# Patient Record
Sex: FEMALE | Race: WHITE | Employment: PART TIME | URBAN - METROPOLITAN AREA
[De-identification: names, ages, dates, MRNs, and addresses within clinical notes are randomized per-mention and may not be internally consistent; named-entity substitution may affect disease eponyms.]

---

## 2017-01-20 ENCOUNTER — HOSPITAL ENCOUNTER (OUTPATIENT)
Dept: GENERAL RADIOLOGY | Age: 77
Discharge: HOME | End: 2017-01-20
Payer: MEDICARE

## 2017-01-20 DIAGNOSIS — M19.90 ARTHRITIS: ICD-10-CM

## 2017-01-20 PROCEDURE — 73130 X-RAY EXAM OF HAND: CPT

## 2017-01-20 PROCEDURE — 71020 XR CHEST PA LAT: CPT

## 2017-02-16 RX ORDER — AZITHROMYCIN 250 MG/1
TABLET, FILM COATED ORAL
Qty: 6 TAB | Refills: 0 | Status: SHIPPED | OUTPATIENT
Start: 2017-02-16 | End: 2017-02-20 | Stop reason: SDUPTHER

## 2017-02-20 RX ORDER — PROMETHAZINE HYDROCHLORIDE AND DEXTROMETHORPHAN HYDROBROMIDE 6.25; 15 MG/5ML; MG/5ML
SYRUP ORAL
Qty: 180 ML | Refills: 0 | Status: SHIPPED | OUTPATIENT
Start: 2017-02-20 | End: 2017-06-02 | Stop reason: ALTCHOICE

## 2017-02-20 RX ORDER — AZITHROMYCIN 250 MG/1
TABLET, FILM COATED ORAL
Qty: 6 TAB | Refills: 0 | Status: SHIPPED | OUTPATIENT
Start: 2017-02-20 | End: 2017-06-02 | Stop reason: ALTCHOICE

## 2017-03-09 ENCOUNTER — OFFICE VISIT (OUTPATIENT)
Dept: FAMILY MEDICINE CLINIC | Age: 77
End: 2017-03-09

## 2017-03-09 VITALS
HEIGHT: 64 IN | DIASTOLIC BLOOD PRESSURE: 84 MMHG | BODY MASS INDEX: 27.14 KG/M2 | SYSTOLIC BLOOD PRESSURE: 138 MMHG | RESPIRATION RATE: 24 BRPM | OXYGEN SATURATION: 93 % | HEART RATE: 58 BPM | WEIGHT: 159 LBS | TEMPERATURE: 97.8 F

## 2017-03-09 DIAGNOSIS — J45.20 ASTHMATIC BRONCHITIS, MILD INTERMITTENT, UNCOMPLICATED: ICD-10-CM

## 2017-03-09 DIAGNOSIS — J30.9 ALLERGIC RHINITIS, CAUSE UNSPECIFIED: Primary | ICD-10-CM

## 2017-03-09 DIAGNOSIS — J32.0 MAXILLARY SINUSITIS, UNSPECIFIED CHRONICITY: ICD-10-CM

## 2017-03-09 RX ORDER — PREDNISONE 10 MG/1
10 TABLET ORAL 2 TIMES DAILY
Qty: 10 TAB | Refills: 0 | Status: SHIPPED | OUTPATIENT
Start: 2017-03-09 | End: 2017-06-02 | Stop reason: ALTCHOICE

## 2017-03-09 RX ORDER — DOXYCYCLINE 100 MG/1
100 CAPSULE ORAL 2 TIMES DAILY
Qty: 20 CAP | Refills: 0 | Status: SHIPPED | OUTPATIENT
Start: 2017-03-09 | End: 2017-03-19

## 2017-03-09 RX ORDER — PROMETHAZINE HYDROCHLORIDE AND CODEINE PHOSPHATE 6.25; 1 MG/5ML; MG/5ML
1 SOLUTION ORAL
Qty: 180 ML | Refills: 1 | Status: SHIPPED | OUTPATIENT
Start: 2017-03-09 | End: 2017-06-02 | Stop reason: ALTCHOICE

## 2017-03-09 NOTE — PROGRESS NOTES
HISTORY OF PRESENT ILLNESS  Amada Nageotte is a 67 y.o. female. sinusitis sx with wheeezy cough x 3 weeks. PMH COPD   Cold Symptoms   The history is provided by the patient. This is a chronic problem. The problem has not changed since onset. The cough is productive of purulent sputum. Associated symptoms include headaches, rhinorrhea, sore throat, shortness of breath and wheezing. Pertinent negatives include no chest pain. Cough   This is a chronic problem. The problem occurs hourly. The problem has not changed since onset. Associated symptoms include headaches and shortness of breath. Pertinent negatives include no chest pain. Review of Systems   Constitutional: Positive for malaise/fatigue. HENT: Positive for rhinorrhea and sore throat. Respiratory: Positive for cough, shortness of breath and wheezing. Cardiovascular: Negative for chest pain, palpitations and orthopnea. Gastrointestinal: Negative for heartburn. Neurological: Positive for headaches. Physical Exam   Constitutional: She appears well-developed and well-nourished. HENT:   Head: Normocephalic and atraumatic. Right Ear: Tympanic membrane and ear canal normal.   Left Ear: Tympanic membrane and ear canal normal.   Nose: Mucosal edema and rhinorrhea present. Mouth/Throat: Posterior oropharyngeal erythema present. Neck: Normal range of motion. Neck supple. Abdominal: Soft. Bowel sounds are normal.   Skin: Skin is warm and dry. ASSESSMENT and PLAN  Joe Humphrey was seen today for cold symptoms. Diagnoses and all orders for this visit:    Allergic rhinitis, cause unspecified  -     predniSONE (DELTASONE) 10 mg tablet; Take 1 Tab by mouth two (2) times a day. Asthmatic bronchitis, mild intermittent, uncomplicated    Maxillary sinusitis, unspecified chronicity  -     XR CHEST PA LAT; Future  -     doxycycline (VIBRAMYCIN) 100 mg capsule; Take 1 Cap by mouth two (2) times a day for 10 days.   -     predniSONE (Dana Dheeraj) 10 mg tablet; Take 1 Tab by mouth two (2) times a day. -     promethazine-codeine (PHENERGAN WITH CODEINE) 6.25-10 mg/5 mL syrup; Take 5 mL by mouth four (4) times daily as needed for Cough. Max Daily Amount: 20 mL. Follow-up Disposition:  Return if symptoms worsen or fail to improve.

## 2017-03-09 NOTE — PROGRESS NOTES
Chief Complaint   Patient presents with    Cold Symptoms     Pt has cough, runny nose, SOB, fever, congestion, ear fullness, bodyaches and HA.

## 2017-03-09 NOTE — MR AVS SNAPSHOT
Visit Information Date & Time Provider Department Dept. Phone Encounter #  
 3/9/2017 10:45 AM Anastasiya Martins [phone] [id number] Follow-up Instructions Return if symptoms worsen or fail to improve. Upcoming Health Maintenance Date Due DTaP/Tdap/Td series (1 - Tdap) 5/28/1960 ZOSTER VACCINE AGE 60> 5/28/1999 GLAUCOMA SCREENING Q2Y 5/28/2004 OSTEOPOROSIS SCREENING (DEXA) 5/28/2004 Pneumococcal 65+ Low/Medium Risk (1 of 2 - PCV13) 5/28/2004 MEDICARE YEARLY EXAM 9/26/2012 INFLUENZA AGE 9 TO ADULT 8/1/2016 Allergies as of 3/9/2017  Review Complete On: 3/9/2017 By: Jamar Gomez Severity Noted Reaction Type Reactions Penicillins  05/26/2010    Unable to Obtain  
 Sulfa (Sulfonamide Antibiotics)  05/26/2010    Unknown (comments) Current Immunizations  Reviewed on 9/27/2011 Name Date Influenza Vaccine 10/22/2013 Influenza Vaccine Split 9/27/2011 Not reviewed this visit You Were Diagnosed With   
  
 Codes Comments Allergic rhinitis, cause unspecified    -  Primary ICD-10-CM: J30.9 ICD-9-CM: 477.9 Asthmatic bronchitis, mild intermittent, uncomplicated     GOS-59-VQ: J45.20 ICD-9-CM: 493.90 Maxillary sinusitis, unspecified chronicity     ICD-10-CM: J32.0 ICD-9-CM: 473.0 Vitals BP Pulse Temp Resp Height(growth percentile) Weight(growth percentile) 138/84 (BP 1 Location: Left arm, BP Patient Position: Sitting) (!) 58 97.8 °F (36.6 °C) (Oral) 24 5' 4\" (1.626 m) 159 lb (72.1 kg) SpO2 BMI OB Status Smoking Status 93% 27.29 kg/m2 Hysterectomy Never Smoker Vitals History BMI and BSA Data Body Mass Index Body Surface Area  
 27.29 kg/m 2 1.8 m 2 Preferred Pharmacy Pharmacy Name Phone [facility] [address] , [address] [place] [address] [phone] Your Updated Medication List  
  
   
 This list is accurate as of: 3/9/17 11:16 AM.  Always use your most recent med list.  
  
  
  
  
 albuterol 90 mcg/actuation inhaler Commonly known as:  PROVENTIL HFA, VENTOLIN HFA, PROAIR HFA Take 1 Puff by inhalation every six (6) hours as needed for Wheezing. amLODIPine 5 mg tablet Commonly known as:  Pipe Tahira Take 5 mg by mouth daily. azithromycin 250 mg tablet Commonly known as:  Alireza Lauth TAKE TWO TABLETS BY MOUTH AS ONE DOSE ON THE FIRST DAY THEN TAKE ONE TABLET DAILY THEREAFTER  
  
 doxycycline 100 mg capsule Commonly known as:  VIBRAMYCIN Take 1 Cap by mouth two (2) times a day for 10 days. fluticasone 50 mcg/actuation nasal spray Commonly known as:  Kina Paez PLACE TWO SPRAYS IN EACH NOSTRIL ONCE DAILY  
  
 metaxalone 400 mg tablet Commonly known as:  SKELAXIN Take 1 Tab by mouth three (3) times daily as needed. * predniSONE 10 mg tablet Commonly known as:  Dana Dheeraj Take 1 Tab by mouth two (2) times a day. * predniSONE 10 mg tablet Commonly known as:  Dana Dheeraj Take 1 Tab by mouth two (2) times a day. promethazine-codeine 6.25-10 mg/5 mL syrup Commonly known as:  PHENERGAN with CODEINE Take 5 mL by mouth four (4) times daily as needed for Cough. Max Daily Amount: 20 mL. promethazine-dextromethorphan 6.25-15 mg/5 mL syrup Commonly known as:  PROMETHAZINE-DM  
TAKE ONE TEASPOONFUL (5ML)  BY MOUTH FOUR TIMES A DAY AS NEEDED  
  
 synthroid 50 mcg tablet Generic drug:  levothyroxine Take  by mouth daily (before breakfast). * Notice: This list has 2 medication(s) that are the same as other medications prescribed for you. Read the directions carefully, and ask your doctor or other care provider to review them with you. Prescriptions Printed Refills  
 promethazine-codeine (PHENERGAN WITH CODEINE) 6.25-10 mg/5 mL syrup 1 Sig: Take 5 mL by mouth four (4) times daily as needed for Cough.  Max Daily Amount: 20 mL. Class: Print Route: Oral  
  
Prescriptions Sent to Pharmacy Refills  
 doxycycline (VIBRAMYCIN) 100 mg capsule 0 Sig: Take 1 Cap by mouth two (2) times a day for 10 days. Class: Normal  
 Pharmacy: [address], [address] [place] [address] Ph #: [phone] Route: Oral  
 predniSONE (DELTASONE) 10 mg tablet 0 Sig: Take 1 Tab by mouth two (2) times a day. Class: Normal  
 Pharmacy: [address] Ph #: [phone] Route: Oral  
  
Follow-up Instructions Return if symptoms worsen or fail to improve. To-Do List   
 03/09/2017 Imaging:  XR CHEST PA LAT Introducing [facility]! Raegan Marquez introduces [email or web address] patient portal. Now you can access parts of your medical record, email your doctor's office, and request medication refills online. 1. In your internet browser, go to [email or web address] 2. Click on the First Time User? Click Here link in the Sign In box. You will see the New Member Sign Up page. 3. Enter your [email or web address] Access Code exactly as it appears below. You will not need to use this code after youve completed the sign-up process. If you do not sign up before the expiration date, you must request a new code. · [email or web address] Access Code: [email or web address] Expires: 4/20/2017 11:16 AM 
 
4. Enter the last four digits of your Social Security Number (xxxx) and Date of Birth (mm/dd/yyyy) as indicated and click Submit. You will be taken to the next sign-up page. 5. Create a Lyfepointst ID. This will be your [email or web address] login ID and cannot be changed, so think of one that is secure and easy to remember. 6. Create a [email or web address][email or web address] password. You can change your password at any time. 7. Enter your Password Reset Question and Answer. This can be used at a later time if you forget your password. 8. Enter your e-mail address.  You will receive e-mail notification when new information is available in HowAboutWe. 9. Click Sign Up. You can now view and download portions of your medical record. 10. Click the Download Summary menu link to download a portable copy of your medical information. If you have questions, please visit the Frequently Asked Questions section of the HowAboutWe website. Remember, HowAboutWe is NOT to be used for urgent needs. For medical emergencies, dial 911. Now available from your iPhone and Android! Please provide this summary of care documentation to your next provider. Your primary care clinician is listed as Maggi Mcgee. If you have any questions after today's visit, please call [phone].

## 2017-03-21 DIAGNOSIS — J44.1 COPD EXACERBATION (HCC): Primary | ICD-10-CM

## 2017-03-21 RX ORDER — PREDNISONE 5 MG/1
TABLET ORAL
Qty: 21 TAB | Refills: 0 | Status: SHIPPED | OUTPATIENT
Start: 2017-03-21 | End: 2017-06-02 | Stop reason: ALTCHOICE

## 2017-03-21 RX ORDER — LEVOFLOXACIN 500 MG/1
500 TABLET, FILM COATED ORAL DAILY
Qty: 10 TAB | Refills: 0 | Status: SHIPPED | OUTPATIENT
Start: 2017-03-21 | End: 2017-03-31

## 2017-03-21 RX ORDER — BUDESONIDE AND FORMOTEROL FUMARATE DIHYDRATE 160; 4.5 UG/1; UG/1
2 AEROSOL RESPIRATORY (INHALATION) 2 TIMES DAILY
Qty: 1 INHALER | Refills: 5 | Status: SHIPPED | OUTPATIENT
Start: 2017-03-21 | End: 2017-06-02 | Stop reason: ALTCHOICE

## 2017-06-02 ENCOUNTER — HOSPITAL ENCOUNTER (OUTPATIENT)
Dept: LAB | Age: 77
Discharge: HOME | End: 2017-06-02
Payer: MEDICARE

## 2017-06-02 ENCOUNTER — OFFICE VISIT (OUTPATIENT)
Dept: FAMILY MEDICINE CLINIC | Age: 77
End: 2017-06-02

## 2017-06-02 VITALS
HEIGHT: 64 IN | RESPIRATION RATE: 24 BRPM | SYSTOLIC BLOOD PRESSURE: 128 MMHG | TEMPERATURE: 98 F | BODY MASS INDEX: 27.66 KG/M2 | HEART RATE: 55 BPM | DIASTOLIC BLOOD PRESSURE: 80 MMHG | WEIGHT: 162 LBS | OXYGEN SATURATION: 97 %

## 2017-06-02 DIAGNOSIS — M54.50 LEFT-SIDED LOW BACK PAIN WITHOUT SCIATICA, UNSPECIFIED CHRONICITY: ICD-10-CM

## 2017-06-02 DIAGNOSIS — E78.2 MIXED HYPERLIPIDEMIA: ICD-10-CM

## 2017-06-02 DIAGNOSIS — E03.9 UNSPECIFIED HYPOTHYROIDISM: ICD-10-CM

## 2017-06-02 DIAGNOSIS — I10 ESSENTIAL HYPERTENSION, BENIGN: ICD-10-CM

## 2017-06-02 DIAGNOSIS — J45.20 MILD INTERMITTENT ASTHMA WITHOUT COMPLICATION: ICD-10-CM

## 2017-06-02 DIAGNOSIS — Z00.00 MEDICARE ANNUAL WELLNESS VISIT, SUBSEQUENT: Primary | ICD-10-CM

## 2017-06-02 DIAGNOSIS — M06.09 RHEUMATOID ARTHRITIS OF MULTIPLE SITES WITH NEGATIVE RHEUMATOID FACTOR (HCC): ICD-10-CM

## 2017-06-02 LAB
BILIRUB UR QL STRIP: NEGATIVE
GLUCOSE UR-MCNC: NEGATIVE MG/DL
KETONES P FAST UR STRIP-MCNC: NEGATIVE MG/DL
PH UR STRIP: 5.5 [PH] (ref 4.6–8)
PROT UR QL STRIP: NEGATIVE MG/DL
SP GR UR STRIP: 1.02 (ref 1–1.03)
UA UROBILINOGEN AMB POC: NORMAL (ref 0.2–1)
URINALYSIS CLARITY POC: NORMAL
URINALYSIS COLOR POC: YELLOW
URINE BLOOD POC: NEGATIVE
URINE LEUKOCYTES POC: NORMAL
URINE NITRITES POC: NEGATIVE

## 2017-06-02 PROCEDURE — 85025 COMPLETE CBC W/AUTO DIFF WBC: CPT

## 2017-06-02 PROCEDURE — 80053 COMPREHEN METABOLIC PANEL: CPT

## 2017-06-02 PROCEDURE — 36415 COLL VENOUS BLD VENIPUNCTURE: CPT

## 2017-06-02 PROCEDURE — 82465 ASSAY BLD/SERUM CHOLESTEROL: CPT

## 2017-06-02 RX ORDER — METAXALONE 400 MG/1
400 TABLET ORAL
Qty: 25 TAB | Refills: 1 | Status: SHIPPED | OUTPATIENT
Start: 2017-06-02 | End: 2017-08-25

## 2017-06-02 RX ORDER — PREDNISONE 10 MG/1
10 TABLET ORAL
Qty: 30 TAB | Refills: 1 | Status: SHIPPED | OUTPATIENT
Start: 2017-06-02 | End: 2017-08-25

## 2017-06-02 NOTE — MR AVS SNAPSHOT
Visit Information Date & Time Provider Department Dept. Phone Encounter #  
 6/2/2017  2:15 PM Zully Shaikh, [facility] [phone] [id number] Follow-up Instructions Return in about 4 weeks (around 6/30/2017). Upcoming Health Maintenance Date Due ZOSTER VACCINE AGE 60> 5/28/1999 GLAUCOMA SCREENING Q2Y 5/28/2004 OSTEOPOROSIS SCREENING (DEXA) 5/28/2004 Pneumococcal 65+ Low/Medium Risk (1 of 2 - PCV13) 5/28/2004 MEDICARE YEARLY EXAM 9/26/2012 INFLUENZA AGE 9 TO ADULT 8/1/2017 DTaP/Tdap/Td series (2 - Td) 6/2/2027 Allergies as of 6/2/2017  Review Complete On: 6/2/2017 By: Paty Hawley Severity Noted Reaction Type Reactions Penicillins  05/26/2010    Unable to Obtain  
 Sulfa (Sulfonamide Antibiotics)  05/26/2010    Unknown (comments) Current Immunizations  Reviewed on 9/27/2011 Name Date Influenza Vaccine 10/22/2013 Influenza Vaccine Split 9/27/2011 Not reviewed this visit You Were Diagnosed With   
  
 Codes Comments Medicare annual wellness visit, subsequent    -  Primary ICD-10-CM: Z00.00 ICD-9-CM: V70.0 Essential hypertension, benign     ICD-10-CM: I10 
ICD-9-CM: 401.1 Mixed hyperlipidemia     ICD-10-CM: E78.2 ICD-9-CM: 272.2 Rheumatoid arthritis of multiple sites with negative rheumatoid factor (HCC)     ICD-10-CM: M06.09 
ICD-9-CM: 714.0 Mild intermittent asthma without complication     [facility]-03-BR: J45.20 ICD-9-CM: 493.90 Unspecified hypothyroidism     ICD-10-CM: E03.9 ICD-9-CM: 244.9 Left-sided low back pain without sciatica, unspecified chronicity     ICD-10-CM: M54.5 ICD-9-CM: 724.2 Vitals BP Pulse Temp Resp Height(growth percentile) Weight(growth percentile) 128/80 (BP 1 Location: Left arm, BP Patient Position: Sitting) (!) 55 98 °F (36.7 °C) (Oral) 24 5' 4\" (1.626 m) 162 lb (73.5 kg) SpO2 BMI OB Status Smoking Status 97% 27.81 kg/m2 Hysterectomy Never Smoker Vitals History BMI and BSA Data Body Mass Index Body Surface Area  
 27.81 kg/m 2 1.82 m 2 Preferred Pharmacy Pharmacy Name Phone [facility] [address] [phone] Your Updated Medication List  
  
   
This list is accurate as of: 6/2/17  2:42 PM.  Always use your most recent med list.  
  
  
  
  
 albuterol 90 mcg/actuation inhaler Commonly known as:  PROVENTIL HFA, VENTOLIN HFA, PROAIR HFA Take 1 Puff by inhalation every six (6) hours as needed for Wheezing. amLODIPine 5 mg tablet Commonly known as:  Tali Medici Take 5 mg by mouth daily. DULERA 100-5 mcg/actuation HFA inhaler Generic drug:  mometasone-formoterol Take 2 Puffs by inhalation two (2) times a day. fluticasone 50 mcg/actuation nasal spray Commonly known as:  Vilma Earmele PLACE TWO SPRAYS IN EACH NOSTRIL ONCE DAILY  
  
 guaiFENesin-dextromethorphan -30 mg per tablet Commonly known as:  Edvin & Edvin DM Take 1 Tab by mouth two (2) times a day. metaxalone 400 mg tablet Commonly known as:  SKELAXIN Take 1 Tab by mouth three (3) times daily as needed. predniSONE 10 mg tablet Commonly known as:  Euel Salaam Take 1 Tab by mouth daily (with breakfast). synthroid 50 mcg tablet Generic drug:  levothyroxine Take  by mouth daily (before breakfast). Prescriptions Sent to Pharmacy Refills  
 metaxalone (SKELAXIN) 400 mg tablet 1 Sig: Take 1 Tab by mouth three (3) times daily as needed. Class: Normal  
 Pharmacy: [address] Ph #: [phone] Route: Oral  
 predniSONE (DELTASONE) 10 mg tablet 1 Sig: Take 1 Tab by mouth daily (with breakfast). Class: Normal  
 Pharmacy: [address] Ph #: [phone] Route: Oral  
  
We Performed the Following AMB POC URINALYSIS DIP STICK AUTO W/O MICRO [61076 CPT(R)] CBC WITH AUTOMATED DIFF [71598 CPT(R)] CHOLESTEROL, TOTAL [75808 CPT(R)] METABOLIC PANEL, COMPREHENSIVE [90208 CPT(R)] REFERRAL TO RHEUMATOLOGY [GCB99 Custom] Comments:  
 Please evaluate patient for RA Follow-up Instructions Return in about 4 weeks (around 6/30/2017). Referral Information Referral ID Referred By Referred To  
  
 [id number] Cinthia Lawrence MD   
   [facility], [address] [place] [address] Phone: [phone] Fax: [phone] Visits Status Start Date End Date 1 New Request 6/2/17 6/2/18 If your referral has a status of pending review or denied, additional information will be sent to support the outcome of this decision. Introducing [facility]! Lulu Sharma introduces [employer] patient portal. Now you can access parts of your medical record, email your doctor's office, and request medication refills online. 1. In your internet browser, go to [email or web address] [employer] 2. Click on the First Time User? Click Here link in the Sign In box. You will see the New Member Sign Up page. 3. Enter your [employer] Access Code exactly as it appears below. You will not need to use this code after youve completed the sign-up process. If you do not sign up before the expiration date, you must request a new code. · [employer] Access Code: [email or web address] Expires: 8/31/2017  2:42 PM 
 
4. Enter the last four digits of your Social Security Number (xxxx) and Date of Birth (mm/dd/yyyy) as indicated and click Submit. You will be taken to the next sign-up page. 5. Create a Milkt ID. This will be your [employer] login ID and cannot be changed, so think of one that is secure and easy to remember. 6. Create a Milkt password. You can change your password at any time. 7. Enter your Password Reset Question and Answer.  This can be used at a later time if you forget your password. 8. Enter your e-mail address. You will receive e-mail notification when new information is available in [address] 9. Click Sign Up. You can now view and download portions of your medical record. 10. Click the Download Summary menu link to download a portable copy of your medical information. If you have questions, please visit the Frequently Asked Questions section of the Peeky website. Remember, [employer] is NOT to be used for urgent needs. For medical emergencies, dial 911. Now available from your iPhone and Android! Please provide this summary of care documentation to your next provider. Your primary care clinician is listed as Bennie Brooks. If you have any questions after today's visit, please call [phone].

## 2017-06-02 NOTE — PROGRESS NOTES
This is a Subsequent Medicare Annual Wellness Visit providing Personalized Prevention Plan Services (PPPS) (Performed 12 months after initial AWV and PPPS )    I have reviewed the patient's medical history in detail and updated the computerized patient record. History     Past Medical History:   Diagnosis Date    Allergic rhinitis, cause unspecified 5/26/2010    Asthma 5/26/2010    Depression 5/26/2010    Encounter for long-term (current) use of other medications 7/11/2011    Essential hypertension, benign 5/26/2010    Hypertension     OA (osteoarthritis) 5/26/2010    Unspecified hypothyroidism 5/26/2010      Past Surgical History:   Procedure Laterality Date    HX HYSTERECTOMY       Current Outpatient Prescriptions   Medication Sig Dispense Refill    mometasone-formoterol (DULERA) 100-5 mcg/actuation HFA inhaler Take 2 Puffs by inhalation two (2) times a day.  fluticasone (FLONASE) 50 mcg/actuation nasal spray PLACE TWO SPRAYS IN EACH NOSTRIL ONCE DAILY 3 Bottle 3    albuterol (PROVENTIL HFA, VENTOLIN HFA) 90 mcg/actuation inhaler Take 1 Puff by inhalation every six (6) hours as needed for Wheezing. 1 Inhaler 5    levothyroxine (SYNTHROID) 50 mcg tablet Take  by mouth daily (before breakfast).  amlodipine (NORVASC) 5 mg tablet Take 5 mg by mouth daily.  guaiFENesin-dextromethorphan SR (MUCINEX DM) 600-30 mg per tablet Take 1 Tab by mouth two (2) times a day.  20 Tab 3     Allergies   Allergen Reactions    Penicillins Unable to Obtain    Sulfa (Sulfonamide Antibiotics) Unknown (comments)     Family History   Problem Relation Age of Onset    Heart Disease Mother     Heart Disease Father     Cancer Sister      Social History   Substance Use Topics    Smoking status: Never Smoker    Smokeless tobacco: Never Used    Alcohol use No     Patient Active Problem List   Diagnosis Code    Allergic rhinitis, cause unspecified J30.9    Asthma J45.909    Essential hypertension, benign I10  OA (osteoarthritis) M19.90    Depression F32.9    Unspecified hypothyroidism E03.9    Mixed hyperlipidemia E78.2    Encounter for long-term (current) use of other medications Z79.899    Rheumatoid arthritis of multiple sites with negative rheumatoid factor (HCC) M06.09       Depression Risk Factor Screening:   No flowsheet data found. Alcohol Risk Factor Screening: On any occasion during the past 3 months, have you had more than 3 drinks containing alcohol? No    Do you average more than 7 drinks per week? No      Functional Ability and Level of Safety:     Hearing Loss   mild    Activities of Daily Living   Self-care. Requires assistance with: no ADLs    Fall Risk     Fall Risk Assessment, last 12 mths 3/9/2017   Able to walk? Yes   Fall in past 12 months? No     Abuse Screen   Patient is not abused    Review of Systems   Constitutional: negative  Eyes: negative  Ears, nose, mouth, throat, and face: negative  Respiratory: negative  Cardiovascular: negative  Gastrointestinal: negative  Genitourinary:negative  Integument/breast: negative  Hematologic/lymphatic: negative  Musculoskeletal:positive for arthralgias and stiff joints    Physical Examination     Evaluation of Cognitive Function:  Mood/affect:  neutral  Appearance: age appropriate  Family member/caregiver input: 0      Visit Vitals    /80 (BP 1 Location: Left arm, BP Patient Position: Sitting)    Pulse (!) 55    Temp 98 °F (36.7 °C) (Oral)    Resp 24    Ht 5' 4\" (1.626 m)    Wt 162 lb (73.5 kg)    SpO2 97%    BMI 27.81 kg/m2     General:  Alert, cooperative, no distress, appears stated age. Head:  Normocephalic, without obvious abnormality, atraumatic. Eyes:  Conjunctivae/corneas clear. PERRL, EOMs intact. Fundi benign. Ears:  Normal TMs and external ear canals both ears. Nose: Nares normal. Septum midline. Mucosa normal. No drainage or sinus tenderness.    Throat: Lips, mucosa, and tongue normal. Teeth and gums normal. Neck: Supple, symmetrical, trachea midline, no adenopathy, thyroid: no enlargement/tenderness/nodules, no carotid bruit and no JVD. Lungs:   Clear to auscultation bilaterally. Heart:  Regular rate and rhythm, S1, S2 normal, no murmur, click, rub or gallop. Abdomen:   Soft, non-tender. Bowel sounds normal. No masses,  No organomegaly. Rectal:  Normal tone,  no masses or tenderness  Guaiac negative stool. Extremities: Extremities normal, atraumatic, no cyanosis or edema. ,synovitis bilateral wrists and mp joints             Lymph nodes: Cervical, supraclavicular, and axillary nodes normal.   Neurologic: CNII-XII intact. Normal strength, sensation and reflexes throughout. Patient Care Team:  Rashida Roberson MD as PCP - General    Advice/Referrals/Counseling   Education and counseling provided:  Are appropriate based on today's review and evaluation      Assessment/Plan   Bernadine Garner was seen today for well woman. Diagnoses and all orders for this visit:    Medicare annual wellness visit, subsequent    Essential hypertension, benign  -     METABOLIC PANEL, COMPREHENSIVE  -     CBC WITH AUTOMATED DIFF  -     AMB POC URINALYSIS DIP STICK AUTO W/O MICRO    Mixed hyperlipidemia  -     CHOLESTEROL, TOTAL    Rheumatoid arthritis of multiple sites with negative rheumatoid factor (HCC)  -     predniSONE (DELTASONE) 10 mg tablet; Take 1 Tab by mouth daily (with breakfast). -     REFERRAL TO RHEUMATOLOGY    Mild intermittent asthma without complication    Unspecified hypothyroidism    Left-sided low back pain without sciatica, unspecified chronicity  -     metaxalone (SKELAXIN) 400 mg tablet; Take 1 Tab by mouth three (3) times daily as needed. Follow-up Disposition:  Return in about 4 weeks (around 6/30/2017). Santino Moore

## 2017-06-03 LAB
ALBUMIN SERPL-MCNC: 4.3 G/DL (ref 3.5–4.8)
ALBUMIN/GLOB SERPL: 1.9 {RATIO} (ref 1.2–2.2)
ALP SERPL-CCNC: 117 IU/L (ref 39–117)
ALT SERPL-CCNC: 17 IU/L (ref 0–32)
AST SERPL-CCNC: 22 IU/L (ref 0–40)
BASOPHILS # BLD AUTO: 0 X10E3/UL (ref 0–0.2)
BASOPHILS NFR BLD AUTO: 0 %
BILIRUB SERPL-MCNC: 0.3 MG/DL (ref 0–1.2)
BUN SERPL-MCNC: 21 MG/DL (ref 8–27)
BUN/CREAT SERPL: 27 (ref 12–28)
CALCIUM SERPL-MCNC: 9.2 MG/DL (ref 8.7–10.3)
CHLORIDE SERPL-SCNC: 98 MMOL/L (ref 96–106)
CHOLEST SERPL-MCNC: 203 MG/DL (ref 100–199)
CO2 SERPL-SCNC: 24 MMOL/L (ref 18–29)
CREAT SERPL-MCNC: 0.77 MG/DL (ref 0.57–1)
EOSINOPHIL # BLD AUTO: 0.2 X10E3/UL (ref 0–0.4)
EOSINOPHIL NFR BLD AUTO: 3 %
ERYTHROCYTE [DISTWIDTH] IN BLOOD BY AUTOMATED COUNT: 13.7 % (ref 12.3–15.4)
GLOBULIN SER CALC-MCNC: 2.3 G/DL (ref 1.5–4.5)
GLUCOSE SERPL-MCNC: 84 MG/DL (ref 65–99)
HCT VFR BLD AUTO: 39.4 % (ref 34–46.6)
HGB BLD-MCNC: 12.9 G/DL (ref 11.1–15.9)
IMM GRANULOCYTES # BLD: 0 X10E3/UL (ref 0–0.1)
IMM GRANULOCYTES NFR BLD: 0 %
LYMPHOCYTES # BLD AUTO: 1.7 X10E3/UL (ref 0.7–3.1)
LYMPHOCYTES NFR BLD AUTO: 24 %
MCH RBC QN AUTO: 29.9 PG (ref 26.6–33)
MCHC RBC AUTO-ENTMCNC: 32.7 G/DL (ref 31.5–35.7)
MCV RBC AUTO: 91 FL (ref 79–97)
MONOCYTES # BLD AUTO: 0.6 X10E3/UL (ref 0.1–0.9)
MONOCYTES NFR BLD AUTO: 8 %
NEUTROPHILS # BLD AUTO: 4.4 X10E3/UL (ref 1.4–7)
NEUTROPHILS NFR BLD AUTO: 65 %
PLATELET # BLD AUTO: 271 X10E3/UL (ref 150–379)
POTASSIUM SERPL-SCNC: 4.5 MMOL/L (ref 3.5–5.2)
PROT SERPL-MCNC: 6.6 G/DL (ref 6–8.5)
RBC # BLD AUTO: 4.31 X10E6/UL (ref 3.77–5.28)
SODIUM SERPL-SCNC: 138 MMOL/L (ref 134–144)
WBC # BLD AUTO: 7 X10E3/UL (ref 3.4–10.8)

## 2017-08-22 ENCOUNTER — TELEPHONE (OUTPATIENT)
Dept: RHEUMATOLOGY | Age: 77
End: 2017-08-22

## 2017-08-24 ENCOUNTER — TELEPHONE (OUTPATIENT)
Dept: RHEUMATOLOGY | Age: 77
End: 2017-08-24

## 2017-08-24 NOTE — TELEPHONE ENCOUNTER
Patient confirmed NP appointment for 8/25/17. She will fill out her NP Paperwork prior to her visit to our office for an arrival time of 8:40am. She is not able to arrive a half hour prior to her visit patient stated.

## 2017-08-25 ENCOUNTER — OFFICE VISIT (OUTPATIENT)
Dept: RHEUMATOLOGY | Age: 77
End: 2017-08-25

## 2017-08-25 VITALS
BODY MASS INDEX: 27.31 KG/M2 | HEIGHT: 64 IN | TEMPERATURE: 97.5 F | RESPIRATION RATE: 18 BRPM | WEIGHT: 160 LBS | SYSTOLIC BLOOD PRESSURE: 166 MMHG | DIASTOLIC BLOOD PRESSURE: 77 MMHG | HEART RATE: 54 BPM

## 2017-08-25 DIAGNOSIS — M89.9 DISORDER OF BONE: ICD-10-CM

## 2017-08-25 DIAGNOSIS — N18.2 CKD (CHRONIC KIDNEY DISEASE) STAGE 2, GFR 60-89 ML/MIN: ICD-10-CM

## 2017-08-25 DIAGNOSIS — Z78.0 POST-MENOPAUSAL: ICD-10-CM

## 2017-08-25 DIAGNOSIS — M43.28 ANKYLOSIS OF SACROILIAC JOINT: ICD-10-CM

## 2017-08-25 DIAGNOSIS — M35.3 PMR (POLYMYALGIA RHEUMATICA) (HCC): ICD-10-CM

## 2017-08-25 DIAGNOSIS — M17.0 PRIMARY OSTEOARTHRITIS OF BOTH KNEES: ICD-10-CM

## 2017-08-25 DIAGNOSIS — M06.9 RHEUMATOID ARTHRITIS WITH UNKNOWN RHEUMATOID FACTOR STATUS (HCC): Primary | ICD-10-CM

## 2017-08-25 RX ORDER — LEFLUNOMIDE 20 MG/1
20 TABLET ORAL DAILY
Qty: 90 TAB | Refills: 0 | Status: SHIPPED | OUTPATIENT
Start: 2017-08-25 | End: 2017-09-11

## 2017-08-25 NOTE — PROGRESS NOTES
REASON FOR VISIT    This is the initial evaluation for Ms. Ginger Oro a 65 y.o.  female for question of an inflammatory arthritis. The patient is referred to the Arthritis and [address] [address] at the request of Dr. Jolene Jean. HISTORY OF PRESENT ILLNESS      I have reviewed and summarized old records from [address] [place] [id number], she was diagnosed with Rheumatoid Arthritis based on bilateral knee swelling which would require aspiration. She had a Baker's cyst. She was treated with Gold Injections but it was stopped after \"passing out\". In 7/15/2015, left hip radiograph showed no fracture, dislocation or other acute abnormality. There appears to be fusion of the left sacroiliac joint and possibly the right sacroiliac joint. Spurring is noted at the symphysis. Lumbar Spine showed the patient is leaning to the right. There is a 2 mm retrolisthesis of L1  relative to L2. Bilateral facet arthropathy is the dominant feature at the  lower 3 levels. Bilateral laminectomies have been performed at L5-S1. Vertebral body heights spaces are well-preserved. There is no fracture. In Fall 2016, she has felt something new developed. She lives alone and has 5 acres of land. She was using the weed eater and she felt all the bones in her back, hips, wrists, and thumbs began to have a sore pain. She also difficutly getting up from a seated position to outer hip pain and stiffness throughout her body. In 1/20/2017, Bilateral Hand radiographs: LEFT: No fracture or dislocation on plain film. There are scattered mild degenerative changes of the interphalangeal joints. There is moderate degeneration of the first [facility] joint and mild degeneration of the first MCP joint. Minimal degenerative changes of the third and fourth MCP joints. There is widening of the scapholunate interval compatible with chronic scapholunate ligament tear. No joint space erosion or periosteal reaction. Alignment is within normal limits. Bone mineralization is decreased. No soft tissue calcification. RIGHT: No fracture or dislocation on plain film. There are scattered mild degenerative changes in the interphalangeal joints and first MCP joint. There is narrowing of the lateral margin radiocarpal joint There is widening of the scapholunate interval compatible with chronic scapholunate ligament tear. No joint space erosion or periosteal reaction. Alignment is within normal limits. Bone mineralization is decreased. No soft tissue calcification. In 2/2017, she was given prednisone for an upper respiratory infection which helped her pain. In 3/09/2017, Chest radiograph showed normal heart size. There is no acute process in the lung fields. The osseous structures are unremarkable. In 6/02/2017, labs showed WBC 7.0, lymphocytes 1.7, Hct 39.4%, platelets 264,799, creatinine 0.77 mg/dL, eGFR 74, albumin 4.3 g/dL,  U/L, ALT 17 U/L, AST 22 U/L. Today, she reports morning stiffness lasting 2 hours and pain in her pelvic girdle and lower back. She feels the soreness is palpable. She has no shoulder girdle stiffness or pain. She has pain in her wrists if she flexes them fully. Her right ankle is a little swollen by the end of the day. She has not taken NSAIDs or Tylenol because she does not want to take medications . Her last DXA was more than 10 years ago.     Therapy History includes:    Current DMARD therapy includes: none  Prior DMARD therapy includes: Gold  The following DMARDs have been ineffective: none  The following DMARDs were stopped because of side effects: Gold (\"pass out\")    Osteoporosis Historical Synopsis    Height loss since age 26 (at least two inches): 0.5  Fracture history includes: yes (ankle)  Family history of hip fracture: no  Fall Risk: no    Daily calcium intake is 1200 mg  Daily vitamin D intake is 800 IU    Smoking history: no  Alcohol consumption: no  Prednisone history: no    Exercise: yes    Previous work-up for osteoporosis includes the following:  DEXA Scan: more than 10 years  Vitamin 25OH D level: None  PTH: None  TSH: None    Therapy History includes:    Current osteoporosis therapy includes: none  Prior osteoporosis therapy includes: none  The following osteoporosis therapy have been ineffective: none  The following osteoporosis therapy were stopped because of side effects: none    REVIEW OF SYSTEMS    A 15 point review of systems was performed and summarized below. The questionnaire was reviewed with the patient and scanned into the patient's medical record.     General: endorses recent 3lb weight gain, fatigue, weakness, denies recent weight loss, fever, night sweats  Musculoskeletal: endorses joint pain, joint swelling, morning stiffness (lasting 4 minutes), denies muscle weakness  Ears: endorses deafness, denies ringing in ears, loss of hearing  Eyes: endorses redness, dryness, denies pain, loss of vision, double vision, blurred vision, foreign body sensation  Mouth: denies sore tongue, oral ulcers, bleeding gums, loss of taste, dryness, increased dental caries  Nose: denies nosebleeds, loss of smell, nasal ulcers  Throat: endorses hoarseness, denies frequent sore throats, difficulty in swallowing, pain in jaw while chewing  Neck: denies swollen glands, tender glands  Cardiopulmonary: endorses sudden changes in heart beat, shortness of breath, denies pain in chest, irregular heart beat, difficulty breathing at night, swollen legs or feet, cough, coughing of blood, wheezing  Gastrointestinal: denies nausea, heartburn, stomach pain relieved by food, vomiting of blood/\"coffee grounds\", jaundice, increasing constipation, persistent diarrhea, blood in stools, black stools  Genitourinary: denies getting up at night to pass urine, difficult urination, pain or burning on urination, blood in urine, cloudy urine, pus in urine, genital discharge, frequent urination, vaginal dryness, rash/ulcers, sexual difficulties Hematologic: denies anemia, bleeding tendency, blood clots  Skin: endorses easy bruising, sun sensitive, denies redness, rash, hives, skin tightness, nodules/bumps, hair loss, color changes of hands or feet in the cold (Raynaud's)  Neurologic: endorses memory loss, denies headaches, dizziness, fainting of loss of consciousness, numbness or tingling in hands/feet, muscle weakness  Psychiatric: denies depression, excessive worries  Sleep: denies poor sleep (8-10 hours), snoring, daytime somnolence, difficulty falling asleep, difficulty staying asleep     PAST MEDICAL HISTORY    She has a past medical history of Allergic rhinitis, cause unspecified (2010); Asthma (2010); Depression (2010); Encounter for long-term (current) use of other medications (2011); Essential hypertension, benign (2010); Hypertension; OA (osteoarthritis) (2010); [facility] and Unspecified hypothyroidism (2010). FAMILY HISTORY    Her family history includes Cancer in her sister; Heart Disease in her father, mother, and sister; Stroke in her sister. SOCIAL HISTORY    She reports that she has never smoked. She has never used smokeless tobacco. She reports that she does not drink alcohol or use illicit drugs. GYNECOLOGIC HISTORY     1, Para 1, Living 1, Miscarriage 0    She denies severe pre-eclampsia, eclampsia or placental insufficiency    HEALTH MAINTENANCE    Immunizations  Immunization History   Administered Date(s) Administered    Influenza Vaccine 10/22/2013    Influenza Vaccine Split 2011       Age Appropriate Cancer Screening    Colonoscopy: 2015  PAP Smear: 2016  Mammogram: 2016    MEDICATIONS    Current Outpatient Prescriptions   Medication Sig Dispense Refill    leflunomide (ARAVA) 20 mg tablet Take 1 Tab by mouth daily for 90 days.  Take half tab daily for 7 days and then one tab if tolerated 90 Tab 0    mometasone-formoterol (DULERA) 100-5 mcg/actuation HFA inhaler Take 2 Puffs by inhalation two (2) times a day.  fluticasone (FLONASE) 50 mcg/actuation nasal spray PLACE TWO SPRAYS IN EACH NOSTRIL ONCE DAILY 3 Bottle 3    albuterol (PROVENTIL HFA, VENTOLIN HFA) 90 mcg/actuation inhaler Take 1 Puff by inhalation every six (6) hours as needed for Wheezing. 1 Inhaler 5    levothyroxine (SYNTHROID) 50 mcg tablet Take  by mouth daily (before breakfast).  amlodipine (NORVASC) 5 mg tablet Take 5 mg by mouth daily. ALLERGIES    Allergies   Allergen Reactions    Penicillins Unable to Obtain    Sulfa (Sulfonamide Antibiotics) Unknown (comments)       PHYSICAL EXAMINATION    Visit Vitals    /77 (BP 1 Location: Right arm, BP Patient Position: Sitting)    Pulse (!) 54    Temp 97.5 °F (36.4 °C)    Resp 18    Ht 5' 4\" (1.626 m)    Wt 160 lb (72.6 kg)    BMI 27.46 kg/m2     Body mass index is 27.46 kg/(m^2). General: Patient is alert, oriented x 3, not in acute distress    HEENT:   Conjunctiva are not injected and appear moist, oral mucous membranes are moist, there are no ulcers present, there is no alopecia, neck is supple, there is no lymphadenopathy. Salivary glands are normal    Cardiovascular:  Heart is regular rate and rhythm, no murmurs. Chest:  Lungs are clear to auscultation bilaterally. Abdomen:  Soft, non-tender    Extremities:  Free of clubbing, cyanosis, edema, extremities well perfused. Neurological exam:  No focal sensory deficits, muscle strength is full in upper and lower extremities. Skin exam:  There are no rashes, no tophi, no psoriasis, no active Raynaud's, no livedo reticularis, no periungual erythema. Musculoskeletal exam:  A comprehensive musculoskeletal exam was performed for all joints of each upper and lower extremity and assessed for swelling, tenderness and range of motion. Pertinent results are documented as below:    Bilateral Sharmila and Heberden nodes.   Bilateral knee crepitus without effusion. Right Ankle synovitis  Bilateral MTP  Sacroiliac tenderness  Normal spina flexion    Z-Deformities:   no  Hatch Neck Deformities:  no  Boutonierre's Deformities:  no  Ulnar Deviation:   no. MCP Subluxation:  no    Joint Count 8/25/2017   Patient pain (0-100) (No Data)   MHAQ 0.125   Left wrist- Tender 1   Left wrist- Swollen 1   Left 1st MCP - Swollen 1   Left 2nd MCP - Swollen 1   Left 3rd MCP - Tender 1   Left 3rd MCP - Swollen 1   Left 4th MCP - Tender 1   Left 2nd PIP - Tender 1   Left 2nd PIP - Swollen 1   Left 3rd PIP - Tender 1   Left 3rd PIP - Swollen 1   Right wrist- Tender 1   Right wrist- Swollen 1   Right 1st MCP - Swollen 1   Right 2nd MCP - Tender 1   Right 2nd MCP - Swollen 1   Right 3rd MCP - Swollen 1   Right 5th MCP - Swollen 1   Right 2nd PIP - Swollen 1   Right 3rd PIP - Swollen 1   Tender Joint Count (Total) 7   Swollen Joint Count (Total) 13   Physician Assessment (0-10) 4   Patient Assessment (0-10) (No Data)       DATA REVIEW    Prior medical records were reviewed and are summarized as below:    Laboratory data: summarized in the HPI    Imaging: summarized in the HPI. ASSESSMENT AND PLAN    1) Rheumatoid Arthritis. She reports a history of Rheumatoid Arthritis that was treated with gold, which she think led her to remission until 2016. She has developed sore pain and Polymyalgia Rheumatica-like symptoms involving her hip girdle. Her CDAI was N/A with 7 tender and 13 swollen joints. She has mild CKD stage 2, so I will avoid methotrexate. I discussed initiation with the DMARD Arava (leflunomide). I informed the patient the potential adverse effects, which may include: nausea, vomiting, dyspepsia, diarrhea, oral ulcers, infection, liver function abnormalities, blood count abnormalities, and rarely melanoma and non-melanoma skin cancer. The patient understood these possible adverse effects.  I informed the patient of the need for routine CBC and CMP as a measure for long term use of immunosuppressants. I also instructed the patient to avoid ill contacts and the needs for annual influenza vaccines and the pneumonia vaccines. In childbearing patients, pregnancy is contra-indicated on leflunomide due to risk for birth defects. I informed the patient that leflunomide may take 4 to 12 weeks to be effective. I prescribed the patient Arava (leflunomide) 20 mg, and instructed to start with half a tablet (10 mg) daily for 7 days and then increase to a full tablet (20 mg) if she has no side effects, such as diarrhea or upset stomach. The patient will follow up in 4 weeks for laboratory monitoring. I will check labs today and radiographs and have her follow up 4 in weeks. 2) Polymyalgia Rheumatica. She is secondary to #1. 3) Bilateral Knee Osteoarthritis. The patient has osteoarthritis, which is also known as \"wear and tear arthritis,\" non-inflammatory arthritis or mechanical arthritis. There are hereditary, vocational and posttraumatic joint injuries predisposing factors. I recommend maintaining a healthy weight to slow the progression of osteoarthritis in addition to following the [employer] of Rheumatology Osteoarthritis Treatment Guidelines: (1) non-pharmacologic modalities such as aerobic, aquatic, and/or resistance exercises as well as weight loss for overweight patients; in addition to (2) pharmacologic modalities such as acetaminophen as first line, oral and topical NSAIDs as second line, tramadol as third line and intra-articular corticosteroid injections as fourth line (Cynthia MC, et al. Arthritis Care Res [facility] ORTHOPEDIC). 2012;64(4):465). Naproxen is a low CARDONA-2 selectivity inhibitor that poses lower cardiovascular risk than other NSAIDs (Haleigh SORENSON, Penny GONZALEZ. Clinical Pharmacology and Cardiovascular Safety of Naproxen. Am J Cardiovasc Drugs. 2016 Nov 8).  NSAIDs should not be used in patients on blood thinners, chronic kidney disease, high risk coronary artery disease, and inflammatory bowel disease (ulcerative colitis or Crohn's disease) Joint replacement surgery if all previous fail, knowing that there is a 10 year lifespan per prosthesis. I recommend weight loss because every 1 lb of extra weight is approximately 5 lbs of extra weight on the knees. I asked the patient to count their daily caloric intake and try not to exceed 1826-4493 calories. I asked the patient to perform at least 30 minutes of physical exercise per day, such as walking, climbing stairs, or swimming. If they have access to a pool, I recommend walking in the pool for at least 30 minutes every day. Performing at least 8 weeks of yoga (two 60-minute classes and 1 home practice per week) was shown to help individuals with arthritis safely increase physical activity, and improve physical and psychological health [facility] et al. [facility] Rheumatol. 2015 Jul;42(7):1194-202). 4) Age-Related Osteoporosis Screening. I ordered a DXA scan. 5) Bilateral Sacroiliac Joint Ankylosis. She has normal spinal flexion, but has tenderness of her SI joints. 6) Chronic Kidney Disease Stage 2. Her creatinine was 0.77 mg/dL and eGFR 74. The patient voiced understanding of the aforementioned assessment and plan. Summary of plan was provided in the After Visit Summary patient instructions. I also provided education about [employer] setup and utility.     TODAY'S ORDERS    Orders Placed This Encounter    QUANTIFERON TB GOLD    DEXA BONE DENSITY STUDY AXIAL    XR FOOT RT MIN 3 V    XR FOOT LT MIN 3 V    XR SI JTS MIN 3 V    CYCLIC CITRUL PEPTIDE AB, IGG    CBC WITH AUTOMATED DIFF    CHRONIC HEPATITIS PANEL    METABOLIC PANEL, COMPREHENSIVE    C REACTIVE PROTEIN, QT    SED RATE (ESR)    HLA-B27    RHEUMATOID FACTOR, QL    PROTEIN ELECTROPHORESIS W/ REFLX JANINA    UA/M W/RFLX CULTURE, ROUTINE    VITAMIN D, 25 HYDROXY    PTH INTACT    TSH REFLEX TO T4    leflunomide (ARAVA) 20 mg tablet       Future Appointments  Date Time Provider Andrea Ga   9/12/2017 4:00 PM [facility] DEXA 1 VALERIA Villegas MD, [address] [facility]    Adult Rheumatology   Musculoskeletal Ultrasound Certified  [address] [place], [place] [address]ash[place]   [id number] [place] [address], [facility], [address]   Phone [phone]  Fax [phone]

## 2017-08-25 NOTE — PATIENT INSTRUCTIONS
Please call the Patient Care Scheduling Team [phone] to schedule your (BONE DENSITY DXA) test.      ARAVA (leflunomide)    I prescribed you Arava (leflunomide) 20 mg tablets. Please start with half a tablet (10 mg) daily for 7 days and if you have no side effects, such as diarrhea or upset stomach, please increase to one full tablet daily (20 mg). Potential Adverse Affects    - Nausea  - Vomiting  - Upset stomach  - Diarrhea  - Oral ulcers  - Hair thinning  - Infection  - Liver function abnormalities  - Blood count abnormalities    Medication Monitoring    You will need routine blood counts and kidney and liver testing as a measure of monitoring for long term use of immunosuppressants. Things You Should Do    You should avoid ill contacts     You should get annual influenza vaccines and the pneumonia vaccines. You should apply SPF 50 sunscreen when out in the sun. You should avoid alcohol as it may hasten hepatotoxicity. You should avoid pregnancy due to risk for birth defects. Leflunomide may take 4 to 12 weeks to be effective. If you have any problems or side effects with this medication, please call me immediately to inform me. KNEE OSTEOARTHRITIS. Osteoarthritis is also known as \"wear and tear arthritis,\" mechanical arthritis, or non-inflammatory arthritis. There are hereditary and vocational components and post-traumatic joint injuries may also predispose to it. It is not Rheumatoid Arthritis, however, patients with Rheumatoid Arthritis may also have osteoarthritis. I recommend maintaining a healthy weight to slow the progression of osteoarthritis in addition to following the [employer] of Rheumatology Osteoarthritis Treatment Guidelines (Cynthia MC, et al. Arthritis Care Res [facility] ORTHOPEDIC).  2012;64(4):465):     (1) non-pharmacologic modalities such as aerobic, aquatic, and/or resistance exercises as well as weight loss for overweight patients      (2) pharmacologic modalities, such as acetaminophen as first line (3000 mg maximum per day) and NSAIDs, such as naproxen (Aleve) two tablets of 220 mg twice daily with food, OR ibuprofen (Advil) two tablets of 200 mg three times daily, with food as second line therapy. Naproxen (Aleve) is associated with a lower cardiovascular risk than other NSAIDs (Haleigh SORENSON, Penny GONZALEZ. Clinical Pharmacology and Cardiovascular Safety of Naproxen. Am J Cardiovasc Drugs. 2016 Nov 8). NSAIDs should not be used in patients on blood thinners, chronic kidney disease, high risk coronary artery disease, and inflammatory bowel disease (ulcerative colitis or Crohn's disease)    (3) tramadol, as third line     (4) intra-articular corticosteroid or viscosupplements injections, as fourth line    (5) knee joint replacement surgery (about 10 years lifespan of prosthesis functionality)    The combination of acetaminophen and NSAIDs are safe together. Please do not combine NSAIDs together, such as Aleve, Advil and Mobic (meloxicam)      MY RECOMMENDATIONS    WEIGHT LOSS    1) I recommend weight loss because every 1 lb of extra weight is approximately 5 lbs of extra weight on the knees. 2) Please count your daily caloric intake and try not to exceed [phone] calories. EXERCISE    1) You should perform at least 30 minutes of physical exercise per day, such as walking, climbing stairs, or swimming. If you have access to a pool, I recommend walking in the pool for at least 30 minutes every day. AND/OR    2) Performing at least 8 weeks of yoga (two 60-minute classes and 1 home practice per week) was shown to help individuals with arthritis safely increase physical activity, and improve physical and psychological health [facility] brayden domingo. [facility] Rheumatol. 2015 Jul;42(7):1197-202).

## 2017-08-25 NOTE — MR AVS SNAPSHOT
Visit Information Date & Time Provider Department Dept. Phone Encounter #  
 8/25/2017  9:00 AM Navjot Schultz MD [address] [facility] [id number] Follow-up Instructions Return in about 4 weeks (around 9/22/2017). Upcoming Health Maintenance Date Due ZOSTER VACCINE AGE 60> 3/28/1999 GLAUCOMA SCREENING Q2Y 5/28/2004 OSTEOPOROSIS SCREENING (DEXA) 5/28/2004 Pneumococcal 65+ Low/Medium Risk (1 of 2 - PCV13) 5/28/2004 INFLUENZA AGE 9 TO ADULT 8/1/2017 MEDICARE YEARLY EXAM 6/3/2018 DTaP/Tdap/Td series (2 - Td) 6/2/2027 Allergies as of 8/25/2017  Review Complete On: 8/25/2017 By: Navjot Schultz MD  
  
 Severity Noted Reaction Type Reactions Penicillins  05/26/2010    Unable to Obtain  
 Sulfa (Sulfonamide Antibiotics)  05/26/2010    Unknown (comments) Current Immunizations  Reviewed on 9/27/2011 Name Date Influenza Vaccine 10/22/2013 Influenza Vaccine Split 9/27/2011 Not reviewed this visit You Were Diagnosed With   
  
 Codes Comments Rheumatoid arthritis with unknown rheumatoid factor status ([facility]    -  Primary ICD-10-CM: M06.9 ICD-9-CM: 714.0 Primary osteoarthritis of both knees     ICD-10-CM: M17.0 ICD-9-CM: 715.16 Post-menopausal     ICD-10-CM: Z78.0 ICD-9-CM: V49.81 Disorder of bone     ICD-10-CM: M89.9 ICD-9-CM: 733.90 Vitals BP Pulse Temp Resp Height(growth percentile) Weight(growth percentile) 166/77 (BP 1 Location: Right arm, BP Patient Position: Sitting) (!) 54 97.5 °F (36.4 °C) 18 5' 4\" (1.626 m) 160 lb (72.6 kg) BMI OB Status Smoking Status 27.46 kg/m2 Hysterectomy Never Smoker BMI and BSA Data Body Mass Index Body Surface Area  
 27.46 kg/m 2 1.81 m 2 Preferred Pharmacy Pharmacy Name Phone [facility] [address] , [address] [place] [address] [phone] Your Updated Medication List  
  
 This list is accurate as of: 8/25/17  9:56 AM.  Always use your most recent med list.  
  
  
  
  
 albuterol 90 mcg/actuation inhaler Commonly known as:  PROVENTIL HFA, VENTOLIN HFA, PROAIR HFA Take 1 Puff by inhalation every six (6) hours as needed for Wheezing. amLODIPine 5 mg tablet Commonly known as:  Marlo Richardsch Take 5 mg by mouth daily. DULERA 100-5 mcg/actuation HFA inhaler Generic drug:  mometasone-formoterol Take 2 Puffs by inhalation two (2) times a day. fluticasone 50 mcg/actuation nasal spray Commonly known as:  Lovetta End PLACE TWO SPRAYS IN EACH NOSTRIL ONCE DAILY  
  
 leflunomide 20 mg tablet Commonly known as:  Mendy Gabriella Take 1 Tab by mouth daily for 90 days. Take half tab daily for 7 days and then one tab if tolerated  
  
 synthroid 50 mcg tablet Generic drug:  levothyroxine Take  by mouth daily (before breakfast). Prescriptions Sent to Pharmacy Refills  
 leflunomide (ARAVA) 20 mg tablet 0 Sig: Take 1 Tab by mouth daily for 90 days. Take half tab daily for 7 days and then one tab if tolerated Class: Normal  
 Pharmacy: [facility] [address] [place] [address] #: [phone] Route: Oral  
  
We Performed the Following C REACTIVE PROTEIN, QT [24186 CPT(R)] CBC WITH AUTOMATED DIFF [22431 CPT(R)] CHRONIC HEPATITIS PANEL [PSQ7186 Custom] [address], IGG [id number] CPT(R)] HLA-B27 [id number] CPT(R)] METABOLIC PANEL, COMPREHENSIVE [87736 CPT(R)] PROTEIN ELECTROPHORESIS W/ REFLX JANINA [[id number] Custom] PTH INTACT [20214 CPT(R)] QUANTIFERON TB GOLD [[id number] Custom] RHEUMATOID FACTOR, QL [id number] CPT(R)] SED RATE (ESR) Q8669400 CPT(R)] TSH REFLEX TO T4 [[id number] Custom] UA/M W/RFLX CULTURE, ROUTINE [[id number] Custom] VITAMIN D, 25 HYDROXY [id number] CPT(R)] Follow-up Instructions Return in about 4 weeks (around 9/22/2017). To-Do List   
 08/25/2017 Imaging:  DEXA BONE DENSITY STUDY AXIAL   
  
 08/25/2017 Imaging:  XR FOOT LT MIN 3 V   
  
 08/25/2017 Imaging:  XR FOOT RT MIN 3 V Patient Instructions Please call the Patient Care Scheduling Team [phone] to schedule your (BONE DENSITY DXA) test. 
 
 
ARAVA (leflunomide) I prescribed you Arava (leflunomide) 20 mg tablets. Please start with half a tablet (10 mg) daily for 7 days and if you have no side effects, such as diarrhea or upset stomach, please increase to one full tablet daily (20 mg). Potential Adverse Affects 
 
- Nausea - Vomiting - Upset stomach 
- Diarrhea 
- Oral ulcers 
- Hair thinning - Infection - Liver function abnormalities - Blood count abnormalities Medication Monitoring You will need routine blood counts and kidney and liver testing as a measure of monitoring for long term use of immunosuppressants. Things You Should Do You should avoid ill contacts You should get annual influenza vaccines and the pneumonia vaccines. You should apply SPF 50 sunscreen when out in the sun. You should avoid alcohol as it may hasten hepatotoxicity. You should avoid pregnancy due to risk for birth defects. Leflunomide may take 4 to 12 weeks to be effective. If you have any problems or side effects with this medication, please call me immediately to inform me. KNEE OSTEOARTHRITIS. Osteoarthritis is also known as \"wear and tear arthritis,\" mechanical arthritis, or non-inflammatory arthritis. There are hereditary and vocational components and post-traumatic joint injuries may also predispose to it. It is not Rheumatoid Arthritis, however, patients with Rheumatoid Arthritis may also have osteoarthritis.   
 
I recommend maintaining a healthy weight to slow the progression of osteoarthritis in addition to following the [employer] of Rheumatology Osteoarthritis Treatment Guidelines (Mirtha Bates MC, et al. Arthritis Care Res [facility]). 2012;64(4):465):  
 
(1) non-pharmacologic modalities such as aerobic, aquatic, and/or resistance exercises as well as weight loss for overweight patients   
 
(2) pharmacologic modalities, such as acetaminophen as first line (3000 mg maximum per day) and NSAIDs, such as naproxen (Aleve) two tablets of 220 mg twice daily with food, OR ibuprofen (Advil) two tablets of 200 mg three times daily, with food as second line therapy. Naproxen (Aleve) is associated with a lower cardiovascular risk than other NSAIDs (Haleigh SORENSON, Penny GONZALEZ. Clinical Pharmacology and Cardiovascular Safety of Naproxen. Am J Cardiovasc Drugs. 2016 Nov 8). NSAIDs should not be used in patients on blood thinners, chronic kidney disease, high risk coronary artery disease, and inflammatory bowel disease (ulcerative colitis or Crohn's disease) 
 
(3) tramadol, as third line  
 
(4) intra-articular corticosteroid or viscosupplements injections, as fourth line 
 
(5) knee joint replacement surgery (about 10 years lifespan of prosthesis functionality) The combination of acetaminophen and NSAIDs are safe together. Please do not combine NSAIDs together, such as Aleve, Advil and Mobic (meloxicam) MY RECOMMENDATIONS 
 
WEIGHT LOSS 
 
1) I recommend weight loss because every 1 lb of extra weight is approximately 5 lbs of extra weight on the knees. 2) Please count your daily caloric intake and try not to exceed 9049-0143 calories. EXERCISE 
 
1) You should perform at least 30 minutes of physical exercise per day, such as walking, climbing stairs, or swimming. If you have access to a pool, I recommend walking in the pool for at least 30 minutes every day. AND/OR 2) Performing at least 8 weeks of yoga (two 60-minute classes and 1 home practice per week) was shown to help individuals with arthritis safely increase physical activity, and improve physical and psychological health [facility] SH et al. Minus Camel Rheumatol. 2015 Jul;42(0):1194-061). Introducing [facility]! [facility] introduces [employer] patient portal. Now you can access parts of your medical record, email your doctor's office, and request medication refills online. 1. In your internet browser, go to [email or web address] 2. Click on the First Time User? Click Here link in the Sign In box. You will see the New Member Sign Up page. 3. Enter your Hoodin Access Code exactly as it appears below. You will not need to use this code after youve completed the sign-up process. If you do not sign up before the expiration date, you must request a new code. · [employer]in Access Code: [email or web address] Expires: 8/31/2017  2:42 PM 
 
4. Enter the last four digits of your Social Security Number (xxxx) and Date of Birth (mm/dd/yyyy) as indicated and click Submit. You will be taken to the next sign-up page. 5. Create a Hoodin ID. This will be your Hoodin login ID and cannot be changed, so think of one that is secure and easy to remember. 6. Create a Hoodin password. You can change your password at any time. 7. Enter your Password Reset Question and Answer. This can be used at a later time if you forget your password. 8. Enter your e-mail address. You will receive e-mail notification when new information is available in [address]. 9. Click Sign Up. You can now view and download portions of your medical record. 10. Click the Download Summary menu link to download a portable copy of your medical information. If you have questions, please visit the Frequently Asked Questions section of the Hoodin website. Remember, [employer]in is NOT to be used for urgent needs. For medical emergencies, dial 911. Now available from your iPhone and Android! Please provide this summary of care documentation to your next provider. Your primary care clinician is listed as Gurdeep Alfaro.  If you have any questions after today's visit, please call [phone].

## 2017-08-27 PROBLEM — N18.2 CKD (CHRONIC KIDNEY DISEASE) STAGE 2, GFR 60-89 ML/MIN: Status: ACTIVE | Noted: 2017-08-27

## 2017-08-27 PROBLEM — M43.28: Status: ACTIVE | Noted: 2017-08-27

## 2017-08-27 PROBLEM — M35.3 PMR (POLYMYALGIA RHEUMATICA) (HCC): Status: ACTIVE | Noted: 2017-08-27

## 2017-08-28 LAB
APPEARANCE UR: CLEAR
BACTERIA #/AREA URNS HPF: NORMAL /[HPF]
BACTERIA UR CULT: NORMAL
BILIRUB UR QL STRIP: NEGATIVE
CASTS URNS QL MICRO: NORMAL /LPF
COLOR UR: YELLOW
EPI CELLS #/AREA URNS HPF: NORMAL /HPF
GLUCOSE UR QL: NEGATIVE
HGB UR QL STRIP: NEGATIVE
KETONES UR QL STRIP: NEGATIVE
LEUKOCYTE ESTERASE UR QL STRIP: ABNORMAL
MICRO URNS: ABNORMAL
MUCOUS THREADS URNS QL MICRO: PRESENT
NITRITE UR QL STRIP: NEGATIVE
PH UR STRIP: 6 [PH] (ref 5–7.5)
PROT UR QL STRIP: NEGATIVE
RBC #/AREA URNS HPF: NORMAL /HPF
SP GR UR: 1.01 (ref 1–1.03)
URINALYSIS REFLEX, 377202: ABNORMAL
UROBILINOGEN UR STRIP-MCNC: 0.2 MG/DL (ref 0.2–1)
WBC #/AREA URNS HPF: NORMAL /HPF

## 2017-08-30 LAB
ANNOTATION COMMENT IMP: NORMAL
GAMMA INTERFERON BACKGROUND BLD IA-ACNC: 0.08 IU/ML
M TB IFN-G BLD-IMP: NEGATIVE
M TB IFN-G CD4+ BCKGRND COR BLD-ACNC: 0.04 IU/ML
M TB IFN-G CD4+ T-CELLS BLD-ACNC: 0.12 IU/ML
MITOGEN IGNF BLD-ACNC: 7.44 IU/ML
QUANTIFERON INCUBATION: NORMAL
SERVICE CMNT-IMP: NORMAL

## 2017-09-01 LAB
25(OH)D3+25(OH)D2 SERPL-MCNC: 50 NG/ML (ref 30–100)
ALBUMIN SERPL ELPH-MCNC: 4.1 G/DL (ref 2.9–4.4)
ALBUMIN SERPL-MCNC: 4.4 G/DL (ref 3.5–4.8)
ALBUMIN/GLOB SERPL: 1.3 {RATIO} (ref 0.7–1.7)
ALBUMIN/GLOB SERPL: 1.6 {RATIO} (ref 1.2–2.2)
ALP SERPL-CCNC: 120 IU/L (ref 39–117)
ALPHA1 GLOB SERPL ELPH-MCNC: 0.2 G/DL (ref 0–0.4)
ALPHA2 GLOB SERPL ELPH-MCNC: 0.7 G/DL (ref 0.4–1)
ALT SERPL-CCNC: 16 IU/L (ref 0–32)
AST SERPL-CCNC: 28 IU/L (ref 0–40)
B-GLOBULIN SERPL ELPH-MCNC: 1.2 G/DL (ref 0.7–1.3)
BASOPHILS # BLD AUTO: 0 X10E3/UL (ref 0–0.2)
BASOPHILS NFR BLD AUTO: 0 %
BILIRUB SERPL-MCNC: 0.4 MG/DL (ref 0–1.2)
BUN SERPL-MCNC: 18 MG/DL (ref 8–27)
BUN/CREAT SERPL: 18 (ref 12–28)
CALCIUM SERPL-MCNC: 9.6 MG/DL (ref 8.7–10.3)
CCP IGA+IGG SERPL IA-ACNC: 6 UNITS (ref 0–19)
CHLORIDE SERPL-SCNC: 102 MMOL/L (ref 96–106)
CO2 SERPL-SCNC: 23 MMOL/L (ref 18–29)
COMMENT, 144067: NORMAL
CREAT SERPL-MCNC: 0.98 MG/DL (ref 0.57–1)
CRP SERPL-MCNC: 9.1 MG/L (ref 0–4.9)
EOSINOPHIL # BLD AUTO: 0.2 X10E3/UL (ref 0–0.4)
EOSINOPHIL NFR BLD AUTO: 3 %
ERYTHROCYTE [DISTWIDTH] IN BLOOD BY AUTOMATED COUNT: 14.2 % (ref 12.3–15.4)
ERYTHROCYTE [SEDIMENTATION RATE] IN BLOOD BY WESTERGREN METHOD: 5 MM/HR (ref 0–40)
GAMMA GLOB SERPL ELPH-MCNC: 0.9 G/DL (ref 0.4–1.8)
GLOBULIN SER CALC-MCNC: 2.8 G/DL (ref 1.5–4.5)
GLOBULIN SER CALC-MCNC: 3.1 G/DL (ref 2.2–3.9)
GLUCOSE SERPL-MCNC: 84 MG/DL (ref 65–99)
HBV CORE AB SERPL QL IA: NEGATIVE
HBV CORE IGM SERPL QL IA: NEGATIVE
HBV E AB SERPL QL IA: NEGATIVE
HBV E AG SERPL QL IA: NEGATIVE
HBV SURFACE AB SER QL: NON REACTIVE
HBV SURFACE AG SERPL QL IA: NEGATIVE
HCT VFR BLD AUTO: 42.1 % (ref 34–46.6)
HCV AB S/CO SERPL IA: <0.1 S/CO RATIO (ref 0–0.9)
HGB BLD-MCNC: 13.9 G/DL (ref 11.1–15.9)
HLA-B27 QL NAA+PROBE: POSITIVE
IMM GRANULOCYTES # BLD: 0 X10E3/UL (ref 0–0.1)
IMM GRANULOCYTES NFR BLD: 0 %
LYMPHOCYTES # BLD AUTO: 1.5 X10E3/UL (ref 0.7–3.1)
LYMPHOCYTES NFR BLD AUTO: 24 %
M PROTEIN SERPL ELPH-MCNC: NORMAL G/DL
MCH RBC QN AUTO: 30.3 PG (ref 26.6–33)
MCHC RBC AUTO-ENTMCNC: 33 G/DL (ref 31.5–35.7)
MCV RBC AUTO: 92 FL (ref 79–97)
MONOCYTES # BLD AUTO: 0.5 X10E3/UL (ref 0.1–0.9)
MONOCYTES NFR BLD AUTO: 9 %
NEUTROPHILS # BLD AUTO: 4 X10E3/UL (ref 1.4–7)
NEUTROPHILS NFR BLD AUTO: 64 %
PLATELET # BLD AUTO: 287 X10E3/UL (ref 150–379)
PLEASE NOTE, 011150: NORMAL
POTASSIUM SERPL-SCNC: 4.7 MMOL/L (ref 3.5–5.2)
PROT PATTERN SERPL ELPH-IMP: NORMAL
PROT SERPL-MCNC: 7.2 G/DL (ref 6–8.5)
PTH-INTACT SERPL-MCNC: 37 PG/ML (ref 15–65)
RBC # BLD AUTO: 4.58 X10E6/UL (ref 3.77–5.28)
RHEUMATOID FACT SERPL-ACNC: <10 IU/ML (ref 0–13.9)
SODIUM SERPL-SCNC: 141 MMOL/L (ref 134–144)
TSH SERPL DL<=0.005 MIU/L-ACNC: 3.33 UIU/ML (ref 0.45–4.5)
WBC # BLD AUTO: 6.2 X10E3/UL (ref 3.4–10.8)

## 2017-09-05 ENCOUNTER — TELEPHONE (OUTPATIENT)
Dept: RHEUMATOLOGY | Age: 77
End: 2017-09-05

## 2017-09-05 NOTE — TELEPHONE ENCOUNTER
Pt called stating that she was getting some swelling in her lips from the Leflunomide. She stated that she started taking it last week ans was only taking half a tab a day and she noticed her lips starting to swell and she stated that she was up going to the bathroom (urinating) about 5 times a night and that was not normal for her. She stated that she stopped the medication on Sunday and has not taken it since then. She stated that her lips are no longer swollen. She wants to know what she needs to do next?

## 2017-09-05 NOTE — TELEPHONE ENCOUNTER
Please call patient regarding having a reaction to new medication puffy lips.  She thinks the medication is arava  [phone] please call after 3pm

## 2017-09-06 ENCOUNTER — TELEPHONE (OUTPATIENT)
Dept: RHEUMATOLOGY | Age: 77
End: 2017-09-06

## 2017-09-06 NOTE — PROGRESS NOTES
The results were reviewed and a letter was sent. Inflammatory marker (CRP and ALK) elevated. Positive HLA-B27 marker. Chronic Kidney Diseae stage 3 - a little worse than before - avoid NSAIDs. All remaining labs are normal/negative.

## 2017-09-06 NOTE — TELEPHONE ENCOUNTER
Spoke with pt and let her know that Dr. Celeste Goetz wants her to stop the Leflunomide and to come in to discuss changing her therapy to a biologic. We looked at the schedule and the month of Sept is fully booked at this time and she has company coming in for the first 2 weeks in October. She stated that she just wanted t keep her appt on Oct 26th and I told her if she starts feeling bad to call us and see if we have any cancellations. She stated an understanding.

## 2017-09-07 ENCOUNTER — TELEPHONE (OUTPATIENT)
Dept: RHEUMATOLOGY | Age: 77
End: 2017-09-07

## 2017-09-07 NOTE — TELEPHONE ENCOUNTER
Spoke with pt and let her know that she can come and  the Humira as we have received some samples in the office today. She stated an understanding.

## 2017-09-11 ENCOUNTER — OFFICE VISIT (OUTPATIENT)
Dept: RHEUMATOLOGY | Age: 77
End: 2017-09-11

## 2017-09-11 VITALS
BODY MASS INDEX: 26.98 KG/M2 | HEART RATE: 54 BPM | TEMPERATURE: 97.8 F | DIASTOLIC BLOOD PRESSURE: 79 MMHG | HEIGHT: 64 IN | RESPIRATION RATE: 18 BRPM | SYSTOLIC BLOOD PRESSURE: 159 MMHG | OXYGEN SATURATION: 96 % | WEIGHT: 158 LBS

## 2017-09-11 DIAGNOSIS — M43.28 ANKYLOSIS, SACROILIAC JOINT: ICD-10-CM

## 2017-09-11 DIAGNOSIS — M45.8 ANKYLOSING SPONDYLITIS OF SACRAL REGION (HCC): Primary | ICD-10-CM

## 2017-09-11 DIAGNOSIS — M17.0 PRIMARY OSTEOARTHRITIS OF BOTH KNEES: ICD-10-CM

## 2017-09-11 DIAGNOSIS — M85.89 OSTEOPENIA OF MULTIPLE SITES: ICD-10-CM

## 2017-09-11 DIAGNOSIS — N18.30 CKD (CHRONIC KIDNEY DISEASE) STAGE 3, GFR 30-59 ML/MIN (HCC): ICD-10-CM

## 2017-09-11 DIAGNOSIS — M35.3 PMR (POLYMYALGIA RHEUMATICA) (HCC): ICD-10-CM

## 2017-09-11 PROBLEM — M06.09 RHEUMATOID ARTHRITIS OF MULTIPLE SITES WITH NEGATIVE RHEUMATOID FACTOR (HCC): Status: RESOLVED | Noted: 2017-06-02 | Resolved: 2017-09-11

## 2017-09-11 NOTE — PATIENT INSTRUCTIONS
I will try you on Humira    Please call the Patient Care Scheduling Team [phone] to schedule your (MRI) test    Adalimumab (By injection)   Adalimumab (l-is-WWF-[id number]-mab)  Treats arthritis, plaque psoriasis, ankylosing spondylitis, Crohn disease, ulcerative colitis, hidradenitis suppurativa, and uveitis. Brand Name(s): Humira   There may be other brand names for this medicine. When This Medicine Should Not Be Used: This medicine is not right for everyone. Do not use it if you had an allergic reaction to adalimumab. How to Use This Medicine:   Injectable  · Your doctor will prescribe your exact dose and tell you how often it should be given. This medicine is given as a shot under your skin. · A nurse or other health provider will give you this medicine. · You may be taught how to give your medicine at home. Make sure you understand all instructions before giving yourself an injection. Do not use more medicine or use it more often than your doctor tells you to. · You will be shown the body areas where this shot can be given. Use a different body area each time you give yourself a shot. Keep track of where you give each shot to make sure you rotate body areas. Do not inject into skin areas that are red, bruised, tender, or hard. If you have psoriasis, do not inject into a raised, thick, red, or scaly skin patch or into skin lesions. · This medicine should come with a Medication Guide. Ask your pharmacist for a copy if you do not have one. · Missed dose: Take a dose as soon as you remember. If it is almost time for your next dose, wait until then and take a regular dose. Do not take extra medicine to make up for a missed dose. · If you store this medicine at home, keep it in the refrigerator. Do not freeze. Protect the medicine from light. Keep your medicine and supplies in the original packages until you are ready to use them.   Drugs and Foods to Avoid:   Ask your doctor or pharmacist before using any other medicine, including over-the-counter medicines, vitamins, and herbal products. · Some foods and medicines can affect how adalimumab works. Tell your doctor if you are using any of the following:   ¨ Abatacept, anakinra, azathioprine, cyclosporine, mercaptopurine, rituximab, theophylline  ¨ A blood thinner (including warfarin)  ¨ Medicine that weakens the immune system (including a steroid or cancer medicine)  · This medicine may interfere with vaccines. Ask your doctor before you get a flu shot or any other vaccines. Warnings While Using This Medicine:   · Tell your doctor if you are pregnant or breastfeeding, or if you have liver disease, a history of cancer, COPD, heart failure, diabetes, psoriasis, multiple sclerosis, optic neuritis, problems with your immune system, or a history of Guillain-Barré syndrome. Tell your doctor if you have any type of infection (such as hepatitis B or tuberculosis) or an infection that keeps coming back. · This medicine may cause the following problems:   ¨ Increased risk for infection  ¨ Increased risk of certain cancers, such as lymphoma or leukemia  ¨ New or worsening heart failure  · Tell your doctor if you have a latex allergy. The needle cover of the syringe contains latex and may cause allergic reactions. · You will need to have a skin test for tuberculosis (TB) before you start this medicine. Tell your doctor if you or anyone in your home has ever had a positive TB skin test or been exposed to TB. · This medicine may make you bleed, bruise, or get infections more easily. Take precautions to prevent illness and injury. Wash your hands often. · Your doctor will do lab tests at regular visits to check on the effects of this medicine. Keep all appointments. · Throw away used needles in a hard, closed container that the needles cannot poke through. Keep this container away from children and pets. · Keep all medicine out of the reach of children.  Never share your medicine with anyone. Possible Side Effects While Using This Medicine:   Call your doctor right away if you notice any of these side effects:  · Allergic reaction: Itching or hives, swelling in your face or hands, swelling or tingling in your mouth or throat, chest tightness, trouble breathing  · Blistering, peeling, red skin rash, or red, scaly patches on the skin  · Change in how much or how often you urinate, painful urination  · Changes in vision  · Chest pain, uneven heartbeat, trouble breathing  · Cough, fever, chills, runny or stuffy nose, sore throat, and body aches  · Dark urine or pale stools, nausea, vomiting, loss of appetite, stomach pain, yellow skin or eyes  · Numbness, tingling, or burning pain in your hands, arms, legs, or feet, or joint pain  · Rapid weight gain, swelling in your hands, ankles, lower legs, or feet  · Sores or white patches on your lips, mouth, or throat  · Swollen glands in your neck, underarms, or groin  · Unusual bleeding, bruising, tiredness, weakness, or weight loss  If you notice these less serious side effects, talk with your doctor:   · Back pain  · Headache  · Redness, itching, bruising, bleeding, pain, or swelling where the shot was given  If you notice other side effects that you think are caused by this medicine, tell your doctor. Call your doctor for medical advice about side effects. You may report side effects to FDA at 0-952-LUN-6913  © 2017 [facility] Information is for End User's use only and may not be sold, redistributed or otherwise used for commercial purposes. The above information is an  only. It is not intended as medical advice for individual conditions or treatments. Talk to your doctor, nurse or pharmacist before following any medical regimen to see if it is safe and effective for you.

## 2017-09-11 NOTE — PROGRESS NOTES
REASON FOR VISIT    This is a follow-up visit for Ms. [facility] for Ankylosing Spondylitis. Spondyloarthritis phenotype includes:  Anti-CCP positive: no  Rheumatoid factor positive: no  HLA-B27: yes  Erosive disease: no  Sacroiliitis: yes  Ankylosis: yes (left SI)  Psoriasis: no  Enthesitis: no  Dactylitis: no  Nail Pitting: no  Onycholysis: no  Extra-articular manifestations include: none  SAPHO: no    Immunosuppression Screening (8/25/2017):   Quantiferon TB: negative  PPD:  Not performed  Hepatitis B: negative  Hepatitis C: negative    Therapy History includes:  Current NSAIDs include: none (contraindicated due to CKD stage 3)  Current DMARD therapy include: None  Prior DMARD therapy includes: Gold, leflunomide  The following DMARDs have been ineffective: none  The following DMARDs were stopped because of side effects: Gold (\"pass out\"), leflunomide  Contra-Indicated DMARDs because of CKD stage 3: methotrexate     Osteoporosis Historical Synopsis     Height loss since age 26 (at least two inches): 0.5  Fracture history includes: yes (ankle)  Family history of hip fracture: no  Fall Risk: no     Daily calcium intake is 1200 mg  Daily vitamin D intake is 800 IU     Smoking history: no  Alcohol consumption: no  Prednisone history: no     Exercise: yes     Previous work-up for osteoporosis includes the following:  DEXA Scan: 9/12/2017  Vitamin 25OH D level: 50.0 (8/25/2017)  PTH: 37 (8/25/2017)  TSH: 3.330 (8/25/2017)     Therapy History includes:     Current osteoporosis therapy includes: none  Prior osteoporosis therapy includes: none  The following osteoporosis therapy have been ineffective: none  The following osteoporosis therapy were stopped because of side effects: none    Active problems include:    Patient Active Problem List   Diagnosis Code    Allergic rhinitis, cause unspecified J30.9    Asthma J45.909    Essential hypertension, benign I10    OA (osteoarthritis) M19.90    Depression F32.9    Unspecified hypothyroidism E03.9    Mixed hyperlipidemia E78.2    Encounter for long-term (current) use of other medications Z79.899    Rheumatoid arthritis of multiple sites with negative rheumatoid factor ([facility]) M06.09    Primary osteoarthritis of both knees M17.0    Ankylosis, 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    PMR (polymyalgia rheumatica) ([facility]) M35.3    CKD (chronic kidney disease) stage 2, GFR 60-89 ml/min N18.2       HISTORY OF PRESENT ILLNESS    Ms. Kristina Gonzalez returns for a follow-up visit. On her last visit, I started her on leflunomide but she had puffy lips, so it was stopped. Radiograghs showed left sacroiliitis with possible early right. Labs showed positive HLA-B27. Today, she complains lower back pain and stiffness lasting hours. She also reports intermittent flu-like symptoms. Last toxicity monitoring by blood work was done on 8/25/2017 and did not reveal any significant adverse effects, except creatinine 0.98 mg/dL, eGFR 55,  U/L. Most recent inflammatory markers from 8/25/2017 revealed a ESR 5 mm/hr (previously N/A mm/hr) and CRP 9.1 mg/L (previously N/A mg/L). The patient has not had any interval hospital admissions, infections, or surgeries. REVIEW OF SYSTEMS    A comprehensive review of systems was performed and pertinent results are documented in the HPI, review of systems is otherwise non-contributory. PAST MEDICAL HISTORY    She has a past medical history of Allergic rhinitis, cause unspecified (5/26/2010); Asthma (5/26/2010); Depression (5/26/2010); Encounter for long-term (current) use of other medications (7/11/2011); Essential hypertension, benign (5/26/2010); Hypertension; OA (osteoarthritis) (5/26/2010); Rheumatoid arthritis ([facility] [address] and Unspecified hypothyroidism (5/26/2010). FAMILY HISTORY    Her family history includes Cancer in her sister; Heart Disease in her father, mother, and sister; Stroke in her sister.     SOCIAL HISTORY    She reports that she has never smoked. She has never used smokeless tobacco. She reports that she does not drink alcohol or use illicit drugs. IMMUNIZATIONS  Immunization History   Administered Date(s) Administered    Influenza Vaccine 10/22/2013    Influenza Vaccine Split 09/27/2011       MEDICATIONS    Current Outpatient Prescriptions   Medication Sig Dispense Refill    FEXOFENADINE HCL (ALLEGRA PO) Take  by mouth.  adalimumab (HUMIRA PEN) 40 mg/0.8 mL injection pen 0.8 mL by SubCUTAneous route every fourteen (14) days for 90 days. 2 Kit 3    mometasone-formoterol (DULERA) 100-5 mcg/actuation HFA inhaler Take 2 Puffs by inhalation two (2) times a day.  fluticasone (FLONASE) 50 mcg/actuation nasal spray PLACE TWO SPRAYS IN EACH NOSTRIL ONCE DAILY 3 Bottle 3    albuterol (PROVENTIL HFA, VENTOLIN HFA) 90 mcg/actuation inhaler Take 1 Puff by inhalation every six (6) hours as needed for Wheezing. 1 Inhaler 5    levothyroxine (SYNTHROID) 50 mcg tablet Take  by mouth daily (before breakfast).  amlodipine (NORVASC) 5 mg tablet Take 5 mg by mouth daily. ALLERGIES    Allergies   Allergen Reactions    Leflunomide Swelling    Penicillins Unable to Obtain    Sulfa (Sulfonamide Antibiotics) Unknown (comments)       PHYSICAL EXAMINATION    Visit Vitals    /79 (BP 1 Location: Right arm, BP Patient Position: Sitting)    Pulse (!) 54    Temp 97.8 °F (36.6 °C) (Oral)    Resp 18    Ht 5' 4\" (1.626 m)    Wt 158 lb (71.7 kg)    SpO2 96%    BMI 27.12 kg/m2     Body mass index is 27.12 kg/(m^2). General: Patient is alert, oriented x 3, not in acute distress    HEENT:   Sclerae are not injected and appear moist.  Oral mucous membranes are moist, there are no ulcers present. There is no alopecia. Cardiovascular:  Heart is regular rate and rhythm, no murmurs. Chest:  Lungs are clear to auscultation bilaterally. No rhonchi, wheezes, or crackles.     Extremities:  Free of clubbing, cyanosis, edema    Neurological exam:  No focal sensory deficits, muscle strength is full in upper and lower extremities     Skin:    Psoriasis:     no  Nail Pitting:     no  Onycholysis:     no  Palmoplantar pustulosis:   no  Acne fulminans:    no  Acne conglobata:    no  Hidradenitis Suppurativa:   no  Dissecting cellulitis of the scalp:  no  Pilonidal sinus:    no  Erythema nodosum:    no    Musculoskeletal:  A comprehensive musculoskeletal exam was performed for all joints of each upper and lower extremity and assessed for swelling, tenderness and range of motion. No change from previous visit    Bilateral Sharmila and Heberden nodes. Bilateral knee crepitus without effusion.   Right Ankle synovitis  Bilateral MTP  Sacroiliac tenderness  Normal spina flexion    Costochondritis:  no   Synovitis:   Yes (see below table)  Dactylitis:   no  Enthesitis:   no    Joint Count 8/25/2017   Patient pain (0-100) (No Data)   MHAQ 0.125   Left wrist- Tender 1   Left wrist- Swollen 1   Left 1st MCP - Swollen 1   Left 2nd MCP - Swollen 1   Left 3rd MCP - Tender 1   Left 3rd MCP - Swollen 1   Left 4th MCP - Tender 1   Left 2nd PIP - Tender 1   Left 2nd PIP - Swollen 1   Left 3rd PIP - Tender 1   Left 3rd PIP - Swollen 1   Right wrist- Tender 1   Right wrist- Swollen 1   Right 1st MCP - Swollen 1   Right 2nd MCP - Tender 1   Right 2nd MCP - Swollen 1   Right 3rd MCP - Swollen 1   Right 5th MCP - Swollen 1   Right 2nd PIP - Swollen 1   Right 3rd PIP - Swollen 1   Tender Joint Count (Total) 7   Swollen Joint Count (Total) 13   Physician Assessment (0-10) 4   Patient Assessment (0-10) (No Data)       DATA REVIEW    Laboratory    The following laboratory results were reviewed and discussed with the patient:    Office Visit on 08/25/2017   Component Date Value    CCP Antibodies IgG/IgA 08/25/2017 6     WBC 08/25/2017 6.2     RBC 08/25/2017 4.58     HGB 08/25/2017 13.9     HCT 08/25/2017 42.1     MCV 08/25/2017 92     [facility] 08/25/2017 30.3     MCHC 08/25/2017 33.0     RDW 08/25/2017 14.2     PLATELET 27/18/4806 541     NEUTROPHILS 08/25/2017 64     Lymphocytes 08/25/2017 24     MONOCYTES 08/25/2017 9     EOSINOPHILS 08/25/2017 3     BASOPHILS 08/25/2017 0     ABS. NEUTROPHILS 08/25/2017 4.0     Abs Lymphocytes 08/25/2017 1.5     ABS. MONOCYTES 08/25/2017 0.5     ABS. EOSINOPHILS 08/25/2017 0.2     ABS. BASOPHILS 08/25/2017 0.0     IMMATURE GRANULOCYTES 08/25/2017 0     ABS. IMM. GRANS. 08/25/2017 0.0     Hep B surface Ag screen 08/25/2017 Negative     Hepatitis Be Antigen 08/25/2017 Negative     Hep B Core Ab, IgM 08/25/2017 Negative     Hep B Core Ab, total 08/25/2017 Negative     Hepatitis Be Antibody 08/25/2017 Negative     HEP B SURFACE AB, QUAL 08/25/2017 Non Reactive     HCV Ab 08/25/2017 <0.1     Glucose 08/25/2017 84     BUN 08/25/2017 18     Creatinine 08/25/2017 0.98     GFR est non-AA 08/25/2017 55*    GFR est AA 08/25/2017 64     BUN/Creatinine ratio 08/25/2017 18     Sodium 08/25/2017 141     Potassium 08/25/2017 4.7     Chloride 08/25/2017 102     CO2 08/25/2017 23     Calcium 08/25/2017 9.6     Protein, total 08/25/2017 7.2     Albumin 08/25/2017 4.4     GLOBULIN, TOTAL 08/25/2017 2.8     A-G Ratio 08/25/2017 1.6     Bilirubin, total 08/25/2017 0.4     Alk. phosphatase 08/25/2017 120*    AST (SGOT) 08/25/2017 28     ALT (SGPT) 08/25/2017 16     C-Reactive Protein, Qt 08/25/2017 9.1*    Sed rate (ESR) 08/25/2017 5     HLA-B27 08/25/2017 Positive     QuantiFERON Incubation 08/25/2017                      Value:Incubated, specimen forwarded to [place], [place] for  completion of the assay.       Rheumatoid factor 08/25/2017 <10.0     Albumin 08/25/2017 4.1     Alpha-1-globulin 08/25/2017 0.2     ALPHA-2 GLOBULIN 08/25/2017 0.7     Beta globulin 08/25/2017 1.2     Gamma globulin 08/25/2017 0.9     M-spike 08/25/2017 Not Observed     Globulin, total 08/25/2017 3.1     A/G ratio 08/25/2017 1.3     Please note 08/25/2017 Comment     Interpretation (see belo* 08/25/2017 Comment     Specific Gravity 08/25/2017 1.011     pH (UA) 08/25/2017 6.0     Color 08/25/2017 Yellow     Appearance 08/25/2017 Clear     Leukocyte Esterase 08/25/2017 1+*    Protein 08/25/2017 Negative     Glucose 08/25/2017 Negative     Ketone 08/25/2017 Negative     Blood 08/25/2017 Negative     Bilirubin 08/25/2017 Negative     Urobilinogen 08/25/2017 0.2     Nitrites 08/25/2017 Negative     Microscopic Examination 08/25/2017 See additional order     URINALYSIS REFLEX 08/25/2017 Comment     VITAMIN D, 25-HYDROXY 08/25/2017 50.0     PTH, Intact 08/25/2017 37     TSH 08/25/2017 3.330     WBC 08/25/2017 0-5     RBC 08/25/2017 0-2     Epithelial cells 08/25/2017 0-10     Casts 08/25/2017 None seen     Mucus 08/25/2017 Present     Bacteria 08/25/2017 None seen     Urine Culture, Routine 08/25/2017                      Value:Lactobacillus species  50,000-100,000 colony forming units per mL      QuantiFERON TB Gold 08/25/2017 Negative     QUANTIFERON CRITERIA 08/25/2017 Comment     QuantiFERON TB Ag Value 08/25/2017 0.12     QuantiFERON Nil Value 08/25/2017 0.08     QuantiFERON Mitogen Value 08/25/2017 7.44     QFT TB Ag minus Nil Value 08/25/2017 0.04     Interpretation: 08/25/2017 Comment     Comment 08/25/2017 Comment    Office Visit on 06/02/2017   Component Date Value    Glucose 06/02/2017 84     BUN 06/02/2017 21     Creatinine 06/02/2017 0.77     GFR est non-AA 06/02/2017 74     GFR est AA 06/02/2017 86     BUN/Creatinine ratio 06/02/2017 27     Sodium 06/02/2017 138     Potassium 06/02/2017 4.5     Chloride 06/02/2017 98     CO2 06/02/2017 24     Calcium 06/02/2017 9.2     Protein, total 06/02/2017 6.6     Albumin 06/02/2017 4.3     GLOBULIN, TOTAL 06/02/2017 2.3     A-G Ratio 06/02/2017 1.9     Bilirubin, total 06/02/2017 0.3     Alk.  phosphatase 06/02/2017 117     AST (SGOT) 06/02/2017 22     ALT (SGPT) 06/02/2017 17     WBC 06/02/2017 7.0     RBC 06/02/2017 4.31     HGB 06/02/2017 12.9     HCT 06/02/2017 39.4     MCV 06/02/2017 91     MCH 06/02/2017 29.9     MCHC 06/02/2017 32.7     RDW 06/02/2017 13.7     PLATELET 93/70/4034 155     NEUTROPHILS 06/02/2017 65     Lymphocytes 06/02/2017 24     MONOCYTES 06/02/2017 8     EOSINOPHILS 06/02/2017 3     BASOPHILS 06/02/2017 0     ABS. NEUTROPHILS 06/02/2017 4.4     Abs Lymphocytes 06/02/2017 1.7     ABS. MONOCYTES 06/02/2017 0.6     ABS. EOSINOPHILS 06/02/2017 0.2     ABS. BASOPHILS 06/02/2017 0.0     IMMATURE GRANULOCYTES 06/02/2017 0     ABS. IMM. GRANS. 06/02/2017 0.0     Cholesterol, total 06/02/2017 203*    Color (UA POC) 06/02/2017 Yellow     Clarity (UA POC) 06/02/2017 Cloudy     Glucose (UA POC) 06/02/2017 Negative     Bilirubin (UA POC) 06/02/2017 Negative     Ketones (UA POC) 06/02/2017 Negative     Specific gravity (UA POC) 06/02/2017 1.020     Blood (UA POC) 06/02/2017 Negative     pH (UA POC) 06/02/2017 5.5     Protein (UA POC) 06/02/2017 Negative     Urobilinogen (UA POC) 06/02/2017 0.2 mg/dL     Nitrites (UA POC) 06/02/2017 Negative     Leukocyte esterase (UA P* 06/02/2017 1+        Imaging    Musculoskeletal Ultrasound    None    Radiographs    Sacroiliac Joint 8/25/2017: There is bony ankylosis of the left SI joint unchanged. There is probable ankylosis of the right SI joint as well. The right SI joint is at least narrowed. Bone mineral density is decreased without fracture or bone destruction    Bilateral Foot 8/25/2017: LEFT: No fracture or dislocation on plain film. There is narrowing of the first MTP joint with minimal spurring. No joint space erosion or periosteal reaction. Alignment is within normal limits. Bone mineralization is decreased. No soft tissue calcification. RIGHT: No fracture or dislocation on plain film.  There is metatarsus primus varus with hallux valgus deformity. There is joint space loss and spurring first MTP joint. No joint space erosion or periosteal reaction. Alignment is within normal limits. Bone mineralization is decreased. No soft tissue calcification. Chest 3/09/2017: normal heart size. There is no acute process in the lung fields. The osseous structures are unremarkable. Bilateral Hand 1/20/2017: LEFT: No fracture or dislocation on plain film. There are scattered mild degenerative changes of the interphalangeal joints. There is moderate degeneration of the first [facility] joint and mild degeneration of the first MCP joint. Minimal degenerative changes of the third and fourth MCP joints. There is widening of the scapholunate interval compatible with chronic scapholunate ligament tear. No joint space erosion or periosteal reaction. Alignment is within normal limits. Bone mineralization is decreased. No soft tissue calcification. RIGHT: No fracture or dislocation on plain film. There are scattered mild degenerative changes in the interphalangeal joints and first MCP joint. There is narrowing of the lateral margin radiocarpal joint There is widening of the scapholunate interval compatible with chronic scapholunate ligament tear. No joint space erosion or periosteal reaction. Alignment is within normal limits. Bone mineralization is decreased. No soft tissue calcification. Left Hip 7/15/2015: no fracture, dislocation or other acute abnormality.  There appears to be fusion of the left sacroiliac joint and possibly the right sacroiliac joint. Spurring is noted at the symphysis. Lumbar Spine 7/15/2015: the patient is leaning to the right. There is a 2 mm retrolisthesis of L1  relative to L2. Bilateral facet arthropathy is the dominant feature at the  lower 3 levels. Bilateral laminectomies have been performed at L5-S1. Vertebral body heights spaces are well-preserved.  There is no fracture.     CT Imaging    None    MR Imaging    None    DXA     DXA 9/12/2017: scheduled    ASSESSMENT AND PLAN    This is a follow-up visit for Ms. Laura Snell. 1) HLA-B27 Positive Ankylosing Spondylitis. (positive HLA-B27, sacroiliitis, inflammatory arthritis). She reports a history of Rheumatoid Arthritis that was treated with gold, which she think led her to remission until 2016. She has developed sore pain and Polymyalgia Rheumatica-like symptoms involving her hip girdle. On her initial evaluation with me, her CDAI was N/A with 7 tender and 13 swollen joints. Her hip radiograph on 7/15/2015 showed ankylosis of the left sacroiliac joint. I repeated the study to evaluate for progression which confirmed left sacroiliac ankylosis and probable right sided involvement. Her labs showed a positive HLA-B27. She has a normal spinal flexion but she continues to have tenderness and complain of pain and stiffness in her lower back lasting hours. She has CKD stage 3, so NSAIDs should be avoided. I discussed obtaining an MRI to confirm active inflammation versus chronic damage and she was amenable to that. I also discussed initiation with an anti-TNF since NSAIDs are contra-indicated and she was amenable. I reviewed potential adverse events and submitted an order for Humira as per her request.     2) Polymyalgia Rheumatica. She is secondary to #1. 3) Bilateral Knee Osteoarthritis. This was not an active issue today. 4) Age-Related Osteoporosis Screening. I ordered a DXA scan which is scheduled on 9/12/2017.    5) Bilateral Sacroiliac Joint Ankylosis. She has normal spinal flexion, but has tenderness of her SI joints. 6) Chronic Kidney Disease Stage 3. Her creatinine was 0.98 mg/dL and eGFR 55 (previously 0.77 mg/dL and eGFR 74). The patient voiced understanding of the aforementioned assessment and plan. Summary of plan was provided in the After Visit Summary patient instructions.      TODAY'S ORDERS    Orders Placed This Encounter    MRI PELV WO CONT    adalimumab (HUMIRA PEN) 40 mg/0.8 mL injection pen     Future Appointments  Date Time Provider Andrea Sury   9/12/2017 4:00 PM [facility] [place] DEXA [address] [facility]   9/20/2017 3:15 PM [facility] MRI [facility]   10/4/2017 3:40 PM Liseth Wang MD [address]   10/18/2017 2:20 PM Liseth Wang MD [address]   12/7/2017 1:40 PM Liseth Wang MD [address]       Tenisha Barreot MD, [address] [facility]    Adult Rheumatology   Musculoskeletal Ultrasound Certified  [address] [facility], [facility], [address]   Phone [phone]  Fax [phone]

## 2017-09-11 NOTE — MR AVS SNAPSHOT
Visit Information Date & Time Provider Department Dept. Phone Encounter #  
 9/11/2017 10:00 AM Mariah Stearns, [address] [phone] Follow-up Instructions Return in about 3 months (around 12/11/2017). Your Appointments 10/4/2017  3:40 PM  
ESTABLISHED PATIENT with Mariah Stearns MD  
[address] ([facility]) Appt Note: f/u; R/S  
 [address] [facility] [address] [facility] [address]  
  
   
 [address] [place] [id number]  
  
    
 10/18/2017  2:20 PM  
ESTABLISHED PATIENT with Mariah Stearns MD  
[address] ([facility]) [address]  
[phone] Upcoming Health Maintenance Date Due ZOSTER VACCINE AGE 60> 3/28/1999 GLAUCOMA SCREENING Q2Y 5/28/2004 OSTEOPOROSIS SCREENING (DEXA) 5/28/2004 Pneumococcal 65+ Low/Medium Risk (1 of 2 - PCV13) 5/28/2004 INFLUENZA AGE 9 TO ADULT 8/1/2017 MEDICARE YEARLY EXAM 6/3/2018 DTaP/Tdap/Td series (2 - Td) 6/2/2027 Allergies as of 9/11/2017  Review Complete On: 9/11/2017 By: Mariah Stearns MD  
  
 Severity Noted Reaction Type Reactions Leflunomide  09/06/2017    Swelling Penicillins  05/26/2010    Unable to Obtain  
 Sulfa (Sulfonamide Antibiotics)  05/26/2010    Unknown (comments) Current Immunizations  Reviewed on 9/27/2011 Name Date Influenza Vaccine 10/22/2013 Influenza Vaccine Split 9/27/2011 Not reviewed this visit You Were Diagnosed With   
  
 Codes Comments Ankylosing spondylitis of sacral region [facility])    -  Primary ICD-10-CM: M45.8 ICD-9-CM: 720.0 Vitals BP Pulse Temp Resp Height(growth percentile) Weight(growth percentile)  159/79 (BP 1 Location: Right arm, BP Patient Position: Sitting) (!) 54 97.8 °F (36.6 °C) (Oral) 18 5' 4\" (1.626 m) 158 lb (71.7 kg) SpO2 BMI OB Status Smoking Status 96% 27.12 kg/m2 Hysterectomy Never Smoker Vitals History BMI and BSA Data Body Mass Index Body Surface Area  
 27.12 kg/m 2 1.8 m 2 Preferred Pharmacy Pharmacy Name Phone Rad[facility] Brasher [address] [place] [address] [phone] Your Updated Medication List  
  
   
This list is accurate as of: 17 10:39 AM.  Always use your most recent med list.  
  
  
  
  
 adalimumab 40 mg/0.8 mL injection pen Commonly known as:  HUMIRA PEN  
0.8 mL by SubCUTAneous route every fourteen (14) days for 90 days. albuterol 90 mcg/actuation inhaler Commonly known as:  PROVENTIL HFA, VENTOLIN HFA, PROAIR HFA Take 1 Puff by inhalation every six (6) hours as needed for Wheezing. ALLEGRA PO Take  by mouth. amLODIPine 5 mg tablet Commonly known as:  Virgilio Cordial Take 5 mg by mouth daily. DULERA 100-5 mcg/actuation HFA inhaler Generic drug:  mometasone-formoterol Take 2 Puffs by inhalation two (2) times a day. fluticasone 50 mcg/actuation nasal spray Commonly known as:  Olga Lamb PLACE TWO SPRAYS IN EACH NOSTRIL ONCE DAILY  
  
 synthroid 50 mcg tablet Generic drug:  levothyroxine Take  by mouth daily (before breakfast). Prescriptions Sent to Pharmacy Refills  
 adalimumab (HUMIRA PEN) 40 mg/0.8 mL injection pen 3 Si.8 mL by SubCUTAneous route every fourteen (14) days for 90 days. Class: Normal  
 Pharmacy: [facility] Ph #: [phone] Route: SubCUTAneous Follow-up Instructions Return in about 3 months (around 2017). To-Do List   
 2017 Imaging:  MRI PELV WO CONT   
  
 2017 4:00 PM  
  Appointment with [facility] [address] at [address] [place] [address] ([phone]) Please, no calcium supplements or antacids that coat the stomach (ex: Tums, Mylanta) 24 hours prior to procedure. Maintain normal diet and medications. Dairy products are allowed. Wear an outfit with an elastic waistband (no zipper or metal snaps). Check in at registration 15min before your appointment time unless you were instructed to do otherwise. Patient Instructions I will try you on Humira Please call the Patient Care Scheduling Team [phone] to schedule your (MRI) test 
 
Adalimumab (By injection) Adalimumab ([id number]-[id number]) Treats arthritis, plaque psoriasis, ankylosing spondylitis, Crohn disease, ulcerative colitis, hidradenitis suppurativa, and uveitis. Brand Name(s): Humira There may be other brand names for this medicine. When This Medicine Should Not Be Used: This medicine is not right for everyone. Do not use it if you had an allergic reaction to adalimumab. How to Use This Medicine:  
Injectable · Your doctor will prescribe your exact dose and tell you how often it should be given. This medicine is given as a shot under your skin. · A nurse or other health provider will give you this medicine. · You may be taught how to give your medicine at home. Make sure you understand all instructions before giving yourself an injection. Do not use more medicine or use it more often than your doctor tells you to. · You will be shown the body areas where this shot can be given. Use a different body area each time you give yourself a shot. Keep track of where you give each shot to make sure you rotate body areas. Do not inject into skin areas that are red, bruised, tender, or hard. If you have psoriasis, do not inject into a raised, thick, red, or scaly skin patch or into skin lesions. · This medicine should come with a Medication Guide. Ask your pharmacist for a copy if you do not have one. · Missed dose: Take a dose as soon as you remember.  If it is almost time for your next dose, wait until then and take a regular dose. Do not take extra medicine to make up for a missed dose. · If you store this medicine at home, keep it in the refrigerator. Do not freeze. Protect the medicine from light. Keep your medicine and supplies in the original packages until you are ready to use them. Drugs and Foods to Avoid: Ask your doctor or pharmacist before using any other medicine, including over-the-counter medicines, vitamins, and herbal products. · Some foods and medicines can affect how adalimumab works. Tell your doctor if you are using any of the following: ¨ Abatacept, anakinra, azathioprine, cyclosporine, mercaptopurine, rituximab, theophylline ¨ A blood thinner (including warfarin) ¨ Medicine that weakens the immune system (including a steroid or cancer medicine) · This medicine may interfere with vaccines. Ask your doctor before you get a flu shot or any other vaccines. Warnings While Using This Medicine: · Tell your doctor if you are pregnant or breastfeeding, or if you have liver disease, a history of cancer, COPD, heart failure, diabetes, psoriasis, multiple sclerosis, optic neuritis, problems with your immune system, or a history of Guillain-Barré syndrome. Tell your doctor if you have any type of infection (such as hepatitis B or tuberculosis) or an infection that keeps coming back. · This medicine may cause the following problems:  
¨ Increased risk for infection ¨ Increased risk of certain cancers, such as lymphoma or leukemia ¨ New or worsening heart failure · Tell your doctor if you have a latex allergy. The needle cover of the syringe contains latex and may cause allergic reactions. · You will need to have a skin test for tuberculosis (TB) before you start this medicine. Tell your doctor if you or anyone in your home has ever had a positive TB skin test or been exposed to TB. · This medicine may make you bleed, bruise, or get infections more easily. Take precautions to prevent illness and injury. Wash your hands often. · Your doctor will do lab tests at regular visits to check on the effects of this medicine. Keep all appointments. · Throw away used needles in a hard, closed container that the needles cannot poke through. Keep this container away from children and pets. · Keep all medicine out of the reach of children. Never share your medicine with anyone. Possible Side Effects While Using This Medicine:  
Call your doctor right away if you notice any of these side effects: · Allergic reaction: Itching or hives, swelling in your face or hands, swelling or tingling in your mouth or throat, chest tightness, trouble breathing · Blistering, peeling, red skin rash, or red, scaly patches on the skin · Change in how much or how often you urinate, painful urination · Changes in vision · Chest pain, uneven heartbeat, trouble breathing · Cough, fever, chills, runny or stuffy nose, sore throat, and body aches · Dark urine or pale stools, nausea, vomiting, loss of appetite, stomach pain, yellow skin or eyes · Numbness, tingling, or burning pain in your hands, arms, legs, or feet, or joint pain · Rapid weight gain, swelling in your hands, ankles, lower legs, or feet · Sores or white patches on your lips, mouth, or throat · Swollen glands in your neck, underarms, or groin · Unusual bleeding, bruising, tiredness, weakness, or weight loss If you notice these less serious side effects, talk with your doctor: · Back pain · Headache · Redness, itching, bruising, bleeding, pain, or swelling where the shot was given If you notice other side effects that you think are caused by this medicine, tell your doctor. Call your doctor for medical advice about side effects. You may report side effects to FDA at 8-708-FDA-9417 © 2017 [address] Information is for End User's use only and may not be sold, redistributed or otherwise used for commercial purposes. The above information is an  only. It is not intended as medical advice for individual conditions or treatments. Talk to your doctor, nurse or pharmacist before following any medical regimen to see if it is safe and effective for you. Introducing [facility]! Jean Rodríguez introduces [employer] patient portal. Now you can access parts of your medical record, email your doctor's office, and request medication refills online. 1. In your internet browser, go to [email or web address] 2. Click on the First Time User? Click Here link in the Sign In box. You will see the New Member Sign Up page. 3. Enter your [employer] Access Code exactly as it appears below. You will not need to use this code after youve completed the sign-up process. If you do not sign up before the expiration date, you must request a new code. · [employer] Access Code: [email or web address] Expires: 12/10/2017 10:30 AM 
 
4. Enter the last four digits of your Social Security Number (xxxx) and Date of Birth (mm/dd/yyyy) as indicated and click Submit. You will be taken to the next sign-up page. 5. Create a [employer] ID. This will be your [employer] login ID and cannot be changed, so think of one that is secure and easy to remember. 6. Create a [employer] password. You can change your password at any time. 7. Enter your Password Reset Question and Answer. This can be used at a later time if you forget your password. 8. Enter your e-mail address. You will receive e-mail notification when new information is available in [address] 9. Click Sign Up. You can now view and download portions of your medical record. 10. Click the Download Summary menu link to download a portable copy of your medical information. If you have questions, please visit the Frequently Asked Questions section of the [employer] website. Remember, [employer] is NOT to be used for urgent needs. For medical emergencies, dial 911. Now available from your iPhone and Android! Please provide this summary of care documentation to your next provider. Your primary care clinician is listed as Tristan Wilde. If you have any questions after today's visit, please call [phone].

## 2017-09-11 NOTE — PROGRESS NOTES
Chief Complaint   Patient presents with    Arthritis    Osteoarthritis    Medication Evaluation     patient states she received letter saying she should follow up; patient states she had been on medication and letter informed her to stop;     Preeti Mac RN

## 2017-09-12 ENCOUNTER — HOSPITAL ENCOUNTER (OUTPATIENT)
Dept: MAMMOGRAPHY | Age: 77
Discharge: HOME | End: 2017-09-12
Attending: INTERNAL MEDICINE
Payer: MEDICARE

## 2017-09-12 DIAGNOSIS — N18.3 CHRONIC KIDNEY DISEASE (CKD), STAGE 3 (MODERATE): Primary | ICD-10-CM

## 2017-09-12 DIAGNOSIS — M06.9 RHEUMATOID ARTHRITIS WITH UNKNOWN RHEUMATOID FACTOR STATUS (HCC): ICD-10-CM

## 2017-09-12 DIAGNOSIS — Z78.0 POST-MENOPAUSAL: ICD-10-CM

## 2017-09-12 PROCEDURE — 77080 DXA BONE DENSITY AXIAL: CPT

## 2017-09-13 ENCOUNTER — TELEPHONE (OUTPATIENT)
Dept: RHEUMATOLOGY | Age: 77
End: 2017-09-13

## 2017-09-13 NOTE — TELEPHONE ENCOUNTER
Ask why Dr. Jean Mora. I do not know who that is. I usually refer to Dr. Rissa Case. But it is up to her.

## 2017-09-13 NOTE — TELEPHONE ENCOUNTER
Patient is calling stating on her last visit Dr. Yancy Gomez stated he would send her to a kidney specialist if needed. Patient would like to be referred to Dr. Laura Oneill. Unless Dr. Yancy Gomez would like her to go to someone else. She would like his opinion. Please advise. It is okay to leave a message with information on voicemail. Dr. Lynsey Jackson office told her she needed to have Dr. Yancy Gomez send a request for her to be seen. Their fax # is - [phone].     Phone # - [phone]

## 2017-09-13 NOTE — TELEPHONE ENCOUNTER
Left message for pt stating that Dr. Frederick Boyle usually sends pt's to Dr. Oanh Lindsey, but that I saw a referral was placed in the system for Dr. Kamlesh De León from her PCP yesterday. I told her to call us back if she needed anything further.

## 2017-09-14 DIAGNOSIS — N18.2 CKD (CHRONIC KIDNEY DISEASE) STAGE 2, GFR 60-89 ML/MIN: Primary | ICD-10-CM

## 2017-09-20 ENCOUNTER — HOSPITAL ENCOUNTER (OUTPATIENT)
Dept: MRI IMAGING | Age: 77
Discharge: HOME | End: 2017-09-20
Attending: INTERNAL MEDICINE
Payer: MEDICARE

## 2017-09-20 DIAGNOSIS — M45.8 ANKYLOSING SPONDYLITIS OF SACRAL REGION (HCC): ICD-10-CM

## 2017-09-20 PROCEDURE — 72195 MRI PELVIS W/O DYE: CPT

## 2017-09-25 NOTE — PROGRESS NOTES
The results were reviewed and a letter was sent. Findings consistent with ankylosing spondylitis but no active inflammation as noted with lack of bone/articular edema.

## 2017-10-05 LAB — CREATININE, EXTERNAL: 0.88

## 2017-10-06 ENCOUNTER — TELEPHONE (OUTPATIENT)
Dept: RHEUMATOLOGY | Age: 77
End: 2017-10-06

## 2017-10-11 ENCOUNTER — HOSPITAL ENCOUNTER (OUTPATIENT)
Dept: ULTRASOUND IMAGING | Age: 77
Discharge: HOME | End: 2017-10-11
Attending: INTERNAL MEDICINE
Payer: MEDICARE

## 2017-10-11 DIAGNOSIS — N18.30 CHRONIC RENAL DISEASE, STAGE III (HCC): ICD-10-CM

## 2017-10-11 PROCEDURE — 76770 US EXAM ABDO BACK WALL COMP: CPT

## 2017-10-17 NOTE — TELEPHONE ENCOUNTER
Received notification from [facility] that med Humira was approved from 08/12/17-10/11/18. Received notification from [address] stating that pt's co-pay for med Humira will be $5.00. [facility] had called pt and left a msg to setup shipment.

## 2017-10-25 ENCOUNTER — OFFICE VISIT (OUTPATIENT)
Dept: RHEUMATOLOGY | Age: 77
End: 2017-10-25

## 2017-10-25 VITALS
BODY MASS INDEX: 26.4 KG/M2 | HEART RATE: 54 BPM | WEIGHT: 153.8 LBS | DIASTOLIC BLOOD PRESSURE: 72 MMHG | SYSTOLIC BLOOD PRESSURE: 150 MMHG | OXYGEN SATURATION: 97 % | TEMPERATURE: 97.7 F | RESPIRATION RATE: 16 BRPM

## 2017-10-25 DIAGNOSIS — M35.3 PMR (POLYMYALGIA RHEUMATICA) (HCC): ICD-10-CM

## 2017-10-25 DIAGNOSIS — M17.0 PRIMARY OSTEOARTHRITIS OF BOTH KNEES: ICD-10-CM

## 2017-10-25 DIAGNOSIS — M45.8 ANKYLOSING SPONDYLITIS OF SACRAL REGION (HCC): Primary | ICD-10-CM

## 2017-10-25 DIAGNOSIS — R10.9 FLANK PAIN: ICD-10-CM

## 2017-10-25 DIAGNOSIS — M43.28 ANKYLOSIS, SACROILIAC JOINT: ICD-10-CM

## 2017-10-25 DIAGNOSIS — N18.30 CKD (CHRONIC KIDNEY DISEASE) STAGE 3, GFR 30-59 ML/MIN (HCC): ICD-10-CM

## 2017-10-25 DIAGNOSIS — M81.0 AGE-RELATED OSTEOPOROSIS WITHOUT CURRENT PATHOLOGICAL FRACTURE: ICD-10-CM

## 2017-10-25 PROBLEM — N18.2 CKD (CHRONIC KIDNEY DISEASE) STAGE 2, GFR 60-89 ML/MIN: Status: RESOLVED | Noted: 2017-08-27 | Resolved: 2017-10-25

## 2017-10-25 RX ORDER — ALENDRONATE SODIUM 70 MG/1
70 TABLET ORAL
Qty: 4 TAB | Refills: 0 | Status: SHIPPED | OUTPATIENT
Start: 2017-10-25 | End: 2017-11-14 | Stop reason: SDUPTHER

## 2017-10-25 NOTE — PROGRESS NOTES
REASON FOR VISIT    This is a follow-up visit for Ms. Ana Marr for Ankylosing Spondylitis. Spondyloarthritis phenotype includes:  Anti-CCP positive: no  Rheumatoid factor positive: no  HLA-B27: yes  Erosive disease: no  Sacroiliitis: yes  Ankylosis: yes (left SI)  Psoriasis: no  Enthesitis: no  Dactylitis: no  Nail Pitting: no  Onycholysis: no  Extra-articular manifestations include: none  SAPHO: no    Immunosuppression Screening (8/25/2017):   Quantiferon TB: negative  PPD:  Not performed  Hepatitis B: negative  Hepatitis C: negative    Therapy History includes:  Current NSAIDs include: none (contraindicated due to CKD stage 3)  Current DMARD therapy include: Humira 40 mg weekl  Prior DMARD therapy includes: Gold, leflunomide  The following DMARDs have been ineffective: none  The following DMARDs were stopped because of side effects: Gold (\"pass out\"), leflunomide  Contra-Indicated DMARDs because of CKD stage 3: methotrexate     Osteoporosis Historical Synopsis     Height loss since age 26 (at least two inches): 0.5  Fracture history includes: yes (ankle)  Family history of hip fracture: no  Fall Risk: no     Daily calcium intake is 1200 mg  Daily vitamin D intake is 800 IU     Smoking history: no  Alcohol consumption: no  Prednisone history: no     Exercise: yes     Previous work-up for osteoporosis includes the following:  DEXA Scan: 9/12/2017  Vitamin 25OH D level: 50.0 (8/25/2017)  PTH: 37 (8/25/2017)  TSH: 3.330 (8/25/2017)     Therapy History includes:     Current osteoporosis therapy includes: none  Prior osteoporosis therapy includes: none  The following osteoporosis therapy have been ineffective: none  The following osteoporosis therapy were stopped because of side effects: none    Active problems include:    Patient Active Problem List   Diagnosis Code    Allergic rhinitis, cause unspecified J30.9    Asthma J45.909    Essential hypertension, benign I10    OA (osteoarthritis) M19.90    Depression F32.9    Unspecified hypothyroidism E03.9    Mixed hyperlipidemia E78.2    Primary osteoarthritis of both knees M17.0    Ankylosis, 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 Cosmo Blackman returns for a follow-up visit. On her last visit, I started her on Humira 40 mg every 2 weeks. I also ordered an MRI of her SI joints due to radiograph showed left sacroiliitis with possible early right. Labs showed positive HLA-B27. MRI showed left SI joint ankylosis. Partial ankylosis of right SI joint. DXA done showed osteoporosis. Humira was approved $5.     She complains left flank pain that has been ongoing for weeks associated with foaming in her urine. Tylenol helps her flank pain. Today, she complains of pain in her groin associated with stiffness. Her hands still hurt. Last toxicity monitoring by blood work was done on 8/25/2017 and did not reveal any significant adverse effects, except creatinine 0.98 mg/dL, eGFR 55,  U/L. Most recent inflammatory markers from 8/25/2017 revealed a ESR 5 mm/hr (previously N/A mm/hr) and CRP 9.1 mg/L (previously N/A mg/L). The patient has not had any interval hospital admissions, infections, or surgeries. REVIEW OF SYSTEMS    A comprehensive review of systems was performed and pertinent results are documented in the HPI, review of systems is otherwise non-contributory. PAST MEDICAL HISTORY    She has a past medical history of Allergic rhinitis, cause unspecified (5/26/2010); Asthma (5/26/2010); Depression (5/26/2010); Encounter for long-term (current) use of other medications (7/11/2011); Essential hypertension, benign (5/26/2010); Hypertension; OA (osteoarthritis) (5/26/2010); [facility] and Unspecified hypothyroidism (5/26/2010).     FAMILY HISTORY    Her family history includes Cancer in her sister; Heart Disease in her father, mother, and sister; Stroke in her sister. SOCIAL HISTORY    She reports that she has never smoked. She has never used smokeless tobacco. She reports that she does not drink alcohol or use illicit drugs. IMMUNIZATIONS  Immunization History   Administered Date(s) Administered    Influenza Vaccine 10/22/2013    Influenza Vaccine Split 09/27/2011       MEDICATIONS    Current Outpatient Prescriptions   Medication Sig Dispense Refill    alendronate (FOSAMAX) 70 mg tablet Take 1 Tab by mouth every seven (7) days for 4 doses. Indications: POST-MENOPAUSAL OSTEOPOROSIS 4 Tab 0    FEXOFENADINE HCL (ALLEGRA PO) Take  by mouth.  mometasone-formoterol (DULERA) 100-5 mcg/actuation HFA inhaler Take 2 Puffs by inhalation two (2) times a day.  fluticasone (FLONASE) 50 mcg/actuation nasal spray PLACE TWO SPRAYS IN EACH NOSTRIL ONCE DAILY 3 Bottle 3    albuterol (PROVENTIL HFA, VENTOLIN HFA) 90 mcg/actuation inhaler Take 1 Puff by inhalation every six (6) hours as needed for Wheezing. 1 Inhaler 5    levothyroxine (SYNTHROID) 50 mcg tablet Take  by mouth daily (before breakfast).  amlodipine (NORVASC) 5 mg tablet Take 5 mg by mouth daily.  adalimumab (HUMIRA PEN) 40 mg/0.8 mL injection pen 0.8 mL by SubCUTAneous route every fourteen (14) days for 90 days. 2 Kit 3        ALLERGIES    Allergies   Allergen Reactions    Leflunomide Swelling    Penicillins Unable to Obtain    Sulfa (Sulfonamide Antibiotics) Unknown (comments)       PHYSICAL EXAMINATION    Visit Vitals    /72 (BP 1 Location: Right arm, BP Patient Position: Sitting)    Pulse (!) 54    Temp 97.7 °F (36.5 °C) (Oral)    Resp 16    Wt 153 lb 12.8 oz (69.8 kg)    SpO2 97%    BMI 26.4 kg/m2     Body mass index is 26.4 kg/(m^2).     General: Patient is alert, oriented x 3, not in acute distress    HEENT:   Sclerae are not injected and appear moist.  Oral mucous membranes are moist, there are no ulcers present. There is no alopecia. Cardiovascular:  Heart is regular rate and rhythm, no murmurs. Chest:  Lungs are clear to auscultation bilaterally. No rhonchi, wheezes, or crackles. Extremities:  Free of clubbing, cyanosis, edema    Neurological exam:  No focal sensory deficits, muscle strength is full in upper and lower extremities     Skin:    Psoriasis:     no  Nail Pitting:     no  Onycholysis:     no  Palmoplantar pustulosis:   no  Acne fulminans:    no  Acne conglobata:    no  Hidradenitis Suppurativa:   no  Dissecting cellulitis of the scalp:  no  Pilonidal sinus:    no  Erythema nodosum:    no    Musculoskeletal:  A comprehensive musculoskeletal exam was performed for all joints of each upper and lower extremity and assessed for swelling, tenderness and range of motion. Left flank tenderness    Bilateral Sharmila and Heberden nodes. Bilateral knee crepitus without effusion.   Right Ankle synovitis  Bilateral MTP  Sacroiliac tenderness  Normal spinal flexion    Costochondritis:  no   Synovitis:   Yes (see below table)  Dactylitis:   no  Enthesitis:   no    Joint Count 8/25/2017   Patient pain (0-100) (No Data)   MHAQ 0.125   Left wrist- Tender 1   Left wrist- Swollen 1   Left 1st MCP - Swollen 1   Left 2nd MCP - Swollen 1   Left 3rd MCP - Tender 1   Left 3rd MCP - Swollen 1   Left 4th MCP - Tender 1   Left 2nd PIP - Tender 1   Left 2nd PIP - Swollen 1   Left 3rd PIP - Tender 1   Left 3rd PIP - Swollen 1   Right wrist- Tender 1   Right wrist- Swollen 1   Right 1st MCP - Swollen 1   Right 2nd MCP - Tender 1   Right 2nd MCP - Swollen 1   Right 3rd MCP - Swollen 1   Right 5th MCP - Swollen 1   Right 2nd PIP - Swollen 1   Right 3rd PIP - Swollen 1   Tender Joint Count (Total) 7   Swollen Joint Count (Total) 13   Physician Assessment (0-10) 4   Patient Assessment (0-10) (No Data)       DATA REVIEW    Laboratory    The following laboratory results were reviewed and discussed with the patient:    Office Visit on 08/25/2017   Component Date Value    CCP Antibodies IgG/IgA 08/25/2017 6     WBC 08/25/2017 6.2     RBC 08/25/2017 4.58     HGB 08/25/2017 13.9     HCT 08/25/2017 42.1     MCV 08/25/2017 92     MCH 08/25/2017 30.3     MCHC 08/25/2017 33.0     RDW 08/25/2017 14.2     PLATELET 77/82/8771 854     NEUTROPHILS 08/25/2017 64     Lymphocytes 08/25/2017 24     MONOCYTES 08/25/2017 9     EOSINOPHILS 08/25/2017 3     BASOPHILS 08/25/2017 0     ABS. NEUTROPHILS 08/25/2017 4.0     Abs Lymphocytes 08/25/2017 1.5     ABS. MONOCYTES 08/25/2017 0.5     ABS. EOSINOPHILS 08/25/2017 0.2     ABS. BASOPHILS 08/25/2017 0.0     IMMATURE GRANULOCYTES 08/25/2017 0     ABS. IMM. GRANS. 08/25/2017 0.0     Hep B surface Ag screen 08/25/2017 Negative     Hepatitis Be Antigen 08/25/2017 Negative     Hep B Core Ab, IgM 08/25/2017 Negative     Hep B Core Ab, total 08/25/2017 Negative     Hepatitis Be Antibody 08/25/2017 Negative     HEP B SURFACE AB, QUAL 08/25/2017 Non Reactive     HCV Ab 08/25/2017 <0.1     Glucose 08/25/2017 84     BUN 08/25/2017 18     Creatinine 08/25/2017 0.98     GFR est non-AA 08/25/2017 55*    GFR est AA 08/25/2017 64     BUN/Creatinine ratio 08/25/2017 18     Sodium 08/25/2017 141     Potassium 08/25/2017 4.7     Chloride 08/25/2017 102     CO2 08/25/2017 23     Calcium 08/25/2017 9.6     Protein, total 08/25/2017 7.2     Albumin 08/25/2017 4.4     GLOBULIN, TOTAL 08/25/2017 2.8     A-G Ratio 08/25/2017 1.6     Bilirubin, total 08/25/2017 0.4     Alk. phosphatase 08/25/2017 120*    AST (SGOT) 08/25/2017 28     ALT (SGPT) 08/25/2017 16     C-Reactive Protein, Qt 08/25/2017 9.1*    Sed rate (ESR) 08/25/2017 5     HLA-B27 08/25/2017 Positive     QuantiFERON Incubation 08/25/2017                      Value:Incubated, specimen forwarded to [place], [place] for  completion of the assay.       Rheumatoid factor 08/25/2017 <10.0     Albumin 08/25/2017 4.1     Alpha-1-globulin 08/25/2017 0.2     ALPHA-2 GLOBULIN 08/25/2017 0.7     Beta globulin 08/25/2017 1.2     Gamma globulin 08/25/2017 0.9     M-spike 08/25/2017 Not Observed     Globulin, total 08/25/2017 3.1     A/G ratio 08/25/2017 1.3     Please note 08/25/2017 Comment     Interpretation (see belo* 08/25/2017 Comment     Specific Gravity 08/25/2017 1.011     pH (UA) 08/25/2017 6.0     Color 08/25/2017 Yellow     Appearance 08/25/2017 Clear     Leukocyte Esterase 08/25/2017 1+*    Protein 08/25/2017 Negative     Glucose 08/25/2017 Negative     Ketone 08/25/2017 Negative     Blood 08/25/2017 Negative     Bilirubin 08/25/2017 Negative     Urobilinogen 08/25/2017 0.2     Nitrites 08/25/2017 Negative     Microscopic Examination 08/25/2017 See additional order     URINALYSIS REFLEX 08/25/2017 Comment     VITAMIN D, 25-HYDROXY 08/25/2017 50.0     PTH, Intact 08/25/2017 37     TSH 08/25/2017 3.330     WBC 08/25/2017 0-5     RBC 08/25/2017 0-2     Epithelial cells 08/25/2017 0-10     Casts 08/25/2017 None seen     Mucus 08/25/2017 Present     Bacteria 08/25/2017 None seen     Urine Culture, Routine 08/25/2017                      Value:Lactobacillus species  50,000-100,000 colony forming units per mL      QuantiFERON TB Gold 08/25/2017 Negative     QUANTIFERON CRITERIA 08/25/2017 Comment     QuantiFERON TB Ag Value 08/25/2017 0.12     QuantiFERON Nil Value 08/25/2017 0.08     QuantiFERON Mitogen Value 08/25/2017 7.44     QFT TB Ag minus Nil Value 08/25/2017 0.04     Interpretation: 08/25/2017 Comment     Comment 08/25/2017 Comment        Imaging    Musculoskeletal Ultrasound    None    Radiographs    Sacroiliac Joint 8/25/2017: There is bony ankylosis of the left SI joint unchanged. There is probable ankylosis of the right SI joint as well. The right SI joint is at least narrowed.  Bone mineral density is decreased without fracture or bone destruction    Bilateral Foot 8/25/2017: LEFT: No fracture or dislocation on plain film. There is narrowing of the first MTP joint with minimal spurring. No joint space erosion or periosteal reaction. Alignment is within normal limits. Bone mineralization is decreased. No soft tissue calcification. RIGHT: No fracture or dislocation on plain film. There is metatarsus primus varus with hallux valgus deformity. There is joint space loss and spurring first MTP joint. No joint space erosion or periosteal reaction. Alignment is within normal limits. Bone mineralization is decreased. No soft tissue calcification. Chest 3/09/2017: normal heart size. There is no acute process in the lung fields. The osseous structures are unremarkable. Bilateral Hand 1/20/2017: LEFT: No fracture or dislocation on plain film. There are scattered mild degenerative changes of the interphalangeal joints. There is moderate degeneration of the first [facility] joint and mild degeneration of the first MCP joint. Minimal degenerative changes of the third and fourth MCP joints. There is widening of the scapholunate interval compatible with chronic scapholunate ligament tear. No joint space erosion or periosteal reaction. Alignment is within normal limits. Bone mineralization is decreased. No soft tissue calcification. RIGHT: No fracture or dislocation on plain film. There are scattered mild degenerative changes in the interphalangeal joints and first MCP joint. There is narrowing of the lateral margin radiocarpal joint There is widening of the scapholunate interval compatible with chronic scapholunate ligament tear. No joint space erosion or periosteal reaction. Alignment is within normal limits. Bone mineralization is decreased. No soft tissue calcification. Left Hip 7/15/2015: no fracture, dislocation or other acute abnormality.  There appears to be fusion of the left sacroiliac joint and possibly the right sacroiliac joint.  Spurring is noted at the symphysis. Lumbar Spine 7/15/2015: the patient is leaning to the right. There is a 2 mm retrolisthesis of L1  relative to L2. Bilateral facet arthropathy is the dominant feature at the  lower 3 levels. Bilateral laminectomies have been performed at L5-S1. Vertebral body heights spaces are well-preserved.  There is no fracture. CT Imaging    None    MR Imaging    MRI Pelvis without contrast 9/20/2017: There is normal bone signal. No para-articular edema-like signal is shown nor is there demonstration of substantial joint effusion. There is ankylosis of the inferior left sacroiliac joint without demonstration of substantial osteophyte formation. The inferior right SI joint is substantially narrowed with perhaps a small area of ankylosis inferiorly.   There is minimal-mild superolateral joint space narrowing of the hip joints bilaterally with tiny marginal osteophytes. Moderate degenerative changes in the lower lumbar spine are shown with disc space narrowing through L4-5 as well as bilateral facet osteoarthrosis without ankylosis at L5-S1. No soft tissue mass is demonstrated. Muscle signal, size and contour appear normal. There is moderate insertional tendinopathy of the gluteus medius bilaterally with partial-thickness tearing in tiny bursal effusions. DXA     DXA 9/12/2017: (excluded Lumbar spine due to degenerative changes) left femoral neck T score: -2.1 (0.749 g/cm2), left total hip T score: -1.9 (0.763 g/cm2), right femoral neck T score: -2.4 (0.709 g/cm2), right total hip T score: -2.1 (0.746 g/cm2), and distal one third left radius T score -3.0 (BMD 0.614 g/cm2). FRAX score 17.8 % probability in 10 years for major osteoporotic fracture and 5.8 % 10 year probability of hip fracture. ASSESSMENT AND PLAN    This is a follow-up visit for Ms. Alea Srivastava. 1) HLA-B27 Positive Ankylosing Spondylitis. (positive HLA-B27, sacroiliitis, inflammatory arthritis).  She reports a history of Rheumatoid Arthritis that was treated with gold, which she think led her to remission until 2016. She has developed sore pain and Polymyalgia Rheumatica-like symptoms involving her hip girdle. On her initial evaluation with me, her CDAI was N/A with 7 tender and 13 swollen joints. Her hip radiograph on 7/15/2015 showed ankylosis of the left sacroiliac joint. I repeated the study to evaluate for progression which confirmed left sacroiliac ankylosis and probable right sided involvement. MRI pelvis showed left SI joint ankylosis. Partial ankylosis of right SI joint. Her labs showed a positive HLA-B27. She has a normal spinal flexion but she continues to have tenderness and complain of pain and stiffness in her lower back lasting hours. She has CKD stage 3, so NSAIDs should be avoided. She was approved for Humira with a co-pay of $5, so I asked her to start it. 2) Polymyalgia Rheumatica. She is secondary to #1. 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 She has osteoporosis. I will started her on Fosamax weekly. 4) Bilateral Knee Osteoarthritis. This was not an active issue today. 5) Chronic Kidney Disease Stage 3. Her creatinine was 0.98 mg/dL and eGFR 55 (previously 0.77 mg/dL and eGFR 74). I referred her to Dr. Viri Sparks. 6) Left Flank Pain. I am concerned from pyelonephritis. I ordered a urinalysis today. The patient voiced understanding of the aforementioned assessment and plan. Summary of plan was provided in the After Visit Summary patient instructions.      TODAY'S ORDERS    Orders Placed This Encounter    UA/M W/RFLX CULTURE, ROUTINE    alendronate (FOSAMAX) 70 mg tablet     Future Appointments  Date Time Provider Andrea Ga   12/7/2017 1:40 PM Caroleen Holter, MD [place], [place], [facility]    Adult Rheumatology   Musculoskeletal Ultrasound Certified  [address] [place] [address], [address] [place] [address]   Phone [phone]  Fax [phone]

## 2017-10-25 NOTE — PATIENT INSTRUCTIONS
I prescribed you Fosamax (Alendronate) which is a ONCE per WEEK medication to be taken on an empty stomach with a tall glass of WATER (not milk) followed by remaining up right for at least 30 minutes. You should not take your other medications before or with Fosamax and you must wait at least 30 minutes after you take Fosamax before taking your other medications. If you develop heart burn or upset stomach with this medication, please let me know.       Please call and order your Humira and bring one dose to us to teach you

## 2017-10-25 NOTE — MR AVS SNAPSHOT
Visit Information Date & Time Provider Department Dept. Phone Encounter #  
 10/25/2017  2:20 PM Ailyn , [address] [facility] [id number] Follow-up Instructions Return in about 3 months (around 1/25/2018). Your Appointments 12/7/2017  1:40 PM  
ESTABLISHED PATIENT with Ailyn Garza MD  
[address] ([facility]) Appt Note: f/up 10.20.17  
 [address] [facility] [id number] [phone]  
  
   
 [facility] [address] Upcoming Health Maintenance Date Due ZOSTER VACCINE AGE 60> 3/28/1999 GLAUCOMA SCREENING Q2Y 5/28/2004 Pneumococcal 65+ Low/Medium Risk (1 of 2 - PCV13) 5/28/2004 INFLUENZA AGE 9 TO ADULT 8/1/2017 MEDICARE YEARLY EXAM 6/3/2018 DTaP/Tdap/Td series (2 - Td) 6/2/2027 Allergies as of 10/25/2017  Review Complete On: 10/25/2017 By: Carmela Contreras LPN Severity Noted Reaction Type Reactions Leflunomide  09/06/2017    Swelling Penicillins  05/26/2010    Unable to Obtain  
 Sulfa (Sulfonamide Antibiotics)  05/26/2010    Unknown (comments) Current Immunizations  Reviewed on 9/27/2011 Name Date Influenza Vaccine 10/22/2013 Influenza Vaccine Split 9/27/2011 Not reviewed this visit You Were Diagnosed With   
  
 Codes Comments Flank pain    -  Primary ICD-10-CM: R10.9 ICD-9-CM: 789.09 Age-related osteoporosis without current pathological fracture     ICD-10-CM: M81.0 ICD-9-CM: 733.01 Vitals BP Pulse Temp Resp Weight(growth percentile) SpO2  
 150/72 (BP 1 Location: Right arm, BP Patient Position: Sitting) (!) 54 97.7 °F (36.5 °C) (Oral) 16 153 lb 12.8 oz (69.8 kg) 97% BMI OB Status Smoking Status 26.4 kg/m2 Hysterectomy Never Smoker BMI and BSA Data  Body Mass Index Body Surface Area  
 26.4 kg/m 2 1.78 m 2  
  
  
 Preferred Pharmacy Pharmacy Name Phone [facility] [address] [phone] Your Updated Medication List  
  
   
This list is accurate as of: 10/25/17  3:20 PM.  Always use your most recent med list.  
  
  
  
  
 adalimumab 40 mg/0.8 mL injection pen Commonly known as:  HUMIRA PEN  
0.8 mL by SubCUTAneous route every fourteen (14) days for 90 days. albuterol 90 mcg/actuation inhaler Commonly known as:  PROVENTIL HFA, VENTOLIN HFA, PROAIR HFA Take 1 Puff by inhalation every six (6) hours as needed for Wheezing. alendronate 70 mg tablet Commonly known as:  FOSAMAX Take 1 Tab by mouth every seven (7) days for 4 doses. Indications: POST-MENOPAUSAL OSTEOPOROSIS ALLEGRA PO Take  by mouth. amLODIPine 5 mg tablet Commonly known as:  [place] Cater Take 5 mg by mouth daily. DULERA 100-5 mcg/actuation HFA inhaler Generic drug:  mometasone-formoterol Take 2 Puffs by inhalation two (2) times a day. fluticasone 50 mcg/actuation nasal spray Commonly known as:  Isa Fields PLACE TWO SPRAYS IN EACH NOSTRIL ONCE DAILY  
  
 synthroid 50 mcg tablet Generic drug:  levothyroxine Take  by mouth daily (before breakfast). Prescriptions Sent to Pharmacy Refills  
 alendronate (FOSAMAX) 70 mg tablet 0 Sig: Take 1 Tab by mouth every seven (7) days for 4 doses. Indications: POST-MENOPAUSAL OSTEOPOROSIS Class: Normal  
 [facility]acy: [facility] [place] [address] Ph #: [phone] Route: Oral  
  
We Performed the Following UA/M W/RFLX CULTURE, ROUTINE [[id number] Custom] Follow-up Instructions Return in about 3 months (around 1/25/2018). Patient Instructions I prescribed you Fosamax (Alendronate) which is a ONCE per WEEK medication to be taken on an empty stomach with a tall glass of WATER (not milk) followed by remaining up right for at least 30 minutes. You should not take your other medications before or with Fosamax and you must wait at least 30 minutes after you take Fosamax before taking your other medications. If you develop heart burn or upset stomach with this medication, please let me know. Please call and order your Humira and bring one dose to us to teach you Introducing [facility]! Jasmin Uziel introduces [employer] patient portal. Now you can access parts of your medical record, email your doctor's office, and request medication refills online. 1. In your internet browser, go to [email or web address] 2. Click on the First Time User? Click Here link in the Sign In box. You will see the New Member Sign Up page. 3. Enter your [employer] Access Code exactly as it appears below. You will not need to use this code after youve completed the sign-up process. If you do not sign up before the expiration date, you must request a new code. · [employer] Access Code: [email or web address] Expires: 12/10/2017 10:30 AM 
 
4. Enter the last four digits of your Social Security Number (xxxx) and Date of Birth (mm/dd/yyyy) as indicated and click Submit. You will be taken to the next sign-up page. 5. Create a [employer] ID. This will be your [employer] login ID and cannot be changed, so think of one that is secure and easy to remember. 6. Create a [employer] password. You can change your password at any time. 7. Enter your Password Reset Question and Answer. This can be used at a later time if you forget your password. 8. Enter your e-mail address. You will receive e-mail notification when new information is available in [address] 9. Click Sign Up. You can now view and download portions of your medical record. 10. Click the Download Summary menu link to download a portable copy of your medical information.  
 
If you have questions, please visit the Frequently Asked Questions section of the [employer]. Remember, [employer] is NOT to be used for urgent needs. For medical emergencies, dial 911. Now available from your iPhone and Android! Please provide this summary of care documentation to your next provider. Your primary care clinician is listed as Troy Cid. If you have any questions after today's visit, please call [phone].

## 2017-10-29 LAB
APPEARANCE UR: ABNORMAL
BACTERIA #/AREA URNS HPF: ABNORMAL /[HPF]
BACTERIA UR CULT: NORMAL
BILIRUB UR QL STRIP: NEGATIVE
CASTS URNS QL MICRO: ABNORMAL /LPF
COLOR UR: YELLOW
EPI CELLS #/AREA URNS HPF: >10 /HPF
GLUCOSE UR QL: NEGATIVE
HGB UR QL STRIP: NEGATIVE
KETONES UR QL STRIP: NEGATIVE
LEUKOCYTE ESTERASE UR QL STRIP: ABNORMAL
MICRO URNS: ABNORMAL
MUCOUS THREADS URNS QL MICRO: PRESENT
NITRITE UR QL STRIP: NEGATIVE
PH UR STRIP: 6 [PH] (ref 5–7.5)
PROT UR QL STRIP: NEGATIVE
RBC #/AREA URNS HPF: ABNORMAL /HPF
SP GR UR: 1.02 (ref 1–1.03)
URINALYSIS REFLEX, 377202: ABNORMAL
UROBILINOGEN UR STRIP-MCNC: 1 MG/DL (ref 0.2–1)
WBC #/AREA URNS HPF: ABNORMAL /HPF

## 2017-10-30 RX ORDER — CEPHALEXIN 500 MG/1
500 CAPSULE ORAL 2 TIMES DAILY
Qty: 14 CAP | Refills: 0 | Status: SHIPPED | OUTPATIENT
Start: 2017-10-30 | End: 2017-11-06

## 2017-10-31 ENCOUNTER — TELEPHONE (OUTPATIENT)
Dept: RHEUMATOLOGY | Age: 77
End: 2017-10-31

## 2017-10-31 NOTE — TELEPHONE ENCOUNTER
Patient called stating she would like to speak with a nurse to discuss a letter that she received from Dr. Bety Morelos. Please advise. [phone]

## 2017-11-14 ENCOUNTER — CLINICAL SUPPORT (OUTPATIENT)
Dept: RHEUMATOLOGY | Age: 77
End: 2017-11-14

## 2017-11-14 VITALS
HEIGHT: 64 IN | TEMPERATURE: 97.9 F | WEIGHT: 153 LBS | SYSTOLIC BLOOD PRESSURE: 182 MMHG | DIASTOLIC BLOOD PRESSURE: 73 MMHG | BODY MASS INDEX: 26.12 KG/M2 | HEART RATE: 58 BPM | RESPIRATION RATE: 18 BRPM

## 2017-11-14 DIAGNOSIS — M45.8 ANKYLOSING SPONDYLITIS SACRAL AND SACROCOCCYGEAL REGION (HCC): Primary | ICD-10-CM

## 2017-11-14 DIAGNOSIS — M81.0 AGE-RELATED OSTEOPOROSIS WITHOUT CURRENT PATHOLOGICAL FRACTURE: ICD-10-CM

## 2017-11-14 RX ORDER — ALENDRONATE SODIUM 70 MG/1
70 TABLET ORAL
Qty: 12 TAB | Refills: 2 | Status: SHIPPED | OUTPATIENT
Start: 2017-11-14 | End: 2018-07-07 | Stop reason: SDUPTHER

## 2017-11-14 NOTE — PROGRESS NOTES
Pt came to office for Humira injection teaching. Pt was instructed on how to store the medication, the sites to inject the medication, the importance of rotating sites and when to do the next injection. Pt gave herself the first injection in her right abdomen. Pt tolerated injection well and stated that she understood how to do the injections. Pt instructed to watch the injection site for injection reactions and to report to us if she experiences any redness, rash, swelling, itching, etc.  Pt stated an understanding.

## 2017-11-29 ENCOUNTER — TELEPHONE (OUTPATIENT)
Dept: RHEUMATOLOGY | Age: 77
End: 2017-11-29

## 2017-11-29 NOTE — TELEPHONE ENCOUNTER
Spoke with pt who stated that she took her Humira injection on Sunday evening (second injection) and she broke out in a rash over her face and neck on Monday. She also started taking the Fosamax 4 days ago. I told her to take Benadryl and to apply some hydrocortisone cream and that I would let Dr. Db Montoya know what is going on and I would call her back. She stated an understanding.

## 2017-11-30 NOTE — TELEPHONE ENCOUNTER
I spoke with the pt and made her an appt on Monday to see Monica per Dr. Shadia Mcghee request.  She stated an understanding.

## 2017-12-04 ENCOUNTER — OFFICE VISIT (OUTPATIENT)
Dept: RHEUMATOLOGY | Age: 77
End: 2017-12-04

## 2017-12-04 VITALS
DIASTOLIC BLOOD PRESSURE: 79 MMHG | OXYGEN SATURATION: 95 % | BODY MASS INDEX: 26.36 KG/M2 | WEIGHT: 154.4 LBS | SYSTOLIC BLOOD PRESSURE: 133 MMHG | RESPIRATION RATE: 16 BRPM | HEART RATE: 56 BPM | TEMPERATURE: 97.4 F | HEIGHT: 64 IN

## 2017-12-04 DIAGNOSIS — M17.0 PRIMARY OSTEOARTHRITIS OF BOTH KNEES: ICD-10-CM

## 2017-12-04 DIAGNOSIS — N18.30 CKD (CHRONIC KIDNEY DISEASE) STAGE 3, GFR 30-59 ML/MIN (HCC): ICD-10-CM

## 2017-12-04 DIAGNOSIS — M43.28 ANKYLOSIS, SACROILIAC JOINT: Primary | ICD-10-CM

## 2017-12-04 DIAGNOSIS — M81.0 AGE-RELATED OSTEOPOROSIS WITHOUT CURRENT PATHOLOGICAL FRACTURE: ICD-10-CM

## 2017-12-04 DIAGNOSIS — R10.9 LEFT FLANK PAIN: ICD-10-CM

## 2017-12-04 DIAGNOSIS — M35.3 PMR (POLYMYALGIA RHEUMATICA) (HCC): ICD-10-CM

## 2017-12-04 RX ORDER — ETANERCEPT 50 MG/ML
50 SOLUTION SUBCUTANEOUS
Qty: 3.92 ML | Refills: 0 | Status: SHIPPED | COMMUNITY
Start: 2017-12-04 | End: 2018-04-25 | Stop reason: SDUPTHER

## 2017-12-04 RX ORDER — MOMETASONE FUROATE AND FORMOTEROL FUMARATE DIHYDRATE 200; 5 UG/1; UG/1
2 AEROSOL RESPIRATORY (INHALATION) 2 TIMES DAILY
COMMUNITY
Start: 2017-11-06 | End: 2022-07-14 | Stop reason: ALTCHOICE

## 2017-12-04 NOTE — PROGRESS NOTES
REASON FOR VISIT    This is a follow-up visit for Ms. Abdi Granados for Ankylosing Spondylitis. Spondyloarthritis phenotype includes:  Anti-CCP positive: no  Rheumatoid factor positive: no  HLA-B27: yes  Erosive disease: no  Sacroiliitis: yes  Ankylosis: yes (left SI)  Psoriasis: no  Enthesitis: no  Dactylitis: no  Nail Pitting: no  Onycholysis: no  Extra-articular manifestations include: none  SAPHO: no    Immunosuppression Screening (8/25/2017):   Quantiferon TB: negative  PPD:  Not performed  Hepatitis B: negative  Hepatitis C: negative    Therapy History includes:  Current NSAIDs include: none (contraindicated due to CKD stage 3)  Current DMARD therapy include: Humira 40 mg weekl  Prior DMARD therapy includes: Gold, leflunomide  The following DMARDs have been ineffective: none  The following DMARDs were stopped because of side effects: Gold (\"pass out\"), leflunomide  Contra-Indicated DMARDs because of CKD stage 3: methotrexate     Osteoporosis Historical Synopsis     Height loss since age 26 (at least two inches): 0.5  Fracture history includes: yes (ankle)  Family history of hip fracture: no  Fall Risk: no     Daily calcium intake is 1200 mg  Daily vitamin D intake is 800 IU     Smoking history: no  Alcohol consumption: no  Prednisone history: no     Exercise: yes     Previous work-up for osteoporosis includes the following:  DEXA Scan: 9/12/2017  Vitamin 25OH D level: 50.0 (8/25/2017)  PTH: 37 (8/25/2017)  TSH: 3.330 (8/25/2017)     Therapy History includes:     Current osteoporosis therapy includes: none  Prior osteoporosis therapy includes: none  The following osteoporosis therapy have been ineffective: none  The following osteoporosis therapy were stopped because of side effects: none    Active problems include:    Patient Active Problem List   Diagnosis Code    Allergic rhinitis, cause unspecified J30.9    Asthma J45.909    Essential hypertension, benign I10    OA (osteoarthritis) M19.90    Depression F32.9    Unspecified hypothyroidism E03.9    Mixed hyperlipidemia E78.2    Primary osteoarthritis of both knees M17.0    Ankylosis, 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 Patten Re returns for an acute visit. On her last visit with Dr. Chelle Gonzalez, he asked her to begin Humira 40 mg every 2 weeks which had been approved. She began Humira on 11/14/2017, and had her second injection on 11/26/2017 (2 days early). Dr. Chelle Gonzalez also started her on alendronate for osteoporosis. She also had complained of flank pain and foamy urine. Initial urinalysis showed white cells, so he called in cephalexin. Urine culture results showed lactobacillus. Today she complains of rash***    ***On her last visit, I started her on Humira 40 mg every 2 weeks. I also ordered an MRI of her SI joints due to radiograph showed left sacroiliitis with possible early right. Labs showed positive HLA-B27. MRI showed left SI joint ankylosis. Partial ankylosis of right SI joint. DXA done showed osteoporosis. Humira was approved $5.     ***She complains left flank pain that has been ongoing for weeks associated with foaming in her urine. Tylenol helps her flank pain. ***Today, she complains of pain in her groin associated with stiffness. Her hands still hurt. Last toxicity monitoring by blood work was done on 8/25/2017 and did not reveal any significant adverse effects, except creatinine 0.98 mg/dL, eGFR 55,  U/L. Most recent inflammatory markers from 8/25/2017 revealed a ESR 5 mm/hr (previously N/A mm/hr) and CRP 9.1 mg/L (previously N/A mg/L). The patient has not had any interval hospital admissions, infections, or surgeries.     REVIEW OF SYSTEMS    A comprehensive review of systems was performed and pertinent results are documented in the HPI, review of systems is otherwise non-contributory. PAST MEDICAL HISTORY    She has a past medical history of Allergic rhinitis, cause unspecified (5/26/2010); Asthma (5/26/2010); Depression (5/26/2010); Encounter for long-term (current) use of other medications (7/11/2011); Essential hypertension, benign (5/26/2010); Hypertension; OA (osteoarthritis) (5/26/2010); [facility] and Unspecified hypothyroidism (5/26/2010). FAMILY HISTORY    Her family history includes Cancer in her sister; Heart Disease in her father, mother, and sister; Stroke in her sister. SOCIAL HISTORY    She reports that she has never smoked. She has never used smokeless tobacco. She reports that she does not drink alcohol or use illicit drugs. IMMUNIZATIONS  Immunization History   Administered Date(s) Administered    Influenza Vaccine 10/22/2013    Influenza Vaccine Split 09/27/2011       MEDICATIONS    Current Outpatient Prescriptions   Medication Sig Dispense Refill    FEXOFENADINE HCL (ALLEGRA PO) Take  by mouth.  adalimumab (HUMIRA PEN) 40 mg/0.8 mL injection pen 0.8 mL by SubCUTAneous route every fourteen (14) days for 90 days. 2 Kit 3    mometasone-formoterol (DULERA) 100-5 mcg/actuation HFA inhaler Take 2 Puffs by inhalation two (2) times a day.  fluticasone (FLONASE) 50 mcg/actuation nasal spray PLACE TWO SPRAYS IN EACH NOSTRIL ONCE DAILY 3 Bottle 3    albuterol (PROVENTIL HFA, VENTOLIN HFA) 90 mcg/actuation inhaler Take 1 Puff by inhalation every six (6) hours as needed for Wheezing. 1 Inhaler 5    levothyroxine (SYNTHROID) 50 mcg tablet Take  by mouth daily (before breakfast).  amlodipine (NORVASC) 5 mg tablet Take 5 mg by mouth daily. ALLERGIES    Allergies   Allergen Reactions    Leflunomide Swelling    Penicillins Unable to Obtain    Sulfa (Sulfonamide Antibiotics) Unknown (comments)       PHYSICAL EXAMINATION    There were no vitals taken for this visit.   There is no height or weight on file to calculate BMI. General: Patient is alert, oriented x 3, not in acute distress    HEENT:   Sclerae are not injected and appear moist.  Oral mucous membranes are moist, there are no ulcers present. There is no alopecia. Cardiovascular:  Heart is regular rate and rhythm, no murmurs. Chest:  Lungs are clear to auscultation bilaterally. No rhonchi, wheezes, or crackles. Extremities:  Free of clubbing, cyanosis, edema    Neurological exam:  No focal sensory deficits, muscle strength is full in upper and lower extremities     Skin:    Psoriasis:     no  Nail Pitting:     no  Onycholysis:     no  Palmoplantar pustulosis:   no  Acne fulminans:    no  Acne conglobata:    no  Hidradenitis Suppurativa:   no  Dissecting cellulitis of the scalp:  no  Pilonidal sinus:    no  Erythema nodosum:    no    Musculoskeletal:  A comprehensive musculoskeletal exam was performed for all joints of each upper and lower extremity and assessed for swelling, tenderness and range of motion. Left flank tenderness    Bilateral Sharmila and Heberden nodes. Bilateral knee crepitus without effusion.   Right Ankle synovitis  Bilateral MTP  Sacroiliac tenderness  Normal spinal flexion    Costochondritis:  no   Synovitis:   Yes (see below table)  Dactylitis:   no  Enthesitis:   no    Joint Count 8/25/2017   Patient pain (0-100) (No Data)   MHAQ 0.125   Left wrist- Tender 1   Left wrist- Swollen 1   Left 1st MCP - Swollen 1   Left 2nd MCP - Swollen 1   Left 3rd MCP - Tender 1   Left 3rd MCP - Swollen 1   Left 4th MCP - Tender 1   Left 2nd PIP - Tender 1   Left 2nd PIP - Swollen 1   Left 3rd PIP - Tender 1   Left 3rd PIP - Swollen 1   Right wrist- Tender 1   Right wrist- Swollen 1   Right 1st MCP - Swollen 1   Right 2nd MCP - Tender 1   Right 2nd MCP - Swollen 1   Right 3rd MCP - Swollen 1   Right 5th MCP - Swollen 1   Right 2nd PIP - Swollen 1   Right 3rd PIP - Swollen 1   Tender Joint Count (Total) 7   Swollen Joint Count (Total) 13   Physician Assessment (0-10) 4   Patient Assessment (0-10) (No Data)       DATA REVIEW    Laboratory    The following laboratory results were reviewed and discussed with the patient:    Abstract on 11/04/2017   Component Date Value    Creatinine, External 10/05/2017 0.88    Office Visit on 10/25/2017   Component Date Value    Specific Gravity 10/25/2017 1.022     pH (UA) 10/25/2017 6.0     Color 10/25/2017 Yellow     Appearance 10/25/2017 Cloudy*    Leukocyte Esterase 10/25/2017 2+*    Protein 10/25/2017 Negative     Glucose 10/25/2017 Negative     Ketone 10/25/2017 Negative     Blood 10/25/2017 Negative     Bilirubin 10/25/2017 Negative     Urobilinogen 10/25/2017 1.0     Nitrites 10/25/2017 Negative     Microscopic Examination 10/25/2017 See additional order     URINALYSIS REFLEX 10/25/2017 Comment     WBC 10/25/2017 6-10*    RBC 10/25/2017 0-2     Epithelial cells 10/25/2017 >10*    Casts 10/25/2017 None seen     Mucus 10/25/2017 Present     Bacteria 10/25/2017 Few     Urine Culture, Routine 10/25/2017                      Value:Lactobacillus species  Greater than 100,000 colony forming units per mL     Office Visit on 08/25/2017   Component Date Value    CCP Antibodies IgG/IgA 08/25/2017 6     WBC 08/25/2017 6.2     RBC 08/25/2017 4.58     HGB 08/25/2017 13.9     HCT 08/25/2017 42.1     MCV 08/25/2017 92     MCH 08/25/2017 30.3     MCHC 08/25/2017 33.0     RDW 08/25/2017 14.2     PLATELET 94/46/2206 030     NEUTROPHILS 08/25/2017 64     Lymphocytes 08/25/2017 24     MONOCYTES 08/25/2017 9     EOSINOPHILS 08/25/2017 3     BASOPHILS 08/25/2017 0     ABS. NEUTROPHILS 08/25/2017 4.0     Abs Lymphocytes 08/25/2017 1.5     ABS. MONOCYTES 08/25/2017 0.5     ABS. EOSINOPHILS 08/25/2017 0.2     ABS. BASOPHILS 08/25/2017 0.0     IMMATURE GRANULOCYTES 08/25/2017 0     ABS. IMM.  GRANS. 08/25/2017 0.0     Hep B surface Ag screen 08/25/2017 Negative     Hepatitis Be Antigen 08/25/2017 Negative     Hep B Core Ab, IgM 08/25/2017 Negative     Hep B Core Ab, total 08/25/2017 Negative     Hepatitis Be Antibody 08/25/2017 Negative     HEP B SURFACE AB, QUAL 08/25/2017 Non Reactive     HCV Ab 08/25/2017 <0.1     Glucose 08/25/2017 84     BUN 08/25/2017 18     Creatinine 08/25/2017 0.98     GFR est non-AA 08/25/2017 55*    GFR est AA 08/25/2017 64     BUN/Creatinine ratio 08/25/2017 18     Sodium 08/25/2017 141     Potassium 08/25/2017 4.7     Chloride 08/25/2017 102     CO2 08/25/2017 23     Calcium 08/25/2017 9.6     Protein, total 08/25/2017 7.2     Albumin 08/25/2017 4.4     GLOBULIN, TOTAL 08/25/2017 2.8     A-G Ratio 08/25/2017 1.6     Bilirubin, total 08/25/2017 0.4     Alk. phosphatase 08/25/2017 120*    AST (SGOT) 08/25/2017 28     ALT (SGPT) 08/25/2017 16     C-Reactive Protein, Qt 08/25/2017 9.1*    Sed rate (ESR) 08/25/2017 5     HLA-B27 08/25/2017 Positive     QuantiFERON Incubation 08/25/2017                      Value:Incubated, specimen forwarded to [place], [place] for  completion of the assay.       Rheumatoid factor 08/25/2017 <10.0     Albumin 08/25/2017 4.1     Alpha-1-globulin 08/25/2017 0.2     ALPHA-2 GLOBULIN 08/25/2017 0.7     Beta globulin 08/25/2017 1.2     Gamma globulin 08/25/2017 0.9     M-spike 08/25/2017 Not Observed     Globulin, total 08/25/2017 3.1     A/G ratio 08/25/2017 1.3     Please note 08/25/2017 Comment     Interpretation (see belo* 08/25/2017 Comment     Specific Gravity 08/25/2017 1.011     pH (UA) 08/25/2017 6.0     Color 08/25/2017 Yellow     Appearance 08/25/2017 Clear     Leukocyte Esterase 08/25/2017 1+*    Protein 08/25/2017 Negative     Glucose 08/25/2017 Negative     Ketone 08/25/2017 Negative     Blood 08/25/2017 Negative     Bilirubin 08/25/2017 Negative     Urobilinogen 08/25/2017 0.2     Nitrites 08/25/2017 Negative     Microscopic Examination 08/25/2017 See additional order     URINALYSIS REFLEX 08/25/2017 Comment     VITAMIN D, 25-HYDROXY 08/25/2017 50.0     PTH, Intact 08/25/2017 37     TSH 08/25/2017 3.330     WBC 08/25/2017 0-5     RBC 08/25/2017 0-2     Epithelial cells 08/25/2017 0-10     Casts 08/25/2017 None seen     Mucus 08/25/2017 Present     Bacteria 08/25/2017 None seen     Urine Culture, Routine 08/25/2017                      Value:Lactobacillus species  50,000-100,000 colony forming units per mL      QuantiFERON TB Gold 08/25/2017 Negative     QUANTIFERON CRITERIA 08/25/2017 Comment     QuantiFERON TB Ag Value 08/25/2017 0.12     QuantiFERON Nil Value 08/25/2017 0.08     QuantiFERON Mitogen Value 08/25/2017 7.44     QFT TB Ag minus Nil Value 08/25/2017 0.04     Interpretation: 08/25/2017 Comment     Comment 08/25/2017 Comment        Imaging    Musculoskeletal Ultrasound    None    Radiographs    Sacroiliac Joint 8/25/2017: There is bony ankylosis of the left SI joint unchanged. There is probable ankylosis of the right SI joint as well. The right SI joint is at least narrowed. Bone mineral density is decreased without fracture or bone destruction    Bilateral Foot 8/25/2017: LEFT: No fracture or dislocation on plain film. There is narrowing of the first MTP joint with minimal spurring. No joint space erosion or periosteal reaction. Alignment is within normal limits. Bone mineralization is decreased. No soft tissue calcification. RIGHT: No fracture or dislocation on plain film. There is metatarsus primus varus with hallux valgus deformity. There is joint space loss and spurring first MTP joint. No joint space erosion or periosteal reaction. Alignment is within normal limits. Bone mineralization is decreased. No soft tissue calcification. Chest 3/09/2017: normal heart size. There is no acute process in the lung fields. The osseous structures are unremarkable. Bilateral Hand 1/20/2017: LEFT: No fracture or dislocation on plain film. There are scattered mild degenerative changes of the interphalangeal joints. There is moderate degeneration of the first [facility] joint and mild degeneration of the first MCP joint. Minimal degenerative changes of the third and fourth MCP joints. There is widening of the scapholunate interval compatible with chronic scapholunate ligament tear. No joint space erosion or periosteal reaction. Alignment is within normal limits. Bone mineralization is decreased. No soft tissue calcification. RIGHT: No fracture or dislocation on plain film. There are scattered mild degenerative changes in the interphalangeal joints and first MCP joint. There is narrowing of the lateral margin radiocarpal joint There is widening of the scapholunate interval compatible with chronic scapholunate ligament tear. No joint space erosion or periosteal reaction. Alignment is within normal limits. Bone mineralization is decreased. No soft tissue calcification. Left Hip 7/15/2015: no fracture, dislocation or other acute abnormality.  There appears to be fusion of the left sacroiliac joint and possibly the right sacroiliac joint. Spurring is noted at the symphysis. Lumbar Spine 7/15/2015: the patient is leaning to the right. There is a 2 mm retrolisthesis of L1  relative to L2. Bilateral facet arthropathy is the dominant feature at the  lower 3 levels. Bilateral laminectomies have been performed at L5-S1. Vertebral body heights spaces are well-preserved.  There is no fracture. CT Imaging    None    MR Imaging    MRI Pelvis without contrast 9/20/2017: There is normal bone signal. No para-articular edema-like signal is shown nor is there demonstration of substantial joint effusion. There is ankylosis of the inferior left sacroiliac joint without demonstration of substantial osteophyte formation.  The inferior right SI joint is substantially narrowed with perhaps a small area of ankylosis inferiorly.   There is minimal-mild superolateral joint space narrowing of the hip joints bilaterally with tiny marginal osteophytes. Moderate degenerative changes in the lower lumbar spine are shown with disc space narrowing through L4-5 as well as bilateral facet osteoarthrosis without ankylosis at L5-S1. No soft tissue mass is demonstrated. Muscle signal, size and contour appear normal. There is moderate insertional tendinopathy of the gluteus medius bilaterally with partial-thickness tearing in tiny bursal effusions. DXA     DXA 9/12/2017: (excluded Lumbar spine due to degenerative changes) left femoral neck T score: -2.1 (0.749 g/cm2), left total hip T score: -1.9 (0.763 g/cm2), right femoral neck T score: -2.4 (0.709 g/cm2), right total hip T score: -2.1 (0.746 g/cm2), and distal one third left radius T score -3.0 (BMD 0.614 g/cm2). FRAX score 17.8 % probability in 10 years for major osteoporotic fracture and 5.8 % 10 year probability of hip fracture. ASSESSMENT AND PLAN    This is a follow-up visit for Ms. Abdi Granados. 1) HLA-B27 Positive Ankylosing Spondylitis. (positive HLA-B27, sacroiliitis, inflammatory arthritis). She reports a history of Rheumatoid Arthritis that was treated with gold, which she think led her to remission until 2016. She has developed sore pain and Polymyalgia Rheumatica-like symptoms involving her hip girdle. On her initial evaluation with me, her CDAI was N/A with 7 tender and 13 swollen joints. Her hip radiograph on 7/15/2015 showed ankylosis of the left sacroiliac joint. I repeated the study to evaluate for progression which confirmed left sacroiliac ankylosis and probable right sided involvement. MRI pelvis showed left SI joint ankylosis. Partial ankylosis of right SI joint. Her labs showed a positive HLA-B27. She has a normal spinal flexion but she continues to have tenderness and complain of pain and stiffness in her lower back lasting hours. She has CKD stage 3, so NSAIDs should be avoided.  She was approved for Humira with a co-pay of $5, so I asked her to start it. 2) Polymyalgia Rheumatica. She is secondary to #1. 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 She has osteoporosis. I will started her on Fosamax weekly. 4) Bilateral Knee Osteoarthritis. This was not an active issue today. 5) Chronic Kidney Disease Stage 3. Her creatinine was 0.98 mg/dL and eGFR 55 (previously 0.77 mg/dL and eGFR 74). I referred her to Dr. Ammy Marc. 6) Left Flank Pain. I am concerned from pyelonephritis. I ordered a urinalysis today. The patient voiced understanding of the aforementioned assessment and plan. Summary of plan was provided in the After Visit Summary patient instructions. TODAY'S ORDERS    No orders of the defined types were placed in this encounter.     Future Appointments  Date Time Provider Andrea Ga   12/4/2017 3:00 PM KIMBERLEY Pederson MD, [address] [facility]    Adult Rheumatology   Musculoskeletal Ultrasound Certified  [employer] [facility] and [address] [place] [address]   Phone [phone]  Fax [phone]

## 2017-12-04 NOTE — PATIENT INSTRUCTIONS
STOP HUMIRA. Return for nurse teaching for Enbrel injection on Wed, Jan 3rd. Bring an Enbrel sample with you.

## 2017-12-04 NOTE — MR AVS SNAPSHOT
Visit Information Date & Time Provider Department Dept. Phone Encounter #  
 12/4/2017  3:00 PM Monica Parker PA-C [address] [facility] [id number] Follow-up Instructions Return in about 4 months (around 4/4/2018) for also nurse visit, Enbrel teaching 3:15 1/4/18. Upcoming Health Maintenance Date Due ZOSTER VACCINE AGE 60> 3/28/1999 GLAUCOMA SCREENING Q2Y 5/28/2004 Pneumococcal 65+ Low/Medium Risk (1 of 2 - PCV13) 5/28/2004 Influenza Age 5 to Adult 8/1/2017 MEDICARE YEARLY EXAM 6/3/2018 DTaP/Tdap/Td series (2 - Td) 6/2/2027 Allergies as of 12/4/2017  Review Complete On: 12/4/2017 By: Ashlee Craft PA-C Severity Noted Reaction Type Reactions Humira [Adalimumab]  12/04/2017    Hives Leflunomide  09/06/2017    Swelling Penicillins  05/26/2010    Unable to Obtain  
 Sulfa (Sulfonamide Antibiotics)  05/26/2010    Unknown (comments) Current Immunizations  Reviewed on 12/4/2017 Name Date Influenza Vaccine 10/22/2013 Influenza Vaccine Split 9/27/2011 Reviewed by Ashlee Craft PA-C on 12/4/2017 at  3:14 PM  
You Were Diagnosed With   
  
 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 
ICD-9-CM: 724.6 Vitals BP Pulse Temp Resp Height(growth percentile) Weight(growth percentile) 133/79 (BP 1 Location: Right arm, BP Patient Position: Sitting) (!) 56 97.4 °F (36.3 °C) (Oral) 16 5' 4\" (1.626 m) 154 lb 6.4 oz (70 kg) SpO2 BMI OB Status Smoking Status 95% 26.5 kg/m2 Hysterectomy Never Smoker Vitals History BMI and BSA Data Body Mass Index Body Surface Area  
 26.5 kg/m 2 1.78 m 2 Preferred Pharmacy Pharmacy Name Phone [facility] [address] [phone] Your Updated Medication List  
  
   
This list is accurate as of: 12/4/17  4:15 PM.  Always use your most recent med list.  
  
  
  
  
 albuterol 90 mcg/actuation inhaler Commonly known as:  PROVENTIL HFA, VENTOLIN HFA, PROAIR HFA Take 1 Puff by inhalation every six (6) hours as needed for Wheezing. ALLEGRA PO Take  by mouth. amLODIPine 5 mg tablet Commonly known as:  Ontiveros Keo Take 5 mg by mouth daily. DULERA 200-5 mcg/actuation HFA inhaler Generic drug:  mometasone-formoterol ENBREL SURECLICK 50 mg/mL (4.10 mL) injection Generic drug:  etanercept  
0.98 mL by SubCUTAneous route every seven (7) days. fluticasone 50 mcg/actuation nasal spray Commonly known as:  Lella Solum PLACE TWO SPRAYS IN EACH NOSTRIL ONCE DAILY  
  
 synthroid 50 mcg tablet Generic drug:  levothyroxine Take  by mouth daily (before breakfast). Follow-up Instructions Return in about 4 months (around 4/4/2018) for also nurse visit, Enbrel teaching 3:15 1/4/18. Patient Instructions STOP HUMIRA. Return for nurse teaching for Enbrel injection on Wed, Jan 3rd. Bring an Enbrel sample with you. Introducing [facility]! Tesfaye Weiner introduces [employer] patient portal. Now you can access parts of your medical record, email your doctor's office, and request medication refills online. 1. In your internet browser, go to [email or web address] 2. Click on the First Time User? Click Here link in the Sign In box. You will see the New Member Sign Up page. 3. Enter your [employer] Access Code exactly as it appears below. You will not need to use this code after youve completed the sign-up process. If you do not sign up before the expiration date, you must request a new code. · [employer] Access Code: [email or web address] Expires: 12/10/2017  9:30 AM 
 
4. Enter the last four digits of your Social Security Number (xxxx) and Date of Birth (mm/dd/yyyy) as indicated and click Submit. You will be taken to the next sign-up page. 5. Create a [employer] ID. This will be your [employer] login ID and cannot be changed, so think of one that is secure and easy to remember. 6. Create a [employer] password. You can change your password at any time. 7. Enter your Password Reset Question and Answer. This can be used at a later time if you forget your password. 8. Enter your e-mail address. You will receive e-mail notification when new information is available in [address] 9. Click Sign Up. You can now view and download portions of your medical record. 10. Click the Download Summary menu link to download a portable copy of your medical information. If you have questions, please visit the Frequently Asked Questions section of the [employer] website. Remember, [employer] is NOT to be used for urgent needs. For medical emergencies, dial 911. Now available from your iPhone and Android! Please provide this summary of care documentation to your next provider. Your primary care clinician is listed as Mya Briscoe. If you have any questions after today's visit, please call [phone].

## 2017-12-04 NOTE — PROGRESS NOTES
REASON FOR VISIT    This is an acute visit for Ms. Fabiana Gutierrez for Ankylosing Spondylitis. Spondyloarthritis phenotype includes:  Anti-CCP positive: no  Rheumatoid factor positive: no  HLA-B27: yes  Erosive disease: no  Sacroiliitis: yes  Ankylosis: yes (left SI)  Psoriasis: no  Enthesitis: no  Dactylitis: no  Nail Pitting: no  Onycholysis: no  Extra-articular manifestations include: none  SAPHO: no    Immunosuppression Screening (8/25/2017):   Quantiferon TB: negative  PPD:  Not performed  Hepatitis B: negative  Hepatitis C: negative    Therapy History includes:  Current NSAIDs include: none (contraindicated due to CKD stage 3)  Current DMARD therapy include: Humira 40 mg every 14 days on Monday (11/14/2017)  Prior DMARD therapy includes: Gold, leflunomide  The following DMARDs have been ineffective: none  The following DMARDs were stopped because of side effects: Gold (\"pass out\"), leflunomide  Contra-Indicated DMARDs because of CKD stage 3: methotrexate     Osteoporosis Historical Synopsis     Height loss since age 26 (at least two inches): 0.5  Fracture history includes: yes (ankle)  Family history of hip fracture: no  Fall Risk: no     Daily calcium intake is 1200 mg  Daily vitamin D intake is 800 IU     Smoking history: no  Alcohol consumption: no  Prednisone history: no     Exercise: yes     Previous work-up for osteoporosis includes the following:  DEXA Scan: 9/12/2017  Vitamin 25OH D level: 50.0 (8/25/2017)  PTH: 37 (8/25/2017)  TSH: 3.330 (8/25/2017)     Therapy History includes:     Current osteoporosis therapy includes: alendronate 70 mg every Sunday  Prior osteoporosis therapy includes: none  The following osteoporosis therapy have been ineffective: none  The following osteoporosis therapy were stopped because of side effects: none    Active problems include:    Patient Active Problem List   Diagnosis Code    Allergic rhinitis, cause unspecified J30.9    Asthma J45.909    Essential hypertension, benign I10    OA (osteoarthritis) M19.90    Depression F32.9    Unspecified hypothyroidism E03.9    Mixed hyperlipidemia E78.2    Primary osteoarthritis of both knees M17.0    Ankylosis, 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 Marcela Hale returns for an acute visit. On her last visit with Dr. Jacquelin Robles, he asked her to begin Humira 40 mg every 2 weeks which had been approved. She has ankylosis of left SI joint seen on MRI, as well as partial ankylosis of right SI joint. Labs showed positive HLA-B27. She also had active synovitis on exam. She began Humira on 11/14/2017, and had her second injection on 11/27/2017. Dr. Jacquelin Robles also started her on alendronate for osteoporosis. She also had complained of flank pain and foamy urine. Initial urinalysis showed white cells, so he called in cephalexin. Urine culture results showed lactobacillus. Today she complains of rash on her face that onset last Tuesday 11/28/2017, the day after having her Humira injection. She reports waking up and having red \"whelps\" on her face and neck and her skin felt \"rubbery\". She also had eyelid swelling, lip swelling and sores in her mouth. The rash had burning and itching. She felt warmth and chills for 3 days (did not check temperature). She took benadryl for the rash, and the rash slowly resolved by Saturday. She reports having had 5 doses of alendronate 70 mg every Sunday and tolerating well. She took alendronate yesterday without adverse reaction. Regarding joint pain, she reports joint pain in hands, fingers, and both hips (points to bilateral SI joints). She reports increased joint pain in hands and hips last week coinciding with when she had the rash. She took Tylenol 500 mg every 3 hours for several days.  She notes that she was feeling better before the rash started and noted improvement in the joint pain in her hands. She reports a stable morning joint stiffness x 1 and 1/2 hours since last visit. She reports the left flank pain resolved after completing the cephalexin. She does note some soreness in flank with certain movements. Last toxicity monitoring by blood work was done on 8/25/2017 and did not reveal any significant adverse effects, except creatinine 0.98 mg/dL, eGFR 55,  U/L. Most recent inflammatory markers from 8/25/2017 revealed a ESR 5 mm/hr (previously N/A mm/hr) and CRP 9.1 mg/L (previously N/A mg/L). The patient has not had any interval hospital admissions, infections, or surgeries. REVIEW OF SYSTEMS    A comprehensive review of systems was performed and pertinent results are documented in the HPI, review of systems is otherwise non-contributory. PAST MEDICAL HISTORY    She has a past medical history of Allergic rhinitis, cause unspecified (5/26/2010); Asthma (5/26/2010); Depression (5/26/2010); Encounter for long-term (current) use of other medications (7/11/2011); Essential hypertension, benign (5/26/2010); Hypertension; OA (osteoarthritis) (5/26/2010); Rheumatoid arthritis ([facility] [address] and Unspecified hypothyroidism (5/26/2010). FAMILY HISTORY    Her family history includes Cancer in her sister; Heart Disease in her father, mother, and sister; Stroke in her sister. SOCIAL HISTORY    She reports that she has never smoked. She has never used smokeless tobacco. She reports that she does not drink alcohol or use illicit drugs.     IMMUNIZATIONS  Immunization History   Administered Date(s) Administered    Influenza Vaccine 10/22/2013    Influenza Vaccine Split 09/27/2011       MEDICATIONS    Current Outpatient Prescriptions   Medication Sig Dispense Refill    DULERA 200-5 mcg/actuation HFA inhaler       albuterol (PROVENTIL HFA, VENTOLIN HFA) 90 mcg/actuation inhaler Take 1 Puff by inhalation every six (6) hours as needed for Wheezing. 1 Inhaler 5    levothyroxine (SYNTHROID) 50 mcg tablet Take  by mouth daily (before breakfast).  amlodipine (NORVASC) 5 mg tablet Take 5 mg by mouth daily.  FEXOFENADINE HCL (ALLEGRA PO) Take  by mouth.  fluticasone (FLONASE) 50 mcg/actuation nasal spray PLACE TWO SPRAYS IN EACH NOSTRIL ONCE DAILY 3 Bottle 3        ALLERGIES    Allergies   Allergen Reactions    Humira [Adalimumab] Hives    Leflunomide Swelling    Penicillins Unable to Obtain    Sulfa (Sulfonamide Antibiotics) Unknown (comments)       PHYSICAL EXAMINATION    Visit Vitals    /79 (BP 1 Location: Right arm, BP Patient Position: Sitting)    Pulse (!) 56    Temp 97.4 °F (36.3 °C) (Oral)    Resp 16    Ht 5' 4\" (1.626 m)    Wt 154 lb 6.4 oz (70 kg)    SpO2 95%    BMI 26.5 kg/m2     Body mass index is 26.5 kg/(m^2). General: Patient is alert, oriented x 3, not in acute distress    HEENT:   Sclerae are not injected and appear moist.  Oral mucous membranes are moist, there are no ulcers present. There is no alopecia. Cardiovascular:  Heart is regular rate and rhythm, no murmurs. Chest:  Lungs are clear to auscultation bilaterally. No rhonchi, wheezes, or crackles. Extremities:  Free of clubbing, cyanosis, edema    Neurological exam:  No focal sensory deficits, muscle strength is full in upper and lower extremities     Skin:    Psoriasis:     no  Nail Pitting:     no  Onycholysis:     no  Palmoplantar pustulosis:   no  Acne fulminans:    no  Acne conglobata:    no  Hidradenitis Suppurativa:   no  Dissecting cellulitis of the scalp:  no  Pilonidal sinus:    no  Erythema nodosum:    no    Musculoskeletal:  A comprehensive musculoskeletal exam was performed for all joints of each upper and lower extremity and assessed for swelling, tenderness and range of motion.       Left flank tenderness is now resolved    The remainder of her joint exam is the same as last visit, but with changes noted in joint count table below. Bilateral Sharmila and [place] nodes. Bilateral knee crepitus without effusion.   Right Ankle synovitis  Bilateral MTP  Sacroiliac tenderness  Normal spinal flexion    Costochondritis:  no   Synovitis:   Yes (see below table)  Dactylitis:   no  Enthesitis:   no    Joint Count 12/4/2017 8/25/2017   Patient pain (0-100) 20 (No Data)   MHAQ (No Data) 0.125   Left shoulder - Tender 1 -   Left wrist- Tender 1 1   Left wrist- Swollen - 1   Left 1st MCP - Tender 1 -   Left 1st MCP - Swollen 1 1   Left 2nd MCP - Tender 1 -   Left 2nd MCP - Swollen 1 1   Left 3rd MCP - Tender - 1   Left 3rd MCP - Swollen 1 1   Left 4th MCP - Tender - 1   Left 2nd PIP - Tender - 1   Left 2nd PIP - Swollen 1 1   Left 3rd PIP - Tender - 1   Left 3rd PIP - Swollen 1 1   Right wrist- Tender 1 1   Right wrist- Swollen - 1   Right 1st MCP - Swollen - 1   Right 2nd MCP - Tender - 1   Right 2nd MCP - Swollen 1 1   Right 3rd MCP - Swollen 1 1   Right 5th MCP - Swollen - 1   Right 2nd PIP - Tender 1 -   Right 2nd PIP - Swollen 1 1   Right 3rd PIP - Tender 1 -   Right 3rd PIP - Swollen 1 1   Tender Joint Count (Total) 7 7   Swollen Joint Count (Total) 9 13   Physician Assessment (0-10) 3 4   Patient Assessment (0-10) 1 (No Data)   CDAI Total (calculated) 20 -       DATA REVIEW    Laboratory    The following laboratory results were reviewed and discussed with the patient:    Abstract on 11/04/2017   Component Date Value    Creatinine, External 10/05/2017 0.88    Office Visit on 10/25/2017   Component Date Value    Specific Gravity 10/25/2017 1.022     pH (UA) 10/25/2017 6.0     Color 10/25/2017 Yellow     Appearance 10/25/2017 Cloudy*    Leukocyte Esterase 10/25/2017 2+*    Protein 10/25/2017 Negative     Glucose 10/25/2017 Negative     Ketone 10/25/2017 Negative     Blood 10/25/2017 Negative     Bilirubin 10/25/2017 Negative     Urobilinogen 10/25/2017 1.0     Nitrites 10/25/2017 Negative     Microscopic Examination 10/25/2017 See additional order     URINALYSIS REFLEX 10/25/2017 Comment     WBC 10/25/2017 6-10*    RBC 10/25/2017 0-2     Epithelial cells 10/25/2017 >10*    Casts 10/25/2017 None seen     Mucus 10/25/2017 Present     Bacteria 10/25/2017 Few     Urine Culture, Routine 10/25/2017                      Value:Lactobacillus species  Greater than 100,000 colony forming units per mL     Office Visit on 08/25/2017   Component Date Value    CCP Antibodies IgG/IgA 08/25/2017 6     WBC 08/25/2017 6.2     RBC 08/25/2017 4.58     HGB 08/25/2017 13.9     HCT 08/25/2017 42.1     MCV 08/25/2017 92     MCH 08/25/2017 30.3     MCHC 08/25/2017 33.0     RDW 08/25/2017 14.2     PLATELET 98/88/3012 448     NEUTROPHILS 08/25/2017 64     Lymphocytes 08/25/2017 24     MONOCYTES 08/25/2017 9     EOSINOPHILS 08/25/2017 3     BASOPHILS 08/25/2017 0     ABS. NEUTROPHILS 08/25/2017 4.0     Abs Lymphocytes 08/25/2017 1.5     ABS. MONOCYTES 08/25/2017 0.5     ABS. EOSINOPHILS 08/25/2017 0.2     ABS. BASOPHILS 08/25/2017 0.0     IMMATURE GRANULOCYTES 08/25/2017 0     ABS. IMM. GRANS. 08/25/2017 0.0     Hep B surface Ag screen 08/25/2017 Negative     Hepatitis Be Antigen 08/25/2017 Negative     Hep B Core Ab, IgM 08/25/2017 Negative     Hep B Core Ab, total 08/25/2017 Negative     Hepatitis Be Antibody 08/25/2017 Negative     HEP B SURFACE AB, QUAL 08/25/2017 Non Reactive     HCV Ab 08/25/2017 <0.1     Glucose 08/25/2017 84     BUN 08/25/2017 18     Creatinine 08/25/2017 0.98     GFR est non-AA 08/25/2017 55*    GFR est AA 08/25/2017 64     BUN/Creatinine ratio 08/25/2017 18     Sodium 08/25/2017 141     Potassium 08/25/2017 4.7     Chloride 08/25/2017 102     CO2 08/25/2017 23     Calcium 08/25/2017 9.6     Protein, total 08/25/2017 7.2     Albumin 08/25/2017 4.4     GLOBULIN, TOTAL 08/25/2017 2.8     A-G Ratio 08/25/2017 1.6     Bilirubin, total 08/25/2017 0.4     Alk. phosphatase 08/25/2017 120*    AST (SGOT) 08/25/2017 28     ALT (SGPT) 08/25/2017 16     C-Reactive Protein, Qt 08/25/2017 9.1*    Sed rate (ESR) 08/25/2017 5     HLA-B27 08/25/2017 Positive     QuantiFERON Incubation 08/25/2017                      Value:Incubated, specimen forwarded to [employer] [facility], [place] for  completion of the assay.       Rheumatoid factor 08/25/2017 <10.0     Albumin 08/25/2017 4.1     Alpha-1-globulin 08/25/2017 0.2     ALPHA-2 GLOBULIN 08/25/2017 0.7     Beta globulin 08/25/2017 1.2     Gamma globulin 08/25/2017 0.9     M-spike 08/25/2017 Not Observed     Globulin, total 08/25/2017 3.1     A/G ratio 08/25/2017 1.3     Please note 08/25/2017 Comment     Interpretation (see belo* 08/25/2017 Comment     Specific Gravity 08/25/2017 1.011     pH (UA) 08/25/2017 6.0     Color 08/25/2017 Yellow     Appearance 08/25/2017 Clear     Leukocyte Esterase 08/25/2017 1+*    Protein 08/25/2017 Negative     Glucose 08/25/2017 Negative     Ketone 08/25/2017 Negative     Blood 08/25/2017 Negative     Bilirubin 08/25/2017 Negative     Urobilinogen 08/25/2017 0.2     Nitrites 08/25/2017 Negative     Microscopic Examination 08/25/2017 See additional order     URINALYSIS REFLEX 08/25/2017 Comment     VITAMIN D, 25-HYDROXY 08/25/2017 50.0     PTH, Intact 08/25/2017 37     TSH 08/25/2017 3.330     WBC 08/25/2017 0-5     RBC 08/25/2017 0-2     Epithelial cells 08/25/2017 0-10     Casts 08/25/2017 None seen     Mucus 08/25/2017 Present     Bacteria 08/25/2017 None seen     Urine Culture, Routine 08/25/2017                      Value:Lactobacillus species  50,000-100,000 colony forming units per mL      QuantiFERON TB Gold 08/25/2017 Negative     QUANTIFERON CRITERIA 08/25/2017 Comment     QuantiFERON TB Ag Value 08/25/2017 0.12     QuantiFERON Nil Value 08/25/2017 0.08     QuantiFERON Mitogen Value 08/25/2017 7.44     QFT TB Ag minus Nil Value 08/25/2017 0.04     Interpretation: 08/25/2017 Comment     Comment 08/25/2017 Comment        Imaging    Musculoskeletal Ultrasound    None    Radiographs    Sacroiliac Joint 8/25/2017: There is bony ankylosis of the left SI joint unchanged. There is probable ankylosis of the right SI joint as well. The right SI joint is at least narrowed. Bone mineral density is decreased without fracture or bone destruction    Bilateral Foot 8/25/2017: LEFT: No fracture or dislocation on plain film. There is narrowing of the first MTP joint with minimal spurring. No joint space erosion or periosteal reaction. Alignment is within normal limits. Bone mineralization is decreased. No soft tissue calcification. RIGHT: No fracture or dislocation on plain film. There is metatarsus primus varus with hallux valgus deformity. There is joint space loss and spurring first MTP joint. No joint space erosion or periosteal reaction. Alignment is within normal limits. Bone mineralization is decreased. No soft tissue calcification. Chest 3/09/2017: normal heart size. There is no acute process in the lung fields. The osseous structures are unremarkable. Bilateral Hand 1/20/2017: LEFT: No fracture or dislocation on plain film. There are scattered mild degenerative changes of the interphalangeal joints. There is moderate degeneration of the first [facility] joint and mild degeneration of the first MCP joint. Minimal degenerative changes of the third and fourth MCP joints. There is widening of the scapholunate interval compatible with chronic scapholunate ligament tear. No joint space erosion or periosteal reaction. Alignment is within normal limits. Bone mineralization is decreased. No soft tissue calcification. RIGHT: No fracture or dislocation on plain film. There are scattered mild degenerative changes in the interphalangeal joints and first MCP joint.  There is narrowing of the lateral margin radiocarpal joint There is widening of the scapholunate interval compatible with chronic scapholunate ligament tear. No joint space erosion or periosteal reaction. Alignment is within normal limits. Bone mineralization is decreased. No soft tissue calcification. Left Hip 7/15/2015: no fracture, dislocation or other acute abnormality.  There appears to be fusion of the left sacroiliac joint and possibly the right sacroiliac joint. Spurring is noted at the symphysis. Lumbar Spine 7/15/2015: the patient is leaning to the right. There is a 2 mm retrolisthesis of L1  relative to L2. Bilateral facet arthropathy is the dominant feature at the  lower 3 levels. Bilateral laminectomies have been performed at L5-S1. Vertebral body heights spaces are well-preserved.  There is no fracture. CT Imaging    None    MR Imaging    MRI Pelvis without contrast 9/20/2017: There is normal bone signal. No para-articular edema-like signal is shown nor is there demonstration of substantial joint effusion. There is ankylosis of the inferior left sacroiliac joint without demonstration of substantial osteophyte formation. The inferior right SI joint is substantially narrowed with perhaps a small area of ankylosis inferiorly.   There is minimal-mild superolateral joint space narrowing of the hip joints bilaterally with tiny marginal osteophytes. Moderate degenerative changes in the lower lumbar spine are shown with disc space narrowing through L4-5 as well as bilateral facet osteoarthrosis without ankylosis at L5-S1. No soft tissue mass is demonstrated. Muscle signal, size and contour appear normal. There is moderate insertional tendinopathy of the gluteus medius bilaterally with partial-thickness tearing in tiny bursal effusions.     DXA     DXA 9/12/2017: (excluded Lumbar spine due to degenerative changes) left femoral neck T score: -2.1 (0.749 g/cm2), left total hip T score: -1.9 (0.763 g/cm2), right femoral neck T score: -2.4 (0.709 g/cm2), right total hip T score: -2.1 (0.746 g/cm2), and distal one third left radius T score -3.0 (BMD 0.614 g/cm2). FRAX score 17.8 % probability in 10 years for major osteoporotic fracture and 5.8 % 10 year probability of hip fracture. ASSESSMENT AND PLAN    This is an acute visit for Ms. Megan Hardy. 1) HLA-B27 Positive Ankylosing Spondylitis. (positive HLA-B27, sacroiliitis, inflammatory arthritis). She reports a history of Rheumatoid Arthritis that was treated with gold, which she think led her to remission until 2016. She has developed sore pain and Polymyalgia Rheumatica-like symptoms involving her hip girdle. Her hip radiograph on 7/15/2015 showed ankylosis of the left sacroiliac joint. A repeat radiograph of SI joints 8/25/2017 confirmed left sacroiliac ankylosis and probable right sided involvement. MRI pelvis showed left SI joint ankylosis. Partial ankylosis of right SI joint. Her labs showed a positive HLA-B27. She has a normal spinal flexion but she continues to have tenderness and complain of pain and stiffness in her lower back lasting hours. She has active peripheral synovitis. She has CKD stage 3, so NSAIDs should be avoided. She started Humira 40 mg every 14 days on 11/14/2017. After her second dose of Humira on 11/27/2017, the next day she developed hives with lip and eyelid swelling. Of note, she had already felt improvement in the joint pain in her hands. Her CDAI today was 20 (previously N/A) with 7 tender and 9 swollen joints (initially 7 tender and 13 swollen joints). She does have improvement in her swollen joints today. Today I have listed Humira as an allergy. She understands to stop Humira and she will call her pharmacy or the drug company on how to properly dispose of the 2 remaining doses that she has. I reviewed the case with Dr. Abdirashid Avila, and I reviewed with her starting Enbrel 50 mg weekly. She would like to wait until after the holidays to start a new medicine. She was given 4 samples of Enbrel Sureclick 50 mg.  She will return for nurse visit in January with an Enbrel sample for teaching and observation. If she does not have a reaction, then we will start authorization for Enbrel Sureclick 50 mg weekly. 2) Polymyalgia Rheumatica. This is secondary to #1. 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 Her DXA 9/12/2017 showed T score distal radius -3.0 and right femoral neck -2.4. She began Fosamax 70 mg every Sunday after last visit and is tolerating well. She had her 5th dose of Fosamax yesterday without reaction. I will continue Fosamax 70 mg every Sunday. 4) Bilateral Knee Osteoarthritis. This was not an active issue today. 5) Chronic Kidney Disease Stage 3. Her creatinine was 0.98 mg/dL and eGFR 55 (previously 0.77 mg/dL and eGFR 74). I referred her to Dr. Sanna Davison. She understands to avoid NSAIDs. 6) Left Flank Pain. She completed cephalexin with relief. Urinalysis showed white cells, but culture showed lactobacillus. This is now resolved. The patient voiced understanding of the aforementioned assessment and plan. Summary of plan was provided in the After Visit Summary patient instructions. TODAY'S ORDERS    Orders Placed This Encounter    ENBREL SURECLICK 50 mg/mL (1.97 mL) injection     No future appointments. Monica Friedman    Adult Rheumatology   [facility] of [address] [place] [address], [address] [place] [address]   Phone [phone]  Fax [phone]

## 2018-01-08 RX ORDER — FLUTICASONE PROPIONATE 50 MCG
SPRAY, SUSPENSION (ML) NASAL
Qty: 3 BOTTLE | Refills: 2 | Status: SHIPPED | OUTPATIENT
Start: 2018-01-08 | End: 2022-06-24 | Stop reason: ALTCHOICE

## 2018-04-25 ENCOUNTER — OFFICE VISIT (OUTPATIENT)
Dept: RHEUMATOLOGY | Age: 78
End: 2018-04-25

## 2018-04-25 VITALS
HEART RATE: 60 BPM | HEIGHT: 64 IN | SYSTOLIC BLOOD PRESSURE: 126 MMHG | OXYGEN SATURATION: 95 % | TEMPERATURE: 97.9 F | BODY MASS INDEX: 27.21 KG/M2 | WEIGHT: 159.4 LBS | DIASTOLIC BLOOD PRESSURE: 64 MMHG | RESPIRATION RATE: 16 BRPM

## 2018-04-25 DIAGNOSIS — M81.0 AGE-RELATED OSTEOPOROSIS WITHOUT CURRENT PATHOLOGICAL FRACTURE: ICD-10-CM

## 2018-04-25 DIAGNOSIS — M35.3 PMR (POLYMYALGIA RHEUMATICA) (HCC): ICD-10-CM

## 2018-04-25 DIAGNOSIS — M70.62 TROCHANTERIC BURSITIS OF BOTH HIPS: ICD-10-CM

## 2018-04-25 DIAGNOSIS — N18.30 CKD (CHRONIC KIDNEY DISEASE) STAGE 3, GFR 30-59 ML/MIN (HCC): ICD-10-CM

## 2018-04-25 DIAGNOSIS — M17.0 PRIMARY OSTEOARTHRITIS OF BOTH KNEES: ICD-10-CM

## 2018-04-25 DIAGNOSIS — M70.61 TROCHANTERIC BURSITIS OF BOTH HIPS: ICD-10-CM

## 2018-04-25 DIAGNOSIS — M45.8 ANKYLOSING SPONDYLITIS OF SACRAL REGION (HCC): Primary | ICD-10-CM

## 2018-04-25 RX ORDER — CHROMIUM PICOLINATE 200 MCG
TABLET ORAL
COMMUNITY

## 2018-04-25 RX ORDER — ALENDRONATE SODIUM 70 MG/1
TABLET ORAL
COMMUNITY
Start: 2018-04-23 | End: 2018-08-01 | Stop reason: SDUPTHER

## 2018-04-25 RX ORDER — ETANERCEPT 50 MG/ML
50 SOLUTION SUBCUTANEOUS
Qty: 3.92 ML | Refills: 2 | Status: SHIPPED | OUTPATIENT
Start: 2018-04-25 | End: 2018-06-15 | Stop reason: SDUPTHER

## 2018-04-25 RX ORDER — ACYCLOVIR 400 MG/1
TABLET ORAL AS NEEDED
COMMUNITY
Start: 2018-03-23 | End: 2022-07-14 | Stop reason: ALTCHOICE

## 2018-04-25 NOTE — PATIENT INSTRUCTIONS
I will submit an order for Enbrel to [facility]. Expect a call from them about your Enbrel. If you are approved but your copay is too high, then tell them you would like to apply for patient assistance. [facility] should contact you to schedule physical therapy. Continue follow up with Dr. Yonathan Laguerre around June for the kidneys.

## 2018-04-25 NOTE — PROGRESS NOTES
REASON FOR VISIT    This is an acute visit for Ms. Charlene White for Ankylosing Spondylitis. Spondyloarthritis phenotype includes:  Anti-CCP positive: no  Rheumatoid factor positive: no  HLA-B27: yes  Erosive disease: no  Sacroiliitis: yes  Ankylosis: yes (left SI)  Psoriasis: no  Enthesitis: no  Dactylitis: no  Nail Pitting: no  Onycholysis: no  Extra-articular manifestations include: none  SAPHO: no    Immunosuppression Screening (8/25/2017):   Quantiferon TB: negative  PPD:  Not performed  Hepatitis B: negative  Hepatitis C: negative    Therapy History includes:  Current NSAIDs include: none (contraindicated due to CKD stage 3)  Current DMARD therapy include: none   Prior DMARD therapy includes: Gold, leflunomide, Humira (11/2017)  The following DMARDs have been ineffective: none  The following DMARDs were stopped because of side effects: Gold (\"pass out\"), leflunomide, Humira (hives, lip and eyelid swelling)  Contra-Indicated DMARDs because of CKD stage 3: methotrexate     Osteoporosis Historical Synopsis     Height loss since age 26 (at least two inches): 0.5  Fracture history includes: yes (ankle)  Family history of hip fracture: no  Fall Risk: no     Daily calcium intake is 1200 mg  Daily vitamin D intake is 800 IU     Smoking history: no  Alcohol consumption: no  Prednisone history: no     Exercise: yes     Previous work-up for osteoporosis includes the following:  DEXA Scan: 9/12/2017  Vitamin 25OH D level: 53.3 (10/04/2017)  PTH: 35 (10/04/2017)  TSH: 3.330 (8/25/2017)     Therapy History includes:     Current osteoporosis therapy includes: alendronate 70 mg every Sunday (11/2017)  Prior osteoporosis therapy includes: none  The following osteoporosis therapy have been ineffective: none  The following osteoporosis therapy were stopped because of side effects: none    Active problems include:    Patient Active Problem List   Diagnosis Code    Allergic rhinitis, cause unspecified J30.9    Asthma J45.909    Essential hypertension, benign I10    OA (osteoarthritis) M19.90    Depression F32.9    Unspecified hypothyroidism E03.9    Mixed hyperlipidemia E78.2    Primary osteoarthritis of both knees M17.0    Ankylosis, 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 Lorena Skelton returns for an acute visit. On her last visit, I stopped Humira due to hives, lip and eyelid swelling. I switched her to Enbrel, giving her samples to return for an observed injection. I continued alendronate 70 mg every Sunday, tolerating well and dosing properly. Today, she reports pain in her left lower back and left hip. But overall stiffness seems to predominate, in her low back, hands, and ankles lasting hours. She also reports myalgias in her thighs, calves, upper back/neck area across shoulders and into upper arms. No joint swelling. She brought an Enbrel sample with her today. She waited so long to come in for Enbrel teaching and observation since she was waiting for after the holidays. She also felt an overall weakness and increased palpitations for over a month after stopping Humira that she wanted to be sure it was cleared from her system before starting something new. No interval falls or fractures. Last toxicity monitoring by blood work was done on 8/25/2017 and did not reveal any significant adverse effects, except creatinine 0.98 mg/dL, eGFR 55,  U/L. Most recent inflammatory markers from 8/25/2017 revealed a ESR 5 mm/hr (previously N/A mm/hr) and CRP 9.1 mg/L (previously N/A mg/L). Labs 10/04/2017 show creatinine 0.88 mg/dL and eGFR 63. The patient has not had any interval hospital admissions, infections, or surgeries. She continues to work as a .      REVIEW OF SYSTEMS    A comprehensive review of systems was performed and pertinent results are documented in the HPI, review of systems is otherwise non-contributory. PAST MEDICAL HISTORY    She has a past medical history of Allergic rhinitis, cause unspecified (5/26/2010); Asthma (5/26/2010); Depression (5/26/2010); Encounter for long-term (current) use of other medications (7/11/2011); Essential hypertension, benign (5/26/2010); Hypertension; OA (osteoarthritis) (5/26/2010); [facility] and Unspecified hypothyroidism (5/26/2010). FAMILY HISTORY    Her family history includes Cancer in her sister; Heart Disease in her father, mother, and sister; Stroke in her sister. SOCIAL HISTORY    She reports that she has never smoked. She has never used smokeless tobacco. She reports that she does not drink alcohol or use illicit drugs. IMMUNIZATIONS  Immunization History   Administered Date(s) Administered    Influenza Vaccine 10/22/2013    Influenza Vaccine Split 09/27/2011       MEDICATIONS    Current Outpatient Prescriptions   Medication Sig Dispense Refill    DULERA 200-5 mcg/actuation HFA inhaler       albuterol (PROVENTIL HFA, VENTOLIN HFA) 90 mcg/actuation inhaler Take 1 Puff by inhalation every six (6) hours as needed for Wheezing. 1 Inhaler 5    levothyroxine (SYNTHROID) 50 mcg tablet Take  by mouth daily (before breakfast).  amlodipine (NORVASC) 5 mg tablet Take 5 mg by mouth daily.  FEXOFENADINE HCL (ALLEGRA PO) Take  by mouth.       fluticasone (FLONASE) 50 mcg/actuation nasal spray PLACE TWO SPRAYS IN EACH NOSTRIL ONCE DAILY 3 Bottle 3        ALLERGIES    Allergies   Allergen Reactions    Humira [Adalimumab] Hives    Leflunomide Swelling    Penicillins Unable to Obtain    Sulfa (Sulfonamide Antibiotics) Unknown (comments)       PHYSICAL EXAMINATION    Visit Vitals    /64 (BP 1 Location: Left arm, BP Patient Position: Sitting)    Pulse 60    Temp 97.9 °F (36.6 °C) (Oral)    Resp 16    Ht 5' 4\" (1.626 m)    Wt 159 lb 6.4 oz (72.3 kg)    SpO2 95%    BMI 27.36 kg/m2     Body mass index is 27.36 kg/(m^2). General: Patient is alert, oriented x 3, not in acute distress    HEENT:   Sclerae are not injected and appear moist.  Oral mucous membranes are moist, there are no ulcers present. There is no alopecia. Cardiovascular:  Heart is regular rate and rhythm, no murmurs. Chest:  Lungs are clear to auscultation bilaterally. No rhonchi, wheezes, or crackles. Extremities:  Free of clubbing, cyanosis, edema    Neurological exam:  No focal sensory deficits, muscle strength is full in upper and lower extremities     Skin:    Psoriasis:     no  Nail Pitting:     no  Onycholysis:     no  Palmoplantar pustulosis:   no  Acne fulminans:    no  Acne conglobata:    no  Hidradenitis Suppurativa:   no  Dissecting cellulitis of the scalp:  no  Pilonidal sinus:    no  Erythema nodosum:    no    Musculoskeletal:  A comprehensive musculoskeletal exam was performed for all joints of each upper and lower extremity and assessed for swelling, tenderness and range of motion. Bilateral upper trapezius tenderness  Bilateral Sharmila and Heberden nodes. Bilateral greater trochanter tenderness  Bilateral knee crepitus without effusion.   Right Ankle synovitis (RESOLVED)  Bilateral MTP tenderness (RIGHT SIDE ONLY TODAY)  Sacroiliac tenderness (LEFT SIDE ONLY TODAY)  Normal spinal flexion (fingertips touch the floor)    Costochondritis:  no   Synovitis:   Yes (see below table)  Dactylitis:   no  Enthesitis:   no    Joint Count 4/25/2018 12/4/2017 8/25/2017   Patient pain (0-100) - 20 (No Data)   MHAQ - (No Data) 0.125   Left shoulder - Tender - 1 -   Left wrist- Tender 1 1 1   Left wrist- Swollen - - 1   Left 1st MCP - Tender - 1 -   Left 1st MCP - Swollen - 1 1   Left 2nd MCP - Tender 1 1 -   Left 2nd MCP - Swollen 1 1 1   Left 3rd MCP - Tender - - 1   Left 3rd MCP - Swollen 1 1 1   Left 4th MCP - Tender - - 1   Left 5th MCP - Tender 1 - -   Left 2nd PIP - Tender 1 - 1   Left 2nd PIP - Swollen 1 1 1   Left 3rd PIP - Tender 1 - 1   Left 3rd PIP - Swollen 1 1 1   Right shoulder - Tender 1 - -   Right elbow - Tender 1 - -   Right wrist- Tender - 1 1   Right wrist- Swollen - - 1   Right 1st MCP - Tender 1 - -   Right 1st MCP - Swollen 1 - 1   Right 2nd MCP - Tender 1 - 1   Right 2nd MCP - Swollen 1 1 1   Right 3rd MCP - Tender 1 - -   Right 3rd MCP - Swollen 1 1 1   Right 5th MCP - Tender 1 - -   Right 5th MCP - Swollen 1 - 1   Right 2nd PIP - Tender 1 1 -   Right 2nd PIP - Swollen 1 1 1   Right 3rd PIP - Tender - 1 -   Right 3rd PIP - Swollen 1 1 1   Tender Joint Count (Total) 12 7 7   Swollen Joint Count (Total) 10 9 13   Physician Assessment (0-10) - 3 4   Patient Assessment (0-10) - 1 (No Data)   CDAI Total (calculated) - 20 -       DATA REVIEW    Laboratory     Recent laboratory results were reviewed, summarized, and discussed with the patient. Imaging    Musculoskeletal Ultrasound    None    Radiographs    Sacroiliac Joint 8/25/2017: There is bony ankylosis of the left SI joint unchanged. There is probable ankylosis of the right SI joint as well. The right SI joint is at least narrowed. Bone mineral density is decreased without fracture or bone destruction    Bilateral Foot 8/25/2017: LEFT: No fracture or dislocation on plain film. There is narrowing of the first MTP joint with minimal spurring. No joint space erosion or periosteal reaction. Alignment is within normal limits. Bone mineralization is decreased. No soft tissue calcification. RIGHT: No fracture or dislocation on plain film. There is metatarsus primus varus with hallux valgus deformity. There is joint space loss and spurring first MTP joint. No joint space erosion or periosteal reaction. Alignment is within normal limits. Bone mineralization is decreased. No soft tissue calcification. Chest 3/09/2017: normal heart size. There is no acute process in the lung fields.  The osseous structures are unremarkable. Bilateral Hand 1/20/2017: LEFT: No fracture or dislocation on plain film. There are scattered mild degenerative changes of the interphalangeal joints. There is moderate degeneration of the first [facility] joint and mild degeneration of the first MCP joint. Minimal degenerative changes of the third and fourth MCP joints. There is widening of the scapholunate interval compatible with chronic scapholunate ligament tear. No joint space erosion or periosteal reaction. Alignment is within normal limits. Bone mineralization is decreased. No soft tissue calcification. RIGHT: No fracture or dislocation on plain film. There are scattered mild degenerative changes in the interphalangeal joints and first MCP joint. There is narrowing of the lateral margin radiocarpal joint There is widening of the scapholunate interval compatible with chronic scapholunate ligament tear. No joint space erosion or periosteal reaction. Alignment is within normal limits. Bone mineralization is decreased. No soft tissue calcification. Left Hip 7/15/2015: no fracture, dislocation or other acute abnormality.  There appears to be fusion of the left sacroiliac joint and possibly the right sacroiliac joint. Spurring is noted at the symphysis. Lumbar Spine 7/15/2015: the patient is leaning to the right. There is a 2 mm retrolisthesis of L1  relative to L2. Bilateral facet arthropathy is the dominant feature at the  lower 3 levels. Bilateral laminectomies have been performed at L5-S1. Vertebral body heights spaces are well-preserved.  There is no fracture. CT Imaging    None    MR Imaging    MRI Pelvis without contrast 9/20/2017: There is normal bone signal. No para-articular edema-like signal is shown nor is there demonstration of substantial joint effusion. There is ankylosis of the inferior left sacroiliac joint without demonstration of substantial osteophyte formation.  The inferior right SI joint is substantially narrowed with perhaps a small area of ankylosis inferiorly.   There is minimal-mild superolateral joint space narrowing of the hip joints bilaterally with tiny marginal osteophytes. Moderate degenerative changes in the lower lumbar spine are shown with disc space narrowing through L4-5 as well as bilateral facet osteoarthrosis without ankylosis at L5-S1. No soft tissue mass is demonstrated. Muscle signal, size and contour appear normal. There is moderate insertional tendinopathy of the gluteus medius bilaterally with partial-thickness tearing in tiny bursal effusions. Ultrasound    Retroperitoneum 10/11/2017: RIGHT KIDNEY: The right kidney has normal echogenicity with no mass, stone or hydronephrosis. The right kidney measures 9.1 cm in length. LEFT KIDNEY: The left kidney has normal echogenicity with  no mass, stone or hydronephrosis. There may be mild caliectasis. The left kidney measures 8.7 cm in length. RETROPERITONEUM: The aorta is atherosclerotic and tapers normally. The aortic bifurcation is normal. The IVC is not visualized due to body habitus and bowel gas. No retroperitoneal mass is identified. BLADDER: The urinary bladder is incompletely distended. DXA     DXA 9/12/2017: (excluded Lumbar spine due to degenerative changes) left femoral neck T score: -2.1 (0.749 g/cm2), left total hip T score: -1.9 (0.763 g/cm2), right femoral neck T score: -2.4 (0.709 g/cm2), right total hip T score: -2.1 (0.746 g/cm2), and distal one third left radius T score -3.0 (BMD 0.614 g/cm2). FRAX score 17.8 % probability in 10 years for major osteoporotic fracture and 5.8 % 10 year probability of hip fracture. ASSESSMENT AND PLAN    This is an acute visit for Ms. Lorena Skelton. 1) HLA-B27 Positive Ankylosing Spondylitis. (positive HLA-B27, sacroiliitis, inflammatory arthritis). She reports a history of Rheumatoid Arthritis that was treated with gold, which she think led her to remission until 2016.  She had developed sore pain and Polymyalgia Rheumatica-like symptoms involving her hip girdle in the fall of 2016. Her hip radiograph on 7/15/2015 showed ankylosis of the left sacroiliac joint. A repeat radiograph of SI joints 8/25/2017 confirmed left sacroiliac ankylosis and probable right sided involvement. MRI pelvis showed left SI joint ankylosis. Partial ankylosis of right SI joint. Her labs showed a positive HLA-B27. She has a normal spinal flexion but she continues to have tenderness and complain of pain and stiffness in her lower back lasting hours. She has active peripheral synovitis. She has CKD stage 3, so NSAIDs and methotrexate should be avoided. She started Humira 40 mg every 14 days on 11/14/2017. After her second dose of Humira on 11/27/2017, the next day she developed hives with lip and eyelid swelling, so Humira was discontinued on her last visit 12/04/2017. Of note, she had already felt improvement in the joint pain in her hands with just 2 doses of Humira. I gave her samples of Enbrel and asked her to return for an observed injection, and she did not return until today. Today she has an increase in Polymyalgia Rheumatica-like symptoms involving both her hip and shoulder girdle, along with active peripheral synovitis. She has tenderness in the left sacroiliac joint with normal back flexion. Today she has 12 tender and 10 swollen joints (previously 7 tender and 9 swollen joints). If she was concerned about dosing herself at home, given her prior reaction to Humira, I offered that she may receive Remicade at the infusion center. She preferred Enbrel. She brought a sample of Enbrel Sureclick 50 mg with her that I had given her previously. She received injection training with the nurse and injected herself with Enbrel. She was observed for 20 minutes without reaction. She has 3 more samples of Enbrel Sureclick 50 mg at home which she will continue weekly.  [facility] Sureclick 50 mg weekly. She understands that if approved and her copay is too high, she will ask to fill out forms for patient assistance. She is to call if she uses her last dose of Enbrel samples and has not had shipment of her prescription yet such that we may obtain more samples for her. I instructed her on signing up for [address] I have also referred her to physical therapy for the sacroiliac pain. Follow up in 3 months. 2) Polymyalgia Rheumatica. This is secondary to #1. 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 Her DXA 9/12/2017 showed T score distal radius -3.0 and right femoral neck -2.4. She began Fosamax 70 mg every Sunday 11/2017 and is tolerating well. I will continue Fosamax 70 mg every Sunday. 4) Bilateral Knee Osteoarthritis. This was not an active issue today. 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 Her most recent labs 10/04/2017 show creatinine 0.88 mg/dL and eGFR 63 (previously creatinine was 0.98, 0.77 mg/dL and eGFR 55, 74). She has seen Dr. Arvin Denver and second opinion from Dr. Lizzy Hawkins. She is to follow up with Dr. Lizzy Hawkins in June 2018, whom I told her will be monitoring and following her kidney function. She understands to avoid NSAIDs. I will avoid methotrexate. 6) Bilateral Greater Trochanteric Bursitis. This is reproducible on exam. I have referred her to physical therapy. The patient voiced understanding of the aforementioned assessment and plan. Summary of plan was provided in the After Visit Summary patient instructions. TODAY'S ORDERS  Orders Placed This Encounter    REFERRAL TO PHYSICAL THERAPY    ENBREL SURECLICK 50 mg/mL (2.67 mL) injection         Future Appointments  Date Time Provider Andrea Ga   7/11/2018 1:40 PM Tammie Wilde MD [address]       Monica Carrera    Adult Rheumatology   [facility] [facility], [address] [place] [address]   Phone [phone]  Fax [phone]

## 2018-04-25 NOTE — MR AVS SNAPSHOT
[address] 13 
[phone] Patient: Neftaly Salvador MRN:  [id number] Visit Information Date & Time Provider Department Dept. Phone Encounter #  
 4/25/2018  1:00 PM Monica Busby PA-C [address] [place] [address] [id number] Follow-up Instructions Return in about 3 months (around 7/25/2018) for with  [facility]. Upcoming Health Maintenance Date Due ZOSTER VACCINE AGE 60> 3/28/1999 GLAUCOMA SCREENING Q2Y 5/28/2004 Pneumococcal 65+ Low/Medium Risk (1 of 2 - PCV13) 5/28/2004 Influenza Age 5 to Adult 8/1/2017 MEDICARE YEARLY EXAM 6/3/2018 DTaP/Tdap/Td series (2 - Td) 6/2/2027 Allergies as of 4/25/2018  Review Complete On: 4/25/2018 By: Will Mendiola LPN Severity Noted Reaction Type Reactions Humira [Adalimumab]  12/04/2017    Hives Leflunomide  09/06/2017    Swelling Penicillins  05/26/2010    Unable to Obtain  
 Sulfa (Sulfonamide Antibiotics)  05/26/2010    Unknown (comments) Current Immunizations  Reviewed on 12/4/2017 Name Date Influenza Vaccine 10/22/2013 Influenza Vaccine Split 9/27/2011 Not reviewed this visit You Were Diagnosed With   
  
 Codes Comments Ankylosing spondylitis of sacral region [facility])    -  Primary ICD-10-CM: M45.8 ICD-9-CM: 720.0 PMR (polymyalgia rheumatica) (HCC)     ICD-10-CM: M35.3 ICD-9-CM: 130 Age-related osteoporosis without current pathological fracture     ICD-10-CM: M81.0 ICD-9-CM: 733.01 Primary osteoarthritis of both knees     ICD-10-CM: M17.0 ICD-9-CM: 715.16 CKD (chronic kidney disease) stage 3, GFR 30-59 ml/min     ICD-10-CM: N18.3 ICD-9-CM: 575. 3 Trochanteric bursitis of both hips     ICD-10-CM: M70.61, M70.62 ICD-9-CM: 726.5 Vitals BP Pulse Temp Resp Height(growth percentile) Weight(growth percentile) 126/64 (BP 1 Location: Left arm, BP Patient Position: Sitting) 60 97.9 °F (36.6 °C) (Oral) 16 5' 4\" (1.626 m) 159 lb 6.4 oz (72.3 kg) SpO2 BMI OB Status Smoking Status 95% 27.36 kg/m2 Hysterectomy Never Smoker Vitals History BMI and BSA Data Body Mass Index Body Surface Area  
 27.36 kg/m 2 1.81 m 2 [facility] [address] [place] [address] Rd [phone] Your Updated Medication List  
  
   
This list is accurate as of 4/25/18  2:31 PM.  Always use your most recent med list.  
  
  
  
  
 acyclovir 400 mg tablet Commonly known as:  ZOVIRAX  
  
 albuterol 90 mcg/actuation inhaler Commonly known as:  PROVENTIL HFA, VENTOLIN HFA, PROAIR HFA Take 1 Puff by inhalation every six (6) hours as needed for Wheezing. alendronate 70 mg tablet Commonly known as:  FOSAMAX ALLEGRA PO Take  by mouth. amLODIPine 5 mg tablet Commonly known as:  Sundra Raleigh Take 5 mg by mouth daily. CALCIUM 600 WITH VITAMIN D3 600 mg(1,500mg) -400 unit Cap Generic drug:  Calcium-Cholecalciferol (D3) Take  by mouth. DULERA 200-5 mcg/actuation HFA inhaler Generic drug:  mometasone-formoterol ENBREL SURECLICK 50 mg/mL (4.25 mL) injection Generic drug:  etanercept  
0.98 mL by SubCUTAneous route every seven (7) days. fluticasone 50 mcg/actuation nasal spray Commonly known as:  [place] PLACE TWO SPRAYS IN EACH NOSTRIL ONCE DAILY  
  
 synthroid 50 mcg tablet Generic drug:  levothyroxine Take  by mouth daily (before breakfast). We Performed the Following REFERRAL TO PHYSICAL THERAPY [RBI31 Custom] Follow-up Instructions Return in about 3 months (around 7/25/2018) for with Dr. Alaina Quesada. Referral Information Referral ID Referred By Referred To  
  
 [id number] MAVIS NIEVES [facility],   [address] [facility] Phone: [phone] Fax: [phone] Visits Status Start Date End Date 1 New Request 4/25/18 4/25/19 If your referral has a status of pending review or denied, additional information will be sent to support the outcome of this decision. Patient Instructions [facility] Expect a call from them about your Enbrel. If you are approved but your copay is too high, then tell them you would like to apply for patient assistance. [facility] should contact you to schedule physical therapy. Continue follow up with Dr. Kamlesh De León around June for the kidneys. Introducing [facility]! Trever Francois introduces [employer] patient portal. Now you can access parts of your medical record, email your doctor's office, and request medication refills online. 1. In your internet browser, go to [email or web address] 2. Click on the First Time User? Click Here link in the Sign In box. You will see the New Member Sign Up page. 3. Enter your [employer] Access Code exactly as it appears below. You will not need to use this code after youve completed the sign-up process. If you do not sign up before the expiration date, you must request a new code. · [employer] Access Code: [email or web address] Expires: 7/24/2018  2:31 PM 
 
4. Enter the last four digits of your Social Security Number (xxxx) and Date of Birth (mm/dd/yyyy) as indicated and click Submit. You will be taken to the next sign-up page. 5. Create a [employer] ID. This will be your [employer] login ID and cannot be changed, so think of one that is secure and easy to remember. 6. Create a [employer] password. You can change your password at any time. 7. Enter your Password Reset Question and Answer. This can be used at a later time if you forget your password. 8. Enter your e-mail address.  You will receive e-mail notification when new information is available in [employer]. 9. Click Sign Up. You can now view and download portions of your medical record. 10. Click the Download Summary menu link to download a portable copy of your medical information. If you have questions, please visit the Frequently Asked Questions section of the [employer] website. Remember, [employer] is NOT to be used for urgent needs. For medical emergencies, dial 911. Now available from your iPhone and Android! Please provide this summary of care documentation to your next provider. Your primary care clinician is listed as Helen Robles. If you have any questions after today's visit, please call [phone].

## 2018-05-22 ENCOUNTER — TELEPHONE (OUTPATIENT)
Dept: RHEUMATOLOGY | Age: 78
End: 2018-05-22

## 2018-05-22 NOTE — TELEPHONE ENCOUNTER
Received fax from [address] was approved. Prescription needs to go through [employer], and is being transferred for next refill.

## 2018-06-15 DIAGNOSIS — M45.8 ANKYLOSING SPONDYLITIS OF SACRAL REGION (HCC): ICD-10-CM

## 2018-06-15 RX ORDER — ETANERCEPT 50 MG/ML
50 SOLUTION SUBCUTANEOUS
Qty: 4 EACH | Refills: 6 | Status: SHIPPED | OUTPATIENT
Start: 2018-06-15 | End: 2018-09-13

## 2018-07-07 DIAGNOSIS — M81.0 AGE-RELATED OSTEOPOROSIS WITHOUT CURRENT PATHOLOGICAL FRACTURE: ICD-10-CM

## 2018-07-09 RX ORDER — ALENDRONATE SODIUM 70 MG/1
TABLET ORAL
Qty: 12 TAB | Refills: 1 | Status: SHIPPED | OUTPATIENT
Start: 2018-07-09 | End: 2018-12-23 | Stop reason: SDUPTHER

## 2018-08-01 ENCOUNTER — OFFICE VISIT (OUTPATIENT)
Dept: RHEUMATOLOGY | Age: 78
End: 2018-08-01

## 2018-08-01 VITALS
BODY MASS INDEX: 27.66 KG/M2 | WEIGHT: 162 LBS | TEMPERATURE: 97.7 F | DIASTOLIC BLOOD PRESSURE: 70 MMHG | HEIGHT: 64 IN | SYSTOLIC BLOOD PRESSURE: 157 MMHG | RESPIRATION RATE: 18 BRPM | HEART RATE: 52 BPM

## 2018-08-01 DIAGNOSIS — M35.3 PMR (POLYMYALGIA RHEUMATICA) (HCC): ICD-10-CM

## 2018-08-01 DIAGNOSIS — M45.8 ANKYLOSING SPONDYLITIS OF SACRAL REGION (HCC): Primary | ICD-10-CM

## 2018-08-01 DIAGNOSIS — M81.0 AGE-RELATED OSTEOPOROSIS WITHOUT CURRENT PATHOLOGICAL FRACTURE: ICD-10-CM

## 2018-08-01 DIAGNOSIS — Z79.60 LONG-TERM USE OF IMMUNOSUPPRESSANT MEDICATION: ICD-10-CM

## 2018-08-01 NOTE — MR AVS SNAPSHOT
[address] 
[phone] Patient: Yung Grove MRN:  [id number] Visit Information Date & Time Provider Department Dept. Phone Encounter #  
 8/1/2018  1:40 PM Lata Dickey MD [address] [facility] [address] [facility] [id number] Follow-up Instructions Return in about 3 months (around 11/1/2018). Upcoming Health Maintenance Date Due ZOSTER VACCINE AGE 60> 3/28/1999 GLAUCOMA SCREENING Q2Y 5/28/2004 Pneumococcal 65+ Low/Medium Risk (1 of 2 - PCV13) 5/28/2004 MEDICARE YEARLY EXAM 6/3/2018 Influenza Age 5 to Adult 8/1/2018 DTaP/Tdap/Td series (2 - Td) 6/2/2027 Allergies as of 8/1/2018  Review Complete On: 8/1/2018 By: Clarke Silva RN Severity Noted Reaction Type Reactions Humira [Adalimumab]  12/04/2017    Hives Leflunomide  09/06/2017    Swelling Penicillins  05/26/2010    Unable to Obtain  
 Sulfa (Sulfonamide Antibiotics)  05/26/2010    Unknown (comments) Current Immunizations  Reviewed on 4/25/2018 Name Date Influenza Vaccine 10/22/2013 Influenza Vaccine Split 9/27/2011 Not reviewed this visit You Were Diagnosed With   
  
 Codes Comments Ankylosing spondylitis of sacral region [facility])    -  Primary ICD-10-CM: M45.8 ICD-9-CM: 720.0 PMR (polymyalgia rheumatica) ([facility])     ICD-10-CM: M35.3 ICD-9-CM: 254 Long-term use of immunosuppressant medication     ICD-10-CM: Z79.899 ICD-9-CM: V58.69 Age-related osteoporosis without current pathological fracture     ICD-10-CM: M81.0 ICD-9-CM: 733.01 Vitals BP Pulse Temp Resp Height(growth percentile) Weight(growth percentile) 157/70 (!) 52 97.7 °F (36.5 °C) 18 5' 4\" (1.626 m) 162 lb (73.5 kg) BMI OB Status Smoking Status 27.81 kg/m2 Hysterectomy Never Smoker Vitals History BMI and BSA Data Body Mass Index Body Surface Area  
 27.81 kg/m 2 1.82 m 2 Preferred Pharmacy Pharmacy Name Phone [facility] [place], [address] [place] [address] [phone] Your Updated Medication List  
  
   
This list is accurate as of 8/1/18  1:49 PM.  Always use your most recent med list.  
  
  
  
  
 acyclovir 400 mg tablet Commonly known as:  ZOVIRAX  
  
 albuterol 90 mcg/actuation inhaler Commonly known as:  PROVENTIL HFA, VENTOLIN HFA, PROAIR HFA Take 1 Puff by inhalation every six (6) hours as needed for Wheezing. alendronate 70 mg tablet Commonly known as:  FOSAMAX TAKE ONE TABLET BY MOUTH ONCE WEEKLY ALLEGRA PO Take  by mouth. amLODIPine 5 mg tablet Commonly known as:  Isa Darting Take 5 mg by mouth daily. CALCIUM 600 WITH VITAMIN D3 600 mg(1,500mg) -400 unit Cap Generic drug:  Calcium-Cholecalciferol (D3) Take  by mouth. DULERA 200-5 mcg/actuation HFA inhaler Generic drug:  mometasone-formoterol ENBREL SURECLICK 50 mg/mL (2.59 mL) injection Generic drug:  etanercept  
0.98 mL by SubCUTAneous route every seven (7) days for 90 days. fluticasone 50 mcg/actuation nasal spray Commonly known as:  Spring Patterson PLACE TWO SPRAYS IN EACH NOSTRIL ONCE DAILY  
  
 synthroid 50 mcg tablet Generic drug:  levothyroxine Take  by mouth daily (before breakfast). We Performed the Following C REACTIVE PROTEIN, QT [62000 CPT(R)] CHRONIC HEPATITIS PANEL [EHE0514 Custom] QUANTIFERON TB GOLD [OBM02351 Custom] REFERRAL TO PHYSICAL THERAPY [CUV32 Custom] Comments:  
 Evaluate and treat for ankylosing spondylitis involving the SI joint SED RATE (ESR) [id number] CPT(R)] Follow-up Instructions Return in about 3 months (around 11/1/2018). Referral Information Referral ID Referred By Referred To  
  
 [id number] Rosendo Zarco Not Available Visits Status Start Date End Date 1 New Request 8/1/18 8/1/19 If your referral has a status of pending review or denied, additional information will be sent to support the outcome of this decision. Introducing [facility]! [facility] introduces [employer] patient portal. Now you can access parts of your medical record, email your doctor's office, and request medication refills online. 1. In your internet browser, go to [email or web address] 2. Click on the First Time User? Click Here link in the Sign In box. You will see the New Member Sign Up page. 3. Enter your [employer] Access Code exactly as it appears below. You will not need to use this code after youve completed the sign-up process. If you do not sign up before the expiration date, you must request a new code. · [employer] Access Code: [email or web address] Expires: 10/30/2018  1:49 PM 
 
4. Enter the last four digits of your Social Security Number (xxxx) and Date of Birth (mm/dd/yyyy) as indicated and click Submit. You will be taken to the next sign-up page. 5. Create a [employer] ID. This will be your [employer] login ID and cannot be changed, so think of one that is secure and easy to remember. 6. Create a [employer] password. You can change your password at any time. 7. Enter your Password Reset Question and Answer. This can be used at a later time if you forget your password. 8. Enter your e-mail address. You will receive e-mail notification when new information is available in [address] 9. Click Sign Up. You can now view and download portions of your medical record. 10. Click the Download Summary menu link to download a portable copy of your medical information. If you have questions, please visit the Frequently Asked Questions section of the [employer] website. Remember, [employer] is NOT to be used for urgent needs. For medical emergencies, dial 911. Now available from your iPhone and Android! Please provide this summary of care documentation to your next provider. Your primary care clinician is listed as Gale Villeda. If you have any questions after today's visit, please call [phone].

## 2018-08-01 NOTE — PROGRESS NOTES
REASON FOR VISIT    This is an acute visit for Ms. Klaudia Davila for Ankylosing Spondylitis. Spondyloarthritis phenotype includes:  Anti-CCP positive: no  Rheumatoid factor positive: no  HLA-B27: yes  Erosive disease: no  Sacroiliitis: yes  Ankylosis: yes (left SI)  Psoriasis: no  Enthesitis: yes (Achilles)  Dactylitis: no  Nail Pitting: no  Onycholysis: no  Extra-articular manifestations include: Polymyalgia Rheumatica   SAPHO: no    Immunosuppression Screening (8/25/2017):   Quantiferon TB: negative  PPD:  Not performed  Hepatitis B: negative  Hepatitis C: negative    Therapy History includes:  Current NSAIDs include: none (contraindicated due to CKD stage 3)  Current DMARD therapy include: Enbrel 50 mg weekly (5/22/2018 to present)  Prior DMARD therapy includes: Gold, leflunomide, Humira (11/2017 ot 12/2017)  The following DMARDs have been ineffective: none  The following DMARDs were stopped because of side effects: Gold (\"pass out\"), leflunomide (swelling), Humira (hives, lip and eyelid swelling)  Contra-Indicated DMARDs because of CKD stage 3: methotrexate     Osteoporosis Historical Synopsis     Height loss since age 26 (at least two inches): 0.5  Fracture history includes: yes (ankle)  Family history of hip fracture: no  Fall Risk: no     Daily calcium intake is 1200 mg  Daily vitamin D intake is 800 IU     Smoking history: no  Alcohol consumption: no  Prednisone history: no     Exercise: yes     Previous work-up for osteoporosis includes the following:  DEXA Scan: 9/12/2017  Vitamin 25OH D level: 53.3 (10/04/2017)  PTH: 35 (10/04/2017)  TSH: 3.330 (8/25/2017)     Therapy History includes:     Current osteoporosis therapy includes: alendronate 70 mg every Sunday (11/2017 to present)  Prior osteoporosis therapy includes: none  The following osteoporosis therapy have been ineffective: none  The following osteoporosis therapy were stopped because of side effects: none    Active problems include:    Patient Active Problem List   Diagnosis Code    Allergic rhinitis, cause unspecified J30.9    Asthma J45.909    Essential hypertension, benign I10    OA (osteoarthritis) M19.90    Depression F32.9    Unspecified hypothyroidism E03.9    Mixed hyperlipidemia E78.2    Primary osteoarthritis of both knees M17.0    Ankylosis, 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 [address] M45.8    CKD (chronic kidney disease) stage 3, GFR 30-59 ml/min N18.3    Long-term use of immunosuppressant medication Z79.899       HISTORY OF PRESENT ILLNESS    Ms. Adam Palm returns for an acute visit. On her last visit, with my PA, she started her on Enbrel 50 mg weekly with good tolerance, except for mild pruirits. She also was referred to physical therapy which helped her very much. Today, she feels much better. She has a little pain in her hands and lower back. She some swelling in her hands and morning stiffness lasting several hours. No interval falls or fractures. Last toxicity monitoring by blood work was done on 8/25/2017 and did not reveal any significant adverse effects, except creatinine 0.98 mg/dL, eGFR 55,  U/L. Most recent inflammatory markers from 8/25/2017 revealed a ESR 5 mm/hr (previously N/A mm/hr) and CRP 9.1 mg/L (previously N/A mg/L). She continues to work as a . The patient has not had any interval hospital admissions, infections, or surgeries. REVIEW OF SYSTEMS    A comprehensive review of systems was performed and pertinent results are documented in the HPI, review of systems is otherwise non-contributory. PAST MEDICAL HISTORY    She has a past medical history of Allergic rhinitis, cause unspecified (5/26/2010); Asthma (5/26/2010); Depression (5/26/2010); Encounter for long-term (current) use of other medications (7/11/2011); Essential hypertension, benign (5/26/2010);  Hypertension; OA (osteoarthritis) (5/26/2010); Rheumatoid arthritis ([facility] [address] and Unspecified hypothyroidism (5/26/2010). FAMILY HISTORY    Her family history includes Cancer in her sister; Heart Disease in her father, mother, and sister; Stroke in her sister. SOCIAL HISTORY    She reports that she has never smoked. She has never used smokeless tobacco. She reports that she does not drink alcohol or use illicit drugs. IMMUNIZATIONS  Immunization History   Administered Date(s) Administered    Influenza Vaccine 10/22/2013    Influenza Vaccine Split 09/27/2011       MEDICATIONS    Current Outpatient Prescriptions   Medication Sig Dispense Refill    alendronate (FOSAMAX) 70 mg tablet TAKE ONE TABLET BY MOUTH ONCE WEEKLY 12 Tab 1    ENBREL SURECLICK 50 mg/mL (2.11 mL) injection 0.98 mL by SubCUTAneous route every seven (7) days for 90 days. 4 Each 6    acyclovir (ZOVIRAX) 400 mg tablet       Calcium-Cholecalciferol, D3, (CALCIUM 600 WITH VITAMIN D3) 600 mg(1,500mg) -400 unit cap Take  by mouth.  fluticasone (FLONASE) 50 mcg/actuation nasal spray PLACE TWO SPRAYS IN EACH NOSTRIL ONCE DAILY 3 Bottle 2    DULERA 200-5 mcg/actuation HFA inhaler       FEXOFENADINE HCL (ALLEGRA PO) Take  by mouth.  albuterol (PROVENTIL HFA, VENTOLIN HFA) 90 mcg/actuation inhaler Take 1 Puff by inhalation every six (6) hours as needed for Wheezing. 1 Inhaler 5    levothyroxine (SYNTHROID) 50 mcg tablet Take  by mouth daily (before breakfast).  amlodipine (NORVASC) 5 mg tablet Take 5 mg by mouth daily. ALLERGIES    Allergies   Allergen Reactions    Humira [Adalimumab] Hives    Leflunomide Swelling    Penicillins Unable to Obtain    Sulfa (Sulfonamide Antibiotics) Unknown (comments)       PHYSICAL EXAMINATION    Visit Vitals    /70    Pulse (!) 52    Temp 97.7 °F (36.5 °C)    Resp 18    Ht 5' 4\" (1.626 m)    Wt 162 lb (73.5 kg)    BMI 27.81 kg/m2     Body mass index is 27.81 kg/(m^2).     General: Patient is alert, oriented x 3, not in acute distress    HEENT:   Sclerae are not injected and appear moist.  Oral mucous membranes are moist, there are no ulcers present. There is no alopecia. Cardiovascular:  Heart is regular rate and rhythm, no murmurs. Chest:  Lungs are clear to auscultation bilaterally. No rhonchi, wheezes, or crackles. Extremities:  Free of clubbing, cyanosis, edema    Neurological exam:  No focal sensory deficits, muscle strength is full in upper and lower extremities     Skin:    Psoriasis:     no  Nail Pitting:     no  Onycholysis:     no  Palmoplantar pustulosis:   no  Acne fulminans:    no  Acne conglobata:    no  Hidradenitis Suppurativa:   no  Dissecting cellulitis of the scalp:  no  Pilonidal sinus:    no  Erythema nodosum:    no    Musculoskeletal:  A comprehensive musculoskeletal exam was performed for all joints of each upper and lower extremity and assessed for swelling, tenderness and range of motion. Bilateral upper trapezius tenderness  Bilateral Sharmila and Heberden nodes. Bilateral greater trochanter tenderness  Bilateral knee crepitus without effusion.   Bilateral MTP tenderness (RESOLVED  Sacroiliac tenderness (LEFT SIDE)  Normal spinal flexion (fingertips touch the floor)    Costochondritis:  no   Synovitis:   Yes (see below table)  Dactylitis:   no  Enthesitis:   Yes (Achilles; left)    Joint Count 8/1/2018 4/25/2018 12/4/2017 8/25/2017   Patient pain (0-100) - - 20 (No Data)   MHAQ - - (No Data) 0.125   Left shoulder - Tender - - 1 -   Left wrist- Tender 1 1 1 1   Left wrist- Swollen 1 - - 1   Left 1st MCP - Tender 1 - 1 -   Left 1st MCP - Swollen 1 - 1 1   Left 2nd MCP - Tender 1 1 1 -   Left 2nd MCP - Swollen 1 1 1 1   Left 3rd MCP - Tender 1 - - 1   Left 3rd MCP - Swollen 1 1 1 1   Left 4th MCP - Tender 1 - - 1   Left 5th MCP - Tender 1 1 - -   Left 5th MCP - Swollen 1 - - -   Left thumb IP - Tender 1 - - -   Left thumb IP - Swollen 0 - - -   Left 2nd PIP - Tender - 1 - 1   Left 2nd PIP - Swollen - 1 1 1   Left 3rd PIP - Tender - 1 - 1   Left 3rd PIP - Swollen - 1 1 1   Right shoulder - Tender - 1 - -   Right elbow - Tender - 1 - -   Right wrist- Tender 1 - 1 1   Right wrist- Swollen 1 - - 1   Right 1st MCP - Tender 1 1 - -   Right 1st MCP - Swollen 1 1 - 1   Right 2nd MCP - Tender 1 1 - 1   Right 2nd MCP - Swollen 1 1 1 1   Right 3rd MCP - Tender 1 1 - -   Right 3rd MCP - Swollen 1 1 1 1   Right 4th MCP - Tender 1 - - -   Right 5th MCP - Tender 1 1 - -   Right 5th MCP - Swollen 1 1 - 1   Right 2nd PIP - Tender - 1 1 -   Right 2nd PIP - Swollen 1 1 1 1   Right 3rd PIP - Tender - - 1 -   Right 3rd PIP - Swollen 1 1 1 1   Right 4th PIP - Swollen 1 - - -   Tender Joint Count (Total) 13 12 7 7   Swollen Joint Count (Total) 13 10 9 13   Physician Assessment (0-10) - - 3 4   Patient Assessment (0-10) - - 1 (No Data)   CDAI Total (calculated) - - 20 -       DATA REVIEW    Laboratory     Recent laboratory results were reviewed, summarized, and discussed with the patient. Imaging    Musculoskeletal Ultrasound    None    Radiographs    Sacroiliac Joint 8/25/2017: There is bony ankylosis of the left SI joint unchanged. There is probable ankylosis of the right SI joint as well. The right SI joint is at least narrowed. Bone mineral density is decreased without fracture or bone destruction    Bilateral Foot 8/25/2017: LEFT: No fracture or dislocation on plain film. There is narrowing of the first MTP joint with minimal spurring. No joint space erosion or periosteal reaction. Alignment is within normal limits. Bone mineralization is decreased. No soft tissue calcification. RIGHT: No fracture or dislocation on plain film. There is metatarsus primus varus with hallux valgus deformity. There is joint space loss and spurring first MTP joint. No joint space erosion or periosteal reaction. Alignment is within normal limits. Bone mineralization is decreased. No soft tissue calcification.     Chest 3/09/2017: normal heart size. There is no acute process in the lung fields. The osseous structures are unremarkable. Bilateral Hand 1/20/2017: LEFT: No fracture or dislocation on plain film. There are scattered mild degenerative changes of the interphalangeal joints. There is moderate degeneration of the first [facility] joint and mild degeneration of the first MCP joint. Minimal degenerative changes of the third and fourth MCP joints. There is widening of the scapholunate interval compatible with chronic scapholunate ligament tear. No joint space erosion or periosteal reaction. Alignment is within normal limits. Bone mineralization is decreased. No soft tissue calcification. RIGHT: No fracture or dislocation on plain film. There are scattered mild degenerative changes in the interphalangeal joints and first MCP joint. There is narrowing of the lateral margin radiocarpal joint There is widening of the scapholunate interval compatible with chronic scapholunate ligament tear. No joint space erosion or periosteal reaction. Alignment is within normal limits. Bone mineralization is decreased. No soft tissue calcification. Left Hip 7/15/2015: no fracture, dislocation or other acute abnormality.  There appears to be fusion of the left sacroiliac joint and possibly the right sacroiliac joint. Spurring is noted at the symphysis. Lumbar Spine 7/15/2015: the patient is leaning to the right. There is a 2 mm retrolisthesis of L1  relative to L2. Bilateral facet arthropathy is the dominant feature at the  lower 3 levels. Bilateral laminectomies have been performed at L5-S1. Vertebral body heights spaces are well-preserved.  There is no fracture. CT Imaging    None    MR Imaging    MRI Pelvis without contrast 9/20/2017: There is normal bone signal. No para-articular edema-like signal is shown nor is there demonstration of substantial joint effusion.  There is ankylosis of the inferior left sacroiliac joint without demonstration of substantial osteophyte formation. The inferior right SI joint is substantially narrowed with perhaps a small area of ankylosis inferiorly.   There is minimal-mild superolateral joint space narrowing of the hip joints bilaterally with tiny marginal osteophytes. Moderate degenerative changes in the lower lumbar spine are shown with disc space narrowing through L4-5 as well as bilateral facet osteoarthrosis without ankylosis at L5-S1. No soft tissue mass is demonstrated. Muscle signal, size and contour appear normal. There is moderate insertional tendinopathy of the gluteus medius bilaterally with partial-thickness tearing in tiny bursal effusions. Ultrasound    Retroperitoneum 10/11/2017: RIGHT KIDNEY: The right kidney has normal echogenicity with no mass, stone or hydronephrosis. The right kidney measures 9.1 cm in length. LEFT KIDNEY: The left kidney has normal echogenicity with  no mass, stone or hydronephrosis. There may be mild caliectasis. The left kidney measures 8.7 cm in length. RETROPERITONEUM: The aorta is atherosclerotic and tapers normally. The aortic bifurcation is normal. The IVC is not visualized due to body habitus and bowel gas. No retroperitoneal mass is identified. BLADDER: The urinary bladder is incompletely distended. DXA     DXA 9/12/2017: (excluded Lumbar spine due to degenerative changes) left femoral neck T score: -2.1 (0.749 g/cm2), left total hip T score: -1.9 (0.763 g/cm2), right femoral neck T score: -2.4 (0.709 g/cm2), right total hip T score: -2.1 (0.746 g/cm2), and distal one third left radius T score -3.0 (BMD 0.614 g/cm2). FRAX score 17.8 % probability in 10 years for major osteoporotic fracture and 5.8 % 10 year probability of hip fracture. ASSESSMENT AND PLAN    This is an acute visit for Ms. Carlota Montes. 1) HLA-B27 Positive Ankylosing Spondylitis. (positive HLA-B27, sacroiliitis, inflammatory arthritis).  She reports a history of Rheumatoid Arthritis that was treated with gold, which she think led her to remission until 2016. She had developed sore pain and Polymyalgia Rheumatica-like symptoms involving her hip girdle in the fall of 2016. Her hip radiograph on 7/15/2015 showed ankylosis of the left sacroiliac joint. A repeat radiograph of SI joints 8/25/2017 confirmed left sacroiliac ankylosis and probable right sided involvement. MRI pelvis showed left SI joint ankylosis. Partial ankylosis of right SI joint. Her labs showed a positive HLA-B27. She has a normal spinal flexion but she continues to have tenderness and complain of pain and stiffness in her lower back lasting hours. She has active peripheral synovitis. She has CKD stage 3, so NSAIDs and methotrexate should be avoided. She started Humira 40 mg every 14 days on 11/14/2017. After her second dose of Humira on 11/27/2017, the next day she developed hives with lip and eyelid swelling, so Humira was discontinued on her last visit 12/04/2017. Of note, she had already felt improvement in the joint pain in her hands with just 2 doses of Humira. She was then started on Enbrel on 5/22/2018 and is much better. She has 13 tender and 13 swollen joints and left SI joint tenderness and left Achilles enthesitis. I will continue treatment for now. Labs today. I plan on changing her to Enbrel Mini if she continues to have injection site reacitons. 2) Polymyalgia Rheumatica. This is secondary to #1. 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 Her DXA 9/12/2017 showed T score distal radius -3.0 and right femoral neck -2.4. She began Fosamax 70 mg every Sunday 11/2017 and is tolerating well. I will continue Fosamax 70 mg every Sunday. 4) Long Term Use of Immunosuppressants. The patient remains on immunomodulatory medications (Enbrel) and requires frequent toxicity monitoring by blood work. 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  Her most recent labs 10/04/2017 show creatinine 0.88 mg/dL and eGFR 63 (previously creatinine was 0.98, 0.77 mg/dL and eGFR 55, 74). She has seen Dr. David Villaseñor and second opinion from Dr. Aj Cole. She is to follow up with Dr. Aj Cole in June 2018, whom I told her will be monitoring and following her kidney function. She understands to avoid NSAIDs. I will avoid methotrexate. 6) Bilateral Greater Trochanteric Bursitis. This is reproducible on exam. I have referred her to physical therapy which helped her. She asked for another referral.    7) Bilateral Knee Osteoarthritis. This was not an active issue today. The patient voiced understanding of the aforementioned assessment and plan. Summary of plan was provided in the After Visit Summary patient instructions.      TODAY'S ORDERS    Orders Placed This Encounter    QUANTIFERON TB GOLD    CHRONIC HEPATITIS PANEL    SED RATE (ESR)    C REACTIVE PROTEIN, QT    REFERRAL TO PHYSICAL THERAPY     Future Appointments  Date Time Provider Andrea Sury   11/7/2018 2:20 PM MD Thomas Asencio MD, [address] [facility]    Adult Rheumatology   Musculoskeletal Ultrasound Certified  [facility]  [address] [address]   Phone [phone]  Fax [phone]

## 2018-08-03 LAB
COMMENT, 144067: NORMAL
CRP SERPL-MCNC: 1.9 MG/L (ref 0–4.9)
ERYTHROCYTE [SEDIMENTATION RATE] IN BLOOD BY WESTERGREN METHOD: 4 MM/HR (ref 0–40)
HBV CORE AB SERPL QL IA: NEGATIVE
HBV CORE IGM SERPL QL IA: NEGATIVE
HBV E AB SERPL QL IA: NEGATIVE
HBV E AG SERPL QL IA: NEGATIVE
HBV SURFACE AB SER QL: NON REACTIVE
HBV SURFACE AG SERPL QL IA: NEGATIVE
HCV AB S/CO SERPL IA: <0.1 S/CO RATIO (ref 0–0.9)
QUANTIFERON INCUBATION: NORMAL

## 2018-08-27 ENCOUNTER — TELEPHONE (OUTPATIENT)
Dept: RHEUMATOLOGY | Age: 78
End: 2018-08-27

## 2018-08-27 NOTE — TELEPHONE ENCOUNTER
Spoke with pt who called stating that she thought she was having a reaction to Enbrel. She stated that since Saturday she has been feeling achy all over her body and fatigued. She has been on the Enbrel since May. I told her that I do not think that she is having a reaction to Enbrel. I advised her to go see her PCP and see if something else might be going on with her. I told her that if she is sick and gets placed on antibiotics that she needs to stop the Enbrel until she is better. She stated an understanding.

## 2018-08-27 NOTE — TELEPHONE ENCOUNTER
----- Message from Nai Gomez sent at 8/27/2018 10:42 AM EDT -----  Regarding: Dr. Gerardo Dove requesting a call back in regards to a possible allergic reaction to \"Embryl\". The backline and main line was called. Pt best contact number is [phone].

## 2018-08-28 ENCOUNTER — OFFICE VISIT (OUTPATIENT)
Dept: FAMILY MEDICINE CLINIC | Age: 78
End: 2018-08-28

## 2018-08-28 ENCOUNTER — HOSPITAL ENCOUNTER (OUTPATIENT)
Dept: LAB | Age: 78
Discharge: HOME | End: 2018-08-28
Payer: MEDICARE

## 2018-08-28 VITALS
HEIGHT: 64 IN | WEIGHT: 162.6 LBS | OXYGEN SATURATION: 98 % | SYSTOLIC BLOOD PRESSURE: 126 MMHG | BODY MASS INDEX: 27.76 KG/M2 | TEMPERATURE: 97.9 F | DIASTOLIC BLOOD PRESSURE: 76 MMHG | HEART RATE: 54 BPM | RESPIRATION RATE: 18 BRPM

## 2018-08-28 DIAGNOSIS — M45.8 ANKYLOSING SPONDYLITIS OF SACRAL REGION (HCC): ICD-10-CM

## 2018-08-28 DIAGNOSIS — R35.0 URINARY FREQUENCY: Primary | ICD-10-CM

## 2018-08-28 DIAGNOSIS — I10 ESSENTIAL HYPERTENSION, BENIGN: ICD-10-CM

## 2018-08-28 DIAGNOSIS — K21.00 GASTROESOPHAGEAL REFLUX DISEASE WITH ESOPHAGITIS: ICD-10-CM

## 2018-08-28 DIAGNOSIS — M79.10 MYALGIA: ICD-10-CM

## 2018-08-28 LAB
BILIRUB UR QL STRIP: NEGATIVE
FLUAV+FLUBV AG NOSE QL IA.RAPID: NEGATIVE POS/NEG
FLUAV+FLUBV AG NOSE QL IA.RAPID: NEGATIVE POS/NEG
GLUCOSE UR-MCNC: NEGATIVE MG/DL
KETONES P FAST UR STRIP-MCNC: NEGATIVE MG/DL
PH UR STRIP: 5.5 [PH] (ref 4.6–8)
PROT UR QL STRIP: NEGATIVE
SP GR UR STRIP: 1.01 (ref 1–1.03)
UA UROBILINOGEN AMB POC: NORMAL (ref 0.2–1)
URINALYSIS CLARITY POC: CLEAR
URINALYSIS COLOR POC: YELLOW
URINE BLOOD POC: NEGATIVE
URINE LEUKOCYTES POC: NORMAL
URINE NITRITES POC: NEGATIVE
VALID INTERNAL CONTROL?: YES

## 2018-08-28 PROCEDURE — 85651 RBC SED RATE NONAUTOMATED: CPT

## 2018-08-28 PROCEDURE — 36415 COLL VENOUS BLD VENIPUNCTURE: CPT

## 2018-08-28 PROCEDURE — 80053 COMPREHEN METABOLIC PANEL: CPT

## 2018-08-28 RX ORDER — PHENOL/SODIUM PHENOLATE
20 AEROSOL, SPRAY (ML) MUCOUS MEMBRANE DAILY
Qty: 30 TAB | Refills: 2 | Status: SHIPPED | OUTPATIENT
Start: 2018-08-28 | End: 2019-03-07

## 2018-08-28 RX ORDER — PREDNISONE 10 MG/1
10 TABLET ORAL 2 TIMES DAILY
Qty: 10 TAB | Refills: 0 | Status: SHIPPED | OUTPATIENT
Start: 2018-08-28 | End: 2018-11-07

## 2018-08-28 NOTE — PROGRESS NOTES
Chief Complaint Patient presents with  Flu Pt states she may have the flu due to bodyaches and acid reflux.

## 2018-08-28 NOTE — MR AVS SNAPSHOT
Larisa Cleveland 
 
 
 [address] [facility] [id number] 
[phone] Patient: Berry Brittle MRN: [id number] ZWW:[id number] Visit Information Date & Time Provider Department Dept. Phone Encounter #  
 8/28/2018 11:00 AM Thomas Hoyos Corky [phone] [id number] Follow-up Instructions Return in about 3 months (around 11/28/2018). Your Appointments 11/7/2018  2:20 PM  
ESTABLISHED PATIENT with Tree Suero MD  
[facility] Appt Note: 3 month fu; 3 month fu  
 [facility] [place] [id number]  
[address] [place] [address] Tracey Oliveira [id number] Upcoming Health Maintenance Date Due ZOSTER VACCINE AGE 60> 3/28/1999 GLAUCOMA SCREENING Q2Y 5/28/2004 Pneumococcal 65+ Low/Medium Risk (1 of 2 - PCV13) 5/28/2004 MEDICARE YEARLY EXAM 6/3/2018 Influenza Age 5 to Adult 8/1/2018 DTaP/Tdap/Td series (2 - Td) 6/2/2027 Allergies as of 8/28/2018  Review Complete On: 8/28/2018 By: Garrison Sweeney MD  
  
 Severity Noted Reaction Type Reactions Humira [Adalimumab]  12/04/2017    Hives Leflunomide  09/06/2017    Swelling Penicillins  05/26/2010    Unable to Obtain  
 Sulfa (Sulfonamide Antibiotics)  05/26/2010    Unknown (comments) Current Immunizations  Reviewed on 4/25/2018 Name Date Influenza Vaccine 10/22/2013 Influenza Vaccine Split 9/27/2011 Not reviewed this visit You Were Diagnosed With   
  
 Codes Comments Urinary frequency    -  Primary ICD-10-CM: R35.0 ICD-9-CM: 788.41 Myalgia     ICD-10-CM: M79.1 ICD-9-CM: 729.1 Ankylosing spondylitis of sacral region [facility])     ICD-10-CM: M45.8 ICD-9-CM: 720.0 Essential hypertension, benign     ICD-10-CM: I10 
ICD-9-CM: 401.1 Gastroesophageal reflux disease with esophagitis     ICD-10-CM: K21.0 ICD-9-CM: 530.11 Vitals BP Pulse Temp Resp Height(growth percentile) Weight(growth percentile) 126/76 (BP 1 Location: Right arm, BP Patient Position: Sitting) (!) 54 97.9 °F (36.6 °C) (Oral) 18 5' 4\" (1.626 m) 162 lb 9.6 oz (73.8 kg) SpO2 BMI OB Status Smoking Status 98% 27.91 kg/m2 Hysterectomy Never Smoker Vitals History BMI and BSA Data Body Mass Index Body Surface Area  
 27.91 kg/m 2 1.83 m 2 Preferred Pharmacy Pharmacy Name Phone Kristin Blizzard [address] [facility] [address] [place] [address] [phone] Your Updated Medication List  
  
   
This list is accurate as of 8/28/18 11:53 AM.  Always use your most recent med list.  
  
  
  
  
 acyclovir 400 mg tablet Commonly known as:  ZOVIRAX  
  
 albuterol 90 mcg/actuation inhaler Commonly known as:  PROVENTIL HFA, VENTOLIN HFA, PROAIR HFA Take 1 Puff by inhalation every six (6) hours as needed for Wheezing. alendronate 70 mg tablet Commonly known as:  FOSAMAX TAKE ONE TABLET BY MOUTH ONCE WEEKLY ALLEGRA PO Take  by mouth. amLODIPine 5 mg tablet Commonly known as:  Izetta Harder Take 5 mg by mouth daily. CALCIUM 600 WITH VITAMIN D3 600 mg(1,500mg) -400 unit Cap Generic drug:  Calcium-Cholecalciferol (D3) Take  by mouth. DULERA 200-5 mcg/actuation HFA inhaler Generic drug:  mometasone-formoterol ENBREL SURECLICK 50 mg/mL (1.64 mL) injection Generic drug:  etanercept  
0.98 mL by SubCUTAneous route every seven (7) days for 90 days. fluticasone 50 mcg/actuation nasal spray Commonly known as:  Keisha Coronado PLACE TWO SPRAYS IN EACH NOSTRIL ONCE DAILY Omeprazole delayed release 20 mg tablet Commonly known as:  PRILOSEC D/R Take 1 Tab by mouth daily. predniSONE 10 mg tablet Commonly known as:  Merle Krishna Take 1 Tab by mouth two (2) times a day. synthroid 50 mcg tablet Generic drug:  levothyroxine Take  by mouth daily (before breakfast). Prescriptions Sent to Pharmacy Refills  
 predniSONE (DELTASONE) 10 mg tablet 0 Sig: Take 1 Tab by mouth two (2) times a day. Class: Normal  
 Pharmacy: [address] Ph #: [phone] Route: Oral  
 Omeprazole delayed release (PRILOSEC D/R) 20 mg tablet 2 Sig: Take 1 Tab by mouth daily. Class: Normal  
 Pharmacy: [address] Ph #: [phone] Route: Oral  
  
We Performed the Following AMB POC COMPLETE CBC, AUTOMATED [11962 CPT(R)] AMB POC BIENVENIDO INFLUENZA A/B TEST [08248 CPT(R)] AMB POC URINALYSIS DIP STICK AUTO W/ MICRO [37498 CPT(R)] METABOLIC PANEL, COMPREHENSIVE [93975 CPT(R)] SED RATE (ESR) [id number] CPT(R)] Follow-up Instructions Return in about 3 months (around 11/28/2018). Introducing [facility]! [employer] introduces [employer] patient portal. Now you can access parts of your medical record, email your doctor's office, and request medication refills online. 1. In your internet browser, go to [email or web address]"[employer]. [employer]/"[employer] 2. Click on the First Time User? Click Here link in the Sign In box. You will see the New Member Sign Up page. 3. Enter your [employer] Access Code exactly as it appears below. You will not need to use this code after youve completed the sign-up process. If you do not sign up before the expiration date, you must request a new code. · [employer] Access Code: [email or web address] Expires: 10/30/2018  1:49 PM 
 
4. Enter the last four digits of your Social Security Number (xxxx) and Date of Birth (mm/dd/yyyy) as indicated and click Submit. You will be taken to the next sign-up page. 5. Create a [employer] ID. This will be your [employer] login ID and cannot be changed, so think of one that is secure and easy to remember. 6. Create a [employer] password. You can change your password at any time. 7. Enter your Password Reset Question and Answer. This can be used at a later time if you forget your password. 8. Enter your e-mail address. You will receive e-mail notification when new information is available in [address] 9. Click Sign Up. You can now view and download portions of your medical record. 10. Click the Download Summary menu link to download a portable copy of your medical information. If you have questions, please visit the Frequently Asked Questions section of the [employer] website. Remember, [employer] is NOT to be used for urgent needs. For medical emergencies, dial 911. Now available from your iPhone and Android! Please provide this summary of care documentation to your next provider. Your primary care clinician is listed as Coralsierra Pacheco. If you have any questions after today's visit, please call [phone].

## 2018-08-28 NOTE — PROGRESS NOTES
HISTORY OF PRESENT ILLNESS Kris Toscano is a 77 y.o. female. 5 days myalgias ,fevers ,malaise urinary frequency. Pt taking Embrel from rheum for AS/RA Myalgia The history is provided by the patient. This is a new problem. The current episode started more than 2 days ago. The problem occurs daily. The problem has been gradually improving. Pertinent negatives include no chest pain, no headaches and no shortness of breath. Fatigue This is a new problem. The current episode started more than 2 days ago. The problem occurs daily. The problem has not changed since onset. Pertinent negatives include no chest pain, no headaches and no shortness of breath. Urinary Frequency The history is provided by the patient. This is a new problem. The current episode started more than 2 days ago. The problem occurs every urination. The problem has been gradually worsening. The patient is experiencing no pain. Associated symptoms include chills, frequency and urgency. Review of Systems Constitutional: Positive for chills, fatigue, fever and malaise/fatigue. Respiratory: Negative for shortness of breath. Cardiovascular: Negative for chest pain, palpitations and orthopnea. Genitourinary: Positive for frequency and urgency. Negative for dysuria. Musculoskeletal: Positive for myalgias. Skin: Negative for rash. Neurological: Negative for headaches. Physical Exam  
Constitutional: She appears well-developed and well-nourished. HENT:  
Head: Normocephalic and atraumatic. Right Ear: External ear normal.  
Left Ear: External ear normal.  
Nose: Nose normal.  
Mouth/Throat: Oropharynx is clear and moist.  
Cardiovascular: Normal rate and regular rhythm. Pulmonary/Chest: Effort normal and breath sounds normal.  
Abdominal: Soft. Bowel sounds are normal.  
Skin: Skin is warm and dry. ASSESSMENT and PLAN Diagnoses and all orders for this visit: 
 
1. Urinary frequency -     AMB POC URINALYSIS DIP STICK AUTO W/ MICRO 
-     AMB POC COMPLETE CBC, AUTOMATED 2. Myalgia -     METABOLIC PANEL, COMPREHENSIVE 
-     AMB POC COMPLETE CBC, AUTOMATED 
-     AMB POC BIENVENIDO INFLUENZA A/B TEST 
-     predniSONE (DELTASONE) 10 mg tablet; Take 1 Tab by mouth two (2) times a day. 3. Ankylosing spondylitis of sacral region [facility]) -     METABOLIC PANEL, COMPREHENSIVE 
-     SED RATE (ESR) -     predniSONE (DELTASONE) 10 mg tablet; Take 1 Tab by mouth two (2) times a day. 4. Essential hypertension, benign -     METABOLIC PANEL, COMPREHENSIVE 5. Gastroesophageal reflux disease with esophagitis -     Omeprazole delayed release (PRILOSEC D/R) 20 mg tablet; Take 1 Tab by mouth daily. Etiology of malaise,fever ,myalgias unclear,check lab ,call prn Follow-up Disposition: 
Return in about 3 months (around 11/28/2018).

## 2018-08-29 LAB
ALBUMIN SERPL-MCNC: 4.4 G/DL (ref 3.5–4.8)
ALBUMIN/GLOB SERPL: 1.8 {RATIO} (ref 1.2–2.2)
ALP SERPL-CCNC: 78 IU/L (ref 39–117)
ALT SERPL-CCNC: 25 IU/L (ref 0–32)
AST SERPL-CCNC: 24 IU/L (ref 0–40)
BILIRUB SERPL-MCNC: 0.2 MG/DL (ref 0–1.2)
BUN SERPL-MCNC: 23 MG/DL (ref 8–27)
BUN/CREAT SERPL: 31 (ref 12–28)
CALCIUM SERPL-MCNC: 9.4 MG/DL (ref 8.7–10.3)
CHLORIDE SERPL-SCNC: 104 MMOL/L (ref 96–106)
CO2 SERPL-SCNC: 22 MMOL/L (ref 20–29)
CREAT SERPL-MCNC: 0.75 MG/DL (ref 0.57–1)
ERYTHROCYTE [SEDIMENTATION RATE] IN BLOOD BY WESTERGREN METHOD: 9 MM/HR (ref 0–40)
GLOBULIN SER CALC-MCNC: 2.5 G/DL (ref 1.5–4.5)
GLUCOSE SERPL-MCNC: 79 MG/DL (ref 65–99)
POTASSIUM SERPL-SCNC: 4.5 MMOL/L (ref 3.5–5.2)
PROT SERPL-MCNC: 6.9 G/DL (ref 6–8.5)
SODIUM SERPL-SCNC: 142 MMOL/L (ref 134–144)

## 2018-09-04 ENCOUNTER — TELEPHONE (OUTPATIENT)
Dept: RHEUMATOLOGY | Age: 78
End: 2018-09-04

## 2018-09-04 NOTE — TELEPHONE ENCOUNTER
Patient called because she wants to talk to nurse regarding allergic reaction to Enbrel as soon as possible. Call back number is [phone].

## 2018-09-04 NOTE — TELEPHONE ENCOUNTER
She's been on this medication for a while, so weird now. I would have her get her next dose, and if she has recurrence, then we stop Enbrel.

## 2018-09-04 NOTE — TELEPHONE ENCOUNTER
Spoke with pt who stated that she took her Enbrel injection on Sunday and her lips became swollen. Then on Monday her eyes became swollen. She took 20mg of prednisone that was prescribed by her PCP last week, and has since gotten better. She wants to know what she needs to do forthgoing for her injections? Please advise.

## 2018-09-05 NOTE — TELEPHONE ENCOUNTER
Spoke with pt and asked her to take one more Enbrel injection next week and see if she has the same reaction next time and to call us and let us know. She stated an understanding.

## 2018-09-20 ENCOUNTER — TELEPHONE (OUTPATIENT)
Dept: RHEUMATOLOGY | Age: 78
End: 2018-09-20

## 2018-09-20 DIAGNOSIS — M45.8 ANKYLOSING SPONDYLITIS OF SACRAL REGION (HCC): ICD-10-CM

## 2018-09-20 DIAGNOSIS — M79.10 MYALGIA: ICD-10-CM

## 2018-09-20 RX ORDER — PREDNISONE 20 MG/1
40 TABLET ORAL
Qty: 32 TAB | Refills: 0 | Status: SHIPPED | OUTPATIENT
Start: 2018-09-20 | End: 2018-11-07

## 2018-09-20 NOTE — TELEPHONE ENCOUNTER
----- Message from Hellen Dave sent at 9/20/2018  2:14 PM EDT -----  Regarding: Dr. Bonilla Fees  Pt requesting a call back from a missed call.  Pt best  Contact number is [phone]

## 2018-09-20 NOTE — TELEPHONE ENCOUNTER
Spoke with pt regarding her reaction to the Enbrel. She stated that she is still experiencing swollen lips after the injection. I spoke with Dr. Nia Antony and he stated that she can stop the medication until she is seen again in Nov.  She stated that the medication is really helping her so I suggested that she take a Benadryl before the injection and see if that helps her reactions. She stated that she would rather do that and continue taking the medication until she is seen. 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

## 2018-09-20 NOTE — TELEPHONE ENCOUNTER
----- Message from [facility] sent at 9/19/2018 12:54 PM EDT -----  Regarding: Dr. Markus Bose  Pt [phone] says that she is having a reaction to Enbrel.    Pls call pt back today after 3p

## 2018-09-20 NOTE — TELEPHONE ENCOUNTER
I spoke with pt regarding her reactions to Enbrel, and advised her that if she wanted to take the Enbrel that she needed to take benadryl before the injection and prednisone 40mg. I told her that she also would need to take Zantac or Pepcid if she developed any oral swelling. She stated that given these recommendations and possible potential consequences of taking this medication that she does not feel like it is safe and she will stop the Enbrel. I told her that we do not have any appt available at this time, but for her to call back and see if we have any cancellations so that we can try and get her in sooner than Nov to change treatment. She stated an understanding and will call back weekly to get rescheduled to come sooner.

## 2018-09-20 NOTE — TELEPHONE ENCOUNTER
I do not recommend her continuing treatment with Enbrel at this time and preferred that she discontinue it altogether and follow-up to discuss alternative treatment option. Inform the patient that given her reactions to Enbrel there is concern that she may  developed angioedema with potential consequences of oropharyngeal angioedema or swelling that may cause her to have dyspnea with its potential of asphyxiation. I am concerned that she continues using a Enbrel she may develop severe angioedema. She insists upon continuing Enbrel, she needs to take Benadryl prior to her dose and prednisone 40 mg and Zantac or Pepcid immediately if she develops signs or symptoms of oral or oropharyngeal swelling. If she develops dyspnea or shortness of breath or difficulty swallowing she must call 911 to be taken to the emergency room or go to the emergency room immediately.

## 2018-11-07 ENCOUNTER — OFFICE VISIT (OUTPATIENT)
Dept: RHEUMATOLOGY | Age: 78
End: 2018-11-07

## 2018-11-07 ENCOUNTER — HOSPITAL ENCOUNTER (OUTPATIENT)
Dept: LAB | Age: 78
Discharge: HOME | End: 2018-11-07
Payer: MEDICARE

## 2018-11-07 VITALS
HEART RATE: 69 BPM | TEMPERATURE: 97.8 F | WEIGHT: 161 LBS | RESPIRATION RATE: 18 BRPM | BODY MASS INDEX: 27.49 KG/M2 | HEIGHT: 64 IN | DIASTOLIC BLOOD PRESSURE: 74 MMHG | SYSTOLIC BLOOD PRESSURE: 145 MMHG

## 2018-11-07 DIAGNOSIS — M35.3 PMR (POLYMYALGIA RHEUMATICA) (HCC): ICD-10-CM

## 2018-11-07 DIAGNOSIS — N18.2 CKD (CHRONIC KIDNEY DISEASE) STAGE 2, GFR 60-89 ML/MIN: ICD-10-CM

## 2018-11-07 DIAGNOSIS — M45.8 ANKYLOSING SPONDYLITIS OF SACRAL REGION (HCC): Primary | ICD-10-CM

## 2018-11-07 DIAGNOSIS — M81.0 AGE-RELATED OSTEOPOROSIS WITHOUT CURRENT PATHOLOGICAL FRACTURE: ICD-10-CM

## 2018-11-07 DIAGNOSIS — M17.0 PRIMARY OSTEOARTHRITIS OF BOTH KNEES: ICD-10-CM

## 2018-11-07 DIAGNOSIS — Z79.60 LONG-TERM USE OF IMMUNOSUPPRESSANT MEDICATION: ICD-10-CM

## 2018-11-07 PROBLEM — N18.30 CKD (CHRONIC KIDNEY DISEASE) STAGE 3, GFR 30-59 ML/MIN (HCC): Status: RESOLVED | Noted: 2017-09-11 | Resolved: 2018-11-07

## 2018-11-07 PROCEDURE — 86480 TB TEST CELL IMMUN MEASURE: CPT

## 2018-11-07 PROCEDURE — 36415 COLL VENOUS BLD VENIPUNCTURE: CPT

## 2018-11-07 NOTE — PATIENT INSTRUCTIONS
Secukinumab (By injection)   Secukinumab (eee-hx-NFJ-ue-mab)  Treats plaque psoriasis, psoriatic arthritis, and ankylosing spondylitis. Brand Name(s): Cosentyx   There may be other brand names for this medicine. When This Medicine Should Not Be Used: This medicine is not right for everyone. Do not use it if you had an allergic reaction to secukinumab. How to Use This Medicine:   Injectable  · Your doctor will prescribe your exact dose and tell you how often it should be given. This medicine is given as a shot under your skin. This medicine is usually given in the upper arms, abdomen, or thighs. · A nurse or other health provider will give you this medicine. · You may be taught how to give your medicine at home. Make sure you understand all instructions before giving yourself an injection. Do not use more medicine or use it more often than your doctor tells you to. · Do not use the medicine if it is cloudy, discolored, or has particles in it. Do not shake the medicine. · You will be shown the body areas where this shot can be given. Use a different body area each time you give yourself a shot. Keep track of where you give each shot to make sure you rotate body areas. · Use a new needle and syringe each time you inject your medicine. · Throw away used needles in a hard, closed container that the needles cannot poke through. Keep this container away from children and pets. · This medicine should come with a Medication Guide. Ask your pharmacist for a copy if you do not have one. · Missed dose: Call your doctor or pharmacist for instructions. · If you store this medicine at home, keep it in the refrigerator. Do not freeze. Keep the medicine in the original carton until you are ready to use it. Use the medicine within 1 hour after you take it out of the refrigerator.   Drugs and Foods to Avoid:   Ask your doctor or pharmacist before using any other medicine, including over-the-counter medicines, vitamins, and herbal products. · Some medicines can affect how secukinumab works. Tell your doctor if you are using cyclosporine or warfarin. · This medicine may interfere with vaccines. Ask your doctor before you get a flu shot or any other vaccines. You should not receive live vaccines while you are using this medicine. Warnings While Using This Medicine:   · Tell your doctor if you are pregnant or breastfeeding, or if you have inflammatory bowel disease (including Crohn disease or ulcerative colitis) or an allergy to latex. · This medicine may cause you to get infections more easily. Tell your doctor if you have any type of infection before you start treatment. Also tell your doctor if you or a family member has a history of tuberculosis (TB). Take precautions to avoid illness. Wash your hands often. · This medicine may cause new or worsening inflammatory bowel disease. · Your doctor will check your progress and the effects of this medicine at regular visits. Keep all appointments. · Keep all medicine out of the reach of children. Never share your medicine with anyone. Possible Side Effects While Using This Medicine:   Call your doctor right away if you notice any of these side effects:  · Allergic reaction: Itching or hives, swelling in your face or hands, swelling or tingling in your mouth or throat, chest tightness, trouble breathing  · Burning feeling when you urinate, change in how much or how often you urinate  · Fever, chills, cough, sore throat, body aches  · Severe stomach pain, severe diarrhea  If you notice these less serious side effects, talk with your doctor:   · Runny or stuffy nose  If you notice other side effects that you think are caused by this medicine, tell your doctor. Call your doctor for medical advice about side effects.  You may report side effects to FDA at 3-966-FDA-3907  © 2017 [address] [employer] Information is for End User's use only and may not be sold, redistributed or otherwise used for commercial purposes. The above information is an  only. It is not intended as medical advice for individual conditions or treatments. Talk to your doctor, nurse or pharmacist before following any medical regimen to see if it is safe and effective for you.

## 2018-11-07 NOTE — PROGRESS NOTES
REASON FOR VISIT    This is a follow up visit for Ms. Mandi Rock for Ankylosing Spondylitis. Spondyloarthritis phenotype includes:  Anti-CCP positive: no  Rheumatoid factor positive: no  HLA-B27: yes  Erosive disease: no  Sacroiliitis: yes  Ankylosis: yes (left SI)  Psoriasis: no  Enthesitis: yes (Achilles)  Dactylitis: no  Nail Pitting: no  Onycholysis: no  Extra-articular manifestations include: Polymyalgia Rheumatica   SAPHO: no    Immunosuppression Screening (8/01/2018):   Quantiferon TB: negative 8/25/2017; repeat (cancelled)  PPD:  Not performed  Hepatitis B: negative  Hepatitis C: negative    Therapy History includes:  Current NSAIDs include: none (contraindicated due to CKD stage 3)  Current DMARD therapy include:   Prior DMARD therapy includes: Gold, leflunomide, Humira (11/2017 ot 12/2017), Enbrel 50 mg weekly (5/22/2018 to 9/20/2018)  The following DMARDs have been ineffective: none  The following DMARDs were stopped because of side effects: Gold (\"pass out\"), leflunomide (swelling), Humira (hives, lip and eyelid swelling), Enbrel (swelling of lips)  Contra-Indicated DMARDs because of CKD stage 3: methotrexate     Osteoporosis Historical Synopsis     Height loss since age 26 (at least two inches): 0.5  Fracture history includes: yes (ankle)  Family history of hip fracture: no  Fall Risk: no     Daily calcium intake is 1200 mg  Daily vitamin D intake is 800 IU     Smoking history: no  Alcohol consumption: no  Prednisone history: no     Exercise: yes     Previous work-up for osteoporosis includes the following:  DEXA Scan: 9/12/2017  Vitamin 25OH D level: 53.3 (10/04/2017)  PTH: 35 (10/04/2017)  TSH: 3.330 (8/25/2017)     Therapy History includes:     Current osteoporosis therapy includes: alendronate 70 mg every Sunday (11/2017 to present)  Prior osteoporosis therapy includes: none  The following osteoporosis therapy have been ineffective: none  The following osteoporosis therapy were stopped because of side effects: none    Active problems include:    Patient Active Problem List   Diagnosis Code    Allergic rhinitis, cause unspecified J30.9    Asthma J45.909    Essential hypertension, benign I10    Depression F32.9    Unspecified hypothyroidism E03.9    Mixed hyperlipidemia E78.2    Primary osteoarthritis of both knees M17.0    Ankylosis, 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 Russ Benjamin returns for a follow up visit. On her last visit, I continued Enbrel 50 mg weekly but she developed angioedema, so it was stopped. She had resolution of her angioedema. Today, she feels much ok. She has days where she feels good and days she bad. If she exercises and taking Tylenol, she feels better. If she does not exercise, she then feels worse. She has discomfort in her shoulders, arms, outer hips, legs and ankles associated with stiffness lasting 2-3 hours. She has swelling in hands. Hot water helps. No interval falls or fractures. She denies fever, weight loss, blurred vision, vision loss, oral ulcers, ankle swelling, dry cough, dyspnea, nausea, vomiting, dysphagia, abdominal pain, black or bloody stool, fall since last visit, rash, easy bruising and increased thirst.    Last toxicity monitoring by blood work was done on 8/25/2017 and did not reveal any significant adverse effects, except creatinine 0.98 mg/dL, eGFR 55,  U/L. Most recent inflammatory markers from 8/01/2018 revealed a ESR 4 mm/hr (previously 5 mm/hr) and CRP 1.9 mg/L (previously 9.1 mg/L). She continues to work as a .      The patient has not had any interval hospital admissions, infections, or surgeries. REVIEW OF SYSTEMS    A comprehensive review of systems was performed and pertinent results are documented in the HPI, review of systems is otherwise non-contributory. PAST MEDICAL HISTORY    She has a past medical history of Allergic rhinitis, cause unspecified, Asthma, Depression, Encounter for long-term (current) use of other medications, Essential hypertension, benign, Hypertension, OA (osteoarthritis), Rheumatoid arthritis ([address] and Unspecified hypothyroidism. FAMILY HISTORY    Her family history includes Cancer in her sister; Heart Disease in her father, mother, and sister; Stroke in her sister. SOCIAL HISTORY    She reports that  has never smoked. she has never used smokeless tobacco. She reports that she does not drink alcohol or use drugs. IMMUNIZATIONS  Immunization History   Administered Date(s) Administered    Influenza Vaccine 10/22/2013    Influenza Vaccine Split 09/27/2011     MEDICATIONS    Current Outpatient Medications   Medication Sig Dispense Refill    secukinumab (COSENTYX, 2 SYRINGES,) 150 mg/mL syrg 300 mg by SubCUTAneous route every month for 30 doses. 6 Syringe 11    Omeprazole delayed release (PRILOSEC D/R) 20 mg tablet Take 1 Tab by mouth daily. 30 Tab 2    alendronate (FOSAMAX) 70 mg tablet TAKE ONE TABLET BY MOUTH ONCE WEEKLY 12 Tab 1    acyclovir (ZOVIRAX) 400 mg tablet       Calcium-Cholecalciferol, D3, (CALCIUM 600 WITH VITAMIN D3) 600 mg(1,500mg) -400 unit cap Take  by mouth.  fluticasone (FLONASE) 50 mcg/actuation nasal spray PLACE TWO SPRAYS IN EACH NOSTRIL ONCE DAILY 3 Bottle 2    DULERA 200-5 mcg/actuation HFA inhaler       FEXOFENADINE HCL (ALLEGRA PO) Take  by mouth.  albuterol (PROVENTIL HFA, VENTOLIN HFA) 90 mcg/actuation inhaler Take 1 Puff by inhalation every six (6) hours as needed for Wheezing.  1 Inhaler 5    levothyroxine (SYNTHROID) 50 mcg tablet Take  by mouth daily (before breakfast).  amlodipine (NORVASC) 5 mg tablet Take 5 mg by mouth daily. ALLERGIES    Allergies   Allergen Reactions    Humira [Adalimumab] Hives    Leflunomide Swelling    Penicillins Unable to Obtain    Sulfa (Sulfonamide Antibiotics) Unknown (comments)       PHYSICAL EXAMINATION    Visit Vitals  /74   Pulse 69   Temp 97.8 °F (36.6 °C)   Resp 18   Ht 5' 4\" (1.626 m)   Wt 161 lb (73 kg)   BMI 27.64 kg/m²     Body mass index is 27.64 kg/m². General: Patient is alert, oriented x 3, not in acute distress    HEENT:   Sclerae are not injected and appear moist.  There is no alopecia. Cardiovascular:  Heart is regular rate and rhythm, no murmurs. Chest:  Lungs are clear to auscultation bilaterally. No rhonchi, wheezes, or crackles. Extremities:  Free of clubbing, cyanosis, edema    Neurological exam:  Muscle strength is full in upper and lower extremities     Skin:    Psoriasis:     no  Nail Pitting:     no  Onycholysis:     no  Palmoplantar pustulosis:   no  Acne fulminans:    no  Acne conglobata:    no  Hidradenitis Suppurativa:   no  Dissecting cellulitis of the scalp:  no  Pilonidal sinus:    no  Erythema nodosum:    no    Musculoskeletal:  A comprehensive musculoskeletal exam was performed for all joints of each upper and lower extremity and assessed for swelling, tenderness and range of motion. Bilateral upper trapezius tenderness  Bilateral Sharmila and Heberden nodes. Bilateral greater trochanter tenderness  Bilateral knee crepitus without effusion.   Bilateral ankle tenderness  Bilateral MTP tenderness    Costochondritis:  no   Synovitis:   Yes (see below table)  Dactylitis:   no  Enthesitis:   Yes (bilateral Achilles)    Joint Count 11/7/2018 8/1/2018 4/25/2018 12/4/2017 8/25/2017   Patient pain (0-100) - - - 20 (No Data)   MHAQ - - - (No Data) 0.125   Left shoulder - Tender - - - 1 -   Left wrist- Tender 1 1 1 1 1   Left wrist- Swollen 1 1 - - 1   Left 1st MCP - Tender 1 1 - 1 -   Left 1st MCP - Swollen - 1 - 1 1   Left 2nd MCP - Tender 1 1 1 1 -   Left 2nd MCP - Swollen 1 1 1 1 1   Left 3rd MCP - Tender 1 1 - - 1   Left 3rd MCP - Swollen - 1 1 1 1   Left 4th MCP - Tender - 1 - - 1   Left 5th MCP - Tender - 1 1 - -   Left 5th MCP - Swollen - 1 - - -   Left thumb IP - Tender 1 1 - - -   Left thumb IP - Swollen 0 0 - - -   Left 2nd PIP - Tender 1 - 1 - 1   Left 2nd PIP - Swollen 0 - 1 1 1   Left 3rd PIP - Tender - - 1 - 1   Left 3rd PIP - Swollen - - 1 1 1   Left 5th PIP - Tender 1 - - - -   Left 5th PIP - Swollen 0 - - - -   Right shoulder - Tender - - 1 - -   Right elbow - Tender - - 1 - -   Right wrist- Tender 1 1 - 1 1   Right wrist- Swollen 1 1 - - 1   Right 1st MCP - Tender - 1 1 - -   Right 1st MCP - Swollen - 1 1 - 1   Right 2nd MCP - Tender 1 1 1 - 1   Right 2nd MCP - Swollen 1 1 1 1 1   Right 3rd MCP - Tender - 1 1 - -   Right 3rd MCP - Swollen - 1 1 1 1   Right 4th MCP - Tender - 1 - - -   Right 5th MCP - Tender - 1 1 - -   Right 5th MCP - Swollen - 1 1 - 1   Right thumb IP - Tender 1 - - - -   Right 2nd PIP - Tender 1 - 1 1 -   Right 2nd PIP - Swollen - 1 1 1 1   Right 3rd PIP - Tender - - - 1 -   Right 3rd PIP - Swollen - 1 1 1 1   Right 4th PIP - Tender 1 - - - -   Right 4th PIP - Swollen - 1 - - -   Right 5th PIP - Tender 1 - - - -   Tender Joint Count (Total) 13 13 12 7 7   Swollen Joint Count (Total) 4 13 10 9 13   Physician Assessment (0-10) - - - 3 4   Patient Assessment (0-10) - - - 1 (No Data)   CDAI Total (calculated) - - - 20 -       DATA REVIEW    Laboratory     Recent laboratory results were reviewed, summarized, and discussed with the patient. Imaging    Musculoskeletal Ultrasound    None    Radiographs    Sacroiliac Joint 8/25/2017: There is bony ankylosis of the left SI joint unchanged. There is probable ankylosis of the right SI joint as well. The right SI joint is at least narrowed.  Bone mineral density is decreased without fracture or bone destruction    Bilateral Foot 8/25/2017: LEFT: No fracture or dislocation on plain film. There is narrowing of the first MTP joint with minimal spurring. No joint space erosion or periosteal reaction. Alignment is within normal limits. Bone mineralization is decreased. No soft tissue calcification. RIGHT: No fracture or dislocation on plain film. There is metatarsus primus varus with hallux valgus deformity. There is joint space loss and spurring first MTP joint. No joint space erosion or periosteal reaction. Alignment is within normal limits. Bone mineralization is decreased. No soft tissue calcification. Chest 3/09/2017: normal heart size. There is no acute process in the lung fields. The osseous structures are unremarkable. Bilateral Hand 1/20/2017: LEFT: No fracture or dislocation on plain film. There are scattered mild degenerative changes of the interphalangeal joints. There is moderate degeneration of the first [facility] joint and mild degeneration of the first MCP joint. Minimal degenerative changes of the third and fourth MCP joints. There is widening of the scapholunate interval compatible with chronic scapholunate ligament tear. No joint space erosion or periosteal reaction. Alignment is within normal limits. Bone mineralization is decreased. No soft tissue calcification. RIGHT: No fracture or dislocation on plain film. There are scattered mild degenerative changes in the interphalangeal joints and first MCP joint. There is narrowing of the lateral margin radiocarpal joint There is widening of the scapholunate interval compatible with chronic scapholunate ligament tear. No joint space erosion or periosteal reaction. Alignment is within normal limits. Bone mineralization is decreased. No soft tissue calcification. Left Hip 7/15/2015: no fracture, dislocation or other acute abnormality.  There appears to be fusion of the left sacroiliac joint and possibly the right sacroiliac joint.  Spurring is noted at the symphysis. Lumbar Spine 7/15/2015: the patient is leaning to the right. There is a 2 mm retrolisthesis of L1  relative to L2. Bilateral facet arthropathy is the dominant feature at the  lower 3 levels. Bilateral laminectomies have been performed at L5-S1. Vertebral body heights spaces are well-preserved.  There is no fracture. CT Imaging    None    MR Imaging    MRI Pelvis without contrast 9/20/2017: There is normal bone signal. No para-articular edema-like signal is shown nor is there demonstration of substantial joint effusion. There is ankylosis of the inferior left sacroiliac joint without demonstration of substantial osteophyte formation. The inferior right SI joint is substantially narrowed with perhaps a small area of ankylosis inferiorly.   There is minimal-mild superolateral joint space narrowing of the hip joints bilaterally with tiny marginal osteophytes. Moderate degenerative changes in the lower lumbar spine are shown with disc space narrowing through L4-5 as well as bilateral facet osteoarthrosis without ankylosis at L5-S1. No soft tissue mass is demonstrated. Muscle signal, size and contour appear normal. There is moderate insertional tendinopathy of the gluteus medius bilaterally with partial-thickness tearing in tiny bursal effusions. Ultrasound    Retroperitoneum 10/11/2017: RIGHT KIDNEY: The right kidney has normal echogenicity with no mass, stone or hydronephrosis. The right kidney measures 9.1 cm in length. LEFT KIDNEY: The left kidney has normal echogenicity with  no mass, stone or hydronephrosis. There may be mild caliectasis. The left kidney measures 8.7 cm in length. RETROPERITONEUM: The aorta is atherosclerotic and tapers normally. The aortic bifurcation is normal. The IVC is not visualized due to body habitus and bowel gas. No retroperitoneal mass is identified. BLADDER: The urinary bladder is incompletely distended.     DXA     DXA 9/12/2017: (excluded Lumbar spine due to degenerative changes) left femoral neck T score: -2.1 (0.749 g/cm2), left total hip T score: -1.9 (0.763 g/cm2), right femoral neck T score: -2.4 (0.709 g/cm2), right total hip T score: -2.1 (0.746 g/cm2), and distal one third left radius T score -3.0 (BMD 0.614 g/cm2). FRAX score 17.8 % probability in 10 years for major osteoporotic fracture and 5.8 % 10 year probability of hip fracture. ASSESSMENT AND PLAN    This is a follow up visit for Ms. Jacquelyn Marshall. 1) HLA-B27 Positive Ankylosing Spondylitis. (positive HLA-B27, sacroiliitis, inflammatory arthritis). She reports a history of Rheumatoid Arthritis that was treated with gold, which she think led her to remission until 2016. She had developed sore pain and Polymyalgia Rheumatica-like symptoms involving her hip girdle in the fall of 2016. Her hip radiograph on 7/15/2015 showed ankylosis of the left sacroiliac joint. A repeat radiograph of SI joints 8/25/2017 confirmed left sacroiliac ankylosis and probable right sided involvement. MRI pelvis showed left SI joint ankylosis. Partial ankylosis of right SI joint. Her labs showed a positive HLA-B27. She has a normal spinal flexion but she continues to have tenderness and complain of pain and stiffness in her lower back lasting hours. She has active peripheral synovitis. She has CKD stage 3, so NSAIDs and methotrexate should be avoided. She did not tolerate Humira or Enbrel due to angioedema although symptomatically she felt well. She has 13 tender and 4 swollen joints, with bilateral ankle, Achilles enthesitis. She is also having Polymyalgia Rheumatica symptoms. I discussed Cosentyx but she is hesitant to start any medication at this time due to her side effects. I submitted Cosentyx to [employer] just to get the paperwork going in case she changes her mind and wants to start treatment. 2) Polymyalgia Rheumatica. This is secondary to #1. 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   Her DXA 9/12/2017 showed T score distal radius -3.0 and right femoral neck -2.4. She began Fosamax 70 mg every Sunday 11/2017 and is tolerating well. I will continue Fosamax 70 mg every Sunday. 4) Long Term Use of Immunosuppressants. The patient remains off immunomodulatory medications. 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 Her most recent labs 8/28/2018 show creatinine 0.75mg/dL and eGFR 76 (previously creatinine was 0.88, 0.98, 0.77 mg/dL and eGFR 63, 55, 74). She has seen Dr. Enzo Sykes and second opinion from Dr. Litzy Petit. She understands to avoid NSAIDs. I will avoid methotrexate. 6) Bilateral Greater Trochanteric Bursitis. This is reproducible on exam. This improved with physical therapy. 7) Bilateral Knee Osteoarthritis. This was not an active issue today. The patient voiced understanding of the aforementioned assessment and plan. Summary of plan was provided in the After Visit Summary patient instructions.      TODAY'S ORDERS    Orders Placed This Encounter    QUANTIFERON-TB GOLD PLUS    secukinumab (COSENTYX, 2 SYRINGES,) 150 mg/mL syrg     Future Appointments   Date Time Provider Andrea Ga   2/7/2019  2:00 PM MD Constance Ralph MD, [address] [facility] [place]    Adult Rheumatology   Rheumatology Ultrasound Certified  [address]  [facility], [address]   Phone [phone]  Fax [phone]

## 2018-11-11 LAB
GAMMA INTERFERON BACKGROUND BLD IA-ACNC: 0.02 IU/ML
M TB IFN-G BLD-IMP: NEGATIVE
M TB IFN-G CD4+ BCKGRND COR BLD-ACNC: 0.02 IU/ML
MITOGEN IGNF BLD-ACNC: >10 IU/ML
QUANTIFERON INCUBATION, QF1T: NORMAL
QUANTIFERON TB2 AG: 0.02 IU/ML
SERVICE CMNT-IMP: NORMAL

## 2018-11-14 ENCOUNTER — DOCUMENTATION ONLY (OUTPATIENT)
Dept: RHEUMATOLOGY | Age: 78
End: 2018-11-14

## 2018-11-29 DIAGNOSIS — M45.8 ANKYLOSING SPONDYLITIS OF SACRAL REGION (HCC): ICD-10-CM

## 2018-12-23 DIAGNOSIS — M81.0 AGE-RELATED OSTEOPOROSIS WITHOUT CURRENT PATHOLOGICAL FRACTURE: ICD-10-CM

## 2018-12-26 ENCOUNTER — DOCUMENTATION ONLY (OUTPATIENT)
Dept: RHEUMATOLOGY | Age: 78
End: 2018-12-26

## 2018-12-26 RX ORDER — ALENDRONATE SODIUM 70 MG/1
TABLET ORAL
Qty: 12 TAB | Refills: 0 | Status: SHIPPED | OUTPATIENT
Start: 2018-12-26 | End: 2019-03-15 | Stop reason: SDUPTHER

## 2018-12-26 NOTE — PROGRESS NOTES
Received fax from [employer] program stating Cosentyx was approved from 11/15/2018-6/13/2019, with zero copay per [employer].

## 2019-01-22 ENCOUNTER — TELEPHONE (OUTPATIENT)
Dept: RHEUMATOLOGY | Age: 79
End: 2019-01-22

## 2019-01-22 NOTE — TELEPHONE ENCOUNTER
----- Message from Dinh You sent at 1/21/2019  7:59 AM EST -----  Regarding: Dr. Fabio Jack  Pt is calling to r/s her appt to Mar 7 instead for 1:30  or later. [phone]. Leave detailed message.

## 2019-01-22 NOTE — TELEPHONE ENCOUNTER
----- Message from Remington Verdugo sent at 1/21/2019  7:59 AM EST -----  Regarding: Dr. Lula Morataya  Pt is calling to r/s her appt to Mar 7 instead for 1:30  or later. [phone]. Leave detailed message.

## 2019-01-30 ENCOUNTER — DOCUMENTATION ONLY (OUTPATIENT)
Dept: RHEUMATOLOGY | Age: 79
End: 2019-01-30

## 2019-03-07 ENCOUNTER — OFFICE VISIT (OUTPATIENT)
Dept: RHEUMATOLOGY | Age: 79
End: 2019-03-07

## 2019-03-07 VITALS
DIASTOLIC BLOOD PRESSURE: 79 MMHG | BODY MASS INDEX: 27.66 KG/M2 | RESPIRATION RATE: 18 BRPM | SYSTOLIC BLOOD PRESSURE: 127 MMHG | WEIGHT: 162 LBS | TEMPERATURE: 97.5 F | HEART RATE: 74 BPM | HEIGHT: 64 IN

## 2019-03-07 DIAGNOSIS — Z79.60 LONG-TERM USE OF IMMUNOSUPPRESSANT MEDICATION: ICD-10-CM

## 2019-03-07 DIAGNOSIS — M35.3 PMR (POLYMYALGIA RHEUMATICA) (HCC): ICD-10-CM

## 2019-03-07 DIAGNOSIS — M81.0 AGE-RELATED OSTEOPOROSIS WITHOUT CURRENT PATHOLOGICAL FRACTURE: ICD-10-CM

## 2019-03-07 DIAGNOSIS — M45.8 ANKYLOSING SPONDYLITIS OF SACRAL REGION (HCC): Primary | ICD-10-CM

## 2019-03-07 DIAGNOSIS — M17.0 PRIMARY OSTEOARTHRITIS OF BOTH KNEES: ICD-10-CM

## 2019-03-07 DIAGNOSIS — N18.2 CKD (CHRONIC KIDNEY DISEASE) STAGE 2, GFR 60-89 ML/MIN: ICD-10-CM

## 2019-03-07 NOTE — PROGRESS NOTES
Chief Complaint   Patient presents with    Arthritis     1. Have you been to the ER, urgent care clinic since your last visit? Hospitalized since your last visit? No    2. Have you seen or consulted any other health care providers outside of the [address] [place] [address] since your last visit? Include any pap smears or colon screening. Yes When: February 2019 Where: [facility].   f

## 2019-03-07 NOTE — PATIENT INSTRUCTIONS
Ask [facility] to send your maintenance. I gave you loading dose samples.  Take 2 injections together every 7 days for 5 consecutive weeks and then 2 doses every 30 days

## 2019-03-07 NOTE — PROGRESS NOTES
REASON FOR VISIT    This is a follow up visit for Ms. Heber Wade for Ankylosing Spondylitis. Spondyloarthritis phenotype includes:  Anti-CCP positive: no  Rheumatoid factor positive: no  HLA-B27: yes  Erosive disease: no  Sacroiliitis: yes  Ankylosis: yes (left SI)  Psoriasis: no  Enthesitis: yes (Achilles)  Dactylitis: no  Nail Pitting: no  Onycholysis: no  Extra-articular manifestations include: Polymyalgia Rheumatica   SAPHO: no    Immunosuppression Screening (8/01/2018):   Quantiferon TB: negative 8/25/2017; repeat (11/07/2018)  PPD:  Not performed  Hepatitis B: negative  Hepatitis C: negative    Therapy History includes:  Current NSAIDs include: none (contraindicated due to CKD stage 3)  Current DMARD therapy include: Cosentyx 300 mg   Prior DMARD therapy includes: Gold, leflunomide, Humira (11/2017 ot 12/2017), Enbrel 50 mg weekly (5/22/2018 to 9/20/2018)  The following DMARDs have been ineffective: none  The following DMARDs were stopped because of side effects: Gold (\"pass out\"), leflunomide (swelling), Humira (hives, lip and eyelid swelling), Enbrel (swelling of lips)  Contra-Indicated DMARDs because of CKD stage 3: methotrexate     Osteoporosis Historical Synopsis     Height loss since age 26 (at least two inches): 0.5  Fracture history includes: yes (ankle)  Family history of hip fracture: no  Fall Risk: no     Daily calcium intake is 1200 mg  Daily vitamin D intake is 800 IU     Smoking history: no  Alcohol consumption: no  Prednisone history: no     Exercise: yes     Previous work-up for osteoporosis includes the following:  DEXA Scan: 9/12/2017  Vitamin 25OH D level: 53.3 (10/04/2017)  PTH: 35 (10/04/2017)  TSH: 3.330 (8/25/2017)     Therapy History includes:  Current osteoporosis therapy includes: alendronate 70 mg every Sunday (11/2017 to present)  Prior osteoporosis therapy includes: none  The following osteoporosis therapy have been ineffective: none  The following osteoporosis therapy were stopped because of side effects: none    Active problems include:    Patient Active Problem List   Diagnosis Code    Allergic rhinitis, cause unspecified J30.9    Asthma J45.909    Essential hypertension, benign I10    Depression F32.9    Unspecified hypothyroidism E03.9    Mixed hyperlipidemia E78.2    Primary osteoarthritis of both knees M17.0    Ankylosis, 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    PMR [facility]ymyalgia rheumatica) ([facility] M35.3    CKD (chronic kidney disease) stage 2, GFR 60-89 ml/min N18.2    Ankylosing spondylitis of sacral region ([facility] [address] M45.8    Long-term use of immunosuppressant medication Z79.899    Age-related osteoporosis without current pathological fracture M81.0    Gastroesophageal reflux disease with esophagitis K21.0     HISTORY OF PRESENT ILLNESS    Ms. Durel Sandhoff returns for a follow up visit. On her last visit, I started Cosentyx 300 mg. She was approved. She did not start Cosentyx yet due to pending cardiac catheterization in February was normal.     Today, she feels bad. She pain in her wrists, elbows, shoulders, knees (left more than right). She has morning stiffness lasting hours in her hands associated with swelling. No interval falls or fractures. She denies fever, weight loss, blurred vision, vision loss, oral ulcers, ankle swelling, dry cough, dyspnea, nausea, vomiting, dysphagia, abdominal pain, black or bloody stool, fall since last visit, rash, easy bruising and increased thirst.    Last toxicity monitoring by blood work was done on 8/28/2018 and did not reveal any significant adverse effects, except eGFR 76. Most recent inflammatory markers from 8/01/2018 revealed a ESR 4 mm/hr (previously 5 mm/hr) and CRP 1.9 mg/L (previously 9.1 mg/L). She continues to work as a . The patient has not had any interval hospital admissions, infections, or surgeries.     REVIEW OF SYSTEMS    A comprehensive review of systems was performed and pertinent results are documented in the HPI, review of systems is otherwise non-contributory. PAST MEDICAL HISTORY    She has a past medical history of Allergic rhinitis, cause unspecified (5/26/2010), Asthma (5/26/2010), Depression (5/26/2010), Encounter for long-term (current) use of other medications (7/11/2011), Essential hypertension, benign (5/26/2010), Hypertension, OA (osteoarthritis) (5/26/2010), [facility] and Unspecified hypothyroidism (5/26/2010). FAMILY HISTORY    Her family history includes Cancer in her sister; Heart Disease in her father, mother, and sister; Stroke in her sister. SOCIAL HISTORY    She reports that  has never smoked. she has never used smokeless tobacco. She reports that she does not drink alcohol or use drugs. IMMUNIZATIONS  Immunization History   Administered Date(s) Administered    Influenza Vaccine 10/22/2013    Influenza Vaccine Split 09/27/2011     MEDICATIONS    Current Outpatient Medications   Medication Sig Dispense Refill    secukinumab (COSENTYX, 2 SYRINGES,) 150 mg/mL syrg Loading dose: 2 injections (300 mg) every 7 days for 5 consecutives and then every 30 days 10 Syringe 0    alendronate (FOSAMAX) 70 mg tablet TAKE ONE TABLET BY MOUTH ONCE WEEKLY 12 Tab 0    acyclovir (ZOVIRAX) 400 mg tablet as needed.  Calcium-Cholecalciferol, D3, (CALCIUM 600 WITH VITAMIN D3) 600 mg(1,500mg) -400 unit cap Take  by mouth.  fluticasone (FLONASE) 50 mcg/actuation nasal spray PLACE TWO SPRAYS IN EACH NOSTRIL ONCE DAILY 3 Bottle 2    DULERA 200-5 mcg/actuation HFA inhaler 2 Puffs two (2) times a day.  FEXOFENADINE HCL (ALLEGRA PO) Take  by mouth daily.  albuterol (PROVENTIL HFA, VENTOLIN HFA) 90 mcg/actuation inhaler Take 1 Puff by inhalation every six (6) hours as needed for Wheezing. 1 Inhaler 5    levothyroxine (SYNTHROID) 50 mcg tablet Take  by mouth daily (before breakfast).  amlodipine (NORVASC) 5 mg tablet Take 5 mg by mouth daily.       secukinumab (COSENTYX PEN) 150 mg/mL pnij 150 mg by SubCUTAneous route every twenty-eight (28) days. 1 mL 11    secukinumab (COSENTYX, 2 SYRINGES,) 150 mg/mL syrg 300 mg by SubCUTAneous route every month for 30 doses. 6 Syringe 11     ALLERGIES    Allergies   Allergen Reactions    Humira [Adalimumab] Hives    Leflunomide Swelling    Penicillins Unable to Obtain    Sulfa (Sulfonamide Antibiotics) Unknown (comments)       PHYSICAL EXAMINATION    Visit Vitals  /79   Pulse 74   Temp 97.5 °F (36.4 °C)   Resp 18   Ht 5' 4\" (1.626 m)   Wt 162 lb (73.5 kg)   BMI 27.81 kg/m²     Body mass index is 27.81 kg/m². General: Patient is alert, oriented x 3, not in acute distress    HEENT:   Sclerae are not injected and appear moist.  There is no alopecia. Cardiovascular:  Heart is regular rate and rhythm, no murmurs. Chest:  Lungs are clear to auscultation bilaterally. No rhonchi, wheezes, or crackles. Extremities:  Free of clubbing, cyanosis, edema    Neurological exam:  Muscle strength is full in upper and lower extremities     Skin:    Psoriasis:     no  Nail Pitting:     no  Onycholysis:     no  Palmoplantar pustulosis:   no  Acne fulminans:    no  Acne conglobata:    no  Hidradenitis Suppurativa:   no  Dissecting cellulitis of the scalp:  no  Pilonidal sinus:    no  Erythema nodosum:    no    Musculoskeletal:  A comprehensive musculoskeletal exam was performed for all joints of each upper and lower extremity and assessed for swelling, tenderness and range of motion. Bilateral upper trapezius tenderness  Bilateral Sharmila and Heberden nodes. Bilateral greater trochanter tenderness  Bilateral knee crepitus without effusion.   Bilateral ankle tenderness  Bilateral MTP tenderness    Costochondritis:  no   Synovitis:   Yes (see below table)  Dactylitis:   no  Enthesitis:   Yes (bilateral Achilles)    Joint Count 3/7/2019 11/7/2018 8/1/2018 4/25/2018 12/4/2017 8/25/2017   Patient pain (0-100) - - - - 20 (No Data)   MHAQ - - - - (No Data) 0.125   Left shoulder - Tender 1 - - - 1 -   Left shoulder - Swollen 0 - - - - -   Left elbow - Tender 1 - - - - -   Left elbow - Swollen 0 - - - - -   Left wrist- Tender 1 1 1 1 1 1   Left wrist- Swollen 1 1 1 - - 1   Left 1st MCP - Tender 1 1 1 - 1 -   Left 1st MCP - Swollen 1 - 1 - 1 1   Left 2nd MCP - Tender 0 1 1 1 1 -   Left 2nd MCP - Swollen 1 1 1 1 1 1   Left 3rd MCP - Tender - 1 1 - - 1   Left 3rd MCP - Swollen - - 1 1 1 1   Left 4th MCP - Tender - - 1 - - 1   Left 5th MCP - Tender - - 1 1 - -   Left 5th MCP - Swollen - - 1 - - -   Left thumb IP - Tender 1 1 1 - - -   Left thumb IP - Swollen 1 0 0 - - -   Left 2nd PIP - Tender 1 1 - 1 - 1   Left 2nd PIP - Swollen 1 0 - 1 1 1   Left 3rd PIP - Tender - - - 1 - 1   Left 3rd PIP - Swollen - - - 1 1 1   Left 5th PIP - Tender - 1 - - - -   Left 5th PIP - Swollen - 0 - - - -   Right shoulder - Tender 1 - - 1 - -   Right shoulder - Swollen 0 - - - - -   Right elbow - Tender - - - 1 - -   Right wrist- Tender 1 1 1 - 1 1   Right wrist- Swollen 1 1 1 - - 1   Right 1st MCP - Tender 0 - 1 1 - -   Right 1st MCP - Swollen 1 - 1 1 - 1   Right 2nd MCP - Tender 1 1 1 1 - 1   Right 2nd MCP - Swollen 1 1 1 1 1 1   Right 3rd MCP - Tender 1 - 1 1 - -   Right 3rd MCP - Swollen 1 - 1 1 1 1   Right 4th MCP - Tender - - 1 - - -   Right 5th MCP - Tender 0 - 1 1 - -   Right 5th MCP - Swollen 1 - 1 1 - 1   Right thumb IP - Tender - 1 - - - -   Right 2nd PIP - Tender - 1 - 1 1 -   Right 2nd PIP - Swollen - - 1 1 1 1   Right 3rd PIP - Tender - - - - 1 -   Right 3rd PIP - Swollen - - 1 1 1 1   Right 4th PIP - Tender 1 1 - - - -   Right 4th PIP - Swollen 0 - 1 - - -   Right 5th PIP - Tender - 1 - - - -   Tender Joint Count (Total) 11 13 13 12 7 7   Swollen Joint Count (Total) 10 4 13 10 9 13   Physician Assessment (0-10) - - - - 3 4   Patient Assessment (0-10) - - - - 1 (No Data)   CDAI Total (calculated) - - - - 20 -       DATA REVIEW    Laboratory     Recent laboratory results were reviewed, summarized, and discussed with the patient. Imaging    Musculoskeletal Ultrasound    None    Radiographs    Sacroiliac Joint 8/25/2017: There is bony ankylosis of the left SI joint unchanged. There is probable ankylosis of the right SI joint as well. The right SI joint is at least narrowed. Bone mineral density is decreased without fracture or bone destruction    Bilateral Foot 8/25/2017: LEFT: No fracture or dislocation on plain film. There is narrowing of the first MTP joint with minimal spurring. No joint space erosion or periosteal reaction. Alignment is within normal limits. Bone mineralization is decreased. No soft tissue calcification. RIGHT: No fracture or dislocation on plain film. There is metatarsus primus varus with hallux valgus deformity. There is joint space loss and spurring first MTP joint. No joint space erosion or periosteal reaction. Alignment is within normal limits. Bone mineralization is decreased. No soft tissue calcification. Chest 3/09/2017: normal heart size. There is no acute process in the lung fields. The osseous structures are unremarkable. Bilateral Hand 1/20/2017: LEFT: No fracture or dislocation on plain film. There are scattered mild degenerative changes of the interphalangeal joints. There is moderate degeneration of the first [facility] joint and mild degeneration of the first MCP joint. Minimal degenerative changes of the third and fourth MCP joints. There is widening of the scapholunate interval compatible with chronic scapholunate ligament tear. No joint space erosion or periosteal reaction. Alignment is within normal limits. Bone mineralization is decreased. No soft tissue calcification. RIGHT: No fracture or dislocation on plain film. There are scattered mild degenerative changes in the interphalangeal joints and first MCP joint.  There is narrowing of the lateral margin radiocarpal joint There is widening of the scapholunate interval compatible with chronic scapholunate ligament tear. No joint space erosion or periosteal reaction. Alignment is within normal limits. Bone mineralization is decreased. No soft tissue calcification. Left Hip 7/15/2015: no fracture, dislocation or other acute abnormality.  There appears to be fusion of the left sacroiliac joint and possibly the right sacroiliac joint. Spurring is noted at the symphysis. Lumbar Spine 7/15/2015: the patient is leaning to the right. There is a 2 mm retrolisthesis of L1  relative to L2. Bilateral facet arthropathy is the dominant feature at the  lower 3 levels. Bilateral laminectomies have been performed at L5-S1. Vertebral body heights spaces are well-preserved.  There is no fracture. CT Imaging    None    MR Imaging    MRI Pelvis without contrast 9/20/2017: There is normal bone signal. No para-articular edema-like signal is shown nor is there demonstration of substantial joint effusion. There is ankylosis of the inferior left sacroiliac joint without demonstration of substantial osteophyte formation. The inferior right SI joint is substantially narrowed with perhaps a small area of ankylosis inferiorly.   There is minimal-mild superolateral joint space narrowing of the hip joints bilaterally with tiny marginal osteophytes. Moderate degenerative changes in the lower lumbar spine are shown with disc space narrowing through L4-5 as well as bilateral facet osteoarthrosis without ankylosis at L5-S1. No soft tissue mass is demonstrated. Muscle signal, size and contour appear normal. There is moderate insertional tendinopathy of the gluteus medius bilaterally with partial-thickness tearing in tiny bursal effusions. Ultrasound    Retroperitoneum 10/11/2017: RIGHT KIDNEY: The right kidney has normal echogenicity with no mass, stone or hydronephrosis. The right kidney measures 9.1 cm in length. LEFT KIDNEY: The left kidney has normal echogenicity with  no mass, stone or hydronephrosis.  There may be mild caliectasis. The left kidney measures 8.7 cm in length. RETROPERITONEUM: The aorta is atherosclerotic and tapers normally. The aortic bifurcation is normal. The IVC is not visualized due to body habitus and bowel gas. No retroperitoneal mass is identified. BLADDER: The urinary bladder is incompletely distended. DXA     DXA 9/12/2017: (excluded Lumbar spine due to degenerative changes) left femoral neck T score: -2.1 (0.749 g/cm2), left total hip T score: -1.9 (0.763 g/cm2), right femoral neck T score: -2.4 (0.709 g/cm2), right total hip T score: -2.1 (0.746 g/cm2), and distal one third left radius T score -3.0 (BMD 0.614 g/cm2). FRAX score 17.8 % probability in 10 years for major osteoporotic fracture and 5.8 % 10 year probability of hip fracture. ASSESSMENT AND PLAN    This is a follow up visit for Ms. Yolande José. 1) HLA-B27 Positive Ankylosing Spondylitis. (positive HLA-B27, sacroiliitis, inflammatory arthritis). She reports a history of Rheumatoid Arthritis that was treated with gold, which she think led her to remission until 2016. She had developed sore pain and Polymyalgia Rheumatica-like symptoms involving her hip girdle in the fall of 2016. Her hip radiograph on 7/15/2015 showed ankylosis of the left sacroiliac joint. A repeat radiograph of SI joints 8/25/2017 confirmed left sacroiliac ankylosis and probable right sided involvement. MRI pelvis showed left SI joint ankylosis. Partial ankylosis of right SI joint. Her labs showed a positive HLA-B27. She has a normal spinal flexion but she continues to have tenderness and complain of pain and stiffness in her lower back lasting hours. She has active peripheral synovitis. She has CKD stage 3, so NSAIDs and methotrexate should be avoided. She was approved for Cosentyx but had not started it yet due to recent heart catheterization. I gave her loading dose today with teaching and asked her to call [employer] for her maintenance shipment.      2) Polymyalgia Rheumatica. This is secondary to #1. 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 Her DXA 9/12/2017 showed T score distal radius -3.0 and right femoral neck -2.4. She began Fosamax 70 mg every Sunday 11/2017 and is tolerating well. I will continue Fosamax 70 mg every Sunday. 4) Long Term Use of Immunosuppressants. The patient remains off immunomodulatory medications. 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 Her most recent labs 8/28/2018 show creatinine 0.75 mg/dL and eGFR 76 (previously creatinine was 0.88, 0.98, 0.77 mg/dL and eGFR 63, 55, 74). She has seen Dr. Mitch Niño and second opinion from Dr. Abdiel Thao. She understands to avoid NSAIDs. I will avoid methotrexate. 6) Rhomboid Muscle Strain. Greater Trochanteric Bursitis. I referred her to physical therapy. 7) Bilateral Knee Osteoarthritis. This was not an active issue today. I referred her to physical therapy. The patient voiced understanding of the aforementioned assessment and plan. Summary of plan was provided in the After Visit Summary patient instructions.      TODAY'S ORDERS    Orders Placed This Encounter    REFERRAL TO PHYSICAL THERAPY    secukinumab (COSENTYX, 2 SYRINGES,) 150 mg/mL syrg     Future Appointments   Date Time Provider Department Center   6/6/2019  2:20 PM MD Constance Campoverde MD, [address] [facility]    Adult Rheumatology   Rheumatology Ultrasound Certified  [address]  [facility], [address] [place] [address]   Phone [phone]  Fax [phone]

## 2019-03-15 ENCOUNTER — HOSPITAL ENCOUNTER (OUTPATIENT)
Dept: PHYSICAL THERAPY | Age: 79
End: 2019-03-15

## 2019-03-15 DIAGNOSIS — M81.0 AGE-RELATED OSTEOPOROSIS WITHOUT CURRENT PATHOLOGICAL FRACTURE: ICD-10-CM

## 2019-03-15 RX ORDER — ALENDRONATE SODIUM 70 MG/1
TABLET ORAL
Qty: 12 TAB | Refills: 0 | Status: SHIPPED | OUTPATIENT
Start: 2019-03-15 | End: 2019-06-02 | Stop reason: SDUPTHER

## 2019-03-26 ENCOUNTER — HOSPITAL ENCOUNTER (OUTPATIENT)
Dept: PHYSICAL THERAPY | Age: 79
Discharge: HOME | End: 2019-03-26
Payer: MEDICARE

## 2019-03-26 PROCEDURE — 97110 THERAPEUTIC EXERCISES: CPT | Performed by: PHYSICAL THERAPIST

## 2019-03-26 PROCEDURE — 97161 PT EVAL LOW COMPLEX 20 MIN: CPT | Performed by: PHYSICAL THERAPIST

## 2019-03-26 NOTE — PROGRESS NOTES
PT INITIAL EVALUATION NOTE - [facility] 2-15 Patient Name: Ernestina Hercules Date:3/26/2019 : 1940 [x]  Patient  Verified Payor: [place] / Plan: Vianney Bradley y / Product Type: Medicare / In time:916  Out time:1010 Total Treatment Time (min): 54 Total Timed Codes (min): 25 
1:1 Treatment Time ( only): 48 Visit #: 1 Treatment Area: Left knee pain [M25.562] Right knee pain [M25.561] SUBJECTIVE Pain Level (0-10 scale): 3 Any medication changes, allergies to medications, adverse drug reactions, diagnosis change, or new procedure performed?: [] No    [x] Yes (see summary sheet for update) Subjective:   
Pt reports ongoing pain in both knees, hips, and hands. She states that her knees are getting weaker and she has increased soreness with increased activity. She enjoys doing yardwork but is unable to do as much as she would like. She would like to get back to gardening and [employer]. She reports she used to get gold shots for rheumatoid arthritis and cortisone injections in the knees >20 years ago. She used to get them every 4-6 months. Currently she complains of difficulty with sit to stand, using steps, uneven surfaces. She lives alone on 5 acres and has difficulty caring for all the land. She has a 2-story home with a second floor bedroom. She feels that her strength is declining and has happened more noticeably in the last year. She does go to [employer] and completes water aerobics OBJECTIVE/EXAMINATION Posture:  Rounded shoulders and forward head, increased kyphosis Gait and Functional Mobility:  Right hip drop > left, right toe out, occasionally Palpation: tenderness to palpation along the fat pad and inferior to the patella Right Knee AROM 0-142 Left Knee AROM 0-135 Joint Mobility Assessment: good patella mobs LOWER QUARTER   MUSCLE STRENGTH 
KEY       R  L 
0 - No Contraction  Knee ext  4+  4+ 1 - Trace            flex  4-  4- 
 2 - Poor   Hip ext   NT  NT 
3 - Fair          flex   4-  4- 
4 - Good         abd  4-  4- 
5 - Normal         add  NT  NT 
          ER   4-  4- Ankle DF  4+  4+ 
              PF  NT  NT 
              INV  NT  NT 
              EV  NT  NT 
 
Neurological: Reflexes / Sensations: grossly intact Special Tests: Trendelenberg: +    Stork: - Quan: NT     Justina: NT 
               H.S. SLR: +    Piriformis Ext: - Long Sit: NT     Hip Scour: + Right > Left Knee Varus/Valgus Stress: NT  Knee Lachmans: NT Other: NT 
 
24 min Therapeutic Exercise:  [x] See flow sheet :  
Rationale: increase ROM, increase strength, improve coordination, improve balance and increase proprioception to improve the patients ability to complete all activity Other Objective/Functional Measures: FOTO 45 Pain Level (0-10 scale) post treatment: 2 
 
ASSESSMENT/Changes in Function:  
 
[x]  See Plan of Care Delroy Rosen, PT 3/26/2019

## 2019-03-28 ENCOUNTER — HOSPITAL ENCOUNTER (OUTPATIENT)
Dept: PHYSICAL THERAPY | Age: 79
Discharge: HOME | End: 2019-03-28
Payer: MEDICARE

## 2019-03-28 PROCEDURE — 97112 NEUROMUSCULAR REEDUCATION: CPT

## 2019-03-28 PROCEDURE — 97116 GAIT TRAINING THERAPY: CPT

## 2019-03-28 PROCEDURE — 97110 THERAPEUTIC EXERCISES: CPT

## 2019-03-28 NOTE — PROGRESS NOTES
PT DAILY TREATMENT NOTE - [facility] 2-15 Patient Name: Yuri Clemons Date:3/28/2019 : 1940 [x]  Patient  Verified Payor: [place] MED[place] / Plan: Vianney Steward / Product Type: Medicare / In time: 12:58  Out time:2:26 Total Treatment Time (min): 88min Total Timed Codes (min): 88min 1:1 Treatment Time ([facility] only): 68min Visit #:  2 Treatment Area: Left knee pain [M25.562] Right knee pain [M25.561] SUBJECTIVE Pain Level (0-10 scale): 4/10 B knees Any medication changes, allergies to medications, adverse drug reactions, diagnosis change, or new procedure performed?: [x] No    [] Yes (see summary sheet for update) Subjective functional status/changes:   [] No changes reported Pt reports HEP compliance since last PT session. Pt states she may have aggravated her knees going up the steps yesterday. Pt reports no falls in the past year. Pt reports feeling like she needs to take small careful steps when walking on uneven surfaces. OBJECTIVE Gait: Decreased B pelvic anterior depression during heelstrike (R worse than L), decreased B pelvic posterior depression during stance phase. Posture: B thighs adducted, narrow base of support. 43 min Therapeutic Exercise:  [x] See flow sheet :  
Rationale: increase ROM, increase strength, improve coordination, improve balance and increase proprioception to improve the patients ability to perform all daily activities with reduced pain and discomfort 30 min Neuromuscular Re-education:  []  See flow sheet : B inferomedial glides of femoral heads in acetabula via MET B hip add, followed by prolonged hold in hip abd, prolonged hold and combination of isotonics in long axis pelvic posterior depression, then rhythmic initiation thru functional hip patterning from flex-add-ER to ext-abd-IR; supine hip bridging with manual resistance thru B knees to approximate and facilitate 1. hip stabilizers with gluteal co-contraction, 2. hip abductors/adductors with gluteal co-contraction Rationale: increase ROM, increase strength, improve coordination, improve balance and increase proprioception  to improve the patients ability to perform all daily activities with reduced pain and discomfort 15 min Gait Training:  slow walking with focus on 1. pelvic anterior depression during heelstrike to reduce ground reaction forces transmission thru BLEs into knees, and 2. pelvic posterior depression during stance phase to improve ability to maintain level pelvis during gait Rationale: increase ROM, increase strength, improve coordination, improve balance and increase proprioception  to improve the patients ability to perform all daily activities with reduced pain and discomfort With 
 [] TE 
 [] TA 
 [] neuro 
 [] other: Patient Education: [x] Review HEP [] Progressed/Changed HEP based on:  
[] positioning   [] body mechanics   [] transfers   [] heat/ice application   
[] other:   
 
Pain Level (0-10 scale) post treatment:  1/10 B knees ASSESSMENT/Changes in Function:  
Pt tolerated treatment well. Pt focused on improving femoral seating in B acetabula, improving initiation and strength of hip stabilizers with OKC and CKC BLE movement, increasing overall BLE strength, and improving pt's functional gait pattern by reducing \"heaviness\" of heelstrike and improving pt's ability to maintain neutral pelvis during stance phase. Post-PT, pt reporting significantly less B knee pain with sit<>stand and with ambulation. Gait deviations remain. Will progress as tolerated.  
 
Patient will continue to benefit from skilled PT services to modify and progress therapeutic interventions, address functional mobility deficits, address ROM deficits, address strength deficits, analyze and address soft tissue restrictions, analyze and cue movement patterns, analyze and modify body mechanics/ergonomics, assess and modify postural abnormalities and instruct in home and community integration to attain remaining goals. [x]  See Plan of Care 
[]  See progress note/recertification 
[]  See Discharge Summary Progress towards goals / Updated goals: 
Pt is progressing well towards her goals. PLAN [x]  Upgrade activities as tolerated     [x]  Continue plan of care [x]  Update interventions per flow sheet      
[]  Discharge due to:_ 
[]  Other:_   
 
Jaron Waller, PT 3/28/2019

## 2019-04-02 ENCOUNTER — HOSPITAL ENCOUNTER (OUTPATIENT)
Dept: PHYSICAL THERAPY | Age: 79
Discharge: HOME | End: 2019-04-02
Payer: MEDICARE

## 2019-04-02 PROCEDURE — 97140 MANUAL THERAPY 1/> REGIONS: CPT

## 2019-04-02 PROCEDURE — 97110 THERAPEUTIC EXERCISES: CPT

## 2019-04-02 NOTE — PROGRESS NOTES
PT DAILY TREATMENT NOTE - [facility] 2-15 Patient Name: Vanna Mahajan Date:2019 : 1940 [x]  Patient  Verified Payor: [place] MEDICARE / Plan: Vianney Bradley y / Product Type: Medicare / In time:330  Out time:431 Total Treatment Time (min): 61 Total Timed Codes (min): 61 
1:1 Treatment Time ( [address] only): 28 Visit #:  3 Treatment Area: Left knee pain [M25.562] Right knee pain [M25.561] SUBJECTIVE Pain Level (0-10 scale): 1.5/10 Any medication changes, allergies to medications, adverse drug reactions, diagnosis change, or new procedure performed?: [x] No    [] Yes (see summary sheet for update) Subjective functional status/changes:   [] No changes reported Patient reports she was very sore after last session, however after a day or two she felt great. OBJECTIVE 43 min Therapeutic Exercise:  [] See flow sheet :  
Rationale: increase ROM and increase strength to improve the patients ability to perform ADLs and reduce pain levels 18 min Manual Therapy: contract relax bilateral hip abduction in sidelying, inferior hip mobs Rationale: decrease pain and increase ROM to improve the patients ability to perform ADLs and reduce pain levels With 
 [] TE 
 [] TA 
 [] neuro 
 [] other: Patient Education: [x] Review HEP [] Progressed/Changed HEP based on:  
[] positioning   [] body mechanics   [] transfers   [] heat/ice application   
[] other:   
 
Other Objective/Functional Measures: none noted Pain Level (0-10 scale) post treatment: 0/10 ASSESSMENT/Changes in Function:  
Patient will weightshift towards the R while performing sit to stands. Will require VCs in order to reduce compensation patterns. Will continue to progress as tolerated.  
Patient will continue to benefit from skilled PT services to modify and progress therapeutic interventions, address functional mobility deficits, address ROM deficits, address strength deficits, analyze and address soft tissue restrictions, analyze and cue movement patterns and analyze and modify body mechanics/ergonomics to attain remaining goals. [x]  See Plan of Care 
[]  See progress note/recertification 
[]  See Discharge Summary Progress towards goals / Updated goals: 
Patient is progressing towards goals, will continue to strengthen the hip stabilizers in order to reduce pain levels. PLAN [x]  Upgrade activities as tolerated     [x]  Continue plan of care [x]  Update interventions per flow sheet      
[]  Discharge due to:_ 
[]  Other:_ Elva TEIXEIRA [place] 4/2/2019

## 2019-04-05 ENCOUNTER — HOSPITAL ENCOUNTER (OUTPATIENT)
Dept: PHYSICAL THERAPY | Age: 79
Discharge: HOME | End: 2019-04-05
Payer: MEDICARE

## 2019-04-05 PROCEDURE — 97110 THERAPEUTIC EXERCISES: CPT

## 2019-04-05 PROCEDURE — 97140 MANUAL THERAPY 1/> REGIONS: CPT

## 2019-04-05 NOTE — PROGRESS NOTES
PT DAILY TREATMENT NOTE - [facility] 2-15 Patient Name: Kya Pressley Date:2019 : 1940 [x]  Patient  Verified Payor: [place] MEDICARE / Plan: Vianney Florencey / Product Type: Medicare / In time:905  Out time:954 Total Treatment Time (min): 49 Total Timed Codes (min): 49 
1:1 Treatment Time ([facility] only): 40 Visit #:  4 Treatment Area: Left knee pain [M25.562] Right knee pain [M25.561] SUBJECTIVE Pain Level (0-10 scale): 2/10 Any medication changes, allergies to medications, adverse drug reactions, diagnosis change, or new procedure performed?: [x] No    [] Yes (see summary sheet for update) Subjective functional status/changes:   [] No changes reported Patient reports she feels like she is getting better. OBJECTIVE 36 min Therapeutic Exercise:  [] See flow sheet :  
Rationale: increase ROM and increase strength to improve the patients ability to perform ADLs and reduce pain levels 13 min Manual Therapy: contract relax bilateral hip abduction in sidelying, inferior hip mobs Rationale: decrease pain and increase ROM to improve the patients ability to perform ADLs and reduce pain levels With 
 [] TE 
 [] TA 
 [] neuro 
 [] other: Patient Education: [x] Review HEP [] Progressed/Changed HEP based on:  
[] positioning   [] body mechanics   [] transfers   [] heat/ice application   
[] other:   
 
Other Objective/Functional Measures: none noted Pain Level (0-10 scale) post treatment: 0/10 ASSESSMENT/Changes in Function:  
Patient demonstrates improved glut and quad activation. R>L strength while performing single leg raises. Will continue to progress as tolerated.  
Patient will continue to benefit from skilled PT services to modify and progress therapeutic interventions, address functional mobility deficits, address ROM deficits, address strength deficits, analyze and address soft tissue restrictions, analyze and cue movement patterns and analyze and modify body mechanics/ergonomics to attain remaining goals. [x]  See Plan of Care 
[]  See progress note/recertification 
[]  See Discharge Summary Progress towards goals / Updated goals: 
Patient is progressing towards goals, will continue to strengthen hip stabilizers in order to reduce pain levels. PLAN [x]  Upgrade activities as tolerated     [x]  Continue plan of care [x]  Update interventions per flow sheet      
[]  Discharge due to:_ 
[]  Other:_ Morris TEIXEIRA [place] 4/5/2019

## 2019-04-09 ENCOUNTER — HOSPITAL ENCOUNTER (OUTPATIENT)
Dept: PHYSICAL THERAPY | Age: 79
Discharge: HOME | End: 2019-04-09
Payer: MEDICARE

## 2019-04-09 PROCEDURE — 97140 MANUAL THERAPY 1/> REGIONS: CPT

## 2019-04-09 PROCEDURE — 97110 THERAPEUTIC EXERCISES: CPT

## 2019-04-09 NOTE — PROGRESS NOTES
PT DAILY TREATMENT NOTE - [facility] 2-15 Patient Name: Modesto Pass Date:2019 : 1940 [x]  Patient  Verified Payor: [place] MEDICARE / Plan: Vianney Bradley y / Product Type: Medicare / In time:306  Out time:415 Total Treatment Time (min): 69 Total Timed Codes (min): 69 
1:1 Treatment Time ([facility] only): 38 Visit #:  2 Treatment Area: Left knee pain [M25.562] Right knee pain [M25.561] SUBJECTIVE Pain Level (0-10 scale): 0/10 Any medication changes, allergies to medications, adverse drug reactions, diagnosis change, or new procedure performed?: [x] No    [] Yes (see summary sheet for update) Subjective functional status/changes:   [] No changes reported Patient reports she did water aerobics the other day and didn't feel any discomfort. OBJECTIVE 56 min Therapeutic Exercise:  [] See flow sheet :  
Rationale: increase ROM and increase strength to improve the patients ability to perform ADLs and reduce pain levels 
  
13 min Manual Therapy: contract relax bilateral hip abduction in sidelying, inferior hip mobs Rationale: decrease pain and increase ROM to improve the patients ability to perform ADLs and reduce pain levels 
       
With 
 [] TE 
 [] TA 
 [] neuro 
 [] other: Patient Education: [x] Review HEP [] Progressed/Changed HEP based on:  
[] positioning   [] body mechanics   [] transfers   [] heat/ice application   
[] other:   
 
Other Objective/Functional Measures: none noted Pain Level (0-10 scale) post treatment: 0/10 ASSESSMENT/Changes in Function:  
Patient demonstrates improved glut strength during therex. Will continue to fatigue in the quads and demonstrate extension lag while perfoming SLR.   
Patient will continue to benefit from skilled PT services to modify and progress therapeutic interventions, address functional mobility deficits, address ROM deficits, address strength deficits, analyze and address soft tissue restrictions, analyze and cue movement patterns, analyze and modify body mechanics/ergonomics and assess and modify postural abnormalities to attain remaining goals. [x]  See Plan of Care 
[]  See progress note/recertification 
[]  See Discharge Summary Progress towards goals / Updated goals: 
Patient is progressing towards goals, will continue to strengthen the hip stabilizers in order to reduce pain levels. PLAN [x]  Upgrade activities as tolerated     [x]  Continue plan of care [x]  Update interventions per flow sheet      
[]  Discharge due to:_ 
[]  Other:_ Mariana Thorpe 4/9/2019

## 2019-04-10 ENCOUNTER — DOCUMENTATION ONLY (OUTPATIENT)
Dept: RHEUMATOLOGY | Age: 79
End: 2019-04-10

## 2019-04-10 NOTE — PROGRESS NOTES
Received fax from [employer] stating [employer] has been approved by [address] [place] [address] from 3/28/2019-6/13/2019, with zero co pay.

## 2019-04-12 ENCOUNTER — APPOINTMENT (OUTPATIENT)
Dept: PHYSICAL THERAPY | Age: 79
End: 2019-04-12
Payer: MEDICARE

## 2019-04-16 ENCOUNTER — APPOINTMENT (OUTPATIENT)
Dept: PHYSICAL THERAPY | Age: 79
End: 2019-04-16
Payer: MEDICARE

## 2019-04-18 ENCOUNTER — HOSPITAL ENCOUNTER (OUTPATIENT)
Dept: PHYSICAL THERAPY | Age: 79
Discharge: HOME | End: 2019-04-18
Payer: MEDICARE

## 2019-04-18 PROCEDURE — 97140 MANUAL THERAPY 1/> REGIONS: CPT | Performed by: PHYSICAL THERAPIST

## 2019-04-18 PROCEDURE — 97110 THERAPEUTIC EXERCISES: CPT | Performed by: PHYSICAL THERAPIST

## 2019-04-23 ENCOUNTER — HOSPITAL ENCOUNTER (OUTPATIENT)
Dept: PHYSICAL THERAPY | Age: 79
Discharge: HOME | End: 2019-04-23
Payer: MEDICARE

## 2019-04-23 PROCEDURE — 97110 THERAPEUTIC EXERCISES: CPT | Performed by: PHYSICAL THERAPIST

## 2019-04-23 NOTE — PROGRESS NOTES
PT DAILY TREATMENT NOTE - [facility] 2-15 Patient Name: Glendon Leventhal Date:2019 : 1940 [x]  Patient  Verified Payor: [place] MEDICARE / Plan: Vianney Bradley [facility] / Product Type: Medicare / In [address] [facility] time:131 Total Treatment Time (min): 55 Total Timed Codes (min): 55 
1:1 Treatment Time ( only): 35 Visit #:  6 Treatment Area: Left knee pain [M25.562] Right knee pain [M25.561] SUBJECTIVE Pain Level (0-10 scale): 2 Any medication changes, allergies to medications, adverse drug reactions, diagnosis change, or new procedure performed?: [x] No    [] Yes (see summary sheet for update) Subjective functional status/changes:   [] No changes reported Pt reports that she has been sick and hasn't been doing much OBJECTIVE 40 min Therapeutic Exercise:  [x] See flow sheet :  
Rationale: increase ROM, increase strength, improve coordination, improve balance and increase proprioception to improve the patients ability to complete all activity 15 min Manual Therapy: contract relax to adductors with distraction and inferior femoral glides Rationale: decrease pain, increase ROM, increase tissue extensibility, decrease trigger points and increase postural awareness to improve the patients ability to complete all acgtivity Pain Level (0-10 scale) post treatment: 0 
 
ASSESSMENT/Changes in Function:  
Pt is progressing slowly. She is able to complete all activity and states that her walking is improving but she still feels that she has ongoing pain in the legs that is the greatest limitations currently.  
Patient will continue to benefit from skilled PT services to modify and progress therapeutic interventions, address functional mobility deficits, address ROM deficits, address strength deficits, analyze and address soft tissue restrictions, analyze and cue movement patterns, analyze and modify body mechanics/ergonomics, assess and modify postural abnormalities and address imbalance/dizziness to attain remaining goals. []  See Plan of Care 
[]  See progress note/recertification 
[]  See Discharge Summary PLAN [x]  Upgrade activities as tolerated     [x]  Continue plan of care [x]  Update interventions per flow sheet      
[]  Discharge due to:_ 
[]  Other:_ Litzy Singleton, PT 4/23/2019

## 2019-04-23 NOTE — PROGRESS NOTES
PT DAILY TREATMENT NOTE - [facility] 2-15 Patient Name: Natalia Quintero Date:2019 : 1940 [x]  Patient  Verified Payor: [place] MEDICARE / Plan: Vianney Florencey / Product Type: Medicare / In time:107  Out time:202 Total Treatment Time (min): 55 Total Timed Codes (min): 55 
1:1 Treatment Time ( [address] only): 24 Visit #:  5 Treatment Area: Left knee pain [M25.562] Right knee pain [M25.561] SUBJECTIVE Pain Level (0-10 scale): 2.5 Any medication changes, allergies to medications, adverse drug reactions, diagnosis change, or new procedure performed?: [x] No    [] Yes (see summary sheet for update) Subjective functional status/changes:   [] No changes reported Pt reports that she continues to be sick and stayed in bed most of the weekend. She is on amoxicillin. OBJECTIVE 55 min Therapeutic Exercise:  [x] See flow sheet :  
Rationale: increase ROM, increase strength, improve coordination, improve balance and increase proprioception to improve the patients ability to complete all activity Pain Level (0-10 scale) post treatment: 0 
 
ASSESSMENT/Changes in Function:  
Pt is able to tolerate all activity. She continues to complain of pain but doesn't demonstrate functional limitations. Will continue to work on strengthening and working toward DC. Patient will continue to benefit from skilled PT services to modify and progress therapeutic interventions, address functional mobility deficits, address ROM deficits, address strength deficits, analyze and address soft tissue restrictions, analyze and cue movement patterns, analyze and modify body mechanics/ergonomics, assess and modify postural abnormalities and address imbalance/dizziness to attain remaining goals. []  See Plan of Care 
[]  See progress note/recertification 
[]  See Discharge Summary PLAN [x]  Upgrade activities as tolerated     [x]  Continue plan of care [x]  Update interventions per flow sheet []  Discharge due to:_ 
[]  Other:_ Willy Kruse, PT 4/23/2019

## 2019-04-24 NOTE — PROGRESS NOTES
[facility] [address] [place] Phone: [phone] Fax: [phone] Plan of Care/Statement of Necessity for Physical Therapy Services  2-15 Patient name: Marielena Kiser  : 1940  Provider#: [id number] Referral source: Reg Garcia MD     
Medical/Treatment Diagnosis: Left knee pain [M25.562] Right knee pain [M25.561] Prior Hospitalization: see medical history Comorbidities: osteoporosis, arthritis, thyroid problems, HBP, ashtma Prior Level of Function: 20 minutes of exercise 2-3 times a week Medications: Verified on Patient Summary List 
Start of Care: 3/26/19      Onset Date: chronic The Plan of Care and following information is based on the information from the initial evaluation. Assessment/ key information: Pt is a 78 yo female who is in today to begin therapy for bilateral knee pain. She states that she has been having knee issues for >20 years secondary to RA and OA. Current AROM Right is 0-142, Left is 0-135. She is tender along the fat pad bilaterally. Strength is decreased throughout the LE bilaterally. She has a hip drop right> left during gait. Pt is a good candidate for PT and will benefit from skilled services to address these deficits and work toward the goals listed below. Evaluation Complexity History HIGH Complexity :3+ comorbidities / personal factors will impact the outcome/ POC ; Examination HIGH Complexity : 4+ Standardized tests and measures addressing body structure, function, activity limitation and / or participation in recreation  ;Presentation LOW Complexity : Stable, uncomplicated  ;Clinical Decision Making MEDIUM Complexity : FOTO score of 26-74 Overall Complexity Rating: LOW Problem List: pain affecting function, decrease ROM, decrease strength, impaired gait/ balance, decrease ADL/ functional abilitiies, decrease activity tolerance, decrease flexibility/ joint mobility and decrease transfer abilities Treatment Plan may include any combination of the following: Therapeutic exercise, Therapeutic activities, Neuromuscular re-education, Physical agent/modality, Gait/balance training, Manual therapy, Patient education, Self Care training, Functional mobility training, Home safety training and Stair training Patient / Family readiness to learn indicated by: asking questions, trying to perform skills and interest 
Persons(s) to be included in education: patient (P) Barriers to Learning/Limitations: None Patient Goal (s): better walking and strength Patient Self Reported Health Status: good Rehabilitation Potential: good Short Term Goals: To be accomplished in 4-6 treatments: 
 Pt will be I with HEP Pt will complain of pain 0-1/10 with all activity Pt will be able to complete all household chores and ADL's without increased symptoms Long Term Goals: To be accomplished in 10-12 treatments: 
 Pt will increase strength and endurance to complete yardwork with better efficiency Pt will be able to lift 10 lbs to complete gardening tasks and work in her flowerbed without increased symptoms Pt will be able to climb all stairs and ambulate all community distances without increased symptoms Frequency / Duration: Patient to be seen 2 times per week for 10-12 treatments. Patient/ Caregiver education and instruction: self care, activity modification and exercises 
 
[x]  Plan of care has been reviewed with PTA Certification Period: 3/26/19-6/25/19 Jeremy Mendes, PT 4/24/2019  
 
________________________________________________________________________ I certify that the above Therapy Services are being furnished while the patient is under my care. I agree with the treatment plan and certify that this therapy is necessary. [de-identified] Signature:____________________  Date:____________Time: _________

## 2019-04-25 ENCOUNTER — HOSPITAL ENCOUNTER (OUTPATIENT)
Dept: PHYSICAL THERAPY | Age: 79
Discharge: HOME | End: 2019-04-25
Payer: MEDICARE

## 2019-04-25 PROCEDURE — 97110 THERAPEUTIC EXERCISES: CPT | Performed by: PHYSICAL THERAPIST

## 2019-04-25 NOTE — PROGRESS NOTES
PT DAILY TREATMENT NOTE - [facility] 2-15 Patient Name: Amanda Mckinney Date:2019 : 1940 [x]  Patient  Verified Payor: [place] MEDICARE / Plan: Vianney Bradley y / Product Type: Medicare / In time:105  Out time:201 Total Treatment Time (min): 56 Total Timed Codes (min): 56 
1:1 Treatment Time ( [address] only): 25 Visit #:  8 Treatment Area: Left knee pain [M25.562] Right knee pain [M25.561] SUBJECTIVE Pain Level (0-10 scale): 2-3 Any medication changes, allergies to medications, adverse drug reactions, diagnosis change, or new procedure performed?: [x] No    [] Yes (see summary sheet for update) Subjective functional status/changes:   [] No changes reported Pt reports that her chest cold is feeling better. OBJECTIVE 56 min Therapeutic Exercise:  [x] See flow sheet :  
Rationale: increase ROM, increase strength, improve coordination, improve balance and increase proprioception to improve the patients ability to complete all activity Pain Level (0-10 scale) post treatment: 1 ASSESSMENT/Changes in Function:  
Pt has progressed well is nearing DC. She reports improved ambulation, balance, and tolerance for activity. Will plan to DC in 2-3 visits. Patient will continue to benefit from skilled PT services to modify and progress therapeutic interventions, address functional mobility deficits, address ROM deficits, address strength deficits, analyze and address soft tissue restrictions, analyze and cue movement patterns, analyze and modify body mechanics/ergonomics, assess and modify postural abnormalities and address imbalance/dizziness to attain remaining goals. []  See Plan of Care 
[]  See progress note/recertification 
[]  See Discharge Summary PLAN [x]  Upgrade activities as tolerated     [x]  Continue plan of care [x]  Update interventions per flow sheet      
[]  Discharge due to:_ 
[]  Other:_ Sameer Tovar, PT 2019

## 2019-04-30 ENCOUNTER — HOSPITAL ENCOUNTER (OUTPATIENT)
Dept: PHYSICAL THERAPY | Age: 79
Discharge: HOME | End: 2019-04-30
Payer: MEDICARE

## 2019-04-30 PROCEDURE — 97110 THERAPEUTIC EXERCISES: CPT | Performed by: PHYSICAL THERAPIST

## 2019-04-30 NOTE — PROGRESS NOTES
PT DAILY TREATMENT NOTE - [facility] 2-15 Patient Name: David Ludwig Date:2019 : 1940 [x]  Patient  Verified Payor: [place] MEDICARE / Plan: Vianney Bradley y / Product Type: Medicare / In time:112  Out time:211 Total Treatment Time (min): 59 Total Timed Codes (min): 59 
1:1 Treatment Time ( [address] only): 33 Visit #:  5 Treatment Area: Left knee pain [M25.562] Right knee pain [M25.561] SUBJECTIVE Pain Level (0-10 scale): 1 Any medication changes, allergies to medications, adverse drug reactions, diagnosis change, or new procedure performed?: [x] No    [] Yes (see summary sheet for update) Subjective functional status/changes:   [] No changes reported Pt reports that she is doing better OBJECTIVE 59 min Therapeutic Exercise:  [x] See flow sheet :  
Rationale: increase ROM, increase strength, improve coordination, improve balance and increase proprioception to improve the patients ability to complete all activity Other Objective/Functional Measures: FOTO 48 Pain Level (0-10 scale) post treatment: 1 ASSESSMENT/Changes in Function:  
Pt is progressing well but scores similarly on her FOTO as she did on intake. Have discussed this and will be planning to continue for 1 additional visit and DC to her independent [facility] Patient will continue to benefit from skilled PT services to modify and progress therapeutic interventions, address functional mobility deficits, address ROM deficits, address strength deficits, analyze and address soft tissue restrictions, analyze and cue movement patterns, analyze and modify body mechanics/ergonomics, assess and modify postural abnormalities and address imbalance/dizziness to attain remaining goals. []  See Plan of Care 
[]  See progress note/recertification 
[]  See Discharge Summary PLAN [x]  Upgrade activities as tolerated     [x]  Continue plan of care [x]  Update interventions per flow sheet      
[]  Discharge due to:_ 
 []  Other:Uziel Ivy, PT 4/30/2019

## 2019-05-02 ENCOUNTER — HOSPITAL ENCOUNTER (OUTPATIENT)
Dept: PHYSICAL THERAPY | Age: 79
Discharge: HOME | End: 2019-05-02
Payer: MEDICARE

## 2019-05-02 PROCEDURE — 97110 THERAPEUTIC EXERCISES: CPT

## 2019-05-02 NOTE — PROGRESS NOTES
[facility] [address] ([facility] [address] [place] Phone: [phone] Fax: [phone] Discharge Summary 2-15 Patient name: Alexys Weems  : 1940  Provider#: [id number] Referral source: Prashant Santos MD     
Medical/Treatment Diagnosis: Left knee pain [M25.562] Right knee pain [M25.561] Prior Hospitalization: see medical history Comorbidities: See Plan of Care Prior Level of Function: See Plan of Care Medications: Verified on Patient Summary List 
 
Start of Care: 3/26/2019     Onset Date:Chronic Visits from Start of Care: 10     Missed Visits: 0 Reporting Period : 3/26/3019 to 2019 Assessment/Summary of care: Patient has completed 10 sessions of outpatient physical therapy for her bilateral knee pain. She is able to ambulate up stairs in a reciprocal gait pattern without any pain or discomfort. Hasn't begun to garden this year, but has been ambulating on uneven surfaces without any difficulties. Will continue to perform HEP, as well as begin to take water aerobics in order to maintain benefits of physical therapy. Short Term Goals: To be accomplished in 4-6 treatments: 
            Pt will be I with HEP met Pt will complain of pain 0-1/10 with all activity met Pt will be able to complete all household chores and ADL's without increased symptoms met Long Term Goals: To be accomplished in 10-12 treatments: 
            Pt will increase strength and endurance to complete yardwork with better efficiency not applicable Pt will be able to lift 10 lbs to complete gardening tasks and work in her flowerbed without increased symptoms met Pt will be able to climb all stairs and ambulate all community distances without increased symptoms met RECOMMENDATIONS: 
[x]Discontinue therapy: [x]Patient has reached or is progressing toward set goals []Patient is non-compliant or has abdicated 
   []Due to lack of appreciable progress towards set goals []Other Cristel TEIXEIRA Sumter 5/2/2019

## 2019-05-02 NOTE — PROGRESS NOTES
PT DAILY TREATMENT NOTE - [facility] 2-15 Patient Name: Josette Jeong Date:2019 : 1940 [x]  Patient  Verified Payor: [facility] MED[facility]ARE / Plan: [facility] / Product Type: Medicare / In time:1200  Out time:1258 Total Treatment Time (min): 58 Total Timed Codes (min): 58 
1:1 Treatment Time ( only): 58 Visit #:  25 Treatment Area: Left knee pain [M25.562] Right knee pain [M25.561] SUBJECTIVE Pain Level (0-10 scale): 1/10 Any medication changes, allergies to medications, adverse drug reactions, diagnosis change, or new procedure performed?: [x] No    [] Yes (see summary sheet for update) Subjective functional status/changes:   [] No changes reported Patient reports she is 60% better than she began. OBJECTIVE 58 min Therapeutic Exercise:  [x] See flow sheet :  
Rationale: increase ROM and increase strength to improve the patients ability to perform ADLs and reduce pain levels With 
 [] TE 
 [] TA 
 [] neuro 
 [] other: Patient Education: [x] Review HEP [] Progressed/Changed HEP based on:  
[] positioning   [] body mechanics   [] transfers   [] heat/ice application   
[] other:   
 
Other Objective/Functional Measures:  
foto:51  
  
 
ASSESSMENT/Changes in Function:  
Patient tolerates weights bearing therex with much greater ease. []  See Plan of Care 
[]  See progress note/recertification 
[x]  See Discharge Summary Progress towards goals / Updated goals: 
Short Term Goals: To be accomplished in 4-6 treatments: 
            Pt will be I with HEP met Pt will complain of pain 0-1/10 with all activity met Pt will be able to complete all household chores and ADL's without increased symptoms met Long Term Goals: To be accomplished in 10-12 treatments: 
            Pt will increase strength and endurance to complete yardwork with better efficiency not applicable Pt will be able to lift 10 lbs to complete gardening tasks and work in her flowerbed without increased symptoms met Pt will be able to climb all stairs and ambulate all community distances without increased symptoms met PLAN 
[]  Upgrade activities as tolerated     []  Continue plan of care 
[]  Update interventions per flow sheet [x]  Discharge due to:_ no longer has limitations with her ADLs. 
[]  Other:_ Camille Thorpe 5/2/2019

## 2019-05-07 ENCOUNTER — APPOINTMENT (OUTPATIENT)
Dept: PHYSICAL THERAPY | Age: 79
End: 2019-05-07
Payer: MEDICARE

## 2019-05-09 ENCOUNTER — APPOINTMENT (OUTPATIENT)
Dept: PHYSICAL THERAPY | Age: 79
End: 2019-05-09
Payer: MEDICARE

## 2019-05-14 ENCOUNTER — APPOINTMENT (OUTPATIENT)
Dept: PHYSICAL THERAPY | Age: 79
End: 2019-05-14
Payer: MEDICARE

## 2019-05-16 ENCOUNTER — APPOINTMENT (OUTPATIENT)
Dept: PHYSICAL THERAPY | Age: 79
End: 2019-05-16
Payer: MEDICARE

## 2019-05-21 ENCOUNTER — APPOINTMENT (OUTPATIENT)
Dept: PHYSICAL THERAPY | Age: 79
End: 2019-05-21
Payer: MEDICARE

## 2019-05-23 ENCOUNTER — APPOINTMENT (OUTPATIENT)
Dept: PHYSICAL THERAPY | Age: 79
End: 2019-05-23
Payer: MEDICARE

## 2019-05-28 ENCOUNTER — APPOINTMENT (OUTPATIENT)
Dept: PHYSICAL THERAPY | Age: 79
End: 2019-05-28
Payer: MEDICARE

## 2019-05-30 ENCOUNTER — APPOINTMENT (OUTPATIENT)
Dept: PHYSICAL THERAPY | Age: 79
End: 2019-05-30
Payer: MEDICARE

## 2019-06-02 DIAGNOSIS — M81.0 AGE-RELATED OSTEOPOROSIS WITHOUT CURRENT PATHOLOGICAL FRACTURE: ICD-10-CM

## 2019-06-02 RX ORDER — ALENDRONATE SODIUM 70 MG/1
TABLET ORAL
Qty: 12 TAB | Refills: 0 | Status: SHIPPED | OUTPATIENT
Start: 2019-06-02 | End: 2019-08-29 | Stop reason: SDUPTHER

## 2019-06-06 ENCOUNTER — OFFICE VISIT (OUTPATIENT)
Dept: RHEUMATOLOGY | Age: 79
End: 2019-06-06

## 2019-06-06 VITALS
HEART RATE: 60 BPM | RESPIRATION RATE: 18 BRPM | SYSTOLIC BLOOD PRESSURE: 144 MMHG | WEIGHT: 163 LBS | DIASTOLIC BLOOD PRESSURE: 74 MMHG | BODY MASS INDEX: 27.83 KG/M2 | TEMPERATURE: 97.8 F | HEIGHT: 64 IN

## 2019-06-06 DIAGNOSIS — M81.0 AGE-RELATED OSTEOPOROSIS WITHOUT CURRENT PATHOLOGICAL FRACTURE: ICD-10-CM

## 2019-06-06 DIAGNOSIS — M70.61 TROCHANTERIC BURSITIS OF BOTH HIPS: ICD-10-CM

## 2019-06-06 DIAGNOSIS — Z79.60 LONG-TERM USE OF IMMUNOSUPPRESSANT MEDICATION: ICD-10-CM

## 2019-06-06 DIAGNOSIS — M70.62 TROCHANTERIC BURSITIS OF BOTH HIPS: ICD-10-CM

## 2019-06-06 DIAGNOSIS — M45.8 ANKYLOSING SPONDYLITIS OF SACRAL REGION (HCC): Primary | ICD-10-CM

## 2019-06-06 DIAGNOSIS — M17.0 PRIMARY OSTEOARTHRITIS OF BOTH KNEES: ICD-10-CM

## 2019-06-06 NOTE — PROGRESS NOTES
REASON FOR VISIT    This is a follow up visit for Ms. Astrid Rodriguez for Ankylosing Spondylitis. Spondyloarthritis phenotype includes:  Anti-CCP positive: no  Rheumatoid factor positive: no  HLA-B27: yes  Erosive disease: no  Sacroiliitis: yes  Ankylosis: yes (left SI)  Psoriasis: no  Enthesitis: yes (Achilles)  Dactylitis: no  Nail Pitting: no  Onycholysis: no  Extra-articular manifestations include: Polymyalgia Rheumatica   SAPHO: no    Immunosuppression Screening (8/01/2018):   Quantiferon TB: negative 8/25/2017; repeat (11/07/2018)  PPD:  Not performed  Hepatitis B: negative  Hepatitis C: negative    Therapy History includes:  Current NSAIDs include: none (contraindicated due to CKD stage 3)  Current DMARD therapy include: Cosentyx 300 mg (3/07/2019 to present)  Prior DMARD therapy includes: Gold, leflunomide, Humira (11/2017 ot 12/2017), Enbrel 50 mg weekly (5/22/2018 to 9/20/2018)  The following DMARDs have been ineffective: none  The following DMARDs were stopped because of side effects: Gold (\"pass out\"), leflunomide (swelling), Humira (hives, lip and eyelid swelling), Enbrel (swelling of lips)  Contra-Indicated DMARDs because of CKD stage 3: methotrexate   Contra-Indicated DMARDs because sulfa allergy: sulfasalazine    Osteoporosis Historical Synopsis     Height loss since age 26 (at least two inches): 0.5  Fracture history includes: yes (ankle)  Family history of hip fracture: no  Fall Risk: no     Daily calcium intake is 1200 mg  Daily vitamin D intake is 800 IU     Smoking history: no  Alcohol consumption: no  Prednisone history: no     Exercise: yes     Previous work-up for osteoporosis includes the following:  DEXA Scan: 9/12/2017  Vitamin 25OH D level: 53.3 (10/04/2017)  PTH: 35 (10/04/2017)  TSH: 3.330 (8/25/2017)     Therapy History includes:  Current osteoporosis therapy includes: alendronate 70 mg every Sunday (11/2017 to present)  Prior osteoporosis therapy includes: none  The following osteoporosis therapy have been ineffective: none  The following osteoporosis therapy were stopped because of side effects: none    Active problems include:    Patient Active Problem List   Diagnosis Code    Allergic rhinitis, cause unspecified J30.9    Asthma J45.909    Essential hypertension, benign I10    Depression F32.9    Unspecified hypothyroidism E03.9    Mixed hyperlipidemia E78.2    Primary osteoarthritis of both knees M17.0    Ankylosis, 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 Sherine Small returns for a follow up visit. On her last visit, I gave her the Cosentyx 300 mg loading dose since she was approved. Today, she feels bad. She has sore pain which is worse in the morning in her hips, thighs, knees, and ankles. It improves after 3 hours. It also happens in the evening. She has morning stiffness lasting hours in her hands associated with swelling. She felt better while on physical therapy. She is interested in getting CBD oil since her neighbor rubs it on her knees which helps. She endorses chronic blurred vision and chronic oral ulcers. She denies fever, weight loss, vision loss, ankle swelling, dry cough, dyspnea, nausea, vomiting, dysphagia, abdominal pain, black or bloody stool, fall since last visit, rash, easy bruising and increased thirst.    Last toxicity monitoring by blood work was done on 8/28/2018 and did not reveal any significant adverse effects, except eGFR 76. Most recent inflammatory markers from 8/01/2018 revealed a ESR 4 mm/hr (previously 5 mm/hr) and CRP 1.9 mg/L (previously 9.1 mg/L).     She continues to work as a . The patient has not had any interval hospital admissions, infections, or surgeries. REVIEW OF SYSTEMS    A comprehensive review of systems was performed and pertinent results are documented in the HPI, review of systems is otherwise non-contributory. PAST MEDICAL HISTORY    She has a past medical history of Allergic rhinitis, cause unspecified (5/26/2010), Asthma (5/26/2010), Depression (5/26/2010), Encounter for long-term (current) use of other medications (7/11/2011), Essential hypertension, benign (5/26/2010), Hypertension, OA (osteoarthritis) (5/26/2010), Rheumatoid arthritis ([facility] [address] and Unspecified hypothyroidism (5/26/2010). FAMILY HISTORY    Her family history includes Cancer in her sister; Heart Disease in her father, mother, and sister; Stroke in her sister. SOCIAL HISTORY    She reports that she has never smoked. She has never used smokeless tobacco. She reports that she does not drink alcohol or use drugs. IMMUNIZATIONS  Immunization History   Administered Date(s) Administered    Influenza Vaccine 10/22/2013    Influenza Vaccine Split 09/27/2011     MEDICATIONS    Current Outpatient Medications   Medication Sig Dispense Refill    alendronate (FOSAMAX) 70 mg tablet TAKE ONE TABLET BY MOUTH ONCE WEEKLY 12 Tab 0    secukinumab (COSENTYX, 2 SYRINGES,) 150 mg/mL syrg 300 mg by SubCUTAneous route every month for 30 doses. 6 Syringe 11    acyclovir (ZOVIRAX) 400 mg tablet as needed.  Calcium-Cholecalciferol, D3, (CALCIUM 600 WITH VITAMIN D3) 600 mg(1,500mg) -400 unit cap Take  by mouth.  fluticasone (FLONASE) 50 mcg/actuation nasal spray PLACE TWO SPRAYS IN EACH NOSTRIL ONCE DAILY 3 Bottle 2    DULERA 200-5 mcg/actuation HFA inhaler 2 Puffs two (2) times a day.  FEXOFENADINE HCL (ALLEGRA PO) Take  by mouth daily.  albuterol (PROVENTIL HFA, VENTOLIN HFA) 90 mcg/actuation inhaler Take 1 Puff by inhalation every six (6) hours as needed for Wheezing.  1 Inhaler 5    levothyroxine (SYNTHROID) 50 mcg tablet Take  by mouth daily (before breakfast).  amlodipine (NORVASC) 5 mg tablet Take 5 mg by mouth daily. ALLERGIES    Allergies   Allergen Reactions    Humira [Adalimumab] Hives    Leflunomide Swelling    Penicillins Unable to Obtain    Sulfa (Sulfonamide Antibiotics) Unknown (comments)       PHYSICAL EXAMINATION    Visit Vitals  /74   Pulse 60   Temp 97.8 °F (36.6 °C)   Resp 18   Ht 5' 4\" (1.626 m)   Wt 163 lb (73.9 kg)   BMI 27.98 kg/m²     Body mass index is 27.98 kg/m². General: Patient is alert, oriented x 3, not in acute distress    HEENT:   Sclerae are not injected and appear moist.  There is no alopecia. Cardiovascular:  Heart is regular rate and rhythm, no murmurs. Chest:  Lungs are clear to auscultation bilaterally. No rhonchi, wheezes, or crackles. Extremities:  Free of clubbing, cyanosis, edema    Neurological exam:  Muscle strength is full in upper and lower extremities     Skin:    Psoriasis:     no  Nail Pitting:     no  Onycholysis:     no  Palmoplantar pustulosis:   no  Acne fulminans:    no  Acne conglobata:    no  Hidradenitis Suppurativa:   no  Dissecting cellulitis of the scalp:  no  Pilonidal sinus:    no  Erythema nodosum:    no    Musculoskeletal:  A comprehensive musculoskeletal exam was performed for all joints of each upper and lower extremity and assessed for swelling, tenderness and range of motion. Bilateral upper trapezius tenderness  Bilateral Sharmila and Heberden nodes. Bilateral greater trochanter tenderness  Bilateral knee crepitus without effusion.   Bilateral Achilles tenderness  Bilateral ankle tenderness (RESOVED)  Bilateral MTP tenderness (RESOLVED)    Costochondritis:  no   Synovitis:   Yes (see below table)  Dactylitis:   no  Enthesitis:   Yes (bilateral Achilles)    Joint Count 6/6/2019 3/7/2019 11/7/2018 8/1/2018 4/25/2018 12/4/2017 8/25/2017   Patient pain (0-100) - - - - - 20 (No Data)   [facility] - - - - - (No Data) 0.125   Left shoulder - Tender - 1 - - - 1 -   Left shoulder - Swollen - 0 - - - - -   Left elbow - Tender 1 1 - - - - -   Left elbow - Swollen 0 0 - - - - -   Left wrist- Tender 1 1 1 1 1 1 1   Left wrist- Swollen 1 1 1 1 - - 1   Left 1st MCP - Tender 1 1 1 1 - 1 -   Left 1st MCP - Swollen 1 1 - 1 - 1 1   Left 2nd MCP - Tender 1 0 1 1 1 1 -   Left 2nd MCP - Swollen 1 1 1 1 1 1 1   Left 3rd MCP - Tender 1 - 1 1 - - 1   Left 3rd MCP - Swollen 1 - - 1 1 1 1   Left 4th MCP - Tender - - - 1 - - 1   Left 5th MCP - Tender - - - 1 1 - -   Left 5th MCP - Swollen - - - 1 - - -   Left thumb IP - Tender - 1 1 1 - - -   Left thumb IP - Swollen - 1 0 0 - - -   Left 2nd PIP - Tender 1 1 1 - 1 - 1   Left 2nd PIP - Swollen 1 1 0 - 1 1 1   Left 3rd PIP - Tender 1 - - - 1 - 1   Left 3rd PIP - Swollen 1 - - - 1 1 1   Left 5th PIP - Tender - - 1 - - - -   Left 5th PIP - Swollen - - 0 - - - -   Right shoulder - Tender - 1 - - 1 - -   Right shoulder - Swollen - 0 - - - - -   Right elbow - Tender 1 - - - 1 - -   Right elbow - Swollen 0 - - - - - -   Right wrist- Tender 1 1 1 1 - 1 1   Right wrist- Swollen 1 1 1 1 - - 1   Right 1st MCP - Tender - 0 - 1 1 - -   Right 1st MCP - Swollen - 1 - 1 1 - 1   Right 2nd MCP - Tender 1 1 1 1 1 - 1   Right 2nd MCP - Swollen 1 1 1 1 1 1 1   Right 3rd MCP - Tender - 1 - 1 1 - -   Right 3rd MCP - Swollen - 1 - 1 1 1 1   Right 4th MCP - Tender - - - 1 - - -   Right 5th MCP - Tender 0 0 - 1 1 - -   Right 5th MCP - Swollen 1 1 - 1 1 - 1   Right thumb IP - Tender - - 1 - - - -   Right 2nd PIP - Tender - - 1 - 1 1 -   Right 2nd PIP - Swollen - - - 1 1 1 1   Right 3rd PIP - Tender - - - - - 1 -   Right 3rd PIP - Swollen - - - 1 1 1 1   Right 4th PIP - Tender 1 1 1 - - - -   Right 4th PIP - Swollen 0 0 - 1 - - -   Right 5th PIP - Tender - - 1 - - - -   Tender Joint Count (Total) 11 11 13 13 12 7 7   Swollen Joint Count (Total) 9 10 4 13 10 9 13   Physician Assessment (0-10) - - - - - 3 4   Patient Assessment (0-10) - - - - - 1 (No Data)   CDAI Total (calculated) - - - - - 20 -       DATA REVIEW    Laboratory     Recent laboratory results were reviewed, summarized, and discussed with the patient. Imaging    Musculoskeletal Ultrasound    None    Radiographs    Sacroiliac Joint 8/25/2017: There is bony ankylosis of the left SI joint unchanged. There is probable ankylosis of the right SI joint as well. The right SI joint is at least narrowed. Bone mineral density is decreased without fracture or bone destruction    Bilateral Foot 8/25/2017: LEFT: No fracture or dislocation on plain film. There is narrowing of the first MTP joint with minimal spurring. No joint space erosion or periosteal reaction. Alignment is within normal limits. Bone mineralization is decreased. No soft tissue calcification. RIGHT: No fracture or dislocation on plain film. There is metatarsus primus varus with hallux valgus deformity. There is joint space loss and spurring first MTP joint. No joint space erosion or periosteal reaction. Alignment is within normal limits. Bone mineralization is decreased. No soft tissue calcification. Chest 3/09/2017: normal heart size. There is no acute process in the lung fields. The osseous structures are unremarkable. Bilateral Hand 1/20/2017: LEFT: No fracture or dislocation on plain film. There are scattered mild degenerative changes of the interphalangeal joints. There is moderate degeneration of the first [facility] joint and mild degeneration of the first MCP joint. Minimal degenerative changes of the third and fourth MCP joints. There is widening of the scapholunate interval compatible with chronic scapholunate ligament tear. No joint space erosion or periosteal reaction. Alignment is within normal limits. Bone mineralization is decreased. No soft tissue calcification. RIGHT: No fracture or dislocation on plain film.  There are scattered mild degenerative changes in the interphalangeal joints and first MCP joint. There is narrowing of the lateral margin radiocarpal joint There is widening of the scapholunate interval compatible with chronic scapholunate ligament tear. No joint space erosion or periosteal reaction. Alignment is within normal limits. Bone mineralization is decreased. No soft tissue calcification. Left Hip 7/15/2015: no fracture, dislocation or other acute abnormality.  There appears to be fusion of the left sacroiliac joint and possibly the right sacroiliac joint. Spurring is noted at the symphysis. Lumbar Spine 7/15/2015: the patient is leaning to the right. There is a 2 mm retrolisthesis of L1  relative to L2. Bilateral facet arthropathy is the dominant feature at the  lower 3 levels. Bilateral laminectomies have been performed at L5-S1. Vertebral body heights spaces are well-preserved.  There is no fracture. CT Imaging    None    MR Imaging    MRI Pelvis without contrast 9/20/2017: There is normal bone signal. No para-articular edema-like signal is shown nor is there demonstration of substantial joint effusion. There is ankylosis of the inferior left sacroiliac joint without demonstration of substantial osteophyte formation. The inferior right SI joint is substantially narrowed with perhaps a small area of ankylosis inferiorly.   There is minimal-mild superolateral joint space narrowing of the hip joints bilaterally with tiny marginal osteophytes. Moderate degenerative changes in the lower lumbar spine are shown with disc space narrowing through L4-5 as well as bilateral facet osteoarthrosis without ankylosis at L5-S1. No soft tissue mass is demonstrated. Muscle signal, size and contour appear normal. There is moderate insertional tendinopathy of the gluteus medius bilaterally with partial-thickness tearing in tiny bursal effusions.     Ultrasound    Retroperitoneum 10/11/2017: RIGHT KIDNEY: The right kidney has normal echogenicity with no mass, stone or hydronephrosis. The right kidney measures 9.1 cm in length. LEFT KIDNEY: The left kidney has normal echogenicity with  no mass, stone or hydronephrosis. There may be mild caliectasis. The left kidney measures 8.7 cm in length. RETROPERITONEUM: The aorta is atherosclerotic and tapers normally. The aortic bifurcation is normal. The IVC is not visualized due to body habitus and bowel gas. No retroperitoneal mass is identified. BLADDER: The urinary bladder is incompletely distended. DXA     DXA 9/12/2017: (excluded Lumbar spine due to degenerative changes) left femoral neck T score: -2.1 (0.749 g/cm2), left total hip T score: -1.9 (0.763 g/cm2), right femoral neck T score: -2.4 (0.709 g/cm2), right total hip T score: -2.1 (0.746 g/cm2), and distal one third left radius T score -3.0 (BMD 0.614 g/cm2). FRAX score 17.8 % probability in 10 years for major osteoporotic fracture and 5.8 % 10 year probability of hip fracture. ASSESSMENT AND PLAN    This is a follow up visit for Ms. Nuno Glynn. 1) HLA-B27 Positive Ankylosing Spondylitis. (positive HLA-B27, sacroiliitis, inflammatory arthritis). She reports a history of Rheumatoid Arthritis that was treated with gold, which she think led her to remission until 2016. She had developed sore pain and Polymyalgia Rheumatica-like symptoms involving her hip girdle in the fall of 2016. Her hip radiograph on 7/15/2015 showed ankylosis of the left sacroiliac joint. A repeat radiograph of SI joints 8/25/2017 confirmed left sacroiliac ankylosis and probable right sided involvement. MRI pelvis showed left SI joint ankylosis. Partial ankylosis of right SI joint. Her labs showed a positive HLA-B27. She has active peripheral synovitis. She has CKD stage 3, so NSAIDs and methotrexate should be avoided. She has been on Cosentyx with good tolerance but continues to have peripheral arthritis and symptoms of Polymyalgia Rheumatica versus osteoarthritis and trochanteric bursitis.  She has bilateral Achilles tenderness. She does not know if she has breakthrough, so I asked her to keep note if she has breakthrough. 2) Polymyalgia Rheumatica. This is secondary to #1. 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 Her DXA 9/12/2017 showed T score distal radius -3.0 and right femoral neck -2.4. She began Fosamax 70 mg every Sunday 11/2017 and is tolerating well. I will continue Fosamax 70 mg every Sunday. 4) Long Term Use of Immunosuppressants. The patient remains on immunomodulatory medications (Cosentyx) and requires frequent toxicity monitoring by blood work. 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 Her most recent labs 8/28/2018 show creatinine 0.75 mg/dL and eGFR 76 (previously creatinine was 0.88, 0.98, 0.77 mg/dL and eGFR 63, 55, 74). She has seen Dr. Magaly Forrest and second opinion from Dr. Iliana Guido. She understands to avoid NSAIDs. I will avoid methotrexate. 6) Rhomboid Muscle Strain. Greater Trochanteric Bursitis. I referred her to physical therapy previously with benefit. I referred her to physical therapy today. 7) Bilateral Knee Osteoarthritis. This was not an active issue today. I referred her to physical therapy today. The patient voiced understanding of the aforementioned assessment and plan. Summary of plan was provided in the After Visit Summary patient instructions.      TODAY'S ORDERS    Orders Placed This Encounter    REFERRAL TO PHYSICAL THERAPY     Future Appointments   Date Time Provider Department Center   9/19/2019  2:20 PM Penelope Lutz MD [place], [place], [address] [facility]    Adult Rheumatology   Rheumatology Ultrasound Certified  [facility]  A Part of [address]   Phone [phone]  Fax [phone]

## 2019-06-06 NOTE — LETTER
6/6/19 Patient: Berry Brittle YOB: 1940 Date of Visit: 6/6/2019 Garrison Sweeney MD 
[address] [facility] [id number] VIA In Basket Dear Garrison Sweeney MD, Thank you for referring Ms. Farida Vanegas to [facility] for evaluation. My notes for this consultation are attached. If you have questions, please do not hesitate to call me. I look forward to following your patient along with you.  
 
 
Sincerely, 
 
Tree Suero MD

## 2019-06-06 NOTE — PROGRESS NOTES
Chief Complaint   Patient presents with    Joint Pain     1. Have you been to the ER, urgent care clinic since your last visit? Hospitalized since your last visit? No    2. Have you seen or consulted any other health care providers outside of the [address] [place] [address] since your last visit? Include any pap smears or colon screening.  No

## 2019-07-16 ENCOUNTER — TELEPHONE (OUTPATIENT)
Dept: RHEUMATOLOGY | Age: 79
End: 2019-07-16

## 2019-07-16 NOTE — TELEPHONE ENCOUNTER
Left message to call with information on Cosentyx prescription, and the name, address, and phone number for the pharmacy.

## 2019-07-16 NOTE — TELEPHONE ENCOUNTER
----- Message from [facility] sent at 7/16/2019  8:33 AM EDT -----  Regarding: Dr Lutz/ refill  The pt called because [facility] is still waiting on an ok for her \"contanyx\" prescription.        Best contact number is [phone]

## 2019-07-17 ENCOUNTER — TELEPHONE (OUTPATIENT)
Dept: RHEUMATOLOGY | Age: 79
End: 2019-07-17

## 2019-07-17 DIAGNOSIS — M45.8 ANKYLOSING SPONDYLITIS OF SACRAL REGION (HCC): ICD-10-CM

## 2019-07-17 NOTE — TELEPHONE ENCOUNTER
Returned call to patient, and used 2 identifiers. Send refill prescription for Cosentyx 300 mg every month to [facility] in [place].

## 2019-07-17 NOTE — TELEPHONE ENCOUNTER
----- Message from [facility] sent at 7/16/2019  3:32 PM EDT -----  Regarding: Dr Lutz/ telephone  The pt returned the nurses call and is requesting another back.       Best contact number is [phone]

## 2019-07-19 ENCOUNTER — TELEPHONE (OUTPATIENT)
Dept: RHEUMATOLOGY | Age: 79
End: 2019-07-19

## 2019-07-19 NOTE — TELEPHONE ENCOUNTER
----- Message from Reji Deluca sent at 7/19/2019  9:07 AM EDT -----  Regarding: Dr. Lisandro Reza Message/Vendor Calls    Caller's first and last name: Shukri Guevara      Reason for call:Rx denial      Callback required yes/no and why:yes      Best contact number(s): [phone]. 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 Pt requested a call from the nurse.       Reji Deluca

## 2019-07-19 NOTE — TELEPHONE ENCOUNTER
Spoke with the patient using 2 identifiers. Informed patient I will call her insurance company to renew the prior auth, and pharmacy [facility] to change prescription to Cosentyx Pen not the syringe. Spoke with Brian Morton at [facility] and got Cosentyx Pen approved as of today. He will fax approval information today to the office. Called [facility] and spoke to [employer], and gave her a verbal order for Cosentyx 150 mg Pen # 6 (90 day supply) to give 300 mg every 4 weeks with 4 refills.

## 2019-08-29 DIAGNOSIS — M81.0 AGE-RELATED OSTEOPOROSIS WITHOUT CURRENT PATHOLOGICAL FRACTURE: ICD-10-CM

## 2019-08-29 RX ORDER — ALENDRONATE SODIUM 70 MG/1
TABLET ORAL
Qty: 12 TAB | Refills: 0 | Status: SHIPPED | OUTPATIENT
Start: 2019-08-29 | End: 2019-11-20 | Stop reason: SDUPTHER

## 2019-09-19 ENCOUNTER — HOSPITAL ENCOUNTER (OUTPATIENT)
Dept: LAB | Age: 79
Discharge: HOME | End: 2019-09-19
Payer: MEDICARE

## 2019-09-19 ENCOUNTER — OFFICE VISIT (OUTPATIENT)
Dept: RHEUMATOLOGY | Age: 79
End: 2019-09-19

## 2019-09-19 VITALS
HEART RATE: 73 BPM | DIASTOLIC BLOOD PRESSURE: 76 MMHG | HEIGHT: 64 IN | TEMPERATURE: 97.8 F | WEIGHT: 161 LBS | BODY MASS INDEX: 27.49 KG/M2 | RESPIRATION RATE: 18 BRPM | SYSTOLIC BLOOD PRESSURE: 152 MMHG

## 2019-09-19 DIAGNOSIS — Z79.60 LONG-TERM USE OF IMMUNOSUPPRESSANT MEDICATION: ICD-10-CM

## 2019-09-19 DIAGNOSIS — M70.62 TROCHANTERIC BURSITIS OF BOTH HIPS: ICD-10-CM

## 2019-09-19 DIAGNOSIS — M45.8 ANKYLOSING SPONDYLITIS OF SACRAL REGION (HCC): Primary | ICD-10-CM

## 2019-09-19 DIAGNOSIS — M70.61 TROCHANTERIC BURSITIS OF BOTH HIPS: ICD-10-CM

## 2019-09-19 DIAGNOSIS — M81.0 AGE-RELATED OSTEOPOROSIS WITHOUT CURRENT PATHOLOGICAL FRACTURE: ICD-10-CM

## 2019-09-19 DIAGNOSIS — N18.2 CKD (CHRONIC KIDNEY DISEASE) STAGE 2, GFR 60-89 ML/MIN: ICD-10-CM

## 2019-09-19 PROCEDURE — 80053 COMPREHEN METABOLIC PANEL: CPT

## 2019-09-19 PROCEDURE — 36415 COLL VENOUS BLD VENIPUNCTURE: CPT

## 2019-09-19 PROCEDURE — 86480 TB TEST CELL IMMUN MEASURE: CPT

## 2019-09-19 PROCEDURE — 86140 C-REACTIVE PROTEIN: CPT

## 2019-09-19 PROCEDURE — 85025 COMPLETE CBC W/AUTO DIFF WBC: CPT

## 2019-09-19 PROCEDURE — 86803 HEPATITIS C AB TEST: CPT

## 2019-09-19 PROCEDURE — 85651 RBC SED RATE NONAUTOMATED: CPT

## 2019-09-19 RX ORDER — PREDNISONE 5 MG/1
TABLET ORAL
Qty: 91 TAB | Refills: 0 | Status: SHIPPED | OUTPATIENT
Start: 2019-09-19 | End: 2019-11-03

## 2019-09-19 NOTE — PROGRESS NOTES
REASON FOR VISIT    This is a follow up visit for Ms. Parish Nickerson for    ICD-10-CM   1. Ankylosing spondylitis of sacral region ([facility] [address] M45.8   2. Age-related osteoporosis without current pathological fracture M81.0     Spondyloarthritis phenotype includes:  Anti-CCP positive: no  Rheumatoid factor positive: no  HLA-B27: yes  Erosive disease: no  Sacroiliitis: yes  Ankylosis: yes (left SI)  Psoriasis: no  Enthesitis: yes (Achilles)  Dactylitis: no  Nail Pitting: no  Onycholysis: no  Extra-articular manifestations include: Polymyalgia Rheumatica   SAPHO: no    Immunosuppression Screening (8/01/2018):   Quantiferon TB: negative 8/25/2017; repeat (11/07/2018)  PPD:  Not performed  Hepatitis B: negative  Hepatitis C: negative    Therapy History includes:  Current NSAIDs include: none (contraindicated due to CKD stage 3)  Current DMARD therapy include: Cosentyx 300 mg (3/07/2019 to present)  Prior DMARD therapy includes: Gold, leflunomide, Humira (11/2017 ot 12/2017), Enbrel 50 mg weekly (5/22/2018 to 9/20/2018)  The following DMARDs have been ineffective: none  The following DMARDs were stopped because of side effects: Gold (\"pass out\"), leflunomide (swelling), Humira (hives, lip and eyelid swelling), Enbrel (swelling of lips)  Contra-Indicated DMARDs because of CKD stage 3: methotrexate   Contra-Indicated DMARDs because sulfa allergy: sulfasalazine    Osteoporosis Historical Synopsis     Height loss since age 26 (at least two inches): 0.5  Fracture history includes: yes (ankle)  Family history of hip fracture: no  Fall Risk: no     Daily calcium intake is 1200 mg  Daily vitamin D intake is 800 IU     Smoking history: no  Alcohol consumption: no  Prednisone history: no     Exercise: yes     Previous work-up for osteoporosis includes the following:  DEXA Scan: 9/12/2017  Vitamin 25OH D level: 53.3 (10/04/2017)  PTH: 35 (10/04/2017)  TSH: 3.330 (8/25/2017)     Therapy History includes:  Current osteoporosis therapy includes: alendronate 70 mg every Sunday (11/2017 to present)  Prior osteoporosis therapy includes: none  The following osteoporosis therapy have been ineffective: none  The following osteoporosis therapy were stopped because of side effects: none    Active problems include:    Patient Active Problem List   Diagnosis Code    Allergic rhinitis, cause unspecified J30.9    Asthma J45.909    Essential hypertension, benign I10    Depression F32.9    Unspecified hypothyroidism E03.9    Mixed hyperlipidemia E78.2    Primary osteoarthritis of both knees M17.0    Ankylosis, 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    PMR (polymyalgia rheumatica) ([facility]) M35.3    CKD (chronic kidney disease) stage 2, GFR 60-89 ml/min N18.2    Ankylosing spondylitis of sacral region ([facility] [address] M45.8    Long-term use of immunosuppressant medication Z79.899    Age-related osteoporosis without current pathological fracture M81.0    Gastroesophageal reflux disease with esophagitis K21.0     HISTORY OF PRESENT ILLNESS    Ms. Nicolas Manley returns for a follow up visit. On her last visit, I continued Cosentyx 300 mg every 4 weeks and  Instructed her to pay notice if she has breakthrough. I referred her to physical therapy today for her knees. Today, she feels worse. She complains of aching pain on her right side of her leg from her outer hip to her ankle. She also complains of knee pain that limits her to climbing steps. she wants an injection. She has aching pain in her hands that improves with use up to 2 hours. She does not feel a difference when she takes Cosentyx. She endorses easy bruising, increased thirst.    She denies fever, weight loss, blurred vision, vision loss, oral ulcers, ankle swelling, dry cough, dyspnea, nausea, vomiting, dysphagia, abdominal pain, black or bloody stool, fall since last visit and rash. Last toxicity monitoring by blood work was done on 8/28/2018 and did not reveal any significant adverse effects, except eGFR 76.     Most recent inflammatory markers from 8/01/2018 revealed a ESR 4 mm/hr (previously 5 mm/hr) and CRP 1.9 mg/L (previously 9.1 mg/L). She continues to work as a . The patient has not had any interval hospital admissions, infections, or surgeries. REVIEW OF SYSTEMS    A comprehensive review of systems was performed and pertinent results are documented in the HPI, review of systems is otherwise non-contributory. PAST MEDICAL HISTORY    She has a past medical history of Allergic rhinitis, cause unspecified (5/26/2010), Asthma (5/26/2010), Depression (5/26/2010), Encounter for long-term (current) use of other medications (7/11/2011), Essential hypertension, benign (5/26/2010), Hypertension, OA (osteoarthritis) (5/26/2010), Rheumatoid arthritis ([facility] and Unspecified hypothyroidism (5/26/2010). FAMILY HISTORY    Her family history includes Cancer in her sister; Heart Disease in her father, mother, and sister; Stroke in her sister. SOCIAL HISTORY    She reports that she has never smoked. She has never used smokeless tobacco. She reports that she does not drink alcohol or use drugs. IMMUNIZATIONS  Immunization History   Administered Date(s) Administered    Influenza Vaccine 10/22/2013    Influenza Vaccine Split 09/27/2011     MEDICATIONS    Current Outpatient Medications   Medication Sig Dispense Refill    predniSONE (DELTASONE) 5 mg tablet 4 tabs daily for 7 days, 3 tabs for 7 days, 2 tabs for 14 days, 1 tab for 14 days 91 Tab 0    alendronate (FOSAMAX) 70 mg tablet TAKE ONE TABLET BY MOUTH ONCE WEEKLY 12 Tab 0    secukinumab (COSENTYX, 2 SYRINGES,) 150 mg/mL syrg 300 mg by SubCUTAneous route every month. Indications: rheumatic disease causing pain & stiffness in backbone 6 Syringe 11    acyclovir (ZOVIRAX) 400 mg tablet as needed.  Calcium-Cholecalciferol, D3, (CALCIUM 600 WITH VITAMIN D3) 600 mg(1,500mg) -400 unit cap Take  by mouth.       DULERA 200-5 mcg/actuation HFA inhaler 2 Puffs two (2) times a day.  FEXOFENADINE HCL (ALLEGRA PO) Take  by mouth daily.  albuterol (PROVENTIL HFA, VENTOLIN HFA) 90 mcg/actuation inhaler Take 1 Puff by inhalation every six (6) hours as needed for Wheezing. 1 Inhaler 5    levothyroxine (SYNTHROID) 50 mcg tablet Take  by mouth daily (before breakfast).  amlodipine (NORVASC) 5 mg tablet Take 5 mg by mouth daily.  fluticasone (FLONASE) 50 mcg/actuation nasal spray PLACE TWO SPRAYS IN EACH NOSTRIL ONCE DAILY 3 Bottle 2     ALLERGIES    Allergies   Allergen Reactions    Humira [Adalimumab] Hives    Leflunomide Swelling    Penicillins Unable to Obtain    Sulfa (Sulfonamide Antibiotics) Unknown (comments)       PHYSICAL EXAMINATION    Visit Vitals  /76   Pulse 73   Temp 97.8 °F (36.6 °C)   Resp 18   Ht 5' 4\" (1.626 m)   Wt 161 lb (73 kg)   BMI 27.64 kg/m²     Body mass index is 27.64 kg/m². General: Patient is alert, oriented x 3, not in acute distress    HEENT:   Sclerae are not injected and appear moist.  There is no alopecia. Cardiovascular:  Heart is regular rate and rhythm, no murmurs. Chest:  Lungs are clear to auscultation bilaterally. No rhonchi, wheezes, or crackles. Extremities:  Free of clubbing, cyanosis, edema    Neurological exam:  Muscle strength is full in upper and lower extremities     Skin:    Psoriasis:     no  Nail Pitting:     no  Onycholysis:     no  Palmoplantar pustulosis:   no  Acne fulminans:    no  Acne conglobata:    no  Hidradenitis Suppurativa:   no  Dissecting cellulitis of the scalp:  no  Pilonidal sinus:    no  Erythema nodosum:    no    Musculoskeletal:  A comprehensive musculoskeletal exam was performed for all joints of each upper and lower extremity and assessed for swelling, tenderness and range of motion. Bilateral arm, forearm, thigh, and calf allodynia  Bilateral Sharmila and Heberden nodes.   Bilateral greater trochanter tenderness  Right IT band tenderness  Bilateral knee crepitus without effusion.   Bilateral Achilles tenderness  Bilateral ankle tenderness    Bilateral MTP tenderness      Costochondritis:  no   Synovitis:   Yes (see below table)  Dactylitis:   no  Enthesitis:   Yes (bilateral Achilles)    Joint Count 9/19/2019 6/6/2019 3/7/2019 11/7/2018 8/1/2018 4/25/2018 12/4/2017   Patient pain (0-100) - - - - - - 20   MHAQ - - - - - - (No Data)   Left shoulder - Tender - - 1 - - - 1   Left shoulder - Swollen - - 0 - - - -   Left elbow - Tender - 1 1 - - - -   Left elbow - Swollen - 0 0 - - - -   Left wrist- Tender 1 1 1 1 1 1 1   Left wrist- Swollen 1 1 1 1 1 - -   Left 1st MCP - Tender 1 1 1 1 1 - 1   Left 1st MCP - Swollen 1 1 1 - 1 - 1   Left 2nd MCP - Tender 1 1 0 1 1 1 1   Left 2nd MCP - Swollen 1 1 1 1 1 1 1   Left 3rd MCP - Tender - 1 - 1 1 - -   Left 3rd MCP - Swollen - 1 - - 1 1 1   Left 4th MCP - Tender - - - - 1 - -   Left 5th MCP - Tender 1 - - - 1 1 -   Left 5th MCP - Swollen 1 - - - 1 - -   Left thumb IP - Tender 1 - 1 1 1 - -   Left thumb IP - Swollen 0 - 1 0 0 - -   Left 2nd PIP - Tender 1 1 1 1 - 1 -   Left 2nd PIP - Swollen 1 1 1 0 - 1 1   Left 3rd PIP - Tender 1 1 - - - 1 -   Left 3rd PIP - Swollen 1 1 - - - 1 1   Left 4th PIP - Tender 1 - - - - - -   Left 4th PIP - Swollen 0 - - - - - -   Left 5th PIP - Tender 1 - - 1 - - -   Left 5th PIP - Swollen 0 - - 0 - - -   Right shoulder - Tender - - 1 - - 1 -   Right shoulder - Swollen - - 0 - - - -   Right elbow - Tender - 1 - - - 1 -   Right elbow - Swollen - 0 - - - - -   Right wrist- Tender 1 1 1 1 1 - 1   Right wrist- Swollen 1 1 1 1 1 - -   Right 1st MCP - Tender 1 - 0 - 1 1 -   Right 1st MCP - Swollen 1 - 1 - 1 1 -   Right 2nd MCP - Tender 1 1 1 1 1 1 -   Right 2nd MCP - Swollen 0 1 1 1 1 1 1   Right 3rd MCP - Tender 1 - 1 - 1 1 -   Right 3rd MCP - Swollen 0 - 1 - 1 1 1   Right 4th MCP - Tender 1 - - - 1 - -   Right 4th MCP - Swollen 0 - - - - - -   Right 5th MCP - Tender 1 0 0 - 1 1 -   Right 5th MCP - Swollen 1 1 1 - 1 1 -   Right thumb IP - Tender - - - 1 - - -   Right 2nd PIP - Tender 1 - - 1 - 1 1   Right 2nd PIP - Swollen 1 - - - 1 1 1   Right 3rd PIP - Tender 1 - - - - - 1   Right 3rd PIP - Swollen 1 - - - 1 1 1   Right 4th PIP - Tender - 1 1 1 - - -   Right 4th PIP - Swollen - 0 0 - 1 - -   Right 5th PIP - Tender 1 - - 1 - - -   Right 5th PIP - Swollen 1 - - - - - -   Tender Joint Count (Total) 18 11 11 13 13 12 7   Swollen Joint Count (Total) 12 9 10 4 13 10 9   Physician Assessment (0-10) - - - - - - 3   Patient Assessment (0-10) - - - - - - 1   CDAI Total (calculated) - - - - - - 20       DATA REVIEW    Laboratory     Recent laboratory results were reviewed, summarized, and discussed with the patient. Imaging    Musculoskeletal Ultrasound    None    Radiographs    Sacroiliac Joint 8/25/2017: There is bony ankylosis of the left SI joint unchanged. There is probable ankylosis of the right SI joint as well. The right SI joint is at least narrowed. Bone mineral density is decreased without fracture or bone destruction    Bilateral Foot 8/25/2017: LEFT: No fracture or dislocation on plain film. There is narrowing of the first MTP joint with minimal spurring. No joint space erosion or periosteal reaction. Alignment is within normal limits. Bone mineralization is decreased. No soft tissue calcification. RIGHT: No fracture or dislocation on plain film. There is metatarsus primus varus with hallux valgus deformity. There is joint space loss and spurring first MTP joint. No joint space erosion or periosteal reaction. Alignment is within normal limits. Bone mineralization is decreased. No soft tissue calcification. Chest 3/09/2017: normal heart size. There is no acute process in the lung fields. The osseous structures are unremarkable. Bilateral Hand 1/20/2017: LEFT: No fracture or dislocation on plain film. There are scattered mild degenerative changes of the interphalangeal joints.  There is moderate degeneration of the first [facility] joint and mild degeneration of the first MCP joint. Minimal degenerative changes of the third and fourth MCP joints. There is widening of the scapholunate interval compatible with chronic scapholunate ligament tear. No joint space erosion or periosteal reaction. Alignment is within normal limits. Bone mineralization is decreased. No soft tissue calcification. RIGHT: No fracture or dislocation on plain film. There are scattered mild degenerative changes in the interphalangeal joints and first MCP joint. There is narrowing of the lateral margin radiocarpal joint There is widening of the scapholunate interval compatible with chronic scapholunate ligament tear. No joint space erosion or periosteal reaction. Alignment is within normal limits. Bone mineralization is decreased. No soft tissue calcification. Left Hip 7/15/2015: no fracture, dislocation or other acute abnormality.  There appears to be fusion of the left sacroiliac joint and possibly the right sacroiliac joint. Spurring is noted at the symphysis. Lumbar Spine 7/15/2015: the patient is leaning to the right. There is a 2 mm retrolisthesis of L1  relative to L2. Bilateral facet arthropathy is the dominant feature at the  lower 3 levels. Bilateral laminectomies have been performed at L5-S1. Vertebral body heights spaces are well-preserved.  There is no fracture. CT Imaging    None    MR Imaging    MRI Pelvis without contrast 9/20/2017: There is normal bone signal. No para-articular edema-like signal is shown nor is there demonstration of substantial joint effusion. There is ankylosis of the inferior left sacroiliac joint without demonstration of substantial osteophyte formation. The inferior right SI joint is substantially narrowed with perhaps a small area of ankylosis inferiorly.   There is minimal-mild superolateral joint space narrowing of the hip joints bilaterally with tiny marginal osteophytes.  Moderate degenerative changes in the lower lumbar spine are shown with disc space narrowing through L4-5 as well as bilateral facet osteoarthrosis without ankylosis at L5-S1. No soft tissue mass is demonstrated. Muscle signal, size and contour appear normal. There is moderate insertional tendinopathy of the gluteus medius bilaterally with partial-thickness tearing in tiny bursal effusions. Ultrasound    Retroperitoneum 10/11/2017: RIGHT KIDNEY: The right kidney has normal echogenicity with no mass, stone or hydronephrosis. The right kidney measures 9.1 cm in length. LEFT KIDNEY: The left kidney has normal echogenicity with  no mass, stone or hydronephrosis. There may be mild caliectasis. The left kidney measures 8.7 cm in length. RETROPERITONEUM: The aorta is atherosclerotic and tapers normally. The aortic bifurcation is normal. The IVC is not visualized due to body habitus and bowel gas. No retroperitoneal mass is identified. BLADDER: The urinary bladder is incompletely distended. DXA     DXA 9/12/2017: (excluded Lumbar spine due to degenerative changes) left femoral neck T score: -2.1 (0.749 g/cm2), left total hip T score: -1.9 (0.763 g/cm2), right femoral neck T score: -2.4 (0.709 g/cm2), right total hip T score: -2.1 (0.746 g/cm2), and distal one third left radius T score -3.0 (BMD 0.614 g/cm2). FRAX score 17.8 % probability in 10 years for major osteoporotic fracture and 5.8 % 10 year probability of hip fracture. ASSESSMENT AND PLAN    This is a follow up visit for Ms. Cohen. 1) HLA-B27 Positive Ankylosing Spondylitis. (positive HLA-B27, sacroiliitis, inflammatory arthritis). She reports a history of Rheumatoid Arthritis that was treated with gold, which she thinks led her to remission until 2016. She had developed sore pain and Polymyalgia Rheumatica-like symptoms involving her hip girdle in the fall of 2016. Her hip radiograph on 7/15/2015 showed ankylosis of the left sacroiliac joint.  A repeat radiograph of SI joints 8/25/2017 confirmed left sacroiliac ankylosis and probable right sided involvement. MRI pelvis showed left SI joint ankylosis. Partial ankylosis of right SI joint. Her labs showed a positive HLA-B27. She has active peripheral synovitis. She has CKD stage 3, so NSAIDs and methotrexate should be avoided. She has been on Cosentyx with good tolerance but does not feel any improvement. I will start her on a short course of prednisone, called a prednisone taper, with 5 mg tablets. This regimen is to be taken as follows: 4 tabs (20 mg) for 7 days, 3 tabs (15 mg) for 7 days, 2 tabs (10 mg) for 14 days and then 1 tab (5 mg) for 14 days, and then stop. I asked her to call and inform of her response after 2 weeks. If improvement, then I will change Cosentyx to Orencia. 2) Polymyalgia Rheumatica. This is secondary to #1. 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 Her DXA 9/12/2017 showed T score distal radius -3.0 and right femoral neck -2.4. She began Fosamax 70 mg every Sunday 11/2017 and is tolerating well. I will continue Fosamax 70 mg every Sunday. She is due for arepeat. 4) Long Term Use of Immunosuppressants. The patient remains on immunomodulatory medications (Cosentyx) and requires frequent toxicity monitoring by blood work. 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 Her most recent labs 8/28/2018 show creatinine 0.75 mg/dL and eGFR 76 (previously creatinine was 0.88, 0.98, 0.77 mg/dL and eGFR 63, 55, 74). She has seen Dr. Ana Perkins and second opinion from Dr. Hemal Kumar. She understands to avoid NSAIDs. I will avoid methotrexate. 6) Right Trochanteric Bursitis with IT Band Syndrome. I referred her to physical therapy previously with benefit but she needed to wait 6 months before she could again. This is symptomatic now. 7) Bilateral Knee Osteoarthritis. This was an active issue today in her right knee. I referred her to physical therapy previously with benefit but she needed to wait 6 months before she could again.  This is symptomatic now. She declined an injection today. The patient voiced understanding of the aforementioned assessment and plan. Summary of plan was provided in the After Visit Summary patient instructions.      TODAY'S ORDERS    Orders Placed This Encounter    QUANTIFERON-TB GOLD PLUS    CHRONIC HEPATITIS PANEL    METABOLIC PANEL, COMPREHENSIVE    C REACTIVE PROTEIN, QT    SED RATE (ESR)    CBC WITH AUTOMATED DIFF    predniSONE (DELTASONE) 5 mg tablet     Future Appointments   Date Time Provider Department Center   1/9/2020  2:20 PM Sirena Lutz MD [address], [place], [address] [place] [address]    Adult Rheumatology   Rheumatology Ultrasound Certified  [facility]  [facility] of [facility], [address] [place] [address]   Phone [phone]  Fax [phone]

## 2019-09-19 NOTE — PROGRESS NOTES
Chief Complaint   Patient presents with    Other     Ankylosing spondylitis     1. Have you been to the ER, urgent care clinic since your last visit? Hospitalized since your last visit? No    2. Have you seen or consulted any other health care providers outside of the [address] [place] [address] since your last visit? Include any pap smears or colon screening.  No

## 2019-09-19 NOTE — LETTER
9/19/19 Patient: Glenys Prieto YOB: 1940 Date of Visit: 9/19/2019 Soraya Lockett MD 
[address] [facility] [phone] VIA In Basket Dear Soraya Lockett MD, Thank you for referring Ms. Reese Domínguez to [facility] for evaluation. My notes for this consultation are attached. If you have questions, please do not hesitate to call me. I look forward to following your patient along with you.  
 
 
Sincerely, 
 
Billie Fuentes MD

## 2019-09-24 LAB
ALBUMIN SERPL-MCNC: 4.5 G/DL (ref 3.5–4.7)
ALBUMIN/GLOB SERPL: 2 {RATIO} (ref 1.2–2.2)
ALP SERPL-CCNC: 84 IU/L (ref 39–117)
ALT SERPL-CCNC: 17 IU/L (ref 0–32)
AST SERPL-CCNC: 23 IU/L (ref 0–40)
BASOPHILS # BLD AUTO: 0 X10E3/UL (ref 0–0.2)
BASOPHILS NFR BLD AUTO: 0 %
BILIRUB SERPL-MCNC: 0.2 MG/DL (ref 0–1.2)
BUN SERPL-MCNC: 22 MG/DL (ref 8–27)
BUN/CREAT SERPL: 21 (ref 12–28)
CALCIUM SERPL-MCNC: 9.2 MG/DL (ref 8.7–10.3)
CHLORIDE SERPL-SCNC: 102 MMOL/L (ref 96–106)
CO2 SERPL-SCNC: 22 MMOL/L (ref 20–29)
COMMENT, 144067: NORMAL
CREAT SERPL-MCNC: 1.07 MG/DL (ref 0.57–1)
CRP SERPL-MCNC: 3 MG/L (ref 0–10)
EOSINOPHIL # BLD AUTO: 0.2 X10E3/UL (ref 0–0.4)
EOSINOPHIL NFR BLD AUTO: 2 %
ERYTHROCYTE [DISTWIDTH] IN BLOOD BY AUTOMATED COUNT: 14.2 % (ref 12.3–15.4)
ERYTHROCYTE [SEDIMENTATION RATE] IN BLOOD BY WESTERGREN METHOD: 13 MM/HR (ref 0–40)
GAMMA INTERFERON BACKGROUND BLD IA-ACNC: 0.02 IU/ML
GLOBULIN SER CALC-MCNC: 2.2 G/DL (ref 1.5–4.5)
GLUCOSE SERPL-MCNC: 149 MG/DL (ref 65–99)
HBV CORE AB SERPL QL IA: NEGATIVE
HBV CORE IGM SERPL QL IA: NEGATIVE
HBV E AB SERPL QL IA: NEGATIVE
HBV E AG SERPL QL IA: NEGATIVE
HBV SURFACE AB SER QL: NON REACTIVE
HBV SURFACE AG SERPL QL IA: NEGATIVE
HCT VFR BLD AUTO: 42.5 % (ref 34–46.6)
HCV AB S/CO SERPL IA: <0.1 S/CO RATIO (ref 0–0.9)
HGB BLD-MCNC: 13.6 G/DL (ref 11.1–15.9)
IMM GRANULOCYTES # BLD AUTO: 0 X10E3/UL (ref 0–0.1)
IMM GRANULOCYTES NFR BLD AUTO: 0 %
LYMPHOCYTES # BLD AUTO: 1.8 X10E3/UL (ref 0.7–3.1)
LYMPHOCYTES NFR BLD AUTO: 26 %
M TB IFN-G BLD-IMP: NEGATIVE
M TB IFN-G CD4+ BCKGRND COR BLD-ACNC: 0.02 IU/ML
MCH RBC QN AUTO: 29.6 PG (ref 26.6–33)
MCHC RBC AUTO-ENTMCNC: 32 G/DL (ref 31.5–35.7)
MCV RBC AUTO: 93 FL (ref 79–97)
MITOGEN IGNF BLD-ACNC: >10 IU/ML
MONOCYTES # BLD AUTO: 0.6 X10E3/UL (ref 0.1–0.9)
MONOCYTES NFR BLD AUTO: 9 %
NEUTROPHILS # BLD AUTO: 4.2 X10E3/UL (ref 1.4–7)
NEUTROPHILS NFR BLD AUTO: 63 %
PLATELET # BLD AUTO: 291 X10E3/UL (ref 150–450)
POTASSIUM SERPL-SCNC: 4.3 MMOL/L (ref 3.5–5.2)
PROT SERPL-MCNC: 6.7 G/DL (ref 6–8.5)
QUANTIFERON INCUBATION, QF1T: NORMAL
QUANTIFERON TB2 AG: 0.02 IU/ML
RBC # BLD AUTO: 4.59 X10E6/UL (ref 3.77–5.28)
SERVICE CMNT-IMP: NORMAL
SODIUM SERPL-SCNC: 141 MMOL/L (ref 134–144)
WBC # BLD AUTO: 6.8 X10E3/UL (ref 3.4–10.8)

## 2019-10-02 ENCOUNTER — HOSPITAL ENCOUNTER (OUTPATIENT)
Dept: PHYSICAL THERAPY | Age: 79
Discharge: HOME | End: 2019-10-02
Payer: MEDICARE

## 2019-10-02 PROCEDURE — 97110 THERAPEUTIC EXERCISES: CPT

## 2019-10-02 PROCEDURE — 97162 PT EVAL MOD COMPLEX 30 MIN: CPT

## 2019-10-02 NOTE — PROGRESS NOTES
[address] [address] ([facility]), [address] [facility] [place]  Phone: [phone] Fax: [phone]     Plan of Care/Statement of Necessity for Physical Therapy Services  2-15    Patient name: Kang Whaley               : 1940                          Provider#: [id number]  Referral source: Iris Gamboa MD                                              Medical/Treatment Diagnosis: Right knee pain [M25.561]                                      Prior Hospitalization: see medical history                                      Comorbidities: Allergies, Arthritis, Back pain, HTN, Osteoporosis  Prior Level of Function: Patient completed 20 minutes of exercise seldom or never. Medications: Verified on Patient Summary List  Start of Care: 10/02/19                                                                   Onset Date: Chronic, but increased symptoms beginning of September       The Plan of Care and following information is based on the information from the initial evaluation.     Assessment/ key information: Patient is a sweet [de-identified]year old female who presents to therapy with signs and symptoms of R knee pain due to osteoarthritis. Based on examination, the patient presents with decreased hip and core strength, decreased knee flexion and extension strength, decreased balance and neuromuscular control, abnormal gait mechanics, and decreased activity tolerance and endurance. The patient would benefit from skilled physical therapy to help improve the above listed impairments to allow the patient to safely return to their prior level of function with less overall pain or risk of further injury.   The patient has a fair prognosis with skilled physical therapy.        Evaluation Complexity History HIGH Complexity :3+ comorbidities / personal factors will impact the outcome/ POC ; Examination MEDIUM Complexity : 3 Standardized tests and measures addressing body structure, function, activity limitation and / or participation in recreation  ;Presentation MEDIUM Complexity : Evolving with changing characteristics  ; Clinical Decision Making MEDIUM Complexity : FOTO score of 26-74  Overall Complexity Rating: MEDIUM     Problem List: pain affecting function, decrease ROM, decrease strength, edema affecting function, impaired gait/ balance, decrease ADL/ functional abilitiies, decrease activity tolerance, decrease flexibility/ joint mobility and decrease transfer abilities              Treatment Plan may include any combination of the following: Therapeutic exercise, Therapeutic activities, Neuromuscular re-education, Physical agent/modality, Gait/balance training, Manual therapy, Patient education, Self Care training, Functional mobility training, Home safety training and Stair training  Patient / Family readiness to learn indicated by: asking questions  Persons(s) to be included in education: patient (P)  Barriers to Learning/Limitations: None  Patient Goal (s): improve balance, strengthen muscles and help with hips and back  Patient Self Reported Health Status: good  Rehabilitation Potential: fair     Short Term Goals: To be accomplished in 6 treatments:  1. The Pt will be independent and compliant with their HEP. 2. The Pt will report a 50% reduction in their pain with ADLs. Long Term Goals: To be accomplished in 12 treatments:  1. The patient will score the MCII on her FOTO survey demonstrating improved overall function (36 to 45 points). 2. The patient will be able to perform 10 sit to stands within 30 seconds to allow the patient perform daily transfers and show improved strength for ADLs.    3. The patient will be able to ascend and descend one flight of stairs with a reciprocal gait pattern to allow for patient to maintain independence at home.      Frequency / Duration: Patient to be seen 1-2 times per week for 12 treatments.     Patient/ Caregiver education and instruction: self care, activity modification and exercises     [x]  Plan of care has been reviewed with PTA     Certification Period: 10/02/2019-01/02/2020    Kishor Gusman, PT 10/2/2019     ________________________________________________________________________    I certify that the above Therapy Services are being furnished while the patient is under my care. I agree with the treatment plan and certify that this therapy is necessary.     [de-identified] Signature:____________________  Date:____________Time: _________

## 2019-10-02 NOTE — PROGRESS NOTES
PT INITIAL EVALUATION NOTE - [facility] 2-15    Patient Name: Radha Lloyd  Date:10/2/2019  : 1940  [x]  Patient  Verified  Payor: [place] MEDICARE / Plan: [place] MEDICARE PART A & B / Product Type: Medicare /    In time: 3:07 PM  Out time: 4:15 PM  Total Treatment Time (min): 68 minutes  Total Timed Codes (min): 10 minutes  1:1 Treatment Time ( [address] only): 68 minutes   Visit #: 1      Treatment Area: Right knee pain [M25.561]     SUBJECTIVE  Pain Level (0-10 scale): 2/10  Any medication changes, allergies to medications, adverse drug reactions, diagnosis change, or new procedure performed?: [] No    [x] Yes (see summary sheet for update)  Subjective:    Patient presents to therapy with R chronic knee pain, that worsened about 3 weeks ago potentially from aquatic exercises or overuse. Patient was previously seen for physical therapy through May of 2019 for similar R knee pain, and was discharged with minimal pain and was able to achieve functional goals. Since, she has not been compliant with her HEP given at discharge and presents to therapy with weakness and balance deficits. When her pain increased about 3 weeks ago she went to see her doctor. She was given 4 weeks of prednisone, and has been through 2 weeks through today. Since taking her medication she has been feeling better, presenting to therapy today with 2/10 pain. At discharge in  she was able to ascend stairs step over step, but in the past 3 weeks she has not been able to and has pain with ascending and descending stairs. Prolonged standing, walking, and increased ADLs aggravate her knee as well. Patient reports feelings of bilateral leg weakness, and minor decreased sensation in her feet. She works twice a week for her [employer], lives alone on 5 acres of property in a 2 story house with her bed on the second story. Patient wants to continue with aquatic exercise, ascend and descend stairs pain free, and return to ADLs with minimal pain or discomfort.    OBJECTIVE/EXAMINATION  Posture:  Slight forward trunk posture. Right knee valgus and excessive pronation during stance. Other Observations: None noted. Gait and Functional Mobility:  Slow gait speed with right knee valgus and over pronation on the right during stance phase. Minor trendelenburg gait on the right due to hip weakness.      Lumbar ROM:              Flexion: NT              Extension: NT              Side Bending: Right: NT                              Left: NT              Rotation: Right: NT                                       Left: NT     Balance Assessment: Increased deficits in balance and neuromuscular control in single limb stance bilaterally, worse on the right knee.     Squat Assessment:              Assessed during 30 second sit <> stand. Patient shows minor favoring towards left leg.               30 Second sit <> stand score: 6 (no hands)     Neurological: Sensations: Mild decrease in sensation to right foot, and heels bilaterally.     Flexibility: WFL     Right Knee:  AROM 0-128    PROM NT  Left  Knee:  AROM 0-137      PROM NT     LOWER QUARTER                            MUSCLE STRENGTH  KEY                                                                             R                      L  0 - No Contraction                   Hip flex                        3+                    3+  1 - Trace                                         er                          4-                     4-  2 - Poor                                          ir                           4-                     4-  3 - Fair                                            abd                       3+                    3+  4 - Good                                         ext                        NT                   NT  5 - Normal                               Knee flex                     3+                    4-                                                           Ext                      4- 4-                                                  Ankle DF                     4                      4                                                            PF                     4                      4     Gluteal activation: NT prone     Joint Mobility Assessment: NT  Palpation: Patient is TTP along lateral joint line and distal to patella on the right. Mild TTP on the left lateral to the patella.        10 min Therapeutic Exercise:  [x] See flow sheet :   Rationale: increase ROM, increase strength, improve coordination, improve balance and increase proprioception to improve the patients ability to complete ADLs with less pain or discomfort.                                                                  With   [x] TE   [] TA   [] neuro   [x] other: Patient Education: [x] Review HEP    [] Progressed/Changed HEP based on:   [] positioning   [] body mechanics   [] transfers   [] heat/ice application    [x] other: Patient was educated on diagnosis and prognosis with therapy.      Other Objective/Functional Measures: FOTO score: 36/100     Pain Level (0-10 scale) post treatment: 1/10     ASSESSMENT/Changes in Function:      [x]  See Plan of [address] [place] [address], PT 10/2/2019

## 2019-10-02 NOTE — ANCILLARY DISCHARGE INSTRUCTIONS
[facility] Physical Therapy [address] ([place]), [address] [place] Phone: [phone] Fax: [phone] Discharge Summary 2-15 Patient name: Saulo Junior  : 1940  Provider#: [id number] Referral source: Svitlana Thomason MD     
Medical/Treatment Diagnosis: Left knee pain [M25.562] Right knee pain [M25.561] Prior Hospitalization: see medical history Comorbidities: See Plan of Care Prior Level of Function: See Plan of Care Medications: Verified on Patient Summary List 
 
Start of Care: 3/26/19      Onset Date:chronic Visits from Start of Care: 10     Missed Visits: 0 Reporting Period : 3/26/19 to 19 Assessment/Summary of care: Pt completed 10 visits of therapy today and has not shown significant changes oon her FOTO score since she began therapy. However, she has no reports of functional limitations at this time. She has been given an independent program to complete at home and will be DC'd to continue this to further strengthen and improve balance. Goal: 
Short Term Goals: To be accomplished in 4-6 treatments: 
            Pt will be I with HEP met 
            Pt will complain of pain 0-1/10 with all activity met 
            Pt will be able to complete all household chores and ADL's without increased symptoms met Long Term Goals: To be accomplished in 10-12 treatments: 
            Pt will increase strength and endurance to complete yardwork with better efficiency not applicable  
            Pt will be able to lift 10 lbs to complete gardening tasks and work in her flowerbed without increased symptoms met 
            Pt will be able to climb all stairs and ambulate all community distances without increased symptoms met RECOMMENDATIONS: 
[x]Discontinue therapy: [x]Patient has reached or is progressing toward set goals []Patient is non-compliant or has abdicated []Due to lack of appreciable progress towards set goals []Other Sameer Tovar, PT 10/2/2019

## 2019-10-07 ENCOUNTER — TELEPHONE (OUTPATIENT)
Dept: RHEUMATOLOGY | Age: 79
End: 2019-10-07

## 2019-10-07 NOTE — TELEPHONE ENCOUNTER
Spoke with pt who called stating that she is feeling much better on the prednisone taper and she has no swelling or pain. I spoke with Dr. Shayy Alarcon who wants to switch her to Orencia injections once weekly. I told her to take her monthly dose of Cosentyx until the Darylene Schiller is approved and shipped to her home. She stated an understanding. A prescription will be sent.

## 2019-10-08 ENCOUNTER — TELEPHONE (OUTPATIENT)
Dept: RHEUMATOLOGY | Age: 79
End: 2019-10-08

## 2019-10-08 NOTE — TELEPHONE ENCOUNTER
----- Message from Christiano Spann sent at 10/8/2019  9:42 AM EDT -----  Regarding: Dr. Dheeraj Sanchez: PT  Reason for call: wants a copy of your lab results from blood work done 9/19/2019  Callback requested: Jackie Menard contact: [phone]  Additional details:

## 2019-10-09 ENCOUNTER — HOSPITAL ENCOUNTER (OUTPATIENT)
Dept: PHYSICAL THERAPY | Age: 79
Discharge: HOME | End: 2019-10-09
Payer: MEDICARE

## 2019-10-09 PROCEDURE — 97110 THERAPEUTIC EXERCISES: CPT

## 2019-10-09 NOTE — PROGRESS NOTES
PT DAILY TREATMENT NOTE - [facility] 2-15    Patient Name: Norita Litten  Date:10/9/2019  : 1940  [x]  Patient  Verified  Payor: [place] MEDICARE / Plan: [place] MEDICARE PART A & B / Product Type: Medicare /    In time:230  Out time:328  Total Treatment Time (min): 58  Total Timed Codes (min): 58  1:1 Treatment Time ( only): 28   Visit #:  2    Treatment Area: Right knee pain [M25.561]    SUBJECTIVE  Pain Level (0-10 scale): 3/10  Any medication changes, allergies to medications, adverse drug reactions, diagnosis change, or new procedure performed?: [x] No    [] Yes (see summary sheet for update)  Subjective functional status/changes:   [] No changes reported  Patient reports she has been experiencing pain all over like a snowball effect starting from her knees. OBJECTIVE    58 min Therapeutic Exercise:  [x] See flow sheet :   Rationale: increase ROM and increase strength to improve the patients ability to perform ADLs and reduce pain levels    With   [] TE   [] TA   [] neuro   [] other: Patient Education: [x] Review HEP    [] Progressed/Changed HEP based on:   [] positioning   [] body mechanics   [] transfers   [] heat/ice application    [] other:      Other Objective/Functional Measures: none noted     Pain Level (0-10 scale) post treatment: 0/10     ASSESSMENT/Changes in Function:   Patient tolerated all new and progressed therex well with little to no discomfort. Fair glute strength Will continue to progress as tolerated. Patient will continue to benefit from skilled PT services to modify and progress therapeutic interventions, address functional mobility deficits, address ROM deficits, address strength deficits, analyze and address soft tissue restrictions, analyze and cue movement patterns, analyze and modify body mechanics/ergonomics and assess and modify postural abnormalities to attain remaining goals.      [x]  See Plan of Care  []  See progress note/recertification  []  See Discharge Summary Progress towards goals / Updated goals:  Patient is progressing towards goals.      PLAN  [x]  Upgrade activities as tolerated     [x]  Continue plan of care  [x]  Update interventions per flow sheet       []  Discharge due to:_  []  Other:_      Inna Dorantes 10/9/2019

## 2019-10-11 ENCOUNTER — HOSPITAL ENCOUNTER (OUTPATIENT)
Dept: PHYSICAL THERAPY | Age: 79
Discharge: HOME | End: 2019-10-11
Payer: MEDICARE

## 2019-10-11 PROCEDURE — 97110 THERAPEUTIC EXERCISES: CPT

## 2019-10-11 NOTE — PROGRESS NOTES
PT DAILY TREATMENT NOTE - [facility] 2-15    Patient Name: Butch Bernard  Date:10/11/2019  : 1940  [x]  Patient  Verified  Payor: Nina Reyes / Plan: [place] MEDICARE PART A & B / Product Type: Medicare /    In time: 10:00 AM  Out time: 10:55 AM  Total Treatment Time (min): 55 minutes  Total Timed Codes (min): 55 minutes  1:1 Treatment Time ( only): 35 minutes   Visit #:  3    Treatment Area: Right knee pain [M25.561]    SUBJECTIVE  Pain Level (0-10 scale): 3/10 (hip), 2.5/10 (knee)  Any medication changes, allergies to medications, adverse drug reactions, diagnosis change, or new procedure performed?: [x] No    [] Yes (see summary sheet for update)  Subjective functional status/changes:   [] No changes reported  Patient reports her pain is worse in her right hip and legs than her right knee today. She reports the cold weather may have made her pain worse. OBJECTIVE    55 min Therapeutic Exercise:  [x] See flow sheet :   Rationale: increase ROM, increase strength, improve coordination, improve balance and increase proprioception to improve the patients ability to complete ADLs with less pain or discomfort. With   [x] TE   [] TA   [] neuro   [] other: Patient Education: [x] Review HEP    [] Progressed/Changed HEP based on:   [] positioning   [] body mechanics   [] transfers   [] heat/ice application    [] other:      Other Objective/Functional Measures: None Noted   Pain Level (0-10 scale) post treatment: 210 (hip and knee)    ASSESSMENT/Changes in Function:   Patient responded well to increased therex. We added sit to stand exercises from the table and focused on having the patient use her hips and proper form and technique to properly engage her hip musculature and put less stress on her knees. Patient continues to need cueing to properly engage hip musculature during therex for proper form.   Patient will continue to benefit from skilled PT services to modify and progress therapeutic interventions, address functional mobility deficits, address ROM deficits, address strength deficits, analyze and address soft tissue restrictions, analyze and cue movement patterns, analyze and modify body mechanics/ergonomics, assess and modify postural abnormalities, address imbalance/dizziness and instruct in home and community integration to attain remaining goals. []  See Plan of Care  []  See progress note/recertification  []  See Discharge Summary         Progress towards goals / Updated goals:  Short Term Goals: To be accomplished in 6 treatments:  1. The Pt will be independent and compliant with their HEP. -progressing  2. The Pt will report a 50% reduction in their pain with ADLs. -progressing  Long Term Goals: To be accomplished in 12 treatments:  1.  The patient will score the MCII on her FOTO survey demonstrating improved overall function (36 to 45 points). -progressing  2. The patient will be able to perform 10 sit to stands within 30 seconds to allow the patient perform daily transfers and show improved strength for ADLs. -progressing  3.  The patient will be able to ascend and descend one flight of stairs with a reciprocal gait pattern to allow for patient to maintain independence at home.  -progressing     PLAN  [x]  Upgrade activities as tolerated     [x]  Continue plan of care  [x]  Update interventions per flow sheet       []  Discharge due to:_  []  Other:_      Charlie 10/11/2019

## 2019-10-15 ENCOUNTER — HOSPITAL ENCOUNTER (OUTPATIENT)
Dept: PHYSICAL THERAPY | Age: 79
Discharge: HOME | End: 2019-10-15
Payer: MEDICARE

## 2019-10-15 ENCOUNTER — TELEPHONE (OUTPATIENT)
Dept: RHEUMATOLOGY | Age: 79
End: 2019-10-15

## 2019-10-15 PROCEDURE — 97110 THERAPEUTIC EXERCISES: CPT

## 2019-10-15 NOTE — TELEPHONE ENCOUNTER
----- Message from [address] sent at 10/15/2019 10:00 AM EDT -----  Regarding: Dr. Rajiv Dominguez first and last name: Devin Greene  Reason for call: Pt is requesting a callback with clarification on lab results.   Callback required yes/no and why: Yes  Best contact number(s): [phone]  Details to clarify the request:    [address]

## 2019-10-15 NOTE — PROGRESS NOTES
PT DAILY TREATMENT NOTE - [facility] 2-15    Patient Name: Sarai Wheeler  Date:10/15/2019  : 1940  [x]  Patient  Verified  Payor: Boy Anand / Plan: [place] MEDICARE PART A & B / Product Type: Medicare /    In time:300  Out time:404  Total Treatment Time (min): 64  Total Timed Codes (min): 64  1:1 Treatment Time ([address] only): 40   Visit #:  4    Treatment Area: Right knee pain [M25.561]    SUBJECTIVE  Pain Level (0-10 scale): 2/10  Any medication changes, allergies to medications, adverse drug reactions, diagnosis change, or new procedure performed?: [x] No    [] Yes (see summary sheet for update)  Subjective functional status/changes:   [] No changes reported  Patient reports her knee has been feeling warmer and is concerned about swelling. OBJECTIVE    64 min Therapeutic Exercise:  [x] See flow sheet :   Rationale: increase ROM and increase strength to improve the patients ability to perform ADLs and reduce pain levels    With   [] TE   [] TA   [] neuro   [] other: Patient Education: [x] Review HEP    [] Progressed/Changed HEP based on:   [] positioning   [] body mechanics   [] transfers   [] heat/ice application    [] other:      Other Objective/Functional Measures: none noted     Pain Level (0-10 scale) post treatment: 0/10    ASSESSMENT/Changes in Function:   Patient demonstrates improved glute strength, will continue to fatigue and have slight valgus when performing sit to stands. Will continue to require slight cues. Will continue to progress as tolerated. Patient will continue to benefit from skilled PT services to modify and progress therapeutic interventions, address functional mobility deficits, address ROM deficits, address strength deficits, analyze and address soft tissue restrictions, analyze and cue movement patterns, analyze and modify body mechanics/ergonomics and assess and modify postural abnormalities to attain remaining goals.      [x]  See Plan of Care  []  See progress note/recertification  []  See Discharge Summary         Progress towards goals / Updated goals:  Patient is progressing towards goals.     PLAN  [x]  Upgrade activities as tolerated     [x]  Continue plan of care  [x]  Update interventions per flow sheet       []  Discharge due to:_  []  Other:_      Jovanni Mason 10/15/2019

## 2019-10-17 ENCOUNTER — HOSPITAL ENCOUNTER (OUTPATIENT)
Dept: PHYSICAL THERAPY | Age: 79
Discharge: HOME | End: 2019-10-17
Payer: MEDICARE

## 2019-10-17 ENCOUNTER — TELEPHONE (OUTPATIENT)
Dept: RHEUMATOLOGY | Age: 79
End: 2019-10-17

## 2019-10-17 PROCEDURE — 97110 THERAPEUTIC EXERCISES: CPT

## 2019-10-17 NOTE — PROGRESS NOTES
PT DAILY TREATMENT NOTE - [facility] 2-15    Patient Name: Algie Kocher  Date:10/17/2019  : 1940  [x]  Patient  Verified  Payor: John Hatfield / Plan: [place] MEDICARE PART A & B / Product Type: Medicare /    In time: 12:00  Out time: 12:59  Total Treatment Time (min): 59 minutes  Total Timed Codes (min): 59 minutes  1:1 Treatment Time ( only): 33 minutes  Visit #:  5    Treatment Area: Right knee pain [M25.561]    SUBJECTIVE  Pain Level (0-10 scale): 3/10  Any medication changes, allergies to medications, adverse drug reactions, diagnosis change, or new procedure performed?: [x] No    [] Yes (see summary sheet for update)  Subjective functional status/changes:   [] No changes reported  Patient reports she still has pain in her right knee, but continues to work on getting stronger. OBJECTIVE    59 min Therapeutic Exercise:  [x] See flow sheet :   Rationale: increase ROM, increase strength, improve coordination, improve balance and increase proprioception to improve the patients ability to complete ADLs with less pain or discomfort. With   [x] TE   [] TA   [] neuro   [] other: Patient Education: [x] Review HEP    [] Progressed/Changed HEP based on:   [] positioning   [] body mechanics   [] transfers   [] heat/ice application    [] other:      Other Objective/Functional Measures: None noted     Pain Level (0-10 scale) post treatment: 2/10    ASSESSMENT/Changes in Function:   Patient continues to have pain with deep knee flexion and lateral motion, but responds well to therapy and stays motivated. We will continue to progress strengthening and balance exercises to help patient complete ADLs with more strength and less discomfort.   Patient will continue to benefit from skilled PT services to modify and progress therapeutic interventions, address functional mobility deficits, address ROM deficits, address strength deficits, analyze and address soft tissue restrictions, analyze and cue movement patterns, analyze and modify body mechanics/ergonomics, assess and modify postural abnormalities and instruct in home and community integration to attain remaining goals. []  See Plan of Care  []  See progress note/recertification  []  See Discharge Summary         Progress towards goals / Updated goals:  Short Term Goals: To be accomplished in 6 treatments:  1. The Pt will be independent and compliant with their HEP. -progressing  2. The Pt will report a 50% reduction in their pain with ADLs. -progressing  Long Term Goals: To be accomplished in 12 treatments:  1.  The patient will score the MCII on her FOTO survey demonstrating improved overall function (36 to 45 points). -progressing  2. The patient will be able to perform 10 sit to stands within 30 seconds to allow the patient perform daily transfers and show improved strength for ADLs. -progressing  3.  The patient will be able to ascend and descend one flight of stairs with a reciprocal gait pattern to allow for patient to maintain independence at home.  -progressing    PLAN  [x]  Upgrade activities as tolerated     [x]  Continue plan of care  [x]  Update interventions per flow sheet       []  Discharge due to:_  []  Other:_      Charlie 10/17/2019

## 2019-10-21 ENCOUNTER — TELEPHONE (OUTPATIENT)
Dept: RHEUMATOLOGY | Age: 79
End: 2019-10-21

## 2019-10-21 NOTE — TELEPHONE ENCOUNTER
----- Message from Dom Saldana sent at 10/21/2019  8:02 AM EDT -----  Regarding: Dr. Negrito Ruiz  Patient return call    Caller's first and last name and relationship (if not the patient):Patient      Best contact number(s): [phone] (until  9 am ) (w): [phone] ( after 2pm)      Whose call is being returned: Alyce's      Details to clarify the request: Pt is returning Alyce's call and would like a call back.        Dom Saldana

## 2019-10-22 ENCOUNTER — HOSPITAL ENCOUNTER (OUTPATIENT)
Dept: PHYSICAL THERAPY | Age: 79
Discharge: HOME | End: 2019-10-22
Payer: MEDICARE

## 2019-10-22 PROCEDURE — 97110 THERAPEUTIC EXERCISES: CPT

## 2019-10-22 NOTE — PROGRESS NOTES
PT DAILY TREATMENT NOTE - [facility] 2-15    Patient Name: Ashly Guo  Date:10/22/2019  : 1940  [x]  Patient  Verified  Payor: Idolina Babinski / Plan: [place] / Product Type: Medicare /    In time:330  Out time:421  Total Treatment Time (min): 51  Total Timed Codes (min): 51  1:1 Treatment Time ( [address] only): 41   Visit #:  6    Treatment Area: Right knee pain [M25.561]    SUBJECTIVE  Pain Level (0-10 scale): 2/10  Any medication changes, allergies to medications, adverse drug reactions, diagnosis change, or new procedure performed?: [x] No    [] Yes (see summary sheet for update)  Subjective functional status/changes:   [] No changes reported  Patient reports she did her side steps in the pool for 10 minutes down and back which felt fine at the time, but today she is in increased pain along the R knee. OBJECTIVE    51 min Therapeutic Exercise:  [x] See flow sheet :   Rationale: increase ROM and increase strength to improve the patients ability to perform ADLs and reduce pain levels          With   [] TE   [] TA   [] neuro   [] other: Patient Education: [x] Review HEP    [] Progressed/Changed HEP based on:   [] positioning   [] body mechanics   [] transfers   [] heat/ice application    [] other:      Other Objective/Functional Measures: none noted     Pain Level (0-10 scale) post treatment: 0/10    ASSESSMENT/Changes in Function:   Patient demonstrates improved quad and glute strength. Continues to struggle with balance. Will continue to progress as tolerated. Patient will continue to benefit from skilled PT services to modify and progress therapeutic interventions, address functional mobility deficits, address ROM deficits, address strength deficits, analyze and address soft tissue restrictions, analyze and cue movement patterns, analyze and modify body mechanics/ergonomics and assess and modify postural abnormalities to attain remaining goals.      [x]  See Plan of Care  []  See progress note/recertification  []  See Discharge Summary         Progress towards goals / Updated goals:  Patient is progressing towards goals.      PLAN  [x]  Upgrade activities as tolerated     []  Continue plan of care  [x]  Update interventions per flow sheet       []  Discharge due to:_  []  Other:_      Sarah Hernandez 10/22/2019

## 2019-10-24 ENCOUNTER — DOCUMENTATION ONLY (OUTPATIENT)
Dept: RHEUMATOLOGY | Age: 79
End: 2019-10-24

## 2019-10-24 ENCOUNTER — HOSPITAL ENCOUNTER (OUTPATIENT)
Dept: PHYSICAL THERAPY | Age: 79
Discharge: HOME | End: 2019-10-24
Payer: MEDICARE

## 2019-10-24 PROCEDURE — 97110 THERAPEUTIC EXERCISES: CPT

## 2019-10-24 NOTE — PROGRESS NOTES
PT DAILY TREATMENT NOTE - [facility] 2-15    Patient Name: Radha Mortensen  Date:10/24/2019  : 1940  [x]  Patient  Verified  Payor: [place] MEDICARE / Plan: [place] MEDICARE PART A & B / Product Type: Medicare /    In time: 12:00 PM  Out time: 1:05 PM  Total Treatment Time (min): 65 minutes  Total Timed Codes (min): 65 minutes  1:1 Treatment Time ( only): 60 minutes   Visit #:  7    Treatment Area: Right knee pain [M25.561]    SUBJECTIVE  Pain Level (0-10 scale): 2/10  Any medication changes, allergies to medications, adverse drug reactions, diagnosis change, or new procedure performed?: [x] No    [] Yes (see summary sheet for update)  Subjective functional status/changes:   [] No changes reported  Patient reports less pain, and more stiffness in her R knee today. OBJECTIVE    65 min Therapeutic Exercise:  [x] See flow sheet :   Rationale: increase ROM, increase strength, improve coordination, improve balance and increase proprioception to improve the patients ability to complete ADLs with less pain or discomfort. With   [x] TE   [] TA   [] neuro   [] other: Patient Education: [x] Review HEP    [] Progressed/Changed HEP based on:   [] positioning   [] body mechanics   [] transfers   [] heat/ice application    [] other:      Other Objective/Functional Measures: None noted     Pain Level (0-10 scale) post treatment: 1/10    ASSESSMENT/Changes in Function:   Patient responded well to therapy today. We added in additional LE strengthening in a pain free range of motion and progressed balance therex with patient. She reported she felt no increased pain during therex, and only needed minimal cueing for proper form and technique. During red mat balance exercises, patient required CGA for safety while on uneven surface. We will continue to progress strengthening with patient.   Patient will continue to benefit from skilled PT services to modify and progress therapeutic interventions, address functional mobility deficits, address ROM deficits, address strength deficits, analyze and address soft tissue restrictions, analyze and cue movement patterns, analyze and modify body mechanics/ergonomics, assess and modify postural abnormalities and instruct in home and community integration to attain remaining goals. []  See Plan of Care  []  See progress note/recertification  []  See Discharge Summary         Progress towards goals / Updated goals:  Short Term Goals: To be accomplished in 6 treatments:  1. The Pt will be independent and compliant with their HEP.  -progressing  2. The Pt will report a 50% reduction in their pain with ADLs.  -progressing  Long Term Goals: To be accomplished in 12 treatments:  1.  The patient will score the MCII on her FOTO survey demonstrating improved overall function (36 to 45 points).  -progressing  2. The patient will be able to perform 10 sit to stands within 30 seconds to allow the patient perform daily transfers and show improved strength for ADLs.   -progressing  3.  The patient will be able to ascend and descend one flight of stairs with a reciprocal gait pattern to allow for patient to maintain independence at home.  -progressing    PLAN  [x]  Upgrade activities as tolerated     [x]  Continue plan of care  [x]  Update interventions per flow sheet       []  Discharge due to:_  []  Other:_      Charlie 10/24/2019

## 2019-10-25 ENCOUNTER — TELEPHONE (OUTPATIENT)
Dept: RHEUMATOLOGY | Age: 79
End: 2019-10-25

## 2019-10-25 NOTE — TELEPHONE ENCOUNTER
----- Message from Magaly Givens sent at 10/25/2019  9:08 AM EDT -----  Regarding: Dr. Shanna Lamas returning a call and would like to see about a shingle shot.  Contact is [phone]

## 2019-10-28 ENCOUNTER — TELEPHONE (OUTPATIENT)
Dept: RHEUMATOLOGY | Age: 79
End: 2019-10-28

## 2019-10-28 NOTE — TELEPHONE ENCOUNTER
Spoke with pt and she wanted to know if it would be ok for her to get her shingle shot? I told her that it would be ok for her to get it. She stated an understanding.

## 2019-10-29 ENCOUNTER — HOSPITAL ENCOUNTER (OUTPATIENT)
Dept: PHYSICAL THERAPY | Age: 79
Discharge: HOME | End: 2019-10-29
Payer: MEDICARE

## 2019-10-29 PROCEDURE — 97110 THERAPEUTIC EXERCISES: CPT

## 2019-10-29 NOTE — PROGRESS NOTES
PT DAILY TREATMENT NOTE - [facility] 2-15    Patient Name: Norita Litten  Date:10/29/2019  : 1940  [x]  Patient  Verified  Payor: Iam Snare / Plan: [place] MED[place]ARE PART A & B / Product Type: Medicare /    In time:302  Out time:400  Total Treatment Time (min): 58  Total Timed Codes (min): 58  1:1 Treatment Time ([address] only): 41   Visit #:  8    Treatment Area: Right knee pain [M25.561]    SUBJECTIVE  Pain Level (0-10 scale): 4/10  Any medication changes, allergies to medications, adverse drug reactions, diagnosis change, or new procedure performed?: [x] No    [] Yes (see summary sheet for update)  Subjective functional status/changes:   [] No changes reported  Patient reports she did 6 minutes worth of side steps in the pool which caused her pain and irritation throughout the night. OBJECTIVE    58 min Therapeutic Exercise:  [x] See flow sheet :   Rationale: increase ROM and increase strength to improve the patients ability to perform ADLs and reduce pain levels    With   [] TE   [] TA   [] neuro   [] other: Patient Education: [x] Review HEP    [] Progressed/Changed HEP based on:   [] positioning   [] body mechanics   [] transfers   [] heat/ice application    [] other:      Other Objective/Functional Measures: none noted     Pain Level (0-10 scale) post treatment: 210    ASSESSMENT/Changes in Function:   Patient experiences slight discomfort with with ER single leg raises on the LLE. Discomfort with putting more weight on the RLE. Will continue to progress as tolerated. Patient will continue to benefit from skilled PT services to modify and progress therapeutic interventions, address functional mobility deficits, address ROM deficits, address strength deficits, analyze and address soft tissue restrictions, analyze and cue movement patterns, analyze and modify body mechanics/ergonomics and assess and modify postural abnormalities to attain remaining goals.      [x]  See Plan of Care  []  See progress note/recertification  []  See Discharge Summary         Progress towards goals / Updated goals:  Patient is progressing towards goals.       PLAN  [x]  Upgrade activities as tolerated     [x]  Continue plan of care  [x]  Update interventions per flow sheet       []  Discharge due to:_  []  Other:_      Conception Herter 10/29/2019

## 2019-11-07 ENCOUNTER — HOSPITAL ENCOUNTER (OUTPATIENT)
Dept: PHYSICAL THERAPY | Age: 79
Discharge: HOME | End: 2019-11-07
Payer: MEDICARE

## 2019-11-07 PROCEDURE — 97110 THERAPEUTIC EXERCISES: CPT

## 2019-11-07 NOTE — PROGRESS NOTES
PT DAILY TREATMENT NOTE - [facility] 2-15    Patient Name: Mili Royal  Date:2019  : 1940  [x]  Patient  Verified  Payor: [place] MEDICARE / Plan: [place] MEDICARE PART A & B / Product Type: Medicare /    In time: 1:31 PM  Out time: 2:31 PM  Total Treatment Time (min): 60 minutes  Total Timed Codes (min): 60 minutes  1:1 Treatment Time ([facility] only): 55 minutes   Visit #:  9    Treatment Area: Right knee pain [M25.561]    SUBJECTIVE  Pain Level (0-10 scale): 3/10  Any medication changes, allergies to medications, adverse drug reactions, diagnosis change, or new procedure performed?: [x] No    [] Yes (see summary sheet for update)  Subjective functional status/changes:   [] No changes reported  The Pt reports that her knee pain continued and did not improve after last session. She got a cortisone injection in the R knee on Tuesday and her pain is much improved. Her swelling has improved as well. She is wearing a neoprene sleeve and it feels more supported. OBJECTIVE    60 min Therapeutic Exercise:  [x] See flow sheet :   Rationale: increase ROM, increase strength, improve coordination, improve balance and increase proprioception to improve the patients ability to ambulate and perform ADLs with less pain or difficulty    With   [x] TE   [] TA   [] neuro   [] other: Patient Education: [x] Review HEP    [] Progressed/Changed HEP based on:   [] positioning   [] body mechanics   [] transfers   [] heat/ice application    [] other:      Other Objective/Functional Measures: None noted     Pain Level (0-10 scale) post treatment: 1/10    ASSESSMENT/Changes in Function:   The Pt tolerated her therapy program well. Today, focused on creating an HEP that she is able to do without any increase in her R knee symptoms. She was able to tolerate these exercises well and is going to continue with them over the next week. Anticipate discharge after next PT session.   Patient will continue to benefit from skilled PT services to modify and progress therapeutic interventions, address functional mobility deficits, address ROM deficits, address strength deficits, analyze and address soft tissue restrictions, analyze and cue movement patterns, analyze and modify body mechanics/ergonomics, assess and modify postural abnormalities and instruct in home and community integration to attain remaining goals. []  See Plan of Care  []  See progress note/recertification  []  See Discharge Summary         Progress towards goals / Updated goals:  Short Term Goals: To be accomplished in 6 treatments:  1. The Pt will be independent and compliant with their HEP.  -progressing  2. The Pt will report a 50% reduction in their pain with ADLs.  -progressing  Long Term Goals: To be accomplished in 12 treatments:  1.  The patient will score the MCII on her FOTO survey demonstrating improved overall function (36 to 45 points).  -progressing  2. The patient will be able to perform 10 sit to stands within 30 seconds to allow the patient perform daily transfers and show improved strength for ADLs.   -progressing  3.  The patient will be able to ascend and descend one flight of stairs with a reciprocal gait pattern to allow for patient to maintain independence at home.  -progressing    PLAN  [x]  Upgrade activities as tolerated     [x]  Continue plan of care  [x]  Update interventions per flow sheet       []  Discharge due to:_  []  Other:_      Loreli Harada, PT 11/7/2019

## 2019-11-13 ENCOUNTER — HOSPITAL ENCOUNTER (OUTPATIENT)
Dept: PHYSICAL THERAPY | Age: 79
Discharge: HOME | End: 2019-11-13
Payer: MEDICARE

## 2019-11-13 PROCEDURE — 97110 THERAPEUTIC EXERCISES: CPT

## 2019-11-13 NOTE — ANCILLARY DISCHARGE INSTRUCTIONS
[facility] [address] [place] Phone: [phone] Fax: [phone] Discharge Summary 2-15 Patient name: Eligio Broosk  : 1940  Provider#: [id number] Referral source: Ye Montiel MD     
Medical/Treatment Diagnosis: Right knee pain [M25.561] Prior Hospitalization: see medical history Comorbidities: See Plan of Care Prior Level of Function: See Plan of Care Medications: Verified on Patient Summary List 
 
Start of Care: 10/2/19      Onset Date: Chronic Visits from Start of Care: 10     Missed Visits: 0 Reporting Period : 10/2/19 to 19 Assessment/Summary of care: The Pt was seen for 10 outpatient physical therapy sessions with R knee pain secondary to osteoarthritis. The Pt's therapy program focused on improving her hip strength and stability, improving her core strength and stability, improving her balance and neuromuscular control, and improving her activity tolerance and endurance via therapeutic exercises. The Pt has progressed well and is reporting less intense pain in her knee and reports that is limiting her less frequently. She is independent with her HEP and has noticed improved strength throughout her LEs. She denies any functional limitations and believes that she is 65% improved since beginning therapy. At this time, it is believed that the Pt has reached maximum benefit from skilled PT and will continue to progress well independently. The Pt was educated how to safely progress her HEP and voiced understanding. The Pt is discharged from skilled PT and will contact her referring provider with any further questions or concerns. Thank you for this referral. 
 
Progress towards goals / Updated goals: 
Short Term Goals: To be accomplished in 6 treatments: 
1. The Pt will be independent and compliant with their HEP.  -met 2. The Pt will report a 50% reduction in their pain with ADLs.  -met Long Term Goals: To be accomplished in 12 treatments: 1.  The patient will score the MCII on her FOTO survey demonstrating improved overall function (36 to 45 points).  -progressing FOTO 37/100 
2. The patient will be able to perform 10 sit to stands within 30 seconds to allow the patient perform daily transfers and show improved strength for ADLs.   -met 3. The patient will be able to ascend and descend one flight of stairs with a reciprocal gait pattern to allow for patient to maintain independence at home.  -progressing RECOMMENDATIONS: 
[x]Discontinue therapy: [x]Patient has reached or is progressing toward set goals []Patient is non-compliant or has abdicated 
   []Due to lack of appreciable progress towards set goals []Shoaib Duvall, PT 11/13/2019

## 2019-11-13 NOTE — PROGRESS NOTES
PT DAILY TREATMENT NOTE - [facility] 2-15    Patient Name: Ashly Guo  Date:2019  : 1940  [x]  Patient  Verified  Payor: [place] MEDICARE / Plan: [place] MEDICARE PART A & B / Product Type: Medicare /    In time: 1:00 PM  Out time: 2:01 PM  Total Treatment Time (min): 61 minutes  Total Timed Codes (min): 61 minutes  1:1 Treatment Time ( [address] only): 56 minutes   Visit #:  10    Treatment Area: Right knee pain [M25.561]    SUBJECTIVE  Pain Level (0-10 scale): 2/10  Any medication changes, allergies to medications, adverse drug reactions, diagnosis change, or new procedure performed?: [x] No    [] Yes (see summary sheet for update)  Subjective functional status/changes:   [] No changes reported  The Pt reports that she was able to do her exercises at home and her R knee is feeling much better. She has stopped doing the step up exercises at home and there is less irritation in the R knee. She has been going down the steps sideways and thinks that has been bothering her R hip slightly. Overall, she believes that she is 65% improved since beginning therapy. OBJECTIVE  61 min Therapeutic Exercise:  [x] See flow sheet :   Rationale: increase ROM, increase strength, improve coordination, improve balance and increase proprioception to improve the patients ability to ambulate and perform ADLs with less pain or discomfort.     With   [x] TE   [] TA   [] neuro   [x] other: Patient Education: [x] Review HEP    [] Progressed/Changed HEP based on:   [] positioning   [] body mechanics   [] transfers   [] heat/ice application    [x] other: Pt educated how to safely progress her HEP independently     Other Objective/Functional Measures:  FOTO 37/100 (36/100 at initial evaluation)   30s sit to stand: 10 (no hands)    Pain Level (0-10 scale) post treatment: 1/10    ASSESSMENT/Changes in Function:     []  See Plan of Care  []  See progress note/recertification  [x]  See Discharge Summary         Progress towards goals / Updated goals:  Short Term Goals: To be accomplished in 6 treatments:  1. The Pt will be independent and compliant with their HEP.  -met  2. The Pt will report a 50% reduction in their pain with ADLs.  -met  Long Term Goals: To be accomplished in 12 treatments:  1.  The patient will score the MCII on her FOTO survey demonstrating improved overall function (36 to 45 points).  -progressing FOTO 37/100  2. The patient will be able to perform 10 sit to stands within 30 seconds to allow the patient perform daily transfers and show improved strength for ADLs.   -met  3.  The patient will be able to ascend and descend one flight of stairs with a reciprocal gait pattern to allow for patient to maintain independence at home.  -progressing    PLAN  []  Upgrade activities as tolerated     []  Continue plan of care  []  Update interventions per flow sheet       [x]  Discharge due to: Pt has met or is progressing well towards her therapeutic goals  []  Other:_      Pawan Beasley, PT 11/13/2019

## 2019-11-20 DIAGNOSIS — M81.0 AGE-RELATED OSTEOPOROSIS WITHOUT CURRENT PATHOLOGICAL FRACTURE: ICD-10-CM

## 2019-11-20 RX ORDER — ALENDRONATE SODIUM 70 MG/1
TABLET ORAL
Qty: 12 TAB | Refills: 0 | Status: SHIPPED | OUTPATIENT
Start: 2019-11-20 | End: 2020-01-27

## 2020-01-26 DIAGNOSIS — M81.0 AGE-RELATED OSTEOPOROSIS WITHOUT CURRENT PATHOLOGICAL FRACTURE: ICD-10-CM

## 2020-01-27 RX ORDER — ALENDRONATE SODIUM 70 MG/1
TABLET ORAL
Qty: 12 TAB | Refills: 0 | Status: SHIPPED | OUTPATIENT
Start: 2020-01-27 | End: 2020-02-04 | Stop reason: SDUPTHER

## 2020-02-04 DIAGNOSIS — M81.0 AGE-RELATED OSTEOPOROSIS WITHOUT CURRENT PATHOLOGICAL FRACTURE: ICD-10-CM

## 2020-02-04 RX ORDER — ALENDRONATE SODIUM 70 MG/1
TABLET ORAL
Qty: 12 TAB | Refills: 0 | Status: SHIPPED | OUTPATIENT
Start: 2020-02-04 | End: 2020-06-04 | Stop reason: ALTCHOICE

## 2020-03-03 ENCOUNTER — TELEPHONE (OUTPATIENT)
Dept: RHEUMATOLOGY | Age: 80
End: 2020-03-03

## 2020-04-08 DIAGNOSIS — J32.0 MAXILLARY SINUSITIS, UNSPECIFIED CHRONICITY: Primary | ICD-10-CM

## 2020-04-08 RX ORDER — AZITHROMYCIN 250 MG/1
TABLET, FILM COATED ORAL
Qty: 6 TAB | Refills: 0 | Status: SHIPPED | OUTPATIENT
Start: 2020-04-08 | End: 2020-04-13

## 2020-04-08 RX ORDER — PROMETHAZINE HYDROCHLORIDE AND DEXTROMETHORPHAN HYDROBROMIDE 6.25; 15 MG/5ML; MG/5ML
5 SYRUP ORAL
Qty: 180 ML | Refills: 1 | Status: SHIPPED | OUTPATIENT
Start: 2020-04-08 | End: 2020-04-15

## 2020-04-08 NOTE — TELEPHONE ENCOUNTER
----- Message from Belgica Melo sent at 4/8/2020 10:09 AM EDT -----  Regarding: /Telephone  General Message/Vendor Calls    Caller's first and last name:      Reason for call:  Cough syrup medication and Zpack    Callback required yes/no and why:  Yes,her pharmacy states they still do not have the prescription.      Best contact number(s):  [phone]    Details to clarify the request:      Belgica Melo

## 2020-06-04 ENCOUNTER — OFFICE VISIT (OUTPATIENT)
Dept: RHEUMATOLOGY | Age: 80
End: 2020-06-04

## 2020-06-04 VITALS
DIASTOLIC BLOOD PRESSURE: 67 MMHG | HEART RATE: 68 BPM | SYSTOLIC BLOOD PRESSURE: 123 MMHG | BODY MASS INDEX: 27.31 KG/M2 | TEMPERATURE: 98 F | RESPIRATION RATE: 18 BRPM | WEIGHT: 160 LBS | HEIGHT: 64 IN

## 2020-06-04 DIAGNOSIS — M06.09 SERONEGATIVE RHEUMATOID ARTHRITIS OF MULTIPLE SITES (HCC): ICD-10-CM

## 2020-06-04 DIAGNOSIS — M35.3 PMR (POLYMYALGIA RHEUMATICA) (HCC): ICD-10-CM

## 2020-06-04 DIAGNOSIS — M45.8 ANKYLOSING SPONDYLITIS OF SACRAL REGION (HCC): Primary | ICD-10-CM

## 2020-06-04 DIAGNOSIS — M81.0 AGE-RELATED OSTEOPOROSIS WITHOUT CURRENT PATHOLOGICAL FRACTURE: ICD-10-CM

## 2020-06-04 RX ORDER — ABATACEPT 125 MG/ML
125 INJECTION, SOLUTION SUBCUTANEOUS
Qty: 4 SYRINGE | Refills: 11 | Status: SHIPPED | OUTPATIENT
Start: 2020-06-04 | End: 2020-06-04

## 2020-06-04 RX ORDER — PREDNISONE 5 MG/1
TABLET ORAL
Qty: 91 TAB | Refills: 0 | Status: SHIPPED | OUTPATIENT
Start: 2020-06-04 | End: 2020-07-19

## 2020-06-04 RX ORDER — ABATACEPT 125 MG/ML
125 INJECTION, SOLUTION SUBCUTANEOUS
Qty: 4 SYRINGE | Refills: 11 | Status: SHIPPED | OUTPATIENT
Start: 2020-06-04 | End: 2020-12-30

## 2020-06-04 RX ORDER — PANTOPRAZOLE SODIUM 20 MG/1
20 TABLET, DELAYED RELEASE ORAL DAILY
COMMUNITY
End: 2020-09-02

## 2020-06-04 NOTE — PROGRESS NOTES
REASON FOR VISIT    This is a follow up visit for Ms. Clifton Paulino for    ICD-10-CM   1. Ankylosing spondylitis of sacral region ([facility] M45.8   2. Seronegative rheumatoid arthritis of multiple sites ([facility] M06.09   3. Age-related osteoporosis without current pathological fracture M81.0     Spondyloarthritis phenotype includes:  Anti-CCP positive: no  Rheumatoid factor positive: no  HLA-B27: yes  Erosive disease: no  Sacroiliitis: yes  Ankylosis: yes (left SI)  Psoriasis: no  Enthesitis: yes (Achilles)  Dactylitis: no  Nail Pitting: no  Onycholysis: no  Extra-articular manifestations include: Polymyalgia Rheumatica   SAPHO: no    Immunosuppression Screening (9/19/2019):   Quantiferon TB: negative 8/25/2017; repeat (11/07/2018)  PPD:  Not performed  Hepatitis B: negative  Hepatitis C: negative    Therapy History includes:  Current NSAIDs include: none (contraindicated due to CKD stage 3)  Current DMARD therapy include: Cosentyx 300 mg (3/07/2019 to present)  Prior DMARD therapy includes: Gold, leflunomide, Humira (11/2017 ot 12/2017), Enbrel 50 mg weekly (5/22/2018 to 9/20/2018)  The following DMARDs have been ineffective: none  The following DMARDs were stopped because of side effects: Gold (\"pass out\"), leflunomide (swelling), Humira (hives, lip and eyelid swelling), Enbrel (swelling of lips)  Contra-Indicated DMARDs because of CKD stage 3: methotrexate   Contra-Indicated DMARDs because sulfa allergy: sulfasalazine    Osteoporosis Historical Synopsis     Height loss since age 26 (at least two inches): 0.5  Fracture history includes: yes (ankle, left foot)  Family history of hip fracture: no  Fall Risk: no     Daily calcium intake is 1200 mg  Daily vitamin D intake is 800 IU     Smoking history: no  Alcohol consumption: no  Prednisone history: no     Exercise: yes     Previous work-up for osteoporosis includes the following:  DEXA Scan: 9/12/2017  Vitamin 25OH D level: 53.3 (10/04/2017)  PTH: 35 (10/04/2017)  TSH: 3.330 (8/25/2017)     Therapy History includes:  Current osteoporosis therapy includes: alendronate  Prior osteoporosis therapy includes: alendronate 70 mg every Sunday (11/2017 to 6/04/2020)  The following osteoporosis therapy have been ineffective: alendronate  The following osteoporosis therapy were stopped because of side effects: none    Active problems include:    Patient Active Problem List   Diagnosis Code    Allergic rhinitis, cause unspecified J30.9    Asthma J45.909    Essential hypertension, benign I10    Depression F32.9    Unspecified hypothyroidism E03.9    Mixed hyperlipidemia E78.2    Primary osteoarthritis of both knees M17.0    Ankylosis, 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 Natty Oglesby returns for a follow up visit. On her last visit, I due to lack of benefit, I discontinued Cosentyx 300 mg every 4 weeks, prescribed her a prednisone taper and started Orencia 125 mg SubQ. Today, she feels complains of dull throbbing pain in her hands, wrists, and ankles. Her pain is worse in her hands if she grabs something. She has morning stiffness 3 hours. She fell and broke a bone in her foot in 2/2020 and had tor wear a boot follow by brace. She is on alendronate.  I reviewed [facility]    She denies fever, weight loss, blurred vision, vision loss, oral ulcers, ankle swelling, dry cough, dyspnea, nausea, vomiting, dysphagia, abdominal pain, black or bloody stool, fall since last visit, rash, easy bruising and increased thirst.    Last toxicity monitoring by blood work was done on 12/03/2019 DR. SAEED[facility]S [facility] reviewed) and did not reveal any significant adverse effects, except creatinine 0.84 mg/dL, eGFR 66. Most recent inflammatory markers from 9/19/2019 revealed a ESR 13 mm/hr and CRP 3 mg/L    She continues to work as a . The patient has not had any interval hospital admissions, infections, or surgeries. REVIEW OF SYSTEMS    A comprehensive review of systems was performed and pertinent results are documented in the HPI, review of systems is otherwise non-contributory. PAST MEDICAL HISTORY    She has a past medical history of Allergic rhinitis, cause unspecified (5/26/2010), Asthma (5/26/2010), Depression (5/26/2010), Encounter for long-term (current) use of other medications (7/11/2011), Essential hypertension, benign (5/26/2010), Hypertension, OA (osteoarthritis) (5/26/2010), Rheumatoid arthritis ([facility] and Unspecified hypothyroidism (5/26/2010). FAMILY HISTORY    Her family history includes Cancer in her sister; Heart Disease in her father, mother, and sister; Stroke in her sister. SOCIAL HISTORY    She reports that she has never smoked. She has never used smokeless tobacco. She reports that she does not drink alcohol or use drugs. IMMUNIZATIONS  Immunization History   Administered Date(s) Administered    Influenza Vaccine 10/22/2013    Influenza Vaccine Split 09/27/2011     MEDICATIONS    Current Outpatient Medications   Medication Sig Dispense Refill    pantoprazole (PROTONIX) 20 mg tablet Take 20 mg by mouth daily.  abatacept (Orencia) 125 mg/mL syrg 1 mL by SubCUTAneous route every seven (7) days. Indications: rheumatoid arthritis 4 Syringe 11    predniSONE (DELTASONE) 5 mg tablet 4 tabs daily for 7 days, 3 tabs for 7 days, 2 tabs for 14 days, 1 tab for 14 days 91 Tab 0    acyclovir (ZOVIRAX) 400 mg tablet as needed.  Calcium-Cholecalciferol, D3, (CALCIUM 600 WITH VITAMIN D3) 600 mg(1,500mg) -400 unit cap Take  by mouth.       fluticasone (FLONASE) 50 mcg/actuation nasal spray PLACE TWO SPRAYS IN EACH NOSTRIL ONCE DAILY 3 Bottle 2    DULERA 200-5 mcg/actuation HFA inhaler 2 Puffs two (2) times a day.  FEXOFENADINE HCL (ALLEGRA PO) Take  by mouth daily.  albuterol (PROVENTIL HFA, VENTOLIN HFA) 90 mcg/actuation inhaler Take 1 Puff by inhalation every six (6) hours as needed for Wheezing. 1 Inhaler 5    levothyroxine (SYNTHROID) 50 mcg tablet Take  by mouth daily (before breakfast).  amlodipine (NORVASC) 5 mg tablet Take 5 mg by mouth daily. ALLERGIES    Allergies   Allergen Reactions    Humira [Adalimumab] Hives    Leflunomide Swelling    Penicillins Unable to Obtain    Sulfa (Sulfonamide Antibiotics) Unknown (comments)       PHYSICAL EXAMINATION    Visit Vitals  /67   Pulse 68   Temp 98 °F (36.7 °C)   Resp 18   Ht 5' 4\" (1.626 m)   Wt 160 lb (72.6 kg)   BMI 27.46 kg/m²     Body mass index is 27.46 kg/m². General: Patient is alert, oriented x 3, not in acute distress    HEENT:   Sclerae are not injected and appear moist.  There is no alopecia. Cardiovascular:  Heart is regular rate and rhythm, no murmurs. Chest:  Lungs are clear to auscultation bilaterally. No rhonchi, wheezes, or crackles. Extremities:  Free of clubbing, cyanosis, edema    Neurological exam:  Muscle strength is full in upper and lower extremities     Skin:    Psoriasis:     no  Nail Pitting:     no  Onycholysis:     no  Palmoplantar pustulosis:   no  Acne fulminans:    no  Acne conglobata:    no  Hidradenitis Suppurativa:   no  Dissecting cellulitis of the scalp:  no  Pilonidal sinus:    no  Erythema nodosum:    no    Musculoskeletal:  A comprehensive musculoskeletal exam was performed for all joints of each upper and lower extremity and assessed for swelling, tenderness and range of motion. Bilateral arm, forearm, thigh, and calf allodynia  Bilateral Sharmila and Heberden nodes.   Bilateral greater trochanter tenderness  Right IT band tenderness  Bilateral knee crepitus without effusion.   Bilateral Achilles tenderness   Bilateral ankle tenderness with swelling   Bilateral MTP tenderness with swelling    Costochondritis:  no   Synovitis:   Yes (see below table)  Dactylitis:   no  Enthesitis:   Yes (bilateral Achilles)    Joint Count 6/4/2020 9/19/2019 6/6/2019 3/7/2019 11/7/2018 8/1/2018 4/25/2018   Patient pain (0-100) - - - - - - -   MHAQ - - - - - - -   Left shoulder - Tender - - - 1 - - -   Left shoulder - Swollen - - - 0 - - -   Left elbow - Tender - - 1 1 - - -   Left elbow - Swollen - - 0 0 - - -   Left wrist- Tender 1 1 1 1 1 1 1   Left wrist- Swollen 1 1 1 1 1 1 -   Left 1st MCP - Tender 1 1 1 1 1 1 -   Left 1st MCP - Swollen 1 1 1 1 - 1 -   Left 2nd MCP - Tender 0 1 1 0 1 1 1   Left 2nd MCP - Swollen 1 1 1 1 1 1 1   Left 3rd MCP - Tender 0 - 1 - 1 1 -   Left 3rd MCP - Swollen 1 - 1 - - 1 1   Left 4th MCP - Tender 1 - - - - 1 -   Left 4th MCP - Swollen 1 - - - - - -   Left 5th MCP - Tender 1 1 - - - 1 1   Left 5th MCP - Swollen - 1 - - - 1 -   Left thumb IP - Tender - 1 - 1 1 1 -   Left thumb IP - Swollen - 0 - 1 0 0 -   Left 2nd PIP - Tender - 1 1 1 1 - 1   Left 2nd PIP - Swollen - 1 1 1 0 - 1   Left 3rd PIP - Tender 1 1 1 - - - 1   Left 3rd PIP - Swollen 1 1 1 - - - 1   Left 4th PIP - Tender - 1 - - - - -   Left 4th PIP - Swollen - 0 - - - - -   Left 5th PIP - Tender - 1 - - 1 - -   Left 5th PIP - Swollen - 0 - - 0 - -   Right shoulder - Tender - - - 1 - - 1   Right shoulder - Swollen - - - 0 - - -   Right elbow - Tender - - 1 - - - 1   Right elbow - Swollen - - 0 - - - -   Right wrist- Tender 1 1 1 1 1 1 -   Right wrist- Swollen 1 1 1 1 1 1 -   Right 1st MCP - Tender - 1 - 0 - 1 1   Right 1st MCP - Swollen - 1 - 1 - 1 1   Right 2nd MCP - Tender 0 1 1 1 1 1 1   Right 2nd MCP - Swollen 1 0 1 1 1 1 1   Right 3rd MCP - Tender 1 1 - 1 - 1 1   Right 3rd MCP - Swollen 1 0 - 1 - 1 1   Right 4th MCP - Tender 1 1 - - - 1 -   Right 4th MCP - Swollen 0 0 - - - - -   Right 5th MCP - Tender - 1 0 0 - 1 1   Right 5th MCP - Swollen - 1 1 1 - 1 1   Right thumb IP - Tender - - - - 1 - -   Right 2nd PIP - Tender 1 1 - - 1 - 1   Right 2nd PIP - Swollen 0 1 - - - 1 1   Right 3rd PIP - Tender - 1 - - - - -   Right 3rd PIP - Swollen - 1 - - - 1 1   Right 4th PIP - Tender 1 - 1 1 1 - -   Right 4th PIP - Swollen 0 - 0 0 - 1 -   Right 5th PIP - Tender 1 1 - - 1 - -   Right 5th PIP - Swollen 0 1 - - - - -   Tender Joint Count (Total) 11 18 11 11 13 13 12   Swollen Joint Count (Total) 9 12 9 10 4 13 10   Physician Assessment (0-10) - - - - - - -   Patient Assessment (0-10) - - - - - - -   CDAI Total (calculated) - - - - - - -       DATA REVIEW    Laboratory     Recent laboratory results were reviewed, summarized, and discussed with the patient. Imaging    Musculoskeletal Ultrasound    None    Radiographs    Sacroiliac Joint 8/25/2017: There is bony ankylosis of the left SI joint unchanged. There is probable ankylosis of the right SI joint as well. The right SI joint is at least narrowed. Bone mineral density is decreased without fracture or bone destruction    Bilateral Foot 8/25/2017: LEFT: No fracture or dislocation on plain film. There is narrowing of the first MTP joint with minimal spurring. No joint space erosion or periosteal reaction. Alignment is within normal limits. Bone mineralization is decreased. No soft tissue calcification. RIGHT: No fracture or dislocation on plain film. There is metatarsus primus varus with hallux valgus deformity. There is joint space loss and spurring first MTP joint. No joint space erosion or periosteal reaction. Alignment is within normal limits. Bone mineralization is decreased. No soft tissue calcification. Chest 3/09/2017: normal heart size. There is no acute process in the lung fields. The osseous structures are unremarkable. Bilateral Hand 1/20/2017: LEFT: No fracture or dislocation on plain film.  There are scattered mild degenerative changes of the interphalangeal joints. There is moderate degeneration of the first [facility] joint and mild degeneration of the first MCP joint. Minimal degenerative changes of the third and fourth MCP joints. There is widening of the scapholunate interval compatible with chronic scapholunate ligament tear. No joint space erosion or periosteal reaction. Alignment is within normal limits. Bone mineralization is decreased. No soft tissue calcification. RIGHT: No fracture or dislocation on plain film. There are scattered mild degenerative changes in the interphalangeal joints and first MCP joint. There is narrowing of the lateral margin radiocarpal joint There is widening of the scapholunate interval compatible with chronic scapholunate ligament tear. No joint space erosion or periosteal reaction. Alignment is within normal limits. Bone mineralization is decreased. No soft tissue calcification. Left Hip 7/15/2015: no fracture, dislocation or other acute abnormality.  There appears to be fusion of the left sacroiliac joint and possibly the right sacroiliac joint. Spurring is noted at the symphysis. Lumbar Spine 7/15/2015: the patient is leaning to the right. There is a 2 mm retrolisthesis of L1  relative to L2. Bilateral facet arthropathy is the dominant feature at the  lower 3 levels. Bilateral laminectomies have been performed at L5-S1. Vertebral body heights spaces are well-preserved.  There is no fracture. CT Imaging    None    MR Imaging    MRI Pelvis without contrast 9/20/2017: There is normal bone signal. No para-articular edema-like signal is shown nor is there demonstration of substantial joint effusion. There is ankylosis of the inferior left sacroiliac joint without demonstration of substantial osteophyte formation.  The inferior right SI joint is substantially narrowed with perhaps a small area of ankylosis inferiorly.   There is minimal-mild superolateral joint space narrowing of the hip joints bilaterally with tiny marginal osteophytes. Moderate degenerative changes in the lower lumbar spine are shown with disc space narrowing through L4-5 as well as bilateral facet osteoarthrosis without ankylosis at L5-S1. No soft tissue mass is demonstrated. Muscle signal, size and contour appear normal. There is moderate insertional tendinopathy of the gluteus medius bilaterally with partial-thickness tearing in tiny bursal effusions. Ultrasound    Retroperitoneum 10/11/2017: RIGHT KIDNEY: The right kidney has normal echogenicity with no mass, stone or hydronephrosis. The right kidney measures 9.1 cm in length. LEFT KIDNEY: The left kidney has normal echogenicity with  no mass, stone or hydronephrosis. There may be mild caliectasis. The left kidney measures 8.7 cm in length. RETROPERITONEUM: The aorta is atherosclerotic and tapers normally. The aortic bifurcation is normal. The IVC is not visualized due to body habitus and bowel gas. No retroperitoneal mass is identified. BLADDER: The urinary bladder is incompletely distended. DXA     DXA 9/12/2017: (excluded Lumbar spine due to degenerative changes) left femoral neck T score: -2.1 (0.749 g/cm2), left total hip T score: -1.9 (0.763 g/cm2), right femoral neck T score: -2.4 (0.709 g/cm2), right total hip T score: -2.1 (0.746 g/cm2), and distal one third left radius T score -3.0 (BMD 0.614 g/cm2). FRAX score 17.8 % probability in 10 years for major osteoporotic fracture and 5.8 % 10 year probability of hip fracture. ASSESSMENT AND PLAN    This is a follow up visit for Ms. Jesus Dean. 1) HLA-B27 Positive Ankylosing Spondylitis with Seronegative Rheumatoid Arthritis. (positive HLA-B27, sacroiliitis, inflammatory arthritis). She reports a history of Rheumatoid Arthritis that was treated with gold, which she thinks led her to remission until 2016. She had developed sore pain and Polymyalgia Rheumatica-like symptoms involving her hip girdle in the fall of 2016.  Her hip radiograph on 7/15/2015 showed ankylosis of the left sacroiliac joint. A repeat radiograph of SI joints 8/25/2017 confirmed left sacroiliac ankylosis and probable right sided involvement. MRI pelvis showed left SI joint ankylosis. Partial ankylosis of right SI joint. Her labs showed a positive HLA-B27. She had peripheral synovitis. She has CKD stage 3, so NSAIDs and methotrexate should be avoided. She had been on Cosentyx with good tolerance but did not feel any improvement. She felt great relief on a prednisone taper. I changed Cosentyx to Kory Chávez but she did not receive any communication from pharmacy ([facility]). I will submit today to [facility]     I will start her on a short course of prednisone, called a prednisone taper, with 5 mg tablets. This regimen is to be taken as follows: 4 tabs (20 mg) for 7 days, 3 tabs (15 mg) for 7 days, 2 tabs (10 mg) for 14 days and then 1 tab (5 mg) for 14 days, and then stop. 2) Polymyalgia Rheumatica. This is secondary to #1. 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 Her DXA 9/12/2017 showed T score distal radius -3.0 and right femoral neck -2.4. She began Fosamax 70 mg every Sunday 11/2017 and is tolerating well but fractured her foot in 2/2020, so I will discontinue it and start Prolia. 4) Long Term Use of Immunosuppressants. The patient remains off immunomodulatory medications. 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 Her most recent labs 12/03/2019 show creatinine 0.84 mg/dL and eGFR 66.    6) Right Trochanteric Bursitis with IT Band Syndrome. I had referred her to physical therapy previously with benefit. This is symptomatic now. 7) Bilateral Knee Osteoarthritis. This was not an active issue today in her right knee. The patient voiced understanding of the aforementioned assessment and plan. Summary of plan was provided in the After Visit Summary patient instructions.      TODAY'S ORDERS    Orders Placed This Encounter    abatacept (Orencia) 125 mg/mL syrg    predniSONE (DELTASONE) 5 mg tablet     No future appointments.   Conni Cooks, MD, [address] [facility]    Adult Rheumatology   Rheumatology Ultrasound Certified  [address] [facility] [address]  [address] [facility], [address] [place] [address]   Phone [phone]  Fax [phone]

## 2020-06-10 ENCOUNTER — DOCUMENTATION ONLY (OUTPATIENT)
Dept: PHARMACY | Age: 80
End: 2020-06-10

## 2020-06-10 NOTE — PROGRESS NOTES
[facility] [address] [facility] Update    Date: 06/10/20    Beltran Estimable 1940     Medication: Orencia 125 mg/ml syringes    Co-pay = $5    Fill: 6/5/20    Ship: 6/8/20    Deliver: 6/9/20    Next Fill Due: 6/26/20    Pharmacist counseled the patient on:        - Use  - Dosing  - Administration  - Side effects    Handout was provided.     Kalie Rivera, [address] at [facility],  [place], [address]  phone: [phone]   fax: [phone]

## 2020-06-16 ENCOUNTER — TELEPHONE (OUTPATIENT)
Dept: RHEUMATOLOGY | Age: 80
End: 2020-06-16

## 2020-06-16 NOTE — TELEPHONE ENCOUNTER
Approvedtoday  Your PA request has been approved. Additional information will be provided in the approval communication. (Message 1146)Prolia 60mg.  RW

## 2020-06-29 ENCOUNTER — CLINICAL SUPPORT (OUTPATIENT)
Dept: RHEUMATOLOGY | Age: 80
End: 2020-06-29

## 2020-06-29 DIAGNOSIS — M81.0 AGE-RELATED OSTEOPOROSIS WITHOUT CURRENT PATHOLOGICAL FRACTURE: Primary | ICD-10-CM

## 2020-06-29 RX ORDER — DENOSUMAB 60 MG/ML
60 INJECTION SUBCUTANEOUS ONCE
Qty: 1 ML | Refills: 0
Start: 2020-06-29 | End: 2020-06-29

## 2020-06-29 NOTE — PROGRESS NOTES
Patient arrives for her first prolia injection in good spirits. COVID precautions in practice. Mask intact. OK to use labs dated 12/2019 per Maite Tian MD    [employer] information discussed and provided reviewing most common side effects. Consent obtained. Reviewed the importance of maintaining prolia schedule and obtaining labs as ordered. Lab slip provided today. Prolia 60 mg SQ L arm 1200  NDC 80416-412-20  Lot [id number]  Exp 06/2022    Observation 25 minutes. Tolerated well  Discharged without incident. Pt to scheduled 6 month prolia appt and labs due in 10 days at . Encouraged to call with any additional questions/concerns. Note:    Recent ECHO \"looked pretty good\" per cardiologist.    CT scan today, pelvic exam saw \"something\"  Double checking. GYN.

## 2020-06-30 ENCOUNTER — DOCUMENTATION ONLY (OUTPATIENT)
Dept: PHARMACY | Age: 80
End: 2020-06-30

## 2020-06-30 NOTE — PROGRESS NOTES
[facility] at [address] [facility] Update    Date: 06/30/20    Jose Tavares 1940    Medication: Orencia 125mg/mL syringe    Prescription transferred to specialty pharmacy: [place]    Phone: [phone]    Pharmacist counseled the patient and informed patient their prescription was transferred to the mandated specialty pharmacy and gave patient the specialty pharmacy's phone number. Pharmacist answered any questions that the patient had.     Sobeida Cabrera, [address] at [place] [address] [place], [address]  phone: [phone]   fax: [phone]

## 2020-07-10 ENCOUNTER — HOSPITAL ENCOUNTER (OUTPATIENT)
Dept: LAB | Age: 80
Discharge: HOME | End: 2020-07-10
Payer: MEDICARE

## 2020-07-10 PROCEDURE — 80069 RENAL FUNCTION PANEL: CPT

## 2020-07-11 LAB
ALBUMIN SERPL-MCNC: 4.2 G/DL (ref 3.6–4.6)
BUN SERPL-MCNC: 22 MG/DL (ref 8–27)
BUN/CREAT SERPL: 28 (ref 12–28)
CALCIUM SERPL-MCNC: 9 MG/DL (ref 8.7–10.3)
CHLORIDE SERPL-SCNC: 102 MMOL/L (ref 96–106)
CO2 SERPL-SCNC: 22 MMOL/L (ref 20–29)
CREAT SERPL-MCNC: 0.78 MG/DL (ref 0.57–1)
GLUCOSE SERPL-MCNC: 101 MG/DL (ref 65–99)
PHOSPHATE SERPL-MCNC: 3.4 MG/DL (ref 3–4.3)
POTASSIUM SERPL-SCNC: 4.4 MMOL/L (ref 3.5–5.2)
SODIUM SERPL-SCNC: 139 MMOL/L (ref 134–144)

## 2020-09-02 ENCOUNTER — OFFICE VISIT (OUTPATIENT)
Dept: RHEUMATOLOGY | Age: 80
End: 2020-09-02
Payer: MEDICARE

## 2020-09-02 VITALS
WEIGHT: 162 LBS | TEMPERATURE: 98 F | DIASTOLIC BLOOD PRESSURE: 71 MMHG | HEIGHT: 65 IN | BODY MASS INDEX: 26.99 KG/M2 | SYSTOLIC BLOOD PRESSURE: 127 MMHG | HEART RATE: 72 BPM | RESPIRATION RATE: 18 BRPM

## 2020-09-02 DIAGNOSIS — Z79.60 LONG-TERM USE OF IMMUNOSUPPRESSANT MEDICATION: ICD-10-CM

## 2020-09-02 DIAGNOSIS — M81.0 AGE-RELATED OSTEOPOROSIS WITHOUT CURRENT PATHOLOGICAL FRACTURE: ICD-10-CM

## 2020-09-02 DIAGNOSIS — R93.89 ABNORMAL FINDINGS ON DIAGNOSTIC IMAGING OF OTHER SPECIFIED BODY STRUCTURES: ICD-10-CM

## 2020-09-02 DIAGNOSIS — M06.09 SERONEGATIVE RHEUMATOID ARTHRITIS OF MULTIPLE SITES (HCC): ICD-10-CM

## 2020-09-02 DIAGNOSIS — E55.9 VITAMIN D DEFICIENCY: ICD-10-CM

## 2020-09-02 DIAGNOSIS — M45.8 ANKYLOSING SPONDYLITIS OF SACRAL REGION (HCC): Primary | ICD-10-CM

## 2020-09-02 DIAGNOSIS — G95.19 NEUROGENIC CLAUDICATION (HCC): ICD-10-CM

## 2020-09-02 DIAGNOSIS — M35.3 PMR (POLYMYALGIA RHEUMATICA) (HCC): ICD-10-CM

## 2020-09-02 PROCEDURE — 1101F PT FALLS ASSESS-DOCD LE1/YR: CPT | Performed by: INTERNAL MEDICINE

## 2020-09-02 PROCEDURE — G8752 SYS BP LESS 140: HCPCS | Performed by: INTERNAL MEDICINE

## 2020-09-02 PROCEDURE — G9717 DOC PT DX DEP/BP F/U NT REQ: HCPCS | Performed by: INTERNAL MEDICINE

## 2020-09-02 PROCEDURE — G0463 HOSPITAL OUTPT CLINIC VISIT: HCPCS | Performed by: INTERNAL MEDICINE

## 2020-09-02 PROCEDURE — G8536 NO DOC ELDER MAL SCRN: HCPCS | Performed by: INTERNAL MEDICINE

## 2020-09-02 PROCEDURE — 99215 OFFICE O/P EST HI 40 MIN: CPT | Performed by: INTERNAL MEDICINE

## 2020-09-02 PROCEDURE — G8427 DOCREV CUR MEDS BY ELIG CLIN: HCPCS | Performed by: INTERNAL MEDICINE

## 2020-09-02 PROCEDURE — G8419 CALC BMI OUT NRM PARAM NOF/U: HCPCS | Performed by: INTERNAL MEDICINE

## 2020-09-02 PROCEDURE — 1090F PRES/ABSN URINE INCON ASSESS: CPT | Performed by: INTERNAL MEDICINE

## 2020-09-02 PROCEDURE — G8754 DIAS BP LESS 90: HCPCS | Performed by: INTERNAL MEDICINE

## 2020-09-02 RX ORDER — ERGOCALCIFEROL 1.25 MG/1
50000 CAPSULE ORAL
Qty: 12 CAP | Refills: 4 | Status: SHIPPED | OUTPATIENT
Start: 2020-09-02 | End: 2021-10-18

## 2020-09-02 RX ORDER — DICLOFENAC SODIUM 10 MG/G
GEL TOPICAL 4 TIMES DAILY
Qty: 100 G | Refills: 2 | Status: SHIPPED | OUTPATIENT
Start: 2020-09-02 | End: 2021-09-01 | Stop reason: SDUPTHER

## 2020-09-02 RX ORDER — DENOSUMAB 60 MG/ML
60 INJECTION SUBCUTANEOUS
COMMUNITY

## 2020-09-02 NOTE — LETTER
9/2/20 Patient: Concepcion De La Torre YOB: 1940 Date of Visit: 9/2/2020 Shanda Garcia MD 
[address] [place] [id number] VIA In Basket Dear Shanda Garcia MD, Thank you for referring Ms. Nilam Draper to [facility] for evaluation. My notes for this consultation are attached. If you have questions, please do not hesitate to call me. I look forward to following your patient along with you.  
 
 
Sincerely, 
 
Kenia Cadena MD

## 2020-09-02 NOTE — PROGRESS NOTES
REASON FOR VISIT    This is a follow up visit for Ms. Seda Queen for    ICD-10-CM   1. Ankylosing spondylitis of sacral region ([facility] M45.8   2. Seronegative rheumatoid arthritis of multiple sites ([facility] M06.09   3. Age-related osteoporosis without current pathological fracture M81.0     Spondyloarthritis phenotype includes:  Anti-CCP positive: no  Rheumatoid factor positive: no  HLA-B27: yes  Erosive disease: no  Sacroiliitis: yes  Ankylosis: yes (left SI)  Psoriasis: no  Enthesitis: yes (Achilles)  Dactylitis: no  Nail Pitting: no  Onycholysis: no  Extra-articular manifestations include: Polymyalgia Rheumatica   SAPHO: no    Immunosuppression Screening (9/19/2019):   Quantiferon TB: negative 8/25/2017; repeat (11/07/2018)  PPD:  Not performed  Hepatitis B: negative  Hepatitis C: negative    Therapy History includes:  Current NSAIDs include: none (contraindicated due to CKD stage 3)  Current DMARD therapy include: Orencia 125 mg SubQ every Monday (6/10/2020 to present)  Prior DMARD therapy includes: Gold, leflunomide, Humira (11/2017 ot 12/2017), Enbrel 50 mg weekly (5/22/2018 to 9/20/2018), Cosentyx 300 mg (3/07/2019 to 6/04/2020)  The following DMARDs have been ineffective: none  The following DMARDs were stopped because of side effects: Gold (\"pass out\"), leflunomide (swelling), Humira (hives, lip and eyelid swelling), Enbrel (swelling of lips)  Contra-Indicated DMARDs because of CKD stage 3: methotrexate   Contra-Indicated DMARDs because sulfa allergy: sulfasalazine    Osteoporosis Historical Synopsis     Height loss since age 26 (at least two inches): 0.5  Fracture history includes: yes (ankle, left foot)  Family history of hip fracture: no  Fall Risk: no     Daily calcium intake is 1200 mg  Daily vitamin D intake is 800 IU     Smoking history: no  Alcohol consumption: no  Prednisone history: no     Exercise: yes     Previous work-up for osteoporosis includes the following:  DEXA Scan: 9/12/2017  Vitamin 25OH D level: 53.3 (10/04/2017)  PTH: 35 (10/04/2017)  TSH: 3.330 (8/25/2017)     Therapy History includes:  Current osteoporosis therapy includes: Prolia 60 mg every 6 months (6/29/2020 to present)  Prior osteoporosis therapy includes: alendronate 70 mg every Sunday (11/2017 to 6/04/2020)  The following osteoporosis therapy have been ineffective: alendronate  The following osteoporosis therapy were stopped because of side effects: none    Active problems include:    Patient Active Problem List   Diagnosis Code    Allergic rhinitis, cause unspecified J30.9    Asthma J45.909    Essential hypertension, benign I10    Depression F32.9    Unspecified hypothyroidism E03.9    Mixed hyperlipidemia E78.2    Primary osteoarthritis of both knees M17.0    Ankylosis, 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    PMR (polymyalgia rheumatica) ([facility]) M35.3    CKD (chronic kidney disease) stage 2, GFR 60-89 ml/min N18.2    Ankylosing spondylitis of sacral region ([address] M45.8    Long-term use of immunosuppressant medication Z79.899    Age-related osteoporosis without current pathological fracture M81.0    Gastroesophageal reflux disease with esophagitis K21.0     HISTORY OF PRESENT ILLNESS    Ms. David Rogers returns for a follow up visit. On her last visit, I prescribed her a prednisone taper and resubmitted Orencia 125 mg SubQ which was delivered on 6/09/2020. I also changed her alendronate to Prolia, which she started on 6/29/2020, with good tolerance. Today, she does not feel much improvement on Orencia. She feels aching pain and stiffness in her hands that lasts about 20 minutes if she bends them. She also has pain pointing to her right 4th volar MCP. She complains of aching pain in her wrists. She complains of pain from her hips to her toes that feels like tightness when she walks in the parking lot that is worse with walking further.     She endorses 6 lbs weight loss, oral ulcers from GERD, intermittent abdominal pain from IBS, bump lesions on arms and trunch, easy bruising. She denies fever, blurred vision, vision loss, ankle swelling, dry cough, dyspnea, nausea, vomiting, dysphagia, black or bloody stool, fall since last visit, easy bruising and increased thirst.    Last toxicity monitoring by blood work was done on 12/03/2019 DR. SAEED'S [facility] reviewed) and did not reveal any significant adverse effects, except creatinine 0.84 mg/dL, eGFR 66. Most recent inflammatory markers from 9/19/2019 revealed a ESR 13 mm/hr and CRP 3 mg/L    She continues to work as a . The patient has not had any interval hospital admissions, infections, or surgeries. REVIEW OF SYSTEMS    A comprehensive review of systems was performed and pertinent results are documented in the HPI, review of systems is otherwise non-contributory. PAST MEDICAL HISTORY    She has a past medical history of Allergic rhinitis, cause unspecified (5/26/2010), Asthma (5/26/2010), Depression (5/26/2010), Encounter for long-term (current) use of other medications (7/11/2011), Essential hypertension, benign (5/26/2010), Hypertension, OA (osteoarthritis) (5/26/2010), Rheumatoid arthritis ([facility] and Unspecified hypothyroidism (5/26/2010). FAMILY HISTORY    Her family history includes Cancer in her sister; Heart Disease in her father, mother, and sister; Stroke in her sister. SOCIAL HISTORY    She reports that she has never smoked. She has never used smokeless tobacco. She reports that she does not drink alcohol or use drugs. IMMUNIZATIONS  Immunization History   Administered Date(s) Administered    Influenza Vaccine 10/22/2013    Influenza Vaccine Split 09/27/2011     MEDICATIONS    Current Outpatient Medications   Medication Sig Dispense Refill    denosumab (Prolia) 60 mg/mL injection 60 mg by SubCUTAneous route.  ergocalciferol (ERGOCALCIFEROL) 1,250 mcg (50,000 unit) capsule Take 1 Cap by mouth every seven (7) days.  Indications: vitamin D deficiency (high dose therapy) 12 Cap 4    diclofenac (VOLTAREN) 1 % gel Apply  to affected area four (4) times daily. 100 g 2    abatacept (Orencia) 125 mg/mL syrg 1 mL by SubCUTAneous route every seven (7) days. Indications: rheumatoid arthritis (Patient taking differently: 125 mg by SubCUTAneous route every Monday. Indications: rheumatoid arthritis) 4 Syringe 11    acyclovir (ZOVIRAX) 400 mg tablet as needed.  Calcium-Cholecalciferol, D3, (CALCIUM 600 WITH VITAMIN D3) 600 mg(1,500mg) -400 unit cap Take  by mouth.  fluticasone (FLONASE) 50 mcg/actuation nasal spray PLACE TWO SPRAYS IN EACH NOSTRIL ONCE DAILY 3 Bottle 2    DULERA 200-5 mcg/actuation HFA inhaler 2 Puffs two (2) times a day.  FEXOFENADINE HCL (ALLEGRA PO) Take  by mouth daily.  albuterol (PROVENTIL HFA, VENTOLIN HFA) 90 mcg/actuation inhaler Take 1 Puff by inhalation every six (6) hours as needed for Wheezing. 1 Inhaler 5    levothyroxine (SYNTHROID) 50 mcg tablet Take  by mouth daily (before breakfast).  amlodipine (NORVASC) 5 mg tablet Take 5 mg by mouth daily. ALLERGIES    Allergies   Allergen Reactions    Humira [Adalimumab] Hives    Leflunomide Swelling    Penicillins Unable to Obtain    Sulfa (Sulfonamide Antibiotics) Unknown (comments)       PHYSICAL EXAMINATION    Visit Vitals  /71   Pulse 72   Temp 98 °F (36.7 °C)   Resp 18   Ht 5' 5\" (1.651 m)   Wt 162 lb (73.5 kg)   BMI 26.96 kg/m²     Body mass index is 26.96 kg/m². General: Patient is alert, oriented x 3, not in acute distress    HEENT:   Sclerae are not injected and appear moist.  There is no alopecia. Cardiovascular:  Heart is regular rate and rhythm, no murmurs. Chest:  Lungs are clear to auscultation bilaterally. No rhonchi, wheezes, or crackles.     Extremities:  Free of clubbing, cyanosis, edema    Neurological exam:  Muscle strength is full in upper and lower extremities     Skin:    Psoriasis:     no  Nail Pitting:     no  Onycholysis: no  Palmoplantar pustulosis:   no  Acne fulminans:    no  Acne conglobata:    no  Hidradenitis Suppurativa:   no  Dissecting cellulitis of the scalp:  no  Pilonidal sinus:    no  Erythema nodosum:    no    Musculoskeletal:  A comprehensive musculoskeletal exam was performed for all joints of each upper and lower extremity and assessed for swelling, tenderness and range of motion. Bilateral arm, forearm, thigh, and calf allodynia on MCPs, PIPs  Bilateral Sharmila and Heberden nodes. Bilateral greater trochanter tenderness  Right IT band tenderness  Bilateral knee crepitus without effusion.   Bilateral Achilles tenderness in the body not insertion  Bilateral ankle tenderness with swelling (RESOLVED)  Bilateral MTP tenderness with swelling (RESOLVED)    Costochondritis:  no   Synovitis:   Yes (see below table)  Dactylitis:   no  Enthesitis:   no    Joint Count 9/2/2020 6/4/2020 9/19/2019 6/6/2019 3/7/2019 11/7/2018 8/1/2018   Patient pain (0-100) 55 - - - - - -   MHAQ 1 - - - - - -   Left shoulder - Tender - - - - 1 - -   Left shoulder - Swollen - - - - 0 - -   Left elbow - Tender - - - 1 1 - -   Left elbow - Swollen - - - 0 0 - -   Left wrist- Tender 1 1 1 1 1 1 1   Left wrist- Swollen 1 1 1 1 1 1 1   Left 1st MCP - Tender 1 1 1 1 1 1 1   Left 1st MCP - Swollen 0 1 1 1 1 - 1   Left 2nd MCP - Tender 1 0 1 1 0 1 1   Left 2nd MCP - Swollen 0 1 1 1 1 1 1   Left 3rd MCP - Tender 1 0 - 1 - 1 1   Left 3rd MCP - Swollen 0 1 - 1 - - 1   Left 4th MCP - Tender 1 1 - - - - 1   Left 4th MCP - Swollen 0 1 - - - - -   Left 5th MCP - Tender 1 1 1 - - - 1   Left 5th MCP - Swollen 0 - 1 - - - 1   Left thumb IP - Tender 1 - 1 - 1 1 1   Left thumb IP - Swollen 0 - 0 - 1 0 0   Left 2nd PIP - Tender 1 - 1 1 1 1 -   Left 2nd PIP - Swollen 0 - 1 1 1 0 -   Left 3rd PIP - Tender 1 1 1 1 - - -   Left 3rd PIP - Swollen 0 1 1 1 - - -   Left 4th PIP - Tender 1 - 1 - - - -   Left 4th PIP - Swollen 0 - 0 - - - -   Left 5th PIP - Tender 1 - 1 - - 1 -   Left 5th PIP - Swollen 0 - 0 - - 0 -   Right shoulder - Tender - - - - 1 - -   Right shoulder - Swollen - - - - 0 - -   Right elbow - Tender - - - 1 - - -   Right elbow - Swollen - - - 0 - - -   Right wrist- Tender 1 1 1 1 1 1 1   Right wrist- Swollen 1 1 1 1 1 1 1   Right 1st MCP - Tender 1 - 1 - 0 - 1   Right 1st MCP - Swollen 1 - 1 - 1 - 1   Right 2nd MCP - Tender 1 0 1 1 1 1 1   Right 2nd MCP - Swollen 0 1 0 1 1 1 1   Right 3rd MCP - Tender 1 1 1 - 1 - 1   Right 3rd MCP - Swollen 0 1 0 - 1 - 1   Right 4th MCP - Tender 1 1 1 - - - 1   Right 4th MCP - Swollen 0 0 0 - - - -   Right 5th MCP - Tender 1 - 1 0 0 - 1   Right 5th MCP - Swollen 1 - 1 1 1 - 1   Right thumb IP - Tender - - - - - 1 -   Right 2nd PIP - Tender 1 1 1 - - 1 -   Right 2nd PIP - Swollen 0 0 1 - - - 1   Right 3rd PIP - Tender 1 - 1 - - - -   Right 3rd PIP - Swollen 0 - 1 - - - 1   Right 4th PIP - Tender 1 1 - 1 1 1 -   Right 4th PIP - Swollen 0 0 - 0 0 - 1   Right 5th PIP - Tender 1 1 1 - - 1 -   Right 5th PIP - Swollen 0 0 1 - - - -   Tender Joint Count (Total) 21 11 18 11 11 13 13   Swollen Joint Count (Total) 4 9 12 9 10 4 13   Physician Assessment (0-10) - - - - - - -   Patient Assessment (0-10) 3 - - - - - -   CDAI Total (calculated) - - - - - - -     DATA REVIEW    Laboratory     Recent laboratory results were reviewed, summarized, and discussed with the patient. Imaging    Musculoskeletal Ultrasound    None    Radiographs    Sacroiliac Joint 8/25/2017: There is bony ankylosis of the left SI joint unchanged. There is probable ankylosis of the right SI joint as well. The right SI joint is at least narrowed. Bone mineral density is decreased without fracture or bone destruction    Bilateral Foot 8/25/2017: LEFT: No fracture or dislocation on plain film. There is narrowing of the first MTP joint with minimal spurring. No joint space erosion or periosteal reaction. Alignment is within normal limits.  Bone mineralization is decreased. No soft tissue calcification. RIGHT: No fracture or dislocation on plain film. There is metatarsus primus varus with hallux valgus deformity. There is joint space loss and spurring first MTP joint. No joint space erosion or periosteal reaction. Alignment is within normal limits. Bone mineralization is decreased. No soft tissue calcification. Chest 3/09/2017: normal heart size. There is no acute process in the lung fields. The osseous structures are unremarkable. Bilateral Hand 1/20/2017: LEFT: No fracture or dislocation on plain film. There are scattered mild degenerative changes of the interphalangeal joints. There is moderate degeneration of the first [facility] joint and mild degeneration of the first MCP joint. Minimal degenerative changes of the third and fourth MCP joints. There is widening of the scapholunate interval compatible with chronic scapholunate ligament tear. No joint space erosion or periosteal reaction. Alignment is within normal limits. Bone mineralization is decreased. No soft tissue calcification. RIGHT: No fracture or dislocation on plain film. There are scattered mild degenerative changes in the interphalangeal joints and first MCP joint. There is narrowing of the lateral margin radiocarpal joint There is widening of the scapholunate interval compatible with chronic scapholunate ligament tear. No joint space erosion or periosteal reaction. Alignment is within normal limits. Bone mineralization is decreased. No soft tissue calcification. Left Hip 7/15/2015: no fracture, dislocation or other acute abnormality.  There appears to be fusion of the left sacroiliac joint and possibly the right sacroiliac joint. Spurring is noted at the symphysis. Lumbar Spine 7/15/2015: the patient is leaning to the right. There is a 2 mm retrolisthesis of L1  relative to L2. Bilateral facet arthropathy is the dominant feature at the  lower 3 levels.  Bilateral laminectomies have been performed at L5-S1. Vertebral body heights spaces are well-preserved.  There is no fracture. CT Imaging    None    MR Imaging    MRI Pelvis without contrast 9/20/2017: There is normal bone signal. No para-articular edema-like signal is shown nor is there demonstration of substantial joint effusion. There is ankylosis of the inferior left sacroiliac joint without demonstration of substantial osteophyte formation. The inferior right SI joint is substantially narrowed with perhaps a small area of ankylosis inferiorly.   There is minimal-mild superolateral joint space narrowing of the hip joints bilaterally with tiny marginal osteophytes. Moderate degenerative changes in the lower lumbar spine are shown with disc space narrowing through L4-5 as well as bilateral facet osteoarthrosis without ankylosis at L5-S1. No soft tissue mass is demonstrated. Muscle signal, size and contour appear normal. There is moderate insertional tendinopathy of the gluteus medius bilaterally with partial-thickness tearing in tiny bursal effusions. Ultrasound    Retroperitoneum 10/11/2017: RIGHT KIDNEY: The right kidney has normal echogenicity with no mass, stone or hydronephrosis. The right kidney measures 9.1 cm in length. LEFT KIDNEY: The left kidney has normal echogenicity with  no mass, stone or hydronephrosis. There may be mild caliectasis. The left kidney measures 8.7 cm in length. RETROPERITONEUM: The aorta is atherosclerotic and tapers normally. The aortic bifurcation is normal. The IVC is not visualized due to body habitus and bowel gas. No retroperitoneal mass is identified. BLADDER: The urinary bladder is incompletely distended.     DXA     DXA 9/12/2017: (excluded Lumbar spine due to degenerative changes) left femoral neck T score: -2.1 (0.749 g/cm2), left total hip T score: -1.9 (0.763 g/cm2), right femoral neck T score: -2.4 (0.709 g/cm2), right total hip T score: -2.1 (0.746 g/cm2), and distal one third left radius T score -3.0 (BMD 0.614 g/cm2). FRAX score 17.8 % probability in 10 years for major osteoporotic fracture and 5.8 % 10 year probability of hip fracture. ASSESSMENT AND PLAN    This is a follow up visit for Ms. Courtney Franks. 1) HLA-B27 Positive Ankylosing Spondylitis with Seronegative Rheumatoid Arthritis. (positive HLA-B27, sacroiliitis, inflammatory arthritis). She reports a history of Rheumatoid Arthritis that was treated with gold, which she thinks led her to remission until 2016. She had developed sore pain and Polymyalgia Rheumatica-like symptoms involving her hip girdle in the fall of 2016. Her hip radiograph on 7/15/2015 showed ankylosis of the left sacroiliac joint. A repeat radiograph of SI joints 8/25/2017 confirmed left sacroiliac ankylosis and probable right sided involvement. MRI pelvis showed left SI joint ankylosis. Partial ankylosis of right SI joint. Her labs showed a positive HLA-B27. She had peripheral synovitis. She has CKD stage 3, so NSAIDs and methotrexate should be avoided. She had been on Cosentyx with good tolerance but did not feel any improvement. She is currently on Orencia 125 mg SubQ every Monday, which she has taken with good tolerance. She does not feel better. She had allodynia in her hands without overt synovitis. She did feel better on prednisone but not resolution. I think we should give Orencia another 3 months (6 months total) and reassess. If no relief, I will try a RUBÉN. Labs ordered. I prescribed diclofenac gel per her request.    2) Polymyalgia Rheumatica. This is secondary to #1. 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 Her DXA 9/12/2017 showed T score distal radius -3.0 and right femoral neck -2.4. She began Fosamax 70 mg every Sunday 11/2017 and but fractured her foot in 2/2020, so I discontinued it and started Prolia, which she received with good tolerance. 4) Long Term Use of Immunosuppressants.  The patient remains on immunomodulatory medications (Orencia) and requires frequent toxicity monitoring by blood work. Respective labs were ordered (Quantiferon TB, chronic hepatitis panel). 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 Her most recent labs 12/03/2019 show creatinine 0.84 mg/dL and eGFR 66.    6) Right Trochanteric Bursitis with IT Band Syndrome. I had referred her to physical therapy previously with benefit. This is symptomatic now. 7) Bilateral Knee Osteoarthritis. This was not an active issue today in her right knee. 8) Neurogenic Claudication. I ordered an MRI of her lumbar spine for suspected spinal canal stenosis. The patient voiced understanding of the aforementioned assessment and plan. Summary of plan was provided in the After Visit Summary patient instructions.      TODAY'S ORDERS    Orders Placed This Encounter    QUANTIFERON-TB GOLD PLUS    MRI LUMB SPINE WO CONT    VITAMIN D, 25 HYDROXY    CHRONIC HEPATITIS PANEL    TSH REFLEX TO T4    PROTEIN ELECTROPHORESIS W/ REFLX JANINA    PTH INTACT    denosumab (Prolia) 60 mg/mL injection    ergocalciferol (ERGOCALCIFEROL) 1,250 mcg (50,000 unit) capsule    diclofenac (VOLTAREN) 1 % gel     Future Appointments   Date Time Provider Andrea Ga   12/30/2020  3:00 PM INFUSIONINJ_RC AOCR BS ANAIS Kramer MD, [facility]    Adult Rheumatology   Rheumatology Ultrasound Certified  [facility]  A Part of [facility], [address] [place] [address] Road   Phone [phone]  Fax [phone]

## 2020-09-06 ENCOUNTER — HOSPITAL ENCOUNTER (OUTPATIENT)
Dept: MRI IMAGING | Age: 80
Discharge: HOME | End: 2020-09-06
Attending: INTERNAL MEDICINE

## 2020-09-06 DIAGNOSIS — G95.19 NEUROGENIC CLAUDICATION (HCC): ICD-10-CM

## 2020-09-08 ENCOUNTER — HOSPITAL ENCOUNTER (OUTPATIENT)
Dept: LAB | Age: 80
Discharge: HOME | End: 2020-09-08
Payer: MEDICARE

## 2020-09-08 PROCEDURE — 86480 TB TEST CELL IMMUN MEASURE: CPT

## 2020-09-08 PROCEDURE — 84443 ASSAY THYROID STIM HORMONE: CPT

## 2020-09-08 PROCEDURE — 86803 HEPATITIS C AB TEST: CPT

## 2020-09-08 PROCEDURE — 82306 VITAMIN D 25 HYDROXY: CPT

## 2020-09-08 PROCEDURE — 84165 PROTEIN E-PHORESIS SERUM: CPT

## 2020-09-08 PROCEDURE — 36415 COLL VENOUS BLD VENIPUNCTURE: CPT

## 2020-09-08 PROCEDURE — 83970 ASSAY OF PARATHORMONE: CPT

## 2020-09-11 LAB
25(OH)D3+25(OH)D2 SERPL-MCNC: 40.8 NG/ML (ref 30–100)
ALBUMIN SERPL ELPH-MCNC: 3.8 G/DL (ref 2.9–4.4)
ALBUMIN/GLOB SERPL: 1.6 {RATIO} (ref 0.7–1.7)
ALPHA1 GLOB SERPL ELPH-MCNC: 0.2 G/DL (ref 0–0.4)
ALPHA2 GLOB SERPL ELPH-MCNC: 0.7 G/DL (ref 0.4–1)
B-GLOBULIN SERPL ELPH-MCNC: 0.8 G/DL (ref 0.7–1.3)
COMMENT, 144067: NORMAL
GAMMA GLOB SERPL ELPH-MCNC: 0.7 G/DL (ref 0.4–1.8)
GAMMA INTERFERON BACKGROUND BLD IA-ACNC: 0.03 IU/ML
GLOBULIN SER CALC-MCNC: 2.4 G/DL (ref 2.2–3.9)
HBV CORE AB SERPL QL IA: NEGATIVE
HBV CORE IGM SERPL QL IA: NEGATIVE
HBV E AB SERPL QL IA: NEGATIVE
HBV E AG SERPL QL IA: NEGATIVE
HBV SURFACE AB SER QL: NON REACTIVE
HBV SURFACE AG SERPL QL IA: NEGATIVE
HCV AB S/CO SERPL IA: <0.1 S/CO RATIO (ref 0–0.9)
M PROTEIN SERPL ELPH-MCNC: NORMAL G/DL
M TB IFN-G BLD-IMP: NEGATIVE
M TB IFN-G CD4+ BCKGRND COR BLD-ACNC: 0.04 IU/ML
MITOGEN IGNF BLD-ACNC: >10 IU/ML
PLEASE NOTE, 011150: NORMAL
PROT PATTERN SERPL ELPH-IMP: NORMAL
PROT SERPL-MCNC: 6.2 G/DL (ref 6–8.5)
PTH-INTACT SERPL-MCNC: 44 PG/ML (ref 15–65)
QUANTIFERON INCUBATION, QF1T: NORMAL
QUANTIFERON TB2 AG: 0.03 IU/ML
SERVICE CMNT-IMP: NORMAL
TSH SERPL DL<=0.005 MIU/L-ACNC: 2.09 UIU/ML (ref 0.45–4.5)

## 2020-09-23 ENCOUNTER — HOSPITAL ENCOUNTER (OUTPATIENT)
Dept: MRI IMAGING | Age: 80
Discharge: HOME | End: 2020-09-23
Attending: INTERNAL MEDICINE
Payer: MEDICARE

## 2020-09-23 PROCEDURE — 72148 MRI LUMBAR SPINE W/O DYE: CPT

## 2020-09-26 NOTE — PROGRESS NOTES
Please call the patient regarding her abnormal result. Moderate central spinal canal stenosis which may cause neurogenic claudication.  Refer to physical therapy and if no improvement refer to Dr. Makayla Blancas [facility])

## 2020-09-29 DIAGNOSIS — M48.00 CENTRAL STENOSIS OF SPINAL CANAL: Primary | ICD-10-CM

## 2020-09-29 NOTE — PROGRESS NOTES
Pt notified of MRI results. Pt informed that she has moderate central canal stenosis. I explained to her that we would like to refer her to PT and if no improvement that we would send her to an orthopedic doctor. She stated an understanding and a referral will be sent to her home.

## 2020-11-12 ENCOUNTER — DOCUMENTATION ONLY (OUTPATIENT)
Dept: RHEUMATOLOGY | Age: 80
End: 2020-11-12

## 2020-11-12 NOTE — PROGRESS NOTES
Prolia order sent to [facility] for continuation of treatment.   LVM for pt

## 2020-11-18 ENCOUNTER — TELEPHONE (OUTPATIENT)
Dept: RHEUMATOLOGY | Age: 80
End: 2020-11-18

## 2020-12-28 ENCOUNTER — APPOINTMENT (OUTPATIENT)
Dept: PHYSICAL THERAPY | Age: 80
End: 2020-12-28
Payer: MEDICARE

## 2020-12-29 ENCOUNTER — HOSPITAL ENCOUNTER (OUTPATIENT)
Dept: INFUSION THERAPY | Age: 80
Discharge: HOME | End: 2020-12-29
Payer: MEDICARE

## 2020-12-29 VITALS
SYSTOLIC BLOOD PRESSURE: 151 MMHG | DIASTOLIC BLOOD PRESSURE: 70 MMHG | OXYGEN SATURATION: 97 % | RESPIRATION RATE: 16 BRPM | HEART RATE: 55 BPM | TEMPERATURE: 98.1 F

## 2020-12-29 LAB
ALBUMIN SERPL-MCNC: 3.5 G/DL (ref 3.5–5)
ANION GAP SERPL CALC-SCNC: 0 MMOL/L (ref 5–15)
BUN SERPL-MCNC: 18 MG/DL (ref 6–20)
BUN/CREAT SERPL: 21 (ref 12–20)
CALCIUM SERPL-MCNC: 8.9 MG/DL (ref 8.5–10.1)
CHLORIDE SERPL-SCNC: 111 MMOL/L (ref 97–108)
CO2 SERPL-SCNC: 30 MMOL/L (ref 21–32)
CREAT SERPL-MCNC: 0.87 MG/DL (ref 0.55–1.02)
GLUCOSE SERPL-MCNC: 84 MG/DL (ref 65–100)
PHOSPHATE SERPL-MCNC: 3.4 MG/DL (ref 2.6–4.7)
POTASSIUM SERPL-SCNC: 4.5 MMOL/L (ref 3.5–5.1)
SODIUM SERPL-SCNC: 141 MMOL/L (ref 136–145)

## 2020-12-29 PROCEDURE — 96372 THER/PROPH/DIAG INJ SC/IM: CPT

## 2020-12-29 PROCEDURE — 36415 COLL VENOUS BLD VENIPUNCTURE: CPT

## 2020-12-29 PROCEDURE — 74011250636 HC RX REV CODE- 250/636: Performed by: INTERNAL MEDICINE

## 2020-12-29 PROCEDURE — 80069 RENAL FUNCTION PANEL: CPT

## 2020-12-29 RX ADMIN — DENOSUMAB 60 MG: 60 INJECTION SUBCUTANEOUS at 11:17

## 2020-12-29 NOTE — PROGRESS NOTES
[facility] Chemo Progress Note    Date: 2020    Name: Lidia Otero    MRN: [id number]         : 1940    0950 Ms. Geronimo Gill Arrived to [facility] for  q 6 month Prolia ambulatory in stable condition. Assessment was completed, no acute issues at this time, no new complaints voiced. Labs drawn peripherally and sent for processing. Patient denies SOB, fever, cough, general not feeling well. Patient denies recent exposure to someone who has tested positive for COVID-19. Patient denies having contact with anyone who has a pending COVID test.      Ms. Pricila Downey vitals were reviewed. Patient Vitals for the past 12 hrs:   Temp Pulse Resp BP SpO2   20 0954 98.1 °F (36.7 °C) (!) 55 16 (!) 151/70 97 %         Lab results were obtained and reviewed. Recent Results (from the past 12 hour(s))   RENAL FUNCTION PANEL    Collection Time: 20 10:06 AM   Result Value Ref Range    Sodium 141 136 - 145 mmol/L    Potassium 4.5 3.5 - 5.1 mmol/L    Chloride 111 (H) 97 - 108 mmol/L    CO2 30 21 - 32 mmol/L    Anion gap 0 (L) 5 - 15 mmol/L    Glucose 84 65 - 100 mg/dL    BUN 18 6 - 20 MG/DL    Creatinine 0.87 0.55 - 1.02 MG/DL    BUN/Creatinine ratio 21 (H) 12 - 20      GFR est AA >60 >60 ml/min/1.73m2    GFR est non-AA >60 >60 ml/min/1.73m2    Calcium 8.9 8.5 - 10.1 MG/DL    Phosphorus 3.4 2.6 - 4.7 MG/DL    Albumin 3.5 3.5 - 5.0 g/dL       Pre-medications  were administered as ordered and chemotherapy was initiated. Medications Administered     denosumab (PROLIA) injection 60 mg     Admin Date  2020 Action  Given Dose  60 mg Route  SubCUTAneous Administered By  Rosalio Monique, RN                [id number] Patient tolerated treatment well. Patient was discharged from [facility] in stable condition.      Future Appointments   Date Time Provider Andrea Ga   2020  1:40 PM Jasvir Mccartney MD AOCR BS AMB   2020  2:30 PM Jhony Milton PT [facility] REG   2021  2:00 PM A3 GABRIELLA MORALES [address]   12/28/2021  2:00 PM A3 [facility] [address] [address] Jhonny Mcnally RN  December 29, 2020

## 2020-12-30 ENCOUNTER — VIRTUAL VISIT (OUTPATIENT)
Dept: RHEUMATOLOGY | Age: 80
End: 2020-12-30
Payer: MEDICARE

## 2020-12-30 DIAGNOSIS — M06.09 SERONEGATIVE RHEUMATOID ARTHRITIS OF MULTIPLE SITES (HCC): Primary | ICD-10-CM

## 2020-12-30 PROCEDURE — 99442 PR PHYS/QHP TELEPHONE EVALUATION 11-20 MIN: CPT | Performed by: INTERNAL MEDICINE

## 2020-12-30 NOTE — PROGRESS NOTES
REASON FOR VISIT    This is a follow up visit for Ms. Tomas Mcgovern for    ICD-10-CM   1. Ankylosing spondylitis of sacral region ([facility] M45.8   2. Seronegative rheumatoid arthritis of multiple sites ([facility] M06.09   3. Age-related osteoporosis without current pathological fracture M81.0     The patient has consented for synchronous (real-time) Telemedicine (audio technology) on 12/30/2020 for their care to be delivered over telemedicine in place of their regularly scheduled office visit pursuant to the emergency declaration under the [facility] [place] [address] waiver authority and the [employer] Act, this Virtual  Visit was conducted, with patient's consent, to reduce the patient's risk of exposure to COVID-19 and provide continuity of care for an established patient. Services were provided through an audio synchronous discussion virtually to substitute for in-person clinic visit.     Spondyloarthritis phenotype includes:  Anti-CCP positive: no  Rheumatoid factor positive: no  HLA-B27: yes  Erosive disease: no  Sacroiliitis: yes  Ankylosis: yes (left SI)  Psoriasis: no  Enthesitis: yes (Achilles)  Dactylitis: no  Nail Pitting: no  Onycholysis: no  Extra-articular manifestations include: Polymyalgia Rheumatica   SAPHO: no    Immunosuppression Screening (9/08/2020):  Quantiferon TB: negative  PPD:  Not performed  Hepatitis B: negative  Hepatitis C: negative    Therapy History includes:  Current NSAIDs include: none (contraindicated due to CKD stage 3)  Current DMARD therapy include: Orencia 125 mg SubQ every Monday (6/10/2020 to 12/30/2020)  Prior DMARD therapy includes: Gold, leflunomide, Humira (11/2017 ot 12/2017), Enbrel 50 mg weekly (5/22/2018 to 9/20/2018), Cosentyx 300 mg (3/07/2019 to 6/04/2020)  The following DMARDs have been ineffective: none  The following DMARDs were stopped because of side effects: Gold (\"pass out\"), leflunomide (swelling), Humira (hives, lip and eyelid swelling), Enbrel (swelling of lips)  Contra-Indicated DMARDs because of CKD stage 3: methotrexate   Contra-Indicated DMARDs because sulfa allergy: sulfasalazine    Osteoporosis Historical Synopsis     Height loss since age 26 (at least two inches): 0.5  Fracture history includes: yes (ankle, left foot)  Family history of hip fracture: no  Fall Risk: no     Daily calcium intake is 1200 mg  Daily vitamin D intake is 800 IU     Smoking history: no  Alcohol consumption: no  Prednisone history: no     Exercise: yes     Previous work-up for osteoporosis includes the following:  DEXA Scan: 9/12/2017  Vitamin 25OH D level: 40.8 (9/08/2020)  PTH: 44 (9/08/2020)  TSH: 2.090 (9/08/2020)     Therapy History includes:  Current osteoporosis therapy includes: Prolia 60 mg every 6 months (6/29/2020 to present)  Prior osteoporosis therapy includes: alendronate 70 mg every Sunday (11/2017 to 6/04/2020)  The following osteoporosis therapy have been ineffective: alendronate  The following osteoporosis therapy were stopped because of side effects: none    HISTORY OF PRESENT ILLNESS    Ms. Supriya Zepeda returns for a follow up visit. On her last visit, I continued Orencia 125 mg SubQ with plans for another 3 months (total of 6 months), since she did not feel better. I continued Prolia. Her most recent dose was 12/29/2020, which she received with good tolerance. I ordered a MRI Lumbar spine due to suspected spinal stenosis which showed L1-L2 and L3-L4 moderate central spinal canal stenosis, so I referred her to physical therapy with instructions to see Dr. Tanesha Rich if no improvement. Today, she feels well. She did not do any work or lifting and has napped feels well. She feels worse when she walks. She will start physical therapy for her spinal stenosis tomorrow. She complains of pain in her wrists, knees, and ankles.  She has swelling in her wrists and has noticed development of nodules under her skin. She does not think Kristi Ham is helping. She endorses nausea    She denies fever, weight loss, blurred vision, vision loss, oral ulcers, ankle swelling, dry cough, dyspnea, vomiting, dysphagia, abdominal pain, black or bloody stool, fall since last visit, rash, easy bruising and increased thirst.    Last toxicity monitoring by blood work was done on 12/03/2019 [facility] reviewed) and did not reveal any significant adverse effects, except creatinine 0.84 mg/dL, eGFR 66. Most recent inflammatory markers from 9/19/2019 revealed a ESR 13 mm/hr and CRP 3 mg/L    She continues to work as a . The patient has not had any interval hospital admissions, infections, or surgeries. REVIEW OF SYSTEMS    A comprehensive review of systems was performed and pertinent results are documented in the HPI, review of systems is otherwise non-contributory. PAST MEDICAL HISTORY    She has a past medical history of Allergic rhinitis, cause unspecified (5/26/2010), Asthma (5/26/2010), Depression (5/26/2010), Encounter for long-term (current) use of other medications (7/11/2011), Essential hypertension, benign (5/26/2010), Hypertension, OA (osteoarthritis) (5/26/2010), [facility] and Unspecified hypothyroidism (5/26/2010). FAMILY HISTORY    Her family history includes Cancer in her sister; Heart Disease in her father, mother, and sister; Stroke in her sister. SOCIAL HISTORY    She reports that she has never smoked. She has never used smokeless tobacco. She reports that she does not drink alcohol or use drugs. MEDICATIONS    Current Outpatient Medications   Medication Sig    tofacitinib 11 mg Tb24 Take 11 mg by mouth daily. Indications: rheumatoid arthritis    denosumab (Prolia) 60 mg/mL injection 60 mg by SubCUTAneous route.  ergocalciferol (ERGOCALCIFEROL) 1,250 mcg (50,000 unit) capsule Take 1 Cap by mouth every seven (7) days.  Indications: vitamin D deficiency (high dose therapy)    diclofenac (VOLTAREN) 1 % gel Apply  to affected area four (4) times daily.  acyclovir (ZOVIRAX) 400 mg tablet as needed.  Calcium-Cholecalciferol, D3, (CALCIUM 600 WITH VITAMIN D3) 600 mg(1,500mg) -400 unit cap Take  by mouth.  fluticasone (FLONASE) 50 mcg/actuation nasal spray PLACE TWO SPRAYS IN EACH NOSTRIL ONCE DAILY    DULERA 200-5 mcg/actuation HFA inhaler 2 Puffs two (2) times a day.  FEXOFENADINE HCL (ALLEGRA PO) Take  by mouth daily.  albuterol (PROVENTIL HFA, VENTOLIN HFA) 90 mcg/actuation inhaler Take 1 Puff by inhalation every six (6) hours as needed for Wheezing.  levothyroxine (SYNTHROID) 50 mcg tablet Take  by mouth daily (before breakfast).  amlodipine (NORVASC) 5 mg tablet Take 5 mg by mouth daily. No current facility-administered medications for this visit. ALLERGIES    Allergies   Allergen Reactions    Humira [Adalimumab] Hives    Leflunomide Swelling    Penicillins Unable to Obtain    Sulfa (Sulfonamide Antibiotics) Unknown (comments)       PHYSICAL EXAMINATION    There were no vitals taken for this visit. There is no height or weight on file to calculate BMI. Chest:  Breathing comfortably at room air    Skin:    Exam deferred  Previous exam    Psoriasis:     no  Nail Pitting:     no  Onycholysis:     no  Palmoplantar pustulosis:   no  Acne fulminans:    no  Acne conglobata:    no  Hidradenitis Suppurativa:   no  Dissecting cellulitis of the scalp:  no  Pilonidal sinus:    no  Erythema nodosum:    no    Musculoskeletal:  A comprehensive musculoskeletal exam was NOT performed for all joints of each upper and lower extremity and assessed for swelling, tenderness and range of motion. Exam deferred  Previous exam    Bilateral arm, forearm, thigh, and calf allodynia on MCPs, PIPs  Bilateral Sharmila and Heberden nodes.   Bilateral greater trochanter tenderness  Right IT band tenderness  Bilateral knee crepitus without effusion.   Bilateral Achilles tenderness in the body not insertion  Bilateral ankle tenderness with swelling (RESOLVED)  Bilateral MTP tenderness with swelling (RESOLVED)    Costochondritis:  no   Synovitis:   Yes (see below table)  Dactylitis:   no  Enthesitis:   no    Joint Count 9/2/2020 6/4/2020 9/19/2019 6/6/2019 3/7/2019 11/7/2018 8/1/2018   Patient pain (0-100) 55 - - - - - -   MHAQ 1 - - - - - -   Left shoulder - Tender - - - - 1 - -   Left shoulder - Swollen - - - - 0 - -   Left elbow - Tender - - - 1 1 - -   Left elbow - Swollen - - - 0 0 - -   Left wrist- Tender 1 1 1 1 1 1 1   Left wrist- Swollen 1 1 1 1 1 1 1   Left 1st MCP - Tender 1 1 1 1 1 1 1   Left 1st MCP - Swollen 0 1 1 1 1 - 1   Left 2nd MCP - Tender 1 0 1 1 0 1 1   Left 2nd MCP - Swollen 0 1 1 1 1 1 1   Left 3rd MCP - Tender 1 0 - 1 - 1 1   Left 3rd MCP - Swollen 0 1 - 1 - - 1   Left 4th MCP - Tender 1 1 - - - - 1   Left 4th MCP - Swollen 0 1 - - - - -   Left 5th MCP - Tender 1 1 1 - - - 1   Left 5th MCP - Swollen 0 - 1 - - - 1   Left thumb IP - Tender 1 - 1 - 1 1 1   Left thumb IP - Swollen 0 - 0 - 1 0 0   Left 2nd PIP - Tender 1 - 1 1 1 1 -   Left 2nd PIP - Swollen 0 - 1 1 1 0 -   Left 3rd PIP - Tender 1 1 1 1 - - -   Left 3rd PIP - Swollen 0 1 1 1 - - -   Left 4th PIP - Tender 1 - 1 - - - -   Left 4th PIP - Swollen 0 - 0 - - - -   Left 5th PIP - Tender 1 - 1 - - 1 -   Left 5th PIP - Swollen 0 - 0 - - 0 -   Right shoulder - Tender - - - - 1 - -   Right shoulder - Swollen - - - - 0 - -   Right elbow - Tender - - - 1 - - -   Right elbow - Swollen - - - 0 - - -   Right wrist- Tender 1 1 1 1 1 1 1   Right wrist- Swollen 1 1 1 1 1 1 1   Right 1st MCP - Tender 1 - 1 - 0 - 1   Right 1st MCP - Swollen 1 - 1 - 1 - 1   Right 2nd MCP - Tender 1 0 1 1 1 1 1   Right 2nd MCP - Swollen 0 1 0 1 1 1 1   Right 3rd MCP - Tender 1 1 1 - 1 - 1   Right 3rd MCP - Swollen 0 1 0 - 1 - 1   Right 4th MCP - Tender 1 1 1 - - - 1   Right 4th MCP - Swollen 0 0 0 - - - - Right 5th MCP - Tender 1 - 1 0 0 - 1   Right 5th MCP - Swollen 1 - 1 1 1 - 1   Right thumb IP - Tender - - - - - 1 -   Right 2nd PIP - Tender 1 1 1 - - 1 -   Right 2nd PIP - Swollen 0 0 1 - - - 1   Right 3rd PIP - Tender 1 - 1 - - - -   Right 3rd PIP - Swollen 0 - 1 - - - 1   Right 4th PIP - Tender 1 1 - 1 1 1 -   Right 4th PIP - Swollen 0 0 - 0 0 - 1   Right 5th PIP - Tender 1 1 1 - - 1 -   Right 5th PIP - Swollen 0 0 1 - - - -   Tender Joint Count (Total) 21 11 18 11 11 13 13   Swollen Joint Count (Total) 4 9 12 9 10 4 13   Physician Assessment (0-10) - - - - - - -   Patient Assessment (0-10) 3 - - - - - -   CDAI Total (calculated) - - - - - - -     DATA REVIEW    Laboratory     Recent laboratory results were reviewed, summarized, and discussed with the patient. Imaging    Musculoskeletal Ultrasound    None    Radiographs    Sacroiliac Joint 8/25/2017: There is bony ankylosis of the left SI joint unchanged. There is probable ankylosis of the right SI joint as well. The right SI joint is at least narrowed. Bone mineral density is decreased without fracture or bone destruction    Bilateral Foot 8/25/2017: LEFT: No fracture or dislocation on plain film. There is narrowing of the first MTP joint with minimal spurring. No joint space erosion or periosteal reaction. Alignment is within normal limits. Bone mineralization is decreased. No soft tissue calcification. RIGHT: No fracture or dislocation on plain film. There is metatarsus primus varus with hallux valgus deformity. There is joint space loss and spurring first MTP joint. No joint space erosion or periosteal reaction. Alignment is within normal limits. Bone mineralization is decreased. No soft tissue calcification. Chest 3/09/2017: normal heart size. There is no acute process in the lung fields. The osseous structures are unremarkable. Bilateral Hand 1/20/2017: LEFT: No fracture or dislocation on plain film.  There are scattered mild degenerative changes of the interphalangeal joints. There is moderate degeneration of the first Aia 16 joint and mild degeneration of the first MCP joint. Minimal degenerative changes of the third and fourth MCP joints. There is widening of the scapholunate interval compatible with chronic scapholunate ligament tear. No joint space erosion or periosteal reaction. Alignment is within normal limits. Bone mineralization is decreased. No soft tissue calcification. RIGHT: No fracture or dislocation on plain film. There are scattered mild degenerative changes in the interphalangeal joints and first MCP joint. There is narrowing of the lateral margin radiocarpal joint There is widening of the scapholunate interval compatible with chronic scapholunate ligament tear. No joint space erosion or periosteal reaction. Alignment is within normal limits. Bone mineralization is decreased. No soft tissue calcification. Left Hip 7/15/2015: no fracture, dislocation or other acute abnormality.  There appears to be fusion of the left sacroiliac joint and possibly the right sacroiliac joint. Spurring is noted at the symphysis. Lumbar Spine 7/15/2015: the patient is leaning to the right. There is a 2 mm retrolisthesis of L1  relative to L2. Bilateral facet arthropathy is the dominant feature at the  lower 3 levels. Bilateral laminectomies have been performed at L5-S1. Vertebral body heights spaces are well-preserved.  There is no fracture. CT Imaging    None    MR Imaging    MRI Lumbar Spine without contrast 9/23/2020: Borderline congenitally slender lumbar spinal canal measures 15 mm in diameter at L3. Grade 1 retrolisthesis of L1 on L2 measures 3 mm. Vertebral body heights are maintained. Marrow signal is normal. Moderate disc desiccation at L1-L2. Mild disc desiccation at the other levels. No discitis. The conus medullaris terminates at L1-L2. Signal and caliber of the distal spinal cord are within normal limits.  There appear to be bilateral parapelvic renal cysts. Moderate atrophy of the paraspinal musculature. Lower thoracic spine: Mild central spinal canal stenosis at T11-T12 and T12-L1. L1-L2: Diffuse disc bulge, facet arthrosis, and thickened ligamentum flavum. Moderate central spinal canal stenosis. Mild bilateral foraminal stenosis. L2-L3: Diffuse disc bulge, facet arthrosis, and thickened ligamentum flavum. Mild central spinal canal stenosis. L3-L4: Diffuse disc bulge, facet arthrosis, and thickened ligamentum flavum. Moderate central spinal canal stenosis. L4-L5: Diffuse disc bulge, facet arthrosis, and thickened ligamentum flavum. Mild central spinal canal stenosis. L5-S1: No herniation or stenosis. Facet arthrosis and facet joint effusions. MRI Pelvis without contrast 9/20/2017: There is normal bone signal. No para-articular edema-like signal is shown nor is there demonstration of substantial joint effusion. There is ankylosis of the inferior left sacroiliac joint without demonstration of substantial osteophyte formation. The inferior right SI joint is substantially narrowed with perhaps a small area of ankylosis inferiorly.   There is minimal-mild superolateral joint space narrowing of the hip joints bilaterally with tiny marginal osteophytes. Moderate degenerative changes in the lower lumbar spine are shown with disc space narrowing through L4-5 as well as bilateral facet osteoarthrosis without ankylosis at L5-S1. No soft tissue mass is demonstrated. Muscle signal, size and contour appear normal. There is moderate insertional tendinopathy of the gluteus medius bilaterally with partial-thickness tearing in tiny bursal effusions. Ultrasound    Retroperitoneum 10/11/2017: RIGHT KIDNEY: The right kidney has normal echogenicity with no mass, stone or hydronephrosis. The right kidney measures 9.1 cm in length. LEFT KIDNEY: The left kidney has normal echogenicity with  no mass, stone or hydronephrosis. There may be mild caliectasis.  The left kidney measures 8.7 cm in length. RETROPERITONEUM: The aorta is atherosclerotic and tapers normally. The aortic bifurcation is normal. The IVC is not visualized due to body habitus and bowel gas. No retroperitoneal mass is identified. BLADDER: The urinary bladder is incompletely distended. DXA     DXA 9/12/2017: (excluded Lumbar spine due to degenerative changes) left femoral neck T score: -2.1 (0.749 g/cm2), left total hip T score: -1.9 (0.763 g/cm2), right femoral neck T score: -2.4 (0.709 g/cm2), right total hip T score: -2.1 (0.746 g/cm2), and distal one third left radius T score -3.0 (BMD 0.614 g/cm2). FRAX score 17.8 % probability in 10 years for major osteoporotic fracture and 5.8 % 10 year probability of hip fracture. ASSESSMENT AND PLAN    This is a follow up visit for Ms. Aria Olivo. 1) HLA-B27 Positive Ankylosing Spondylitis with Seronegative Rheumatoid Arthritis. (positive HLA-B27, sacroiliitis, inflammatory arthritis). She reports a history of Rheumatoid Arthritis that was treated with gold, which she thinks led her to remission until 2016. She had developed sore pain and Polymyalgia Rheumatica-like symptoms involving her hip girdle in the fall of 2016. Her hip radiograph on 7/15/2015 showed ankylosis of the left sacroiliac joint. A repeat radiograph of SI joints 8/25/2017 confirmed left sacroiliac ankylosis and probable right sided involvement. MRI pelvis showed left SI joint ankylosis. Partial ankylosis of right SI joint. Her labs showed a positive HLA-B27. She had peripheral synovitis. She has CKD stage 3, so NSAIDs and methotrexate should be avoided. She had been on Cosentyx with good tolerance but did not feel any improvement. She is currently on Orencia 125 mg SubQ every Monday, which she has taken with good tolerance but has not had any benefit. I will discontinue it and give her a sample of Xeljanz 11 mg XR which she will  on Monday. 2) Polymyalgia Rheumatica.  This is secondary to #1. 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 Her DXA 9/12/2017 showed T score distal radius -3.0 and right femoral neck -2.4. She began Fosamax 70 mg every Sunday 11/2017 and but fractured her foot in 2/2020, so I discontinued it and started Prolia 60 mg every 6 months, which she received with good tolerance. Her most recent dose was 12/29/2020. 4) Long Term Use of Immunosuppressants. The patient remains on immunomodulatory medications (Orencia) and requires frequent toxicity monitoring by blood work. 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 Her most recent labs 12/03/2019 show creatinine 0.84 mg/dL and eGFR 66.    6) Right Trochanteric Bursitis with IT Band Syndrome. I had referred her to physical therapy previously with benefit. 7) Bilateral Knee Osteoarthritis. This was not an active issue today in her right knee. 8) Neurogenic Claudication. MRI lumbar spine showed L1-L2 and L3-L4 moderate central spinal canal stenosis. I had referred her to physical therapy with instructions of seeing Dr. Deonte Seo if no improvement. She will start physical therapy tomorrow. The patient voiced understanding of the aforementioned assessment and plan. The patient has consented for synchronous (real-time) Telemedicine (audio technology) on 12/30/2020 for their care to be delivered over telemedicine in place of their regularly scheduled office visit pursuant to the emergency declaration under the [facility] waiver authority and the [employer] Act, this Virtual  Visit was conducted, with patient's consent, to reduce the patient's risk of exposure to COVID-19 and provide continuity of care for an established patient.      Today, I personally spent 11 minutes in direct patient care with the patient, of which greater than 50% of the time was spent in patient education, counseling, and coordination of care as described above. All questions asked and answered. Services were provided through an audio synchronous discussion virtually to substitute for in-person clinic visit.     TODAY'S ORDERS    Orders Placed This Encounter    tofacitinib 11 mg Tb24     Future Appointments   Date Time Provider Andrea Ga   12/31/2020  2:30 PM Anthony Calvin PT [facility]   2/16/2021  1:40 PM Silvana Gonzalez MD [facility]   6/29/2021  2:00 PM [facility]   12/28/2021  2:00 PM A3 [facility]     Marybeth De Luna MD, [address]    Adult Rheumatology   Rheumatology Ultrasound Certified  [facility]  [facility] of [facility], [address] [place] [address]   Phone [phone]  Fax [phone]

## 2020-12-31 ENCOUNTER — HOSPITAL ENCOUNTER (OUTPATIENT)
Dept: PHYSICAL THERAPY | Age: 80
Discharge: HOME | End: 2020-12-31
Payer: MEDICARE

## 2020-12-31 PROCEDURE — 97110 THERAPEUTIC EXERCISES: CPT

## 2020-12-31 PROCEDURE — 97161 PT EVAL LOW COMPLEX 20 MIN: CPT

## 2020-12-31 NOTE — PROGRESS NOTES
PT INITIAL EVALUATION NOTE - [facility] 2-15    Patient Name: John Busby  Date:2020  : 1940  [x]  Patient  Verified  Payor: [place] MEDICARE / Plan: [place] MEDICARE PART A & B / Product Type: Medicare /    In time:235 P  Out time: 335 P  Total Treatment Time (min): 70  Total Timed Codes (min): 15  1:1 Treatment Time ( only): 15   Visit #: 1     Treatment Area: Spinal stenosis, site unspecified [M48.00]    SUBJECTIVE  Pain Level (0-10 scale): 2/10  Any medication changes, allergies to medications, adverse drug reactions, diagnosis change, or new procedure performed?: [] No    [x] Yes (see summary sheet for update)  Subjective:      Pt presents w/ chief complaint of low back pain, sx variable R to L, in to hip, up to mid back; describes central low back and pelvic reg pain w/ standing & walking; also describes pain/weakness in to bilat LE, ant thigh reg; c/o cramping in distal LE at night; describes long hx of back pain but worsening in the last year; saw Dr. Arpita Carlson yesterday for regular visit, recommended she start PT (had initially referred at her visit 2020 but had been unable to come due to moving houses)    Recently moved in to [address] [place] [address], in to single level home, no stairs, w/ small dog; move involved a loft of packing & lifting of boxes \"taken a lot out of me\"; has just started began walking dog again    Pain:   7/10 max 0/10 min 2/10 now     Aggravated by: While walking >3 min, standing  Eased by: eases gradually once sits down, sleeps w/ heating pad,   Location/description of symptoms: back, ant hip, bilat thigh pain/weakness     Previous treatment: PT here ~ 14 months ago, admits poor compliance w/ ex since moving houses      Diagnostic Tests: [] Lab work [] X-rays    [] CT [] MRI     [] Other:  IMPRESSION:     1. L1-L2 and L3-L4 moderate central spinal canal stenosis.   2. Degenerative grade 1 retrolisthesis of L1-L2.  3. Borderline congenitally slender lumbar spinal canal.    PMH: Significant for ankylosing spondylitis of sacral region, RA, osteoporosis (prolia injection 12/28/2020), OA    Social/Recreation/Work: pt is retired; lives alone in [address] [place] [address]; reports in the past swam laps for exercise but had to stop ~10 months because of pain in to low back and legs; has not been going to gym because of covid 19 & pain - belongs to [employer] and is considering returning to pool for aerobics; limited w/ household chores/activities that require standing; difficulty running errands such as grocery shopping due to pain/weakness w/ walking      Prior level of function: able to mani standing, walking w/out pain/limitaiton     Patient goal(s): \"bending, stooping, walking better\"        OBJECTIVE/EXAMINATION  Posture:  Lateral Shift: [] R    [] L       Kyphosis: [x] Increased [] Decreased   []  WNL  Lordosis:  [] Increased [] Decreased   [] WNL  Pelvic symmetry: [x] WNL    [] Other:    Gait:  Flexed trunk, bilat toe out, bilat knee valgus, decr foot clear     SLS able to achieve 10 sec bilat LE w/in 3 attempts each side  trendelenburg able to hold 15 sec lead each leg  Romberg EC able to hold 30 sec     Active Movements: [] N/A   [] Too acute   [] Other:  LUMB AROM  AROM (%  of normal) Comments:pain, area   Forward flexion  100    Extension  90    Rotation right 100    Rotation left 100    SB right 100    SB left 75  pain lumb spine      HIP ROM:   Hip extension limited bilat by hip flexor mm.  Tightness     Strength:   [] N/A   [] Too acute   [] Other:   L(0-5) R (0-5)   Hip Flexion (L1,2) 5 4 pain   Hip Abduction 4 4   Hip ER 4 4   Hip IR 5 5   Hip Extension nt nt   Glut Extension nt nt   Knee Extension (L3,4) 5 5   Knee Flexion (S1,2) 5 5   Ankle Dorsiflexion (L4) 5 5   Great Toe Extension (L5) 5 5   Ankle Plantarflexion (S1) 4 4   Ankle Eversion (S1) nt nt   Comments:    Abdominal brace poor seq & endur   Bridge fair, c/o pain upper back at end range   Bridge w/ march not tested Neurosensory:  Sensory examination reveals bilat LE grossly intact to light touch     Palpation:  [] Min  [x] Mod  [] Severe    Location: L SI joint, glut max, QL mm. [x] Min  [] Mod  [] Severe    Location: pressure L1-5 SP     Special Tests:  SLR Supine: [] R    [] L    [] +    [x] -  @ (degrees): Hip scour: [] R    [] L    [] +    [x] -   90/90   [] R    [] L    [] +    [x] -     Joint mobility:  gen hypomobility w/ gentle PA pressure lumb spine/sacrum       Outcome Measure: Patient presents with an initial FOTO score of did not capture.         15 min Therapeutic Exercise:  X See flow sheet :   Rationale: increase ROM, increase strength and improve coordination to improve the patients ability to stand, walk           With   X TE   - TA   - neuro   - other: Patient Education: - Review HEP    - Progressed/Changed HEP based on:   - positioning   - body mechanics   - transfers   - heat/ice application    - other:        Pain Level (0-10 scale) post treatment: 2/10    ASSESSMENT/Changes in Function:     -  See Plan of [address] [facility], PT 12/31/2020  2:39 PM

## 2020-12-31 NOTE — PROGRESS NOTES
[facility] SPORTS MEDICINE &PHYSICAL THERAPY  [id number] [facility] [address]    Plan of Care/Statement of Necessity for Physical Therapy Services  2-15    Patient name: Brennan Hilton  : 1940  Provider#: [id number]  Referral source: Erica Suggs MD      Medical/Treatment Diagnosis: Spinal stenosis, site unspecified [M48.00]     Prior Hospitalization: see medical history     Comorbidities: see chart  Prior Level of Function: able to stand, walk   Medications: Verified on Patient Summary List  Start of Care: 2020      Onset Date:    The Plan of Care and following information is based on the information from the initial evaluation.     Assessment/ key information: pt is an 79year old female presents w/ signs/sx consistent w/ spinal stenosis resulting in low back pain, bilat LE pain/weakness     Evaluation Complexity History MEDIUM  Complexity : 1-2 comorbidities / personal factors will impact the outcome/ POC ; Examination LOW Complexity : 1-2 Standardized tests and measures addressing body structure, function, activity limitation and / or participation in recreation  ;Presentation LOW Complexity : Stable, uncomplicated  ;Clinical Decision Making MEDIUM Complexity : FOTO score of 26-74  Overall Complexity Rating: LOW     Problem List: pain affecting function, decrease ROM, decrease strength, impaired gait/ balance, decrease ADL/ functional abilitiies, decrease activity tolerance and decrease flexibility/ joint mobility   Treatment Plan may include any combination of the following: Therapeutic exercise, Therapeutic activities, Neuromuscular re-education, Physical agent/modality, Gait/balance training and Patient education  Patient / Family readiness to learn indicated by: asking questions, trying to perform skills and interest  Persons(s) to be included in education: patient (P)  Barriers to Learning/Limitations: None  Patient Goal (s): bending, stooping, walking better  Patient Self Reported Health Status: fair  Rehabilitation Potential: good    Short Term Goals: To be accomplished in 1-2 weeks:  1) Pt will be independent with HEP  2) Pt will demonstrate understanding/application of postural principles and recommendations   3) Stand greater than 5 minutes without increase of pain  4) Pt will demonstrate proper abdominal brace sequencing and ability to hold >/= 10 seconds for improved core strength    Long Term Goals: To be accomplished in 4-6 weeks:  1) Pt will be able to stand greater than 10 minutes without increase pain so that can better tolerate household chores   2) Pt will be able to ambulate greater than 10 without increase of pain so she can walk her dog   3) Pt will demonstrate independence with modified exercise/gym routine without aggravation of sx. 4) Pt will be able to perform 10 consecutive bridge w/out hip drop to demonstrate improved core strength in stance     Frequency / Duration: Patient to be seen 1-2 times per week for 4-6 weeks. Patient/ Caregiver education and instruction: self care, activity modification, brace/ splint application and exercises    [x]  Plan of care has been reviewed with PTA    Certification Period: 12/31/2020-3/25/2021    Jonathan Plascencia, PT 12/31/2020    ________________________________________________________________________    I certify that the above Therapy Services are being furnished while the patient is under my care. I agree with the treatment plan and certify that this therapy is necessary.     [de-identified] Signature:____________________  Date:____________Time: _________

## 2021-01-04 ENCOUNTER — APPOINTMENT (OUTPATIENT)
Dept: PHYSICAL THERAPY | Age: 81
End: 2021-01-04
Payer: MEDICARE

## 2021-01-07 ENCOUNTER — HOSPITAL ENCOUNTER (OUTPATIENT)
Dept: PHYSICAL THERAPY | Age: 81
Discharge: HOME | End: 2021-01-07
Payer: MEDICARE

## 2021-01-07 PROCEDURE — 97110 THERAPEUTIC EXERCISES: CPT

## 2021-01-12 ENCOUNTER — HOSPITAL ENCOUNTER (OUTPATIENT)
Dept: PHYSICAL THERAPY | Age: 81
Discharge: HOME | End: 2021-01-12
Payer: MEDICARE

## 2021-01-12 PROCEDURE — 97110 THERAPEUTIC EXERCISES: CPT

## 2021-01-12 NOTE — PROGRESS NOTES
PT DAILY TREATMENT NOTE - [facility] 2-15    Patient Name: Tosha Guardado  Date:2021  : 1940  [x]  Patient  Verified  Payor: [place] MEDICARE / Plan: VA MEDICARE PART A & B / Product Type: Medicare /    In time: 1:45 PM  Out time: 2:33 PM  Total Treatment Time (min): 48 minutes  Total Timed Codes (min): 48 minutes  1:1 Treatment Time ( only): 43 minutes   Visit #:  3    Treatment Area: Low back pain [M54.5]    SUBJECTIVE  Pain Level (0-10 scale): 2/10  Any medication changes, allergies to medications, adverse drug reactions, diagnosis change, or new procedure performed?: [x] No    [] Yes (see summary sheet for update)  Subjective functional status/changes:   [] No changes reported  The Pt reports that she took a break from doing heavy lifting of boxes and has been doing her exercises.  Her back is feeling better today and less pain and stiffness.    OBJECTIVE    48 min Therapeutic Exercise:  [x] See flow sheet :   Rationale: increase ROM, increase strength, improve coordination, improve balance and increase proprioception to improve the patient’s ability to perform ADLs with less pain or discomfort.          With   [x] TE   [] TA   [] Neuro   [] SC   [] other: Patient Education: [x] Review HEP    [] Progressed/Changed HEP based on:   [] positioning   [] body mechanics   [] transfers   [] heat/ice application    [] other:      Other Objective/Functional Measures: None noted     Pain Level (0-10 scale) post treatment: 1/10    ASSESSMENT/Changes in Function:   The Pt tolerated the new exercises well without increased pain or symptoms. She required mild verbal cuing to help correct her form and technique of her HEP and made the corrections well.  Patient will continue to benefit from skilled PT services to modify and progress therapeutic interventions, address functional mobility deficits, address ROM deficits, address strength deficits, analyze and address soft tissue restrictions, analyze and cue movement  patterns, analyze and modify body mechanics/ergonomics, assess and modify postural abnormalities and instruct in home and community integration to attain remaining goals.      []  See Plan of Care  []  See progress note/recertification  []  See Discharge Summary         Progress towards goals / Updated goals:  Long Term Goals: To be accomplished in 4-6 weeks:  1) Pt will be able to stand greater than 10 minutes without increase pain so that can better tolerate household chores- progressing  2) Pt will be able to ambulate greater than 10 without increase of pain so she can walk her dog - progressing  3) Pt will demonstrate independence with modified exercise/gym routine without aggravation of sx- progressing  4) Pt will be able to perform 10 consecutive bridge w/out hip drop to demonstrate improved core strength in stance- progressing     PLAN  [x]  Upgrade activities as tolerated     [x]  Continue plan of care  [x]  Update interventions per flow sheet       []  Discharge due to:_  []  Other:_      Debo Houser, PT 1/12/2021

## 2021-01-14 ENCOUNTER — HOSPITAL ENCOUNTER (OUTPATIENT)
Dept: PHYSICAL THERAPY | Age: 81
Discharge: HOME | End: 2021-01-14
Payer: MEDICARE

## 2021-01-14 ENCOUNTER — TELEPHONE (OUTPATIENT)
Dept: FAMILY MEDICINE CLINIC | Age: 81
End: 2021-01-14

## 2021-01-14 PROCEDURE — 97110 THERAPEUTIC EXERCISES: CPT

## 2021-01-14 NOTE — PROGRESS NOTES
PT DAILY TREATMENT NOTE - [facility] 2-15    Patient Name: Didier Nguyen  Date:2021  : 1940  [x]  Patient  Verified  Payor: Aidan Gupta / Plan: [place] MEDICARE PART A & B / Product Type: Medicare /    In time: 1:45 PM  Out time: 2:39 PM  Total Treatment Time (min): 54 minutes  Total Timed Codes (min): 54 minutes  1:1 Treatment Time ( only): 49 minutes   Visit #:  4    Treatment Area: Low back pain [M54.5]    SUBJECTIVE  Pain Level (0-10 scale): 3/10  Any medication changes, allergies to medications, adverse drug reactions, diagnosis change, or new procedure performed?: [x] No    [] Yes (see summary sheet for update)  Subjective functional status/changes:   [] No changes reported  The Pt reports that she felt very good after she left her last session and it lasted until she did some heavy house cleaning. She had pain while vacuuming (canister vacuum), but little discomfort with washing/waxing the floors. Since then, her back has been more irritated. She admits this is more activity than usual.    OBJECTIVE    54 min Therapeutic Exercise:  [x] See flow sheet :   Rationale: increase ROM, increase strength, improve coordination, improve balance and increase proprioception to improve the patients ability to perform ADLs with less pain or discomfort. With   [x] TE   [] TA   [] Neuro   [] SC   [] other: Patient Education: [x] Review HEP    [] Progressed/Changed HEP based on:   [] positioning   [] body mechanics   [] transfers   [] heat/ice application    [] other:      Other Objective/Functional Measures: None noted     Pain Level (0-10 scale) post treatment: 1.5/10    ASSESSMENT/Changes in Function:   The Pt tolerated her therapy program well and was able to feel appropriate muscle activation. She had reduced symptoms after performing all of her exercises.   Patient will continue to benefit from skilled PT services to modify and progress therapeutic interventions, address functional mobility deficits, address ROM deficits, address strength deficits, analyze and address soft tissue restrictions, analyze and cue movement patterns, analyze and modify body mechanics/ergonomics, assess and modify postural abnormalities and instruct in home and community integration to attain remaining goals.      []  See Plan of Care  []  See progress note/recertification  []  See Discharge Summary         Progress towards goals / Updated goals:  Long Term Goals: To be accomplished in 4-6 weeks:  1) Pt will be able to stand greater than 10 minutes without increase pain so that can better tolerate household chores- progressing  2) Pt will be able to ambulate greater than 10 without increase of pain so she can walk her dog - progressing  3) Pt will demonstrate independence with modified exercise/gym routine without aggravation of sx- progressing  4) Pt will be able to perform 10 consecutive bridge w/out hip drop to demonstrate improved core strength in stance- progressing     PLAN  [x]  Upgrade activities as tolerated     [x]  Continue plan of care  [x]  Update interventions per flow sheet       []  Discharge due to:_  []  Other:_      Renita Butcher, PT 1/14/2021

## 2021-01-15 NOTE — TELEPHONE ENCOUNTER
Left message for pt stating that we received a call about the side effects she had regarding the Almer Sins of a sore throat, nasal congestion, cough and wheezing, and told her to stop the Almer Sins for a few days and see if the symptoms resolve. If the symptoms resolve then she was instructed to restart the Almer Sins and see if she gets the symptoms again. She was told to call us back once this has been done so that we know what to do regarding next steps with sending it in or trying another medication.

## 2021-01-19 ENCOUNTER — HOSPITAL ENCOUNTER (OUTPATIENT)
Dept: PHYSICAL THERAPY | Age: 81
Discharge: HOME | End: 2021-01-19
Payer: MEDICARE

## 2021-01-19 PROCEDURE — 97110 THERAPEUTIC EXERCISES: CPT

## 2021-01-19 NOTE — PROGRESS NOTES
PT DAILY TREATMENT NOTE - [facility] 2-15    Patient Name: Cristy Wells  Date:2021  : 1940  [x]  Patient  Verified  Payor: Medhat Santo / Plan: [place] MEDICARE PART A & B / Product Type: Medicare /    In time: 1:30 PM  Out time: 2:33 PM  Total Treatment Time (min): 63 minutes  Total Timed Codes (min): 63 minutes  1:1 Treatment Time ([facility] only): 58 minutes   Visit #:  5    Treatment Area: Low back pain [M54.5]    SUBJECTIVE  Pain Level (0-10 scale): 2/10  Any medication changes, allergies to medications, adverse drug reactions, diagnosis change, or new procedure performed?: [x] No    [] Yes (see summary sheet for update)  Subjective functional status/changes:   [] No changes reported  The Pt denies any increased pain or symptoms since Thursday. She has significant stiffness 1st thing in the morning, but after being up for ~2 hrs she starts to loosen up. Twisting continues to aggravating her back. OBJECTIVE    63 min Therapeutic Exercise:  [x] See flow sheet :   Rationale: increase ROM, increase strength, improve coordination, improve balance and increase proprioception to improve the patients ability to perform ADLs with less pain or discomfort. With   [x] TE   [] TA   [] Neuro   [] SC   [] other: Patient Education: [x] Review HEP    [] Progressed/Changed HEP based on:   [] positioning   [] body mechanics   [] transfers   [] heat/ice application    [] other:      Other Objective/Functional Measures: None noted     Pain Level (0-10 scale) post treatment: 2/10    ASSESSMENT/Changes in Function:   The Pt tolerated her new exercises well. She had no increased LBP, but did have increased tightness in her lower back following the plantargrade bird dogs. Will progress as tolerated during next PT session.   Patient will continue to benefit from skilled PT services to modify and progress therapeutic interventions, address functional mobility deficits, address ROM deficits, address strength deficits, analyze and address soft tissue restrictions, analyze and cue movement patterns, analyze and modify body mechanics/ergonomics, assess and modify postural abnormalities and instruct in home and community integration to attain remaining goals.      []  See Plan of Care  []  See progress note/recertification  []  See Discharge Summary         Progress towards goals / Updated goals:  Long Term Goals: To be accomplished in 4-6 weeks:  1) Pt will be able to stand greater than 10 minutes without increase pain so that can better tolerate household chores- progressing  2) Pt will be able to ambulate greater than 10 without increase of pain so she can walk her dog - progressing  3) Pt will demonstrate independence with modified exercise/gym routine without aggravation of sx- progressing  4) Pt will be able to perform 10 consecutive bridge w/out hip drop to demonstrate improved core strength in stance- progressing     PLAN  [x]  Upgrade activities as tolerated     [x]  Continue plan of care  [x]  Update interventions per flow sheet       []  Discharge due to:_  []  Other:_      Jimbo Wade, PT 1/19/2021

## 2021-01-21 ENCOUNTER — HOSPITAL ENCOUNTER (OUTPATIENT)
Dept: PHYSICAL THERAPY | Age: 81
Discharge: HOME | End: 2021-01-21
Payer: MEDICARE

## 2021-01-21 PROCEDURE — 97110 THERAPEUTIC EXERCISES: CPT

## 2021-01-21 NOTE — PROGRESS NOTES
PT DAILY TREATMENT NOTE - [facility] 2-15    Patient Name: Randy Justin  Date:2021  : 1940  [x]  Patient  Verified  Payor: VA MEDICARE / Plan: VA MEDICARE PART A & B / Product Type: Medicare /    In time: 3:23 PM  Out time: 4:15 PM  Total Treatment Time (min): 52 minutes  Total Timed Codes (min): 52 minutes  1:1 Treatment Time ( only): 47 minutes   Visit #:  6    Treatment Area: Low back pain [M54.5]    SUBJECTIVE  Pain Level (0-10 scale): 210  Any medication changes, allergies to medications, adverse drug reactions, diagnosis change, or new procedure performed?: [x] No    [] Yes (see summary sheet for update)  Subjective functional status/changes:   [] No changes reported  The Pt reports    OBJECTIVE    52 min Therapeutic Exercise:  [x] See flow sheet :   Rationale: increase ROM, increase strength, improve coordination, improve balance and increase proprioception to improve the patients ability to perform ADLs with less pain or discomfort. With   [x] TE   [] TA   [] Neuro   [] SC   [] other: Patient Education: [x] Review HEP    [] Progressed/Changed HEP based on:   [] positioning   [] body mechanics   [] transfers   [] heat/ice application    [] other:      Other Objective/Functional Measures: None noted     Pain Level (0-10 scale) post treatment: 2/10    ASSESSMENT/Changes in Function:   No new exercises were added in today to allow time for the Pt to get used to the newer exercises. Will progress as tolerated during next PT session. Patient will continue to benefit from skilled PT services to modify and progress therapeutic interventions, address functional mobility deficits, address ROM deficits, address strength deficits, analyze and address soft tissue restrictions, analyze and cue movement patterns, analyze and modify body mechanics/ergonomics, assess and modify postural abnormalities and instruct in home and community integration to attain remaining goals.      []  See Plan of Care  []  See progress note/recertification  []  See Discharge Summary         Progress towards goals / Updated goals:  Long Term Goals: To be accomplished in 4-6 weeks:  1) Pt will be able to stand greater than 10 minutes without increase pain so that can better tolerate household chores- progressing  2) Pt will be able to ambulate greater than 10 without increase of pain so she can walk her dog - progressing  3) Pt will demonstrate independence with modified exercise/gym routine without aggravation of sx- progressing  4) Pt will be able to perform 10 consecutive bridge w/out hip drop to demonstrate improved core strength in stance- progressing      PLAN  [x]  Upgrade activities as tolerated     [x]  Continue plan of care  [x]  Update interventions per flow sheet       []  Discharge due to:_  []  Other:_      Dianne Rivera, PT 1/21/2021

## 2021-01-22 ENCOUNTER — OFFICE VISIT (OUTPATIENT)
Dept: FAMILY MEDICINE CLINIC | Age: 81
End: 2021-01-22
Payer: MEDICARE

## 2021-01-22 VITALS
TEMPERATURE: 97.5 F | RESPIRATION RATE: 18 BRPM | WEIGHT: 160.8 LBS | OXYGEN SATURATION: 95 % | HEART RATE: 69 BPM | SYSTOLIC BLOOD PRESSURE: 138 MMHG | DIASTOLIC BLOOD PRESSURE: 74 MMHG | BODY MASS INDEX: 26.79 KG/M2 | HEIGHT: 65 IN

## 2021-01-22 DIAGNOSIS — M45.8 ANKYLOSING SPONDYLITIS OF SACRAL REGION (HCC): ICD-10-CM

## 2021-01-22 DIAGNOSIS — Z00.00 MEDICARE ANNUAL WELLNESS VISIT, SUBSEQUENT: Primary | ICD-10-CM

## 2021-01-22 DIAGNOSIS — K21.00 GASTROESOPHAGEAL REFLUX DISEASE WITH ESOPHAGITIS WITHOUT HEMORRHAGE: ICD-10-CM

## 2021-01-22 DIAGNOSIS — J45.20 MILD INTERMITTENT ASTHMA WITHOUT COMPLICATION: ICD-10-CM

## 2021-01-22 DIAGNOSIS — M81.0 AGE-RELATED OSTEOPOROSIS WITHOUT CURRENT PATHOLOGICAL FRACTURE: ICD-10-CM

## 2021-01-22 DIAGNOSIS — I10 ESSENTIAL HYPERTENSION, BENIGN: ICD-10-CM

## 2021-01-22 DIAGNOSIS — M06.09 RHEUMATOID ARTHRITIS OF MULTIPLE SITES WITH NEGATIVE RHEUMATOID FACTOR (HCC): ICD-10-CM

## 2021-01-22 DIAGNOSIS — M35.3 PMR (POLYMYALGIA RHEUMATICA) (HCC): ICD-10-CM

## 2021-01-22 DIAGNOSIS — N18.2 CKD (CHRONIC KIDNEY DISEASE) STAGE 2, GFR 60-89 ML/MIN: ICD-10-CM

## 2021-01-22 DIAGNOSIS — J30.9 ALLERGIC RHINITIS, UNSPECIFIED SEASONALITY, UNSPECIFIED TRIGGER: ICD-10-CM

## 2021-01-22 DIAGNOSIS — M17.0 PRIMARY OSTEOARTHRITIS OF BOTH KNEES: ICD-10-CM

## 2021-01-22 DIAGNOSIS — Z12.31 ENCOUNTER FOR SCREENING MAMMOGRAM FOR BREAST CANCER: ICD-10-CM

## 2021-01-22 PROCEDURE — G8752 SYS BP LESS 140: HCPCS | Performed by: FAMILY MEDICINE

## 2021-01-22 PROCEDURE — G9717 DOC PT DX DEP/BP F/U NT REQ: HCPCS | Performed by: FAMILY MEDICINE

## 2021-01-22 PROCEDURE — G0439 PPPS, SUBSEQ VISIT: HCPCS | Performed by: FAMILY MEDICINE

## 2021-01-22 PROCEDURE — 1101F PT FALLS ASSESS-DOCD LE1/YR: CPT | Performed by: FAMILY MEDICINE

## 2021-01-22 PROCEDURE — 99213 OFFICE O/P EST LOW 20 MIN: CPT | Performed by: FAMILY MEDICINE

## 2021-01-22 PROCEDURE — G8419 CALC BMI OUT NRM PARAM NOF/U: HCPCS | Performed by: FAMILY MEDICINE

## 2021-01-22 PROCEDURE — 1090F PRES/ABSN URINE INCON ASSESS: CPT | Performed by: FAMILY MEDICINE

## 2021-01-22 PROCEDURE — G8427 DOCREV CUR MEDS BY ELIG CLIN: HCPCS | Performed by: FAMILY MEDICINE

## 2021-01-22 PROCEDURE — G0463 HOSPITAL OUTPT CLINIC VISIT: HCPCS | Performed by: FAMILY MEDICINE

## 2021-01-22 PROCEDURE — G8536 NO DOC ELDER MAL SCRN: HCPCS | Performed by: FAMILY MEDICINE

## 2021-01-22 PROCEDURE — G8754 DIAS BP LESS 90: HCPCS | Performed by: FAMILY MEDICINE

## 2021-01-22 NOTE — PROGRESS NOTES
HISTORY OF PRESENT ILLNESS  Senia Mayfield is a 79 y.o. female. f/u RA,AR,CKD,CholOA. gENERALLY DOING WELL  Well Woman  The history is provided by the patient. This is a chronic problem. The problem occurs hourly. Pertinent negatives include no chest pain. Cholesterol Problem  The history is provided by the patient. This is a chronic problem. The problem occurs daily. Pertinent negatives include no chest pain. Joint Pain   This is a chronic problem. The pain is present in the right knee, left knee, left wrist and right wrist. The pain is at a severity of 4/10. Review of Systems   Constitutional: Negative for fever and malaise/fatigue. HENT: Negative for hearing loss. Respiratory: Negative for cough. Cardiovascular: Negative for chest pain, palpitations and claudication. Gastrointestinal: Negative for blood in stool, constipation and melena. Genitourinary: Negative for frequency. Musculoskeletal: Positive for joint pain. Negative for myalgias. Skin: Negative for rash. Psychiatric/Behavioral: Negative for depression and suicidal ideas. Physical Exam  Vitals signs reviewed. Constitutional:       Appearance: Normal appearance. HENT:      Head: Normocephalic and atraumatic. Right Ear: Tympanic membrane normal.      Left Ear: Tympanic membrane normal.      Nose: Nose normal.      Mouth/Throat:      Mouth: Mucous membranes are moist.   Eyes:      Extraocular Movements: Extraocular movements intact. Pupils: Pupils are equal, round, and reactive to light. Neck:      Musculoskeletal: Normal range of motion and neck supple. Cardiovascular:      Rate and Rhythm: Normal rate and regular rhythm. Pulses: Normal pulses. Heart sounds: Normal heart sounds. Pulmonary:      Effort: Pulmonary effort is normal.      Breath sounds: Normal breath sounds. Abdominal:      General: Abdomen is flat. Skin:     General: Skin is warm and dry.    Neurological:      General: No focal deficit present. Mental Status: She is alert and oriented to person, place, and time. Psychiatric:         Mood and Affect: Mood normal.         ASSESSMENT and PLAN  Diagnoses and all orders for this visit:    1. Medicare annual wellness visit, subsequent    2. Rheumatoid arthritis of multiple sites with negative rheumatoid factor ([facility]    3. Essential hypertension, benign    4. Allergic rhinitis, unspecified seasonality, unspecified trigger    5. Mild intermittent asthma without complication    6. Primary osteoarthritis of both knees    7. CKD (chronic kidney disease) stage 2, GFR 60-89 ml/min    8. Age-related osteoporosis without current pathological fracture    9. Gastroesophageal reflux disease with esophagitis without hemorrhage    10. PMR (polymyalgia rheumatica) (HCC)    11. Ankylosing spondylitis of sacral region ([facility]    12. Encounter for screening mammogram for breast cancer  -     [facility] MAMMO BI SCREENING INCL CAD; Future        Doing well,continue current meds and treatments    Follow-up and Dispositions    · Return in about 4 months (around 5/22/2021).

## 2021-01-22 NOTE — PROGRESS NOTES
This is the Subsequent Medicare Annual Wellness Exam, performed 12 months or more after the Initial AWV or the last Subsequent AWV    I have reviewed the patient's medical history in detail and updated the computerized patient record. Depression Risk Factor Screening:     3 most recent PHQ Screens 6/2/2017   Little interest or pleasure in doing things Not at all   Feeling down, depressed, irritable, or hopeless Not at all   Total Score PHQ 2 0       Alcohol Risk Screen    Do you average more than 1 drink per night or more than 7 drinks a week:  No    On any one occasion in the past three months have you have had more than 3 drinks containing alcohol:  No        Functional Ability and Level of Safety:    Hearing: Hearing is good. Activities of Daily Living: The home contains: handrails  Patient does total self care      Ambulation: with no difficulty     Fall Risk:  Fall Risk Assessment, last 12 mths 9/2/2020   Able to walk? Yes   Fall in past 12 months? No   Number of falls in past 12 months -   Fall with injury? -      Abuse Screen:  Patient is not abused       Cognitive Screening    Has your family/caregiver stated any concerns about your memory: no     Cognitive Screening: Normal - 0    Assessment/Plan   Education and counseling provided:  Are appropriate based on today's review and evaluation    Diagnoses and all orders for this visit:    1. Medicare annual wellness visit, subsequent    2. Rheumatoid arthritis of multiple sites with negative rheumatoid factor ([facility]    3. Essential hypertension, benign    4. Allergic rhinitis, unspecified seasonality, unspecified trigger    5. Mild intermittent asthma without complication    6. Primary osteoarthritis of both knees    7. CKD (chronic kidney disease) stage 2, GFR 60-89 ml/min    8. Age-related osteoporosis without current pathological fracture    9. Gastroesophageal reflux disease with esophagitis without hemorrhage    10.  PMR (polymyalgia rheumatica) ([address]    11. Ankylosing spondylitis of sacral region ([address]    12. Encounter for screening mammogram for breast cancer  -     [facility] MAMMO BI SCREENING INCL CAD;  Future        Health Maintenance Due     Health Maintenance Due   Topic Date Due    COVID-19 Vaccine (1 of 2) 05/28/1956    Shingrix Vaccine Age 50> (1 of 2) 05/28/1989    GLAUCOMA SCREENING Q2Y  05/28/2004    Pneumococcal 65+ years (1 of 1 - PPSV23) 05/28/2004    Medicare Yearly Exam  06/03/2018    Flu Vaccine (1) 09/01/2020       Patient Care Team   Patient Care Team:  Esau Collins MD as PCP - Cholo Crowe, Omaira Landry MD as PCP - [facility] Empaneled Provider  Bayron Roberts MD as Consulting Provider (Pulmonary Disease)  Alisson Fisher MD as Physician (Nephrology)    History     Patient Active Problem List   Diagnosis Code    Allergic rhinitis J30.9    Asthma J45.909    Essential hypertension, benign I10    Depression F32.9    Unspecified hypothyroidism E03.9    Mixed hyperlipidemia E78.2    Primary osteoarthritis of both knees M17.0    Ankylosis, 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    PMR (polymyalgia rheumatica) (formerly [facility]) M35.3    CKD (chronic kidney disease) stage 2, GFR 60-89 ml/min N18.2    Ankylosing spondylitis of sacral region ([address] M45.8    Long-term use of immunosuppressant medication Z79.899    Age-related osteoporosis without current pathological fracture M81.0    Gastroesophageal reflux disease with esophagitis K21.00     Past Medical History:   Diagnosis Date    Allergic rhinitis, cause unspecified 5/26/2010    Asthma 5/26/2010    Depression 5/26/2010    Encounter for long-term (current) use of other medications 7/11/2011    Essential hypertension, benign 5/26/2010    Hypertension     OA (osteoarthritis) 5/26/2010    Rheumatoid arthritis ([address]     Unspecified hypothyroidism 5/26/2010      Past Surgical History:   Procedure Laterality Date    HX HYSTERECTOMY       Current Outpatient Medications   Medication Sig Dispense Refill    denosumab (Prolia) 60 mg/mL injection 60 mg by SubCUTAneous route.  ergocalciferol (ERGOCALCIFEROL) 1,250 mcg (50,000 unit) capsule Take 1 Cap by mouth every seven (7) days. Indications: vitamin D deficiency (high dose therapy) 12 Cap 4    diclofenac (VOLTAREN) 1 % gel Apply  to affected area four (4) times daily. 100 g 2    Calcium-Cholecalciferol, D3, (CALCIUM 600 WITH VITAMIN D3) 600 mg(1,500mg) -400 unit cap Take  by mouth.  fluticasone (FLONASE) 50 mcg/actuation nasal spray PLACE TWO SPRAYS IN EACH NOSTRIL ONCE DAILY 3 Bottle 2    DULERA 200-5 mcg/actuation HFA inhaler 2 Puffs two (2) times a day.  FEXOFENADINE HCL (ALLEGRA PO) Take  by mouth daily.  levothyroxine (SYNTHROID) 50 mcg tablet Take  by mouth daily (before breakfast).  amlodipine (NORVASC) 5 mg tablet Take 5 mg by mouth daily.  acyclovir (ZOVIRAX) 400 mg tablet as needed.  albuterol (PROVENTIL HFA, VENTOLIN HFA) 90 mcg/actuation inhaler Take 1 Puff by inhalation every six (6) hours as needed for Wheezing. 1 Inhaler 5     Allergies   Allergen Reactions    Humira [Adalimumab] Hives    Leflunomide Swelling    Penicillins Unable to Obtain    Sulfa (Sulfonamide Antibiotics) Unknown (comments)       Family History   Problem Relation Age of Onset    Heart Disease Mother     Heart Disease Father     Cancer Sister     Stroke Sister     Heart Disease Sister      Social History     Tobacco Use    Smoking status: Never Smoker    Smokeless tobacco: Never Used   Substance Use Topics    Alcohol use:  No

## 2021-01-22 NOTE — PROGRESS NOTES
Chief Complaint   Patient presents with    Well Woman     [facility]     1. Have you been to the ER, urgent care clinic since your last visit? Hospitalized since your last visit? No    2. Have you seen or consulted any other health care providers outside of the [address] [place] [address] since your last visit? Include any pap smears or colon screening.  No

## 2021-01-25 ENCOUNTER — TELEPHONE (OUTPATIENT)
Dept: RHEUMATOLOGY | Age: 81
End: 2021-01-25

## 2021-01-26 ENCOUNTER — APPOINTMENT (OUTPATIENT)
Dept: PHYSICAL THERAPY | Age: 81
End: 2021-01-26
Payer: MEDICARE

## 2021-01-28 ENCOUNTER — TELEPHONE (OUTPATIENT)
Dept: RHEUMATOLOGY | Age: 81
End: 2021-01-28

## 2021-01-28 ENCOUNTER — HOSPITAL ENCOUNTER (OUTPATIENT)
Dept: PHYSICAL THERAPY | Age: 81
Discharge: HOME | End: 2021-01-28
Payer: MEDICARE

## 2021-01-28 PROCEDURE — 97110 THERAPEUTIC EXERCISES: CPT

## 2021-01-28 NOTE — TELEPHONE ENCOUNTER
Patient is calling due to let Dr. Angel Alejandro nurse know she is doing okay from her her reaction to her medication and everything has cleared up. Her phone is [phone].

## 2021-01-28 NOTE — PROGRESS NOTES
PT DAILY TREATMENT NOTE - [facility] 2-15    Patient Name: Seb Smith  Date:2021  : 1940  [x]  Patient  Verified  Payor: [place] MEDICARE / Plan: [place] MEDICARE PART A & B / Product Type: Medicare /    In time: 3:30 PM  Out time: 4:17 PM  Total Treatment Time (min): 47 minutes  Total Timed Codes (min): 47 minutes  1:1 Treatment Time ( only): 42 minutes   Visit #:  7    Treatment Area: Low back pain [M54.5]    SUBJECTIVE  Pain Level (0-10 scale): 2/10  Any medication changes, allergies to medications, adverse drug reactions, diagnosis change, or new procedure performed?: [x] No    [] Yes (see summary sheet for update)  Subjective functional status/changes:   [] No changes reported  The Pt reports that her back is feeling better and she is able to do more. She felt more stable throughout her trunk and LEs and climbed a small step stool over the weekend and was able to hang a picture without feeling weak. She continues to report compliance with her HEP. OBJECTIVE    47 min Therapeutic Exercise:  [x] See flow sheet :   Rationale: increase ROM, increase strength, improve coordination, improve balance and increase proprioception to improve the patients ability to perform ADLs with less pain or discomfort. With   [x] TE   [] TA   [] Neuro   [] SC   [] other: Patient Education: [x] Review HEP    [] Progressed/Changed HEP based on:   [] positioning   [] body mechanics   [] transfers   [] heat/ice application    [] other:      Other Objective/Functional Measures: None noted     Pain Level (0-10 scale) post treatment: 1/10    ASSESSMENT/Changes in Function:   The Pt tolerated the increased resistance and new exercise well with appropriate muscle activation noted. She is progressing well towards her goals.   Patient will continue to benefit from skilled PT services to modify and progress therapeutic interventions, address functional mobility deficits, address ROM deficits, address strength deficits, analyze and address soft tissue restrictions, analyze and cue movement patterns, analyze and modify body mechanics/ergonomics, assess and modify postural abnormalities and instruct in home and community integration to attain remaining goals.      []  See Plan of Care  []  See progress note/recertification  []  See Discharge Summary         Progress towards goals / Updated goals:  Long Term Goals: To be accomplished in 4-6 weeks:  1) Pt will be able to stand greater than 10 minutes without increase pain so that can better tolerate household chores- progressing  2) Pt will be able to ambulate greater than 10 without increase of pain so she can walk her dog - progressing  3) Pt will demonstrate independence with modified exercise/gym routine without aggravation of sx- progressing  4) Pt will be able to perform 10 consecutive bridge w/out hip drop to demonstrate improved core strength in stance- progressing     PLAN  [x]  Upgrade activities as tolerated     [x]  Continue plan of care  [x]  Update interventions per flow sheet       []  Discharge due to:_  []  Other:_      Matt Khan, PT 1/28/2021

## 2021-01-29 NOTE — TELEPHONE ENCOUNTER
Spoke with pt who stated that her symptoms are resolving from stopping the NEW JAKE, I asked her if she has restarted taking it again to see if the symptoms came back and she stated that she had not. I asked her to resume the medication and see what happens and to call us and let us know. She stated an understanding and will resume the medication today.

## 2021-02-02 ENCOUNTER — APPOINTMENT (OUTPATIENT)
Dept: PHYSICAL THERAPY | Age: 81
End: 2021-02-02
Payer: MEDICARE

## 2021-02-04 ENCOUNTER — HOSPITAL ENCOUNTER (OUTPATIENT)
Dept: PHYSICAL THERAPY | Age: 81
Discharge: HOME | End: 2021-02-04
Payer: MEDICARE

## 2021-02-04 PROCEDURE — 97110 THERAPEUTIC EXERCISES: CPT

## 2021-02-04 NOTE — PROGRESS NOTES
[facility]  [address] ([address]), [address] [place]  Phone: [phone] Fax: [phone]    Progress Note    Name: Stella Loyola   : 1940   MD: Nova Phillips MD       Treatment Diagnosis: Low back pain [M54.5]  Start of Care: 2020    Visits from Start of Care: 8  Missed Visits: Cancelled 1    Summary of Care: The Pt has been seen for 8 outpatient physical therapy sessions with [facility]. The Pt's therapy program has focused on improving her hip strength and stability, improving her core strength and stability, stretching her hips and lower back, improving her balance and neuromuscular control, and improving her activity tolerance and endurance via therapeutic exercises. The Pt has progressed nicely and reports that her back is feeling better overall. She no longer complains of a \"catching sensation\" when performing her ADLs. She is having less discomfort in her back when getting dressed or trying to step up on a step. She has noticed improved strength and control in her LEs and is less worried about falling. Her standing tolerance is now 10 minutes and then pain starts to increase. Overall, she believes that she is 80% improved since beginning therapy. Recommend continued PT for an additional 8 sessions to help progress her remaining impairments to allow the Pt to safely return to her PLOF with less pain or risk of further injury.   Thank you for this referral.    Progress towards goals / Updated goals:  Long Term Goals: To be accomplished in 4-6 weeks:  1) Pt will be able to stand greater than 10 minutes without increase pain so that can better tolerate household chores- progressing  2) Pt will be able to ambulate greater than 10 without increase of pain so she can walk her dog - met  3) Pt will demonstrate independence with modified exercise/gym routine without aggravation of sx- progressing  4) Pt will be able to perform 10 consecutive bridge w/out hip drop to demonstrate improved core strength in stance- progressing     Edgard Pham, PT 2/4/2021     ________________________________________________________________________  NOTE TO PHYSICIAN:  Please complete the following and fax to: [facility]: Fax: [phone]  . Retain this original for your records. If you are unable to process this request in 24 hours, please contact our office.        ____ I have read the above report and request that my patient continue therapy with the following changes/special instructions:  ____ I have read the above report and request that my patient be discharged from therapy    Physician's Signature:_________________ Date:___________Time:__________

## 2021-02-04 NOTE — PROGRESS NOTES
PT DAILY TREATMENT NOTE - [facility] 2-15    Patient Name: Stella Loyola  [id number]  : 1940  [x]  Patient  Verified  Payor: [place] MEDICARE / Plan: VA MEDICARE PART A & B / Product Type: Medicare /    In time: 1:45 PM  Out time: 2:32 PM  Total Treatment Time (min): 47 minutes  Total Timed Codes (min): 47 minutes  1:1 Treatment Time ( only): 42 minutes   Visit #:  8    Treatment Area: Low back pain [M54.5]    SUBJECTIVE  Pain Level (0-10 scale):  2.5/10  Any medication changes, allergies to medications, adverse drug reactions, diagnosis change, or new procedure performed?: [x] No    [] Yes (see summary sheet for update)  Subjective functional status/changes:   [] No changes reported  The Pt reports that her back is feeling overall. She is no longer experiencing a catching sensation when performing her ADLs. She is having less discomfort in her back when stepping up on a step. Her standing tolerance is ~10 minutes. She is walking better and feels like she has more control of her feet and legs and is less worried about falling. Her legs are feeling stronger as well. Overall, she believes that 80% improved since beginning therapy. OBJECTIVE    47 min Therapeutic Exercise:  [x] See flow sheet :   Rationale: increase ROM, increase strength, improve coordination, improve balance and increase proprioception to improve the patients ability to perform ADLs with less pain or discomfort.           With   [x] TE   [] TA   [] Neuro   [] SC   [] other: Patient Education: [x] Review HEP    [] Progressed/Changed HEP based on:   [] positioning   [] body mechanics   [] transfers   [] heat/ice application    [] other:      Other Objective/Functional Measures:  FOTO 56/100 (58/100 at initial evaluation)     Pain Level (0-10 scale) post treatment: 0/10    ASSESSMENT/Changes in Function:     Patient will continue to benefit from skilled PT services to modify and progress therapeutic interventions, address functional mobility deficits, address ROM deficits, address strength deficits, analyze and address soft tissue restrictions, analyze and cue movement patterns, analyze and modify body mechanics/ergonomics, assess and modify postural abnormalities and instruct in home and community integration to attain remaining goals.      []  See Plan of Care  [x]  See progress note/recertification  []  See Discharge Summary         Progress towards goals / Updated goals:  Long Term Goals: To be accomplished in 4-6 weeks:  1) Pt will be able to stand greater than 10 minutes without increase pain so that can better tolerate household chores- progressing  2) Pt will be able to ambulate greater than 10 without increase of pain so she can walk her dog - met  3) Pt will demonstrate independence with modified exercise/gym routine without aggravation of sx- progressing  4) Pt will be able to perform 10 consecutive bridge w/out hip drop to demonstrate improved core strength in stance- progressing     PLAN  [x]  Upgrade activities as tolerated     [x]  Continue plan of care  [x]  Update interventions per flow sheet       []  Discharge due to:_  []  Other:_      Dianne Rivera, PT 2/4/2021

## 2021-02-09 ENCOUNTER — HOSPITAL ENCOUNTER (OUTPATIENT)
Dept: PHYSICAL THERAPY | Age: 81
Discharge: HOME | End: 2021-02-09
Payer: MEDICARE

## 2021-02-09 PROCEDURE — 97110 THERAPEUTIC EXERCISES: CPT

## 2021-02-09 NOTE — PROGRESS NOTES
PT DAILY TREATMENT NOTE - [facility] 2-15    Patient Name: Christine Lan  Date:2021  : 1940  [x]  Patient  Verified  Payor: VA MEDICARE / Plan: VA MEDICARE PART A & B / Product Type: Medicare /    In time: 1:45 PM  Out time: 2:33 PM  Total Treatment Time (min): 48 minutes  Total Timed Codes (min): 48 minutes  1:1 Treatment Time ( only): 43 minutes   Visit #:  9    Treatment Area: Low back pain [M54.5]    SUBJECTIVE  Pain Level (0-10 scale): 2/10  Any medication changes, allergies to medications, adverse drug reactions, diagnosis change, or new procedure performed?: [x] No    [] Yes (see summary sheet for update)  Subjective functional status/changes:   [] No changes reported  The Pt reports that her back has been feeling better and she has not had any significant changes since her last session. OBJECTIVE    48 min Therapeutic Exercise:  [x] See flow sheet :   Rationale: increase ROM, increase strength, improve coordination, improve balance and increase proprioception to improve the patients ability to perform ADLs with less pain or discomfort. With   [x] TE   [] TA   [] Neuro   [] SC   [] other: Patient Education: [x] Review HEP    [] Progressed/Changed HEP based on:   [] positioning   [] body mechanics   [] transfers   [] heat/ice application    [] other:      Other Objective/Functional Measures: None noted     Pain Level (0-10 scale) post treatment: 0/10    ASSESSMENT/Changes in Function:   The Pt tolerated her exercise program well. She required many VC to help her figure out how to separate her upper trunk from her lower trunk and with time was able to make this distinction well. She is progressing nicely towards her therapeutic goals.   Patient will continue to benefit from skilled PT services to modify and progress therapeutic interventions, address functional mobility deficits, address ROM deficits, address strength deficits, analyze and address soft tissue restrictions, analyze and cue movement patterns, analyze and modify body mechanics/ergonomics, assess and modify postural abnormalities and instruct in home and community integration to attain remaining goals.      []  See Plan of Care  []  See progress note/recertification  []  See Discharge Summary         Progress towards goals / Updated goals:  Long Term Goals: To be accomplished in 4-6 weeks:  1) Pt will be able to stand greater than 10 minutes without increase pain so that can better tolerate household chores- progressing  2) Pt will be able to ambulate greater than 10 without increase of pain so she can walk her dog - met  3) Pt will demonstrate independence with modified exercise/gym routine without aggravation of sx- progressing  4) Pt will be able to perform 10 consecutive bridge w/out hip drop to demonstrate improved core strength in stance- progressing     PLAN  [x]  Upgrade activities as tolerated     [x]  Continue plan of care  [x]  Update interventions per flow sheet       []  Discharge due to:_  []  Other:_      Giovany Duncan, PT 2/9/2021

## 2021-02-10 DIAGNOSIS — J20.9 ACUTE BRONCHITIS, UNSPECIFIED ORGANISM: Primary | ICD-10-CM

## 2021-02-10 RX ORDER — PROMETHAZINE HYDROCHLORIDE AND DEXTROMETHORPHAN HYDROBROMIDE 6.25; 15 MG/5ML; MG/5ML
5 SYRUP ORAL
Qty: 180 ML | Refills: 1 | Status: SHIPPED | OUTPATIENT
Start: 2021-02-10 | End: 2021-02-17

## 2021-02-10 RX ORDER — AZITHROMYCIN 250 MG/1
TABLET, FILM COATED ORAL
Qty: 6 TAB | Refills: 0 | Status: SHIPPED | OUTPATIENT
Start: 2021-02-10 | End: 2021-08-20 | Stop reason: ALTCHOICE

## 2021-02-16 ENCOUNTER — VIRTUAL VISIT (OUTPATIENT)
Dept: RHEUMATOLOGY | Age: 81
End: 2021-02-16

## 2021-02-16 ENCOUNTER — HOSPITAL ENCOUNTER (OUTPATIENT)
Dept: PHYSICAL THERAPY | Age: 81
Discharge: HOME | End: 2021-02-16
Payer: MEDICARE

## 2021-02-16 DIAGNOSIS — M81.0 AGE-RELATED OSTEOPOROSIS WITHOUT CURRENT PATHOLOGICAL FRACTURE: ICD-10-CM

## 2021-02-16 DIAGNOSIS — M06.09 SERONEGATIVE RHEUMATOID ARTHRITIS OF MULTIPLE SITES (HCC): Primary | ICD-10-CM

## 2021-02-16 DIAGNOSIS — M45.8 ANKYLOSING SPONDYLITIS OF SACRAL REGION (HCC): ICD-10-CM

## 2021-02-16 DIAGNOSIS — Z79.60 LONG-TERM USE OF IMMUNOSUPPRESSANT MEDICATION: ICD-10-CM

## 2021-02-16 DIAGNOSIS — M35.3 PMR (POLYMYALGIA RHEUMATICA) (HCC): ICD-10-CM

## 2021-02-16 PROCEDURE — 99442 PR PHYS/QHP TELEPHONE EVALUATION 11-20 MIN: CPT | Performed by: INTERNAL MEDICINE

## 2021-02-16 PROCEDURE — 97110 THERAPEUTIC EXERCISES: CPT

## 2021-02-16 NOTE — PROGRESS NOTES
PT DAILY TREATMENT NOTE - [facility] 2-15    Patient Name: Yaneth Jorge  Date:2021  : 1940  [x]  Patient  Verified  Payor: Marvaharriett Gonzalez / Plan: [place] MEDICARE PART A & B / Product Type: Medicare /    In time: 3:30 PM  Out time: 4:15 PM  Total Treatment Time (min): 45 minutes  Total Timed Codes (min): 45 minutes  1:1 Treatment Time ( only): 40 minutes   Visit #:  10    Treatment Area: Low back pain [M54.5]    SUBJECTIVE  Pain Level (0-10 scale): 2/10  Any medication changes, allergies to medications, adverse drug reactions, diagnosis change, or new procedure performed?: [x] No    [] Yes (see summary sheet for update)  Subjective functional status/changes:   [] No changes reported  The Pt reports that her back is feeling better and is not bothering her as much. OBJECTIVE    45 min Therapeutic Exercise:  [x] See flow sheet :   Rationale: increase ROM, increase strength, improve coordination, improve balance and increase proprioception to improve the patients ability to perform ADLs with less pain or discomfort. With   [x] TE   [] TA   [] Neuro   [] SC   [] other: Patient Education: [x] Review HEP    [] Progressed/Changed HEP based on:   [] positioning   [] body mechanics   [] transfers   [] heat/ice application    [] other:      Other Objective/Functional Measures: None noted     Pain Level (0-10 scale) post treatment: 0/10    ASSESSMENT/Changes in Function:   Today, taught the Pt how to perform seated UTR at home with the theraband. It took many repetitions and a lot of practice, but by the end of the session she was able to do it independently and with good form and technique. Will continue to progress as tolerated druing next PT session.   Patient will continue to benefit from skilled PT services to modify and progress therapeutic interventions, address functional mobility deficits, address ROM deficits, address strength deficits, analyze and address soft tissue restrictions, analyze and cue movement patterns, analyze and modify body mechanics/ergonomics, assess and modify postural abnormalities and instruct in home and community integration to attain remaining goals.      []  See Plan of Care  []  See progress note/recertification  []  See Discharge Summary         Progress towards goals / Updated goals:  Long Term Goals: To be accomplished in 4-6 weeks:  1) Pt will be able to stand greater than 10 minutes without increase pain so that can better tolerate household chores- progressing  2) Pt will be able to ambulate greater than 10 without increase of pain so she can walk her dog - met  3) Pt will demonstrate independence with modified exercise/gym routine without aggravation of sx- progressing  4) Pt will be able to perform 10 consecutive bridge w/out hip drop to demonstrate improved core strength in stance- progressing     PLAN  [x]  Upgrade activities as tolerated     [x]  Continue plan of care  [x]  Update interventions per flow sheet       []  Discharge due to:_  []  Other:_      Magno Nunez, PT 2/16/2021

## 2021-02-16 NOTE — PROGRESS NOTES
REASON FOR VISIT    This is a follow up visit for Ms. Ariane Doss for    ICD-10-CM   1. Ankylosing spondylitis of sacral region ([facility] M45.8   2. Seronegative rheumatoid arthritis of multiple sites ([facility] M06.09   3. Age-related osteoporosis without current pathological fracture M81.0     The patient has consented for synchronous (real-time) Telemedicine (audio technology) on 2/16/2021 for their care to be delivered over telemedicine in place of their regularly scheduled office visit pursuant to the emergency declaration under the [facility] [place] [address] waiver authority and the [employer] Act, this Virtual  Visit was conducted, with patient's consent, to reduce the patient's risk of exposure to COVID-19 and provide continuity of care for an established patient. Services were provided through an audio synchronous discussion virtually to substitute for in-person clinic visit.     Spondyloarthritis phenotype includes:  Anti-CCP positive: no  Rheumatoid factor positive: no  HLA-B27: yes  Erosive disease: no  Sacroiliitis: yes  Ankylosis: yes (left SI)  Psoriasis: no  Enthesitis: yes (Achilles)  Dactylitis: no  Nail Pitting: no  Onycholysis: no  Extra-articular manifestations include: Polymyalgia Rheumatica   SAPHO: no    Immunosuppression Screening (9/08/2020):  Quantiferon TB: negative  PPD:  Not performed  Hepatitis B: negative  Hepatitis C: negative    Therapy History includes:  Current NSAIDs include: none (contraindicated due to CKD stage 3)  Current DMARD therapy include: Xeljanz 11 mg XR daily (1/04/2021 to 1/25/2021; SAMPLE)  Prior DMARD therapy includes: Gold, leflunomide, Humira (11/2017 ot 12/2017), Enbrel 50 mg weekly (5/22/2018 to 9/20/2018), Cosentyx 300 mg (3/07/2019 to 6/04/2020), Orencia 125 mg SubQ every Monday (6/10/2020 to 12/30/2020)  The following DMARDs have been ineffective: Orencia   The following DMARDs were stopped because of side effects: Gold (\"pass out\"), leflunomide (swelling), Humira (hives, lip and eyelid swelling), Enbrel (swelling of lips)  Contra-Indicated DMARDs because of CKD stage 3: methotrexate   Contra-Indicated DMARDs because sulfa allergy: sulfasalazine    Osteoporosis Historical Synopsis     Height loss since age 26 (at least two inches): 0.5  Fracture history includes: yes (ankle, left foot)  Family history of hip fracture: no  Fall Risk: no     Daily calcium intake is 1200 mg  Daily vitamin D intake is 800 IU     Smoking history: no  Alcohol consumption: no  Prednisone history: no     Exercise: yes     Previous work-up for osteoporosis includes the following:  DEXA Scan: 9/12/2017  Vitamin 25OH D level: 40.8 (9/08/2020)  PTH: 44 (9/08/2020)  TSH: 2.090 (9/08/2020)     Therapy History includes:  Current osteoporosis therapy includes: Prolia 60 mg every 6 months (6/29/2020 to present)  Prior osteoporosis therapy includes: alendronate 70 mg every Sunday (11/2017 to 6/04/2020)  The following osteoporosis therapy have been ineffective: alendronate  The following osteoporosis therapy were stopped because of side effects: none    HISTORY OF PRESENT ILLNESS    Ms. Rafael Bill returns for a follow up visit. On her last visit, I discontinued Orencia 125 mg SubQ due to lack of benefit. I asked her to  a sample of Xeljanz 11 mg XR daily which she did on 1/04/2021 and she developed progressive flu-like symptoms, fever-blisters, so she called our office to inform us. She stopped Cori Nando and her symptoms improved after 5 days. She received the Moderna vaccine and felt flu like again with similar symptoms after it and was treated with a Z-pack. Her next Moderna dose on 3/01/2021    Today, she feels well. She did not do any work or lifting and has napped feels well. She feels worse when she walks. She will start physical therapy for her spinal stenosis tomorrow.      She complains of pain in her wrists, knees, and ankles. She has swelling in her wrists and has noticed development of nodules under her skin. She does not think Mariia Merchant is helping. Most recent toxicity monitoring by blood work was done on 12/29/2020 and did not reveal any significant adverse effects. Most recent inflammatory markers from 9/19/2019 revealed a ESR 13 mm/hr and CRP 3 mg/L. I reviewed the patient's interval medical history, surgical history, medications, and allergies. She continues to work as a . The patient has had an infection but interval hospital admissions or surgeries. REVIEW OF SYSTEMS    A comprehensive review of systems was performed and pertinent results are documented in the HPI, review of systems is otherwise non-contributory. PAST MEDICAL HISTORY    She has a past medical history of Allergic rhinitis, cause unspecified (5/26/2010), Asthma (5/26/2010), Depression (5/26/2010), Encounter for long-term (current) use of other medications (7/11/2011), Essential hypertension, benign (5/26/2010), Hypertension, OA (osteoarthritis) (5/26/2010), Rhe[facility] ([facility] and Unspecified hypothyroidism (5/26/2010). FAMILY HISTORY    Her family history includes Cancer in her sister; Heart Disease in her father, mother, and sister; Stroke in her sister. SOCIAL HISTORY    She reports that she has never smoked. She has never used smokeless tobacco. She reports that she does not drink alcohol or use drugs. MEDICATIONS    Current Outpatient Medications   Medication Sig    azithromycin (ZITHROMAX) 250 mg tablet Take 2 tablets today, then take 1 tablet daily    promethazine-dextromethorphan (PROMETHAZINE-DM) 6.25-15 mg/5 mL syrup Take 5 mL by mouth four (4) times daily as needed for Cough for up to 7 days.  denosumab (Prolia) 60 mg/mL injection 60 mg by SubCUTAneous route.  ergocalciferol (ERGOCALCIFEROL) 1,250 mcg (50,000 unit) capsule Take 1 Cap by mouth every seven (7) days.  Indications: vitamin D deficiency (high dose therapy)    diclofenac (VOLTAREN) 1 % gel Apply  to affected area four (4) times daily.  acyclovir (ZOVIRAX) 400 mg tablet as needed.  Calcium-Cholecalciferol, D3, (CALCIUM 600 WITH VITAMIN D3) 600 mg(1,500mg) -400 unit cap Take  by mouth.  fluticasone (FLONASE) 50 mcg/actuation nasal spray PLACE TWO SPRAYS IN EACH NOSTRIL ONCE DAILY    DULERA 200-5 mcg/actuation HFA inhaler 2 Puffs two (2) times a day.  FEXOFENADINE HCL (ALLEGRA PO) Take  by mouth daily.  albuterol (PROVENTIL HFA, VENTOLIN HFA) 90 mcg/actuation inhaler Take 1 Puff by inhalation every six (6) hours as needed for Wheezing.  levothyroxine (SYNTHROID) 50 mcg tablet Take  by mouth daily (before breakfast).  amlodipine (NORVASC) 5 mg tablet Take 5 mg by mouth daily. No current facility-administered medications for this visit. ALLERGIES    Allergies   Allergen Reactions    Humira [Adalimumab] Hives    Leflunomide Swelling    Penicillins Unable to Obtain    Sulfa (Sulfonamide Antibiotics) Unknown (comments)       PHYSICAL EXAMINATION    There were no vitals taken for this visit. There is no height or weight on file to calculate BMI. Chest:  Breathing comfortably at room air    Skin:    Exam deferred  Previous exam    Psoriasis:     no  Nail Pitting:     no  Onycholysis:     no  Palmoplantar pustulosis:   no  Acne fulminans:    no  Acne conglobata:    no  Hidradenitis Suppurativa:   no  Dissecting cellulitis of the scalp:  no  Pilonidal sinus:    no  Erythema nodosum:    no    Musculoskeletal:  A comprehensive musculoskeletal exam was NOT performed for all joints of each upper and lower extremity and assessed for swelling, tenderness and range of motion. Exam deferred  Previous exam    Bilateral arm, forearm, thigh, and calf allodynia on MCPs, PIPs  Bilateral Sharmila and Heberden nodes.   Bilateral greater trochanter tenderness  Right IT band tenderness  Bilateral knee crepitus without effusion.   Bilateral Achilles tenderness in the body not insertion  Bilateral ankle tenderness with swelling (RESOLVED)  Bilateral MTP tenderness with swelling (RESOLVED)    Costochondritis:  no   Synovitis:   Yes (see below table)  Dactylitis:   no  Enthesitis:   no    Joint Count 9/2/2020 6/4/2020 9/19/2019 6/6/2019 3/7/2019 11/7/2018 8/1/2018   Patient pain (0-100) 55 - - - - - -   MHAQ 1 - - - - - -   Left shoulder - Tender - - - - 1 - -   Left shoulder - Swollen - - - - 0 - -   Left elbow - Tender - - - 1 1 - -   Left elbow - Swollen - - - 0 0 - -   Left wrist- Tender 1 1 1 1 1 1 1   Left wrist- Swollen 1 1 1 1 1 1 1   Left 1st MCP - Tender 1 1 1 1 1 1 1   Left 1st MCP - Swollen 0 1 1 1 1 - 1   Left 2nd MCP - Tender 1 0 1 1 0 1 1   Left 2nd MCP - Swollen 0 1 1 1 1 1 1   Left 3rd MCP - Tender 1 0 - 1 - 1 1   Left 3rd MCP - Swollen 0 1 - 1 - - 1   Left 4th MCP - Tender 1 1 - - - - 1   Left 4th MCP - Swollen 0 1 - - - - -   Left 5th MCP - Tender 1 1 1 - - - 1   Left 5th MCP - Swollen 0 - 1 - - - 1   Left thumb IP - Tender 1 - 1 - 1 1 1   Left thumb IP - Swollen 0 - 0 - 1 0 0   Left 2nd PIP - Tender 1 - 1 1 1 1 -   Left 2nd PIP - Swollen 0 - 1 1 1 0 -   Left 3rd PIP - Tender 1 1 1 1 - - -   Left 3rd PIP - Swollen 0 1 1 1 - - -   Left 4th PIP - Tender 1 - 1 - - - -   Left 4th PIP - Swollen 0 - 0 - - - -   Left 5th PIP - Tender 1 - 1 - - 1 -   Left 5th PIP - Swollen 0 - 0 - - 0 -   Right shoulder - Tender - - - - 1 - -   Right shoulder - Swollen - - - - 0 - -   Right elbow - Tender - - - 1 - - -   Right elbow - Swollen - - - 0 - - -   Right wrist- Tender 1 1 1 1 1 1 1   Right wrist- Swollen 1 1 1 1 1 1 1   Right 1st MCP - Tender 1 - 1 - 0 - 1   Right 1st MCP - Swollen 1 - 1 - 1 - 1   Right 2nd MCP - Tender 1 0 1 1 1 1 1   Right 2nd MCP - Swollen 0 1 0 1 1 1 1   Right 3rd MCP - Tender 1 1 1 - 1 - 1   Right 3rd MCP - Swollen 0 1 0 - 1 - 1   Right 4th MCP - Tender 1 1 1 - - - 1   Right 4th MCP - Swollen 0 0 0 - - - - Right 5th MCP - Tender 1 - 1 0 0 - 1   Right 5th MCP - Swollen 1 - 1 1 1 - 1   Right thumb IP - Tender - - - - - 1 -   Right 2nd PIP - Tender 1 1 1 - - 1 -   Right 2nd PIP - Swollen 0 0 1 - - - 1   Right 3rd PIP - Tender 1 - 1 - - - -   Right 3rd PIP - Swollen 0 - 1 - - - 1   Right 4th PIP - Tender 1 1 - 1 1 1 -   Right 4th PIP - Swollen 0 0 - 0 0 - 1   Right 5th PIP - Tender 1 1 1 - - 1 -   Right 5th PIP - Swollen 0 0 1 - - - -   Tender Joint Count (Total) 21 11 18 11 11 13 13   Swollen Joint Count (Total) 4 9 12 9 10 4 13   Physician Assessment (0-10) - - - - - - -   Patient Assessment (0-10) 3 - - - - - -   CDAI Total (calculated) - - - - - - -     DATA REVIEW    Laboratory     Recent laboratory results were reviewed, summarized, and discussed with the patient. Imaging    Musculoskeletal Ultrasound    None    Radiographs    Sacroiliac Joint 8/25/2017: There is bony ankylosis of the left SI joint unchanged. There is probable ankylosis of the right SI joint as well. The right SI joint is at least narrowed. Bone mineral density is decreased without fracture or bone destruction    Bilateral Foot 8/25/2017: LEFT: No fracture or dislocation on plain film. There is narrowing of the first MTP joint with minimal spurring. No joint space erosion or periosteal reaction. Alignment is within normal limits. Bone mineralization is decreased. No soft tissue calcification. RIGHT: No fracture or dislocation on plain film. There is metatarsus primus varus with hallux valgus deformity. There is joint space loss and spurring first MTP joint. No joint space erosion or periosteal reaction. Alignment is within normal limits. Bone mineralization is decreased. No soft tissue calcification. Chest 3/09/2017: normal heart size. There is no acute process in the lung fields. The osseous structures are unremarkable. Bilateral Hand 1/20/2017: LEFT: No fracture or dislocation on plain film.  There are scattered mild degenerative changes of the interphalangeal joints. There is moderate degeneration of the first [facility] joint and mild degeneration of the first MCP joint. Minimal degenerative changes of the third and fourth MCP joints. There is widening of the scapholunate interval compatible with chronic scapholunate ligament tear. No joint space erosion or periosteal reaction. Alignment is within normal limits. Bone mineralization is decreased. No soft tissue calcification. RIGHT: No fracture or dislocation on plain film. There are scattered mild degenerative changes in the interphalangeal joints and first MCP joint. There is narrowing of the lateral margin radiocarpal joint There is widening of the scapholunate interval compatible with chronic scapholunate ligament tear. No joint space erosion or periosteal reaction. Alignment is within normal limits. Bone mineralization is decreased. No soft tissue calcification. Left Hip 7/15/2015: no fracture, dislocation or other acute abnormality.  There appears to be fusion of the left sacroiliac joint and possibly the right sacroiliac joint. Spurring is noted at the symphysis. Lumbar Spine 7/15/2015: the patient is leaning to the right. There is a 2 mm retrolisthesis of L1  relative to L2. Bilateral facet arthropathy is the dominant feature at the  lower 3 levels. Bilateral laminectomies have been performed at L5-S1. Vertebral body heights spaces are well-preserved.  There is no fracture. CT Imaging    None    MR Imaging    MRI Lumbar Spine without contrast 9/23/2020: Borderline congenitally slender lumbar spinal canal measures 15 mm in diameter at L3. Grade 1 retrolisthesis of L1 on L2 measures 3 mm. Vertebral body heights are maintained. Marrow signal is normal. Moderate disc desiccation at L1-L2. Mild disc desiccation at the other levels. No discitis. The conus medullaris terminates at L1-L2. Signal and caliber of the distal spinal cord are within normal limits.  There appear to be bilateral parapelvic renal cysts. Moderate atrophy of the paraspinal musculature. Lower thoracic spine: Mild central spinal canal stenosis at T11-T12 and T12-L1. L1-L2: Diffuse disc bulge, facet arthrosis, and thickened ligamentum flavum. Moderate central spinal canal stenosis. Mild bilateral foraminal stenosis. L2-L3: Diffuse disc bulge, facet arthrosis, and thickened ligamentum flavum. Mild central spinal canal stenosis. L3-L4: Diffuse disc bulge, facet arthrosis, and thickened ligamentum flavum. Moderate central spinal canal stenosis. L4-L5: Diffuse disc bulge, facet arthrosis, and thickened ligamentum flavum. Mild central spinal canal stenosis. L5-S1: No herniation or stenosis. Facet arthrosis and facet joint effusions. MRI Pelvis without contrast 9/20/2017: There is normal bone signal. No para-articular edema-like signal is shown nor is there demonstration of substantial joint effusion. There is ankylosis of the inferior left sacroiliac joint without demonstration of substantial osteophyte formation. The inferior right SI joint is substantially narrowed with perhaps a small area of ankylosis inferiorly.   There is minimal-mild superolateral joint space narrowing of the hip joints bilaterally with tiny marginal osteophytes. Moderate degenerative changes in the lower lumbar spine are shown with disc space narrowing through L4-5 as well as bilateral facet osteoarthrosis without ankylosis at L5-S1. No soft tissue mass is demonstrated. Muscle signal, size and contour appear normal. There is moderate insertional tendinopathy of the gluteus medius bilaterally with partial-thickness tearing in tiny bursal effusions. Ultrasound    Retroperitoneum 10/11/2017: RIGHT KIDNEY: The right kidney has normal echogenicity with no mass, stone or hydronephrosis. The right kidney measures 9.1 cm in length. LEFT KIDNEY: The left kidney has normal echogenicity with  no mass, stone or hydronephrosis. There may be mild caliectasis.  The left kidney measures 8.7 cm in length. RETROPERITONEUM: The aorta is atherosclerotic and tapers normally. The aortic bifurcation is normal. The IVC is not visualized due to body habitus and bowel gas. No retroperitoneal mass is identified. BLADDER: The urinary bladder is incompletely distended. DXA     DXA 9/12/2017: (excluded Lumbar spine due to degenerative changes) left femoral neck T score: -2.1 (0.749 g/cm2), left total hip T score: -1.9 (0.763 g/cm2), right femoral neck T score: -2.4 (0.709 g/cm2), right total hip T score: -2.1 (0.746 g/cm2), and distal one third left radius T score -3.0 (BMD 0.614 g/cm2). FRAX score 17.8 % probability in 10 years for major osteoporotic fracture and 5.8 % 10 year probability of hip fracture. ASSESSMENT AND PLAN    This is a follow up visit for Ms. Flori Hall. 1) HLA-B27 Positive Ankylosing Spondylitis with Seronegative Rheumatoid Arthritis. (positive HLA-B27, sacroiliitis, inflammatory arthritis). She reports a history of Rheumatoid Arthritis that was treated with gold, which she thinks led her to remission until 2016. She had developed sore pain and Polymyalgia Rheumatica-like symptoms involving her hip girdle in the fall of 2016. Her hip radiograph on 7/15/2015 showed ankylosis of the left sacroiliac joint. A repeat radiograph of SI joints 8/25/2017 confirmed left sacroiliac ankylosis and probable right sided involvement. MRI pelvis showed left SI joint ankylosis. Partial ankylosis of right SI joint. Her labs showed a positive HLA-B27. She had peripheral synovitis. She has CKD stage 3, so NSAIDs and methotrexate should be avoided. She had been on Cosentyx and Orencia 125 mg SubQ with good tolerance but did not feel any improvement. I gave her Gagan Welsh 11 mg XR samples but after around 2 weeks, she developed flu-like symptoms and mouth sores that resovled 5 days after stopping it.  She then received her Moderna vaccine and had recurrent symptoms that were treated with a Z-pack. I am uncertain of the relation of Sylvie Failing and those symptoms. I asked her to re-challenge on Sylvie Dockery and to inform me if she has any adverse reactions. She understood and agreed. 2) Polymyalgia Rheumatica. This is secondary to #1. 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 Her DXA 9/12/2017 showed T score distal radius -3.0 and right femoral neck -2.4. She began Fosamax 70 mg every Sunday 11/2017 and but fractured her foot in 2/2020, so I discontinued it and started Prolia 60 mg every 6 months, which she received with good tolerance. Her most recent dose was 12/29/2020. 4) Long Term Use of Immunosuppressants. The patient remains on immunomodulatory medications (Orencia) and requires frequent toxicity monitoring by blood work. 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 Her most recent labs 12/03/2019 show creatinine 0.84 mg/dL and eGFR 66.    6) Right Trochanteric Bursitis with IT Band Syndrome. I had referred her to physical therapy previously with benefit. 7) Bilateral Knee Osteoarthritis. This was not an active issue today in her right knee. 8) Neurogenic Claudication. MRI lumbar spine showed L1-L2 and L3-L4 moderate central spinal canal stenosis. I had referred her to physical therapy with instructions of seeing Dr. Jerry Plunkett if no improvement. She has done physical therapy. The patient voiced understanding of the aforementioned assessment and plan.      The patient has consented for synchronous (real-time) Telemedicine (audio technology) on 2/16/2021 for their care to be delivered over telemedicine in place of their regularly scheduled office visit pursuant to the emergency declaration under the [facility] waiver authority and the [employer] Act, this Virtual  Visit was conducted, with patient's consent, to reduce the patient's risk of exposure to COVID-19 and provide continuity of care for an established patient. Today, I personally spent 12 minutes in direct patient care with the patient, of which greater than 50% of the time was spent in patient education, counseling, and coordination of care as described above. All questions asked and answered. Services were provided through an audio synchronous discussion virtually to substitute for in-person clinic visit. TODAY'S ORDERS    No orders of the defined types were placed in this encounter.     Future Appointments   Date Time Provider Andrea Sury   2/16/2021  3:30 PM Alpineotoniel Carrillo, PT [facility]   2/23/2021  1:45 PM Alpine Olympia, PT [facility]   3/2/2021  1:45 PM Chelsie , PT [facility]   3/2/2021  2:40 PM Miguel Ángel Thomson MD [facility] BS AMB   3/9/2021  1:45 PM Alpine , PT [facility]   3/12/2021  1:20  [address] [facility]LM[facility] IM   5/28/2021 10:40 AM Marisol Pickens MD [facility] BS AMB   6/29/2021  2:00 PM A3 [facility]   12/28/2021  2:00 PM A3 [facility]     Hesham Carrillo MD, [address] [facility]    Adult Rheumatology   Rheumatology Ultrasound Certified  [facility]  A Part of [address]   Phone [phone]  Fax [phone]

## 2021-02-23 ENCOUNTER — HOSPITAL ENCOUNTER (OUTPATIENT)
Dept: PHYSICAL THERAPY | Age: 81
Discharge: HOME | End: 2021-02-23
Payer: MEDICARE

## 2021-02-23 PROCEDURE — 97110 THERAPEUTIC EXERCISES: CPT

## 2021-02-23 NOTE — PROGRESS NOTES
PT DAILY TREATMENT NOTE - [facility] 2-15    Patient Name: Ash North  Date:2021  : 1940  [x]  Patient  Verified  [place]or[place] / Plan: [place] MED[place] & B / Product Type: Medicare /    In time: 1:46 PM  Out time: 2:40 PM  Total Treatment Time (min): 54 minutes  Total Timed Codes (min): 54 minutes  1:1 Treatment Time (MC only): 49 minutes   Visit #:  11    Treatment Area: Low back pain [M54.5]    SUBJECTIVE  Pain Level (0-10 scale): 3/10  Any medication changes, allergies to medications, adverse drug reactions, diagnosis change, or new procedure performed?: [x] No    [] Yes (see summary sheet for update)  Subjective functional status/changes:   [] No changes reported  The Pt reports that her back was feeling better, but this morning she did 2 hrs of vacuuming with a 15 min break. She reports that vacuuming always bothers her back and she is much more stiff afterwards. OBJECTIVE    54 min Therapeutic Exercise:  [x] See flow sheet :   Rationale: increase ROM, increase strength, improve coordination, improve balance and increase proprioception to improve the patients ability to perform ADLs with less pain or discomfort. With   [x] TE   [] TA   [] Neuro   [] SC   [] other: Patient Education: [x] Review HEP    [] Progressed/Changed HEP based on:   [] positioning   [] body mechanics   [] transfers   [] heat/ice application    [] other:      Other Objective/Functional Measures: None noted     Pain Level (0-10 scale) post treatment: 1/10    ASSESSMENT/Changes in Function:   The Pt tolerated her therapy program well. She was able to perform the exercises well with less need for cuing to correct form and technique. She reported less pain at the end of the session. Anticipate discharge after next PT session.   Patient will continue to benefit from skilled PT services to modify and progress therapeutic interventions, address functional mobility deficits, address ROM deficits, address strength deficits, analyze and address soft tissue restrictions, analyze and cue movement patterns, analyze and modify body mechanics/ergonomics, assess and modify postural abnormalities and instruct in home and community integration to attain remaining goals.      []  See Plan of Care  []  See progress note/recertification  []  See Discharge Summary         Progress towards goals / Updated goals:  Long Term Goals: To be accomplished in 4-6 weeks:  1) Pt will be able to stand greater than 10 minutes without increase pain so that can better tolerate household chores- progressing  2) Pt will be able to ambulate greater than 10 without increase of pain so she can walk her dog - met  3) Pt will demonstrate independence with modified exercise/gym routine without aggravation of sx- progressing  4) Pt will be able to perform 10 consecutive bridge w/out hip drop to demonstrate improved core strength in stance- progressing     PLAN  [x]  Upgrade activities as tolerated     [x]  Continue plan of care  [x]  Update interventions per flow sheet       []  Discharge due to:_  []  Other:_      Dheeraj Limb, PT 2/23/2021

## 2021-03-02 ENCOUNTER — HOSPITAL ENCOUNTER (OUTPATIENT)
Dept: PHYSICAL THERAPY | Age: 81
Discharge: HOME | End: 2021-03-02
Payer: MEDICARE

## 2021-03-02 ENCOUNTER — VIRTUAL VISIT (OUTPATIENT)
Dept: RHEUMATOLOGY | Age: 81
End: 2021-03-02

## 2021-03-02 PROCEDURE — 97110 THERAPEUTIC EXERCISES: CPT

## 2021-03-02 NOTE — ANCILLARY DISCHARGE INSTRUCTIONS
[facility]. Physical Therapy [address] Andrei Craft Rina Benedict Amy Phone: [phone] Fax: [phone] Discharge Summary 2-15 Patient name: Senia Mayfield  : 1940  Provider#: [id number] Referral source: Melecio Ernandez MD     
Medical/Treatment Diagnosis: Low back pain [M54.5] Prior Hospitalization: see medical history Comorbidities: See Plan of Care Prior Level of Function: See Plan of Care Medications: Verified on Patient Summary List 
 
Start of Care: 2020      Onset Date: Visits from Start of Care: 12     Missed Visits: [facility] 2 Reporting Period : 2020 to 3/2/2021 Assessment/Summary of care: The Pt was seen for 12 outpatient physical therapy sessions with LBP. The Pt's therapy program focused on stretching her lower back and hip musculature, improving her hip strength and stability, improving her core strength and stability, improving her balance and neuromuscular control, and improving her activity tolerance and endurance via therapeutic exercises. The Pt has progressed well and reports that her pain is better overall. She continues to have mild LBP, but it is less intense and more manageable. She reports continued lower back stiffness after sitting for longer periods, but it goes away quickly with movement and is no longer constant. She feels more stable and stronger throughout her LEs. She does not fatigue as quickly. Overall, she believes that she is 75-80% improved since beginning therapy. At this time, it is believed that the Pt has reached maximum benefit from skilled PT and will continue to progress well independently. The Pt was educated how to safely progress her HEP and voiced understanding. The Pt is discharged from skilled PT and will contact her referring provider with any further questions or concerns.   Thank you for this referral. 
 
 Progress towards goals / Updated goals:  
Long Term Goals: To be accomplished in 4-6 weeks: 
1) Pt will be able to stand greater than 10 minutes without increase pain so that can better tolerate household chores- progressing (still has pain standing for longer periods, but able able to be on her feet for longer periods) 2) Pt will be able to ambulate greater than 10 without increase of pain so she can walk her dog - met 3) Pt will demonstrate independence with modified exercise/gym routine without aggravation of sx- met 4) Pt will be able to perform 10 consecutive bridge w/out hip drop to demonstrate improved core strength in stance- met RECOMMENDATIONS: 
[x]Discontinue therapy: [x]Patient has reached or is progressing toward set goals []Patient is non-compliant or has abdicated 
   []Due to lack of appreciable progress towards set goals []Other Elana Brown, PT 3/2/2021

## 2021-03-02 NOTE — PROGRESS NOTES
PT DAILY TREATMENT NOTE - [facility] 2-15    Patient Name: Matt Sosa  Date:3/2/2021  : 1940  [x]  Patient  Verified  Payor: [place] MED[place]ARE / Plan: [place] MEDICARE PART A & B / Product Type: Medicare /    In time: 1:45 PM  Out time: 2:47 PM  Total Treatment Time (min): 62 minutes  Total Timed Codes (min): 62 minutes  1:1 Treatment Time ( only): 57 minutes   Visit #:  12    Treatment Area: Low back pain [M54.5]    SUBJECTIVE  Pain Level (0-10 scale): 3/10  Any medication changes, allergies to medications, adverse drug reactions, diagnosis change, or new procedure performed?: [x] No    [] Yes (see summary sheet for update)  Subjective functional status/changes:   [] No changes reported  The Pt reports that she feels much better overall. She does have stiffness after sitting for a long period, but it goes away after moving for a few minutes. She was able to walk for almost 15 minutes straight without significant pain. She believes that she is 75-80% improved since beginning therapy. OBJECTIVE    62 min Therapeutic Exercise:  [x] See flow sheet :   Rationale: increase ROM, increase strength, improve coordination, improve balance and increase proprioception to improve the patients ability to perform ADLs with less pain or discomfort.     With   [x] TE   [] TA   [] Neuro   [] SC   [x] other: Patient Education: [x] Review HEP    [] Progressed/Changed HEP based on:   [] positioning   [] body mechanics   [] transfers   [] heat/ice application    [x] other: Pt educated how to safely progress her HEP independently      Other Objective/Functional Measures: FOTO 51/100 (58/100 at initial evaluation)     Pain Level (0-10 scale) post treatment: 1/10    ASSESSMENT/Changes in Function:     []  See Plan of Care  []  See progress note/recertification  [x]  See Discharge Summary         Progress towards goals / Updated goals:   Long Term Goals: To be accomplished in 4-6 weeks:  1) Pt will be able to stand greater than 10 minutes without increase pain so that can better tolerate household chores- progressing (still has pain standing for longer periods, but able able to be on her feet for longer periods)  2) Pt will be able to ambulate greater than 10 without increase of pain so she can walk her dog - met  3) Pt will demonstrate independence with modified exercise/gym routine without aggravation of sx- met  4) Pt will be able to perform 10 consecutive bridge w/out hip drop to demonstrate improved core strength in stance- met    PLAN  []  Upgrade activities as tolerated     []  Continue plan of care  []  Update interventions per flow sheet       [x]  Discharge due to: Pt has met or is progressing well   []  Other:_      Tika Caballero, PT 3/2/2021

## 2021-03-08 ENCOUNTER — TELEPHONE (OUTPATIENT)
Dept: RHEUMATOLOGY | Age: 81
End: 2021-03-08

## 2021-03-08 NOTE — TELEPHONE ENCOUNTER
Patient calling due to she will need an extension for her handicap license plate. Her phone: [phone].

## 2021-03-09 ENCOUNTER — APPOINTMENT (OUTPATIENT)
Dept: PHYSICAL THERAPY | Age: 81
End: 2021-03-09
Payer: MEDICARE

## 2021-03-11 ENCOUNTER — VIRTUAL VISIT (OUTPATIENT)
Dept: RHEUMATOLOGY | Age: 81
End: 2021-03-11
Payer: MEDICARE

## 2021-03-11 DIAGNOSIS — Z79.60 LONG-TERM USE OF IMMUNOSUPPRESSANT MEDICATION: ICD-10-CM

## 2021-03-11 DIAGNOSIS — M06.09 SERONEGATIVE RHEUMATOID ARTHRITIS OF MULTIPLE SITES (HCC): Primary | ICD-10-CM

## 2021-03-11 PROCEDURE — 99443 PR PHYS/QHP TELEPHONE EVALUATION 21-30 MIN: CPT | Performed by: INTERNAL MEDICINE

## 2021-03-11 RX ORDER — UPADACITINIB 15 MG/1
15 TABLET, EXTENDED RELEASE ORAL DAILY
Qty: 28 TAB | Refills: 0 | Status: SHIPPED | COMMUNITY
Start: 2021-03-11 | End: 2022-06-06

## 2021-03-11 NOTE — Clinical Note
Lemuel White, please schedule her to get labs done on Monday.  She's coming in to  samples and handicap placard

## 2021-03-11 NOTE — PROGRESS NOTES
REASON FOR VISIT    This is a follow up visit for Ms. Kandy Guzman for    ICD-10-CM   1. Ankylosing spondylitis of sacral region ([facility] M45.8   2. Seronegative rheumatoid arthritis of multiple sites ([facility] [address] M06.09   3. Age-related osteoporosis without current pathological fracture M81.0     The patient has consented for synchronous (real-time) Telemedicine (audio technology) on 3/11/2021 for their care to be delivered over telemedicine in place of their regularly scheduled office visit pursuant to the emergency declaration under the [facility] [place] [address] waiver authority and the [employer] Act, this Virtual  Visit was conducted, with patient's consent, to reduce the patient's risk of exposure to COVID-19 and provide continuity of care for an established patient. Services were provided through an audio synchronous discussion virtually to substitute for in-person clinic visit.     Spondyloarthritis phenotype includes:  Anti-CCP positive: no  Rheumatoid factor positive: no  HLA-B27: yes  Erosive disease: no  Sacroiliitis: yes  Ankylosis: yes (left SI)  Psoriasis: no  Enthesitis: yes (Achilles)  Dactylitis: no  Nail Pitting: no  Onycholysis: no  Extra-articular manifestations include: Polymyalgia Rheumatica   SAPHO: no    Immunosuppression Screening (9/08/2020):  Quantiferon TB: negative  PPD:  Not performed  Hepatitis B: negative  Hepatitis C: negative    Therapy History includes:  Current NSAIDs include: none (contraindicated due to CKD stage 3)  Current DMARD therapy include:   Prior DMARD therapy includes: Gold, leflunomide, Humira (11/2017 ot 12/2017), Enbrel 50 mg weekly (5/22/2018 to 9/20/2018), Cosentyx 300 mg (3/07/2019 to 6/04/2020), Orencia 125 mg SubQ every Monday (6/10/2020 to 12/30/2020), Bethany Brady 11 mg XR daily (1/04/2021 to 1/25/2021; SAMPLE)  The following DMARDs have been ineffective: Orencia   The following DMARDs were stopped because of side effects: Gold (\"pass out\"), leflunomide (swelling), Humira (hives, lip and eyelid swelling), Enbrel (swelling of lips), Xeljanz 11 mg XR (oral ulcers, flu-like symptoms)  Contra-Indicated DMARDs because of CKD stage 3: methotrexate   Contra-Indicated DMARDs because sulfa allergy: sulfasalazine    Osteoporosis Historical Synopsis     Height loss since age 26 (at least two inches): 0.5  Fracture history includes: yes (ankle, left foot)  Family history of hip fracture: no  Fall Risk: no     Daily calcium intake is 1200 mg  Daily vitamin D intake is 800 IU     Smoking history: no  Alcohol consumption: no  Prednisone history: no     Exercise: yes     Previous work-up for osteoporosis includes the following:  DEXA Scan: 9/12/2017  Vitamin 25OH D level: 40.8 (9/08/2020)  PTH: 44 (9/08/2020)  TSH: 2.090 (9/08/2020)     Therapy History includes:  Current osteoporosis therapy includes: Prolia 60 mg every 6 months (6/29/2020 to present)  Prior osteoporosis therapy includes: alendronate 70 mg every Sunday (11/2017 to 6/04/2020)  The following osteoporosis therapy have been ineffective: alendronate  The following osteoporosis therapy were stopped because of side effects: none    HISTORY OF PRESENT ILLNESS    Ms. Roly Pate returns for a follow up visit. On her last visit, I had previously given her Xeljanz 11 mg XR samples but after around 2 weeks, she developed flu-like symptoms and mouth sores that resovled 5 days after stopping it. She then received her Moderna vaccine and had recurrent symptoms that were treated with a Z-pack. I am uncertain of the relation of Gume Lopez and those symptoms. I therefore asked her to re-challenge on Gume Lopez and to inform me if she has any adverse reactions. She understood and agreed, which she did and had smaller oral ulcers which were not as severe as the first time. She felt better on Gume Lopez. She ran out of her samples. She did receive her second dose of COVID.     Today, she feels okay. She has pain and stiffness in her wrist and knees lasting several hours. She felt Laci David helped. She did physical therapy for back which has helped. Most recent toxicity monitoring by blood work was done on 12/29/2020 and did not reveal any significant adverse effects. Most recent inflammatory markers from 9/19/2019 revealed a ESR 13 mm/hr and CRP 3 mg/L. I reviewed the patient's interval medical history, surgical history, medications, and allergies. She continues to work as a . The patient has had an infection but interval hospital admissions or surgeries. REVIEW OF SYSTEMS    A comprehensive review of systems was performed and pertinent results are documented in the HPI, review of systems is otherwise non-contributory. PAST MEDICAL HISTORY    She has a past medical history of Allergic rhinitis, cause unspecified (5/26/2010), Asthma (5/26/2010), Depression (5/26/2010), Encounter for long-term (current) use of other medications (7/11/2011), Essential hypertension, benign (5/26/2010), Hypertension, OA (osteoarthritis) (5/26/2010), [facility] and Unspecified hypothyroidism (5/26/2010). FAMILY HISTORY    Her family history includes Cancer in her sister; Heart Disease in her father, mother, and sister; Stroke in her sister. SOCIAL HISTORY    She reports that she has never smoked. She has never used smokeless tobacco. She reports that she does not drink alcohol or use drugs. MEDICATIONS    Current Outpatient Medications   Medication Sig    upadacitinib (Rinvoq) 15 mg Tb24 Take 15 mg by mouth daily. Indications: rheumatoid arthritis    azithromycin (ZITHROMAX) 250 mg tablet Take 2 tablets today, then take 1 tablet daily    denosumab (Prolia) 60 mg/mL injection 60 mg by SubCUTAneous route.  ergocalciferol (ERGOCALCIFEROL) 1,250 mcg (50,000 unit) capsule Take 1 Cap by mouth every seven (7) days.  Indications: vitamin D deficiency (high dose therapy)    diclofenac (VOLTAREN) 1 % gel Apply  to affected area four (4) times daily.  acyclovir (ZOVIRAX) 400 mg tablet as needed.  Calcium-Cholecalciferol, D3, (CALCIUM 600 WITH VITAMIN D3) 600 mg(1,500mg) -400 unit cap Take  by mouth.  fluticasone (FLONASE) 50 mcg/actuation nasal spray PLACE TWO SPRAYS IN EACH NOSTRIL ONCE DAILY    DULERA 200-5 mcg/actuation HFA inhaler 2 Puffs two (2) times a day.  FEXOFENADINE HCL (ALLEGRA PO) Take  by mouth daily.  albuterol (PROVENTIL HFA, VENTOLIN HFA) 90 mcg/actuation inhaler Take 1 Puff by inhalation every six (6) hours as needed for Wheezing.  levothyroxine (SYNTHROID) 50 mcg tablet Take  by mouth daily (before breakfast).  amlodipine (NORVASC) 5 mg tablet Take 5 mg by mouth daily. No current facility-administered medications for this visit. ALLERGIES    Allergies   Allergen Reactions    Humira [Adalimumab] Hives    Leflunomide Swelling    Penicillins Unable to Obtain    Sulfa (Sulfonamide Antibiotics) Unknown (comments)       PHYSICAL EXAMINATION    There were no vitals taken for this visit. There is no height or weight on file to calculate BMI. Chest:  Breathing comfortably at room air    Skin:    Exam deferred  Previous exam    Psoriasis:     no  Nail Pitting:     no  Onycholysis:     no  Palmoplantar pustulosis:   no  Acne fulminans:    no  Acne conglobata:    no  Hidradenitis Suppurativa:   no  Dissecting cellulitis of the scalp:  no  Pilonidal sinus:    no  Erythema nodosum:    no    Musculoskeletal:  A comprehensive musculoskeletal exam was NOT performed for all joints of each upper and lower extremity and assessed for swelling, tenderness and range of motion. Exam deferred  Previous exam    Bilateral arm, forearm, thigh, and calf allodynia on MCPs, PIPs  Bilateral Sharmila and Heberden nodes. Bilateral greater trochanter tenderness  Right IT band tenderness  Bilateral knee crepitus without effusion.   Bilateral Achilles tenderness in the body not insertion  Bilateral ankle tenderness with swelling (RESOLVED)  Bilateral MTP tenderness with swelling (RESOLVED)    Costochondritis:  no   Synovitis:   Yes (see below table)  Dactylitis:   no  Enthesitis:   no    Joint Count 9/2/2020 6/4/2020 9/19/2019 6/6/2019 3/7/2019 11/7/2018 8/1/2018   Patient pain (0-100) 55 - - - - - -   MHAQ 1 - - - - - -   Left shoulder - Tender - - - - 1 - -   Left shoulder - Swollen - - - - 0 - -   Left elbow - Tender - - - 1 1 - -   Left elbow - Swollen - - - 0 0 - -   Left wrist- Tender 1 1 1 1 1 1 1   Left wrist- Swollen 1 1 1 1 1 1 1   Left 1st MCP - Tender 1 1 1 1 1 1 1   Left 1st MCP - Swollen 0 1 1 1 1 - 1   Left 2nd MCP - Tender 1 0 1 1 0 1 1   Left 2nd MCP - Swollen 0 1 1 1 1 1 1   Left 3rd MCP - Tender 1 0 - 1 - 1 1   Left 3rd MCP - Swollen 0 1 - 1 - - 1   Left 4th MCP - Tender 1 1 - - - - 1   Left 4th MCP - Swollen 0 1 - - - - -   Left 5th MCP - Tender 1 1 1 - - - 1   Left 5th MCP - Swollen 0 - 1 - - - 1   Left thumb IP - Tender 1 - 1 - 1 1 1   Left thumb IP - Swollen 0 - 0 - 1 0 0   Left 2nd PIP - Tender 1 - 1 1 1 1 -   Left 2nd PIP - Swollen 0 - 1 1 1 0 -   Left 3rd PIP - Tender 1 1 1 1 - - -   Left 3rd PIP - Swollen 0 1 1 1 - - -   Left 4th PIP - Tender 1 - 1 - - - -   Left 4th PIP - Swollen 0 - 0 - - - -   Left 5th PIP - Tender 1 - 1 - - 1 -   Left 5th PIP - Swollen 0 - 0 - - 0 -   Right shoulder - Tender - - - - 1 - -   Right shoulder - Swollen - - - - 0 - -   Right elbow - Tender - - - 1 - - -   Right elbow - Swollen - - - 0 - - -   Right wrist- Tender 1 1 1 1 1 1 1   Right wrist- Swollen 1 1 1 1 1 1 1   Right 1st MCP - Tender 1 - 1 - 0 - 1   Right 1st MCP - Swollen 1 - 1 - 1 - 1   Right 2nd MCP - Tender 1 0 1 1 1 1 1   Right 2nd MCP - Swollen 0 1 0 1 1 1 1   Right 3rd MCP - Tender 1 1 1 - 1 - 1   Right 3rd MCP - Swollen 0 1 0 - 1 - 1   Right 4th MCP - Tender 1 1 1 - - - 1   Right 4th MCP - Swollen 0 0 0 - - - -   Right 5th MCP - Tender 1 - 1 0 0 - 1   Right 5th MCP - Swollen 1 - 1 1 1 - 1   Right thumb IP - Tender - - - - - 1 -   Right 2nd PIP - Tender 1 1 1 - - 1 -   Right 2nd PIP - Swollen 0 0 1 - - - 1   Right 3rd PIP - Tender 1 - 1 - - - -   Right 3rd PIP - Swollen 0 - 1 - - - 1   Right 4th PIP - Tender 1 1 - 1 1 1 -   Right 4th PIP - Swollen 0 0 - 0 0 - 1   Right 5th PIP - Tender 1 1 1 - - 1 -   Right 5th PIP - Swollen 0 0 1 - - - -   Tender Joint Count (Total) 21 11 18 11 11 13 13   Swollen Joint Count (Total) 4 9 12 9 10 4 13   Physician Assessment (0-10) - - - - - - -   Patient Assessment (0-10) 3 - - - - - -   CDAI Total (calculated) - - - - - - -     DATA REVIEW    Laboratory     Recent laboratory results were reviewed, summarized, and discussed with the patient. Imaging    Musculoskeletal Ultrasound    None    Radiographs    Sacroiliac Joint 8/25/2017: There is bony ankylosis of the left SI joint unchanged. There is probable ankylosis of the right SI joint as well. The right SI joint is at least narrowed. Bone mineral density is decreased without fracture or bone destruction    Bilateral Foot 8/25/2017: LEFT: No fracture or dislocation on plain film. There is narrowing of the first MTP joint with minimal spurring. No joint space erosion or periosteal reaction. Alignment is within normal limits. Bone mineralization is decreased. No soft tissue calcification. RIGHT: No fracture or dislocation on plain film. There is metatarsus primus varus with hallux valgus deformity. There is joint space loss and spurring first MTP joint. No joint space erosion or periosteal reaction. Alignment is within normal limits. Bone mineralization is decreased. No soft tissue calcification. Chest 3/09/2017: normal heart size. There is no acute process in the lung fields. The osseous structures are unremarkable. Bilateral Hand 1/20/2017: LEFT: No fracture or dislocation on plain film.  There are scattered mild degenerative changes of the interphalangeal joints. There is moderate degeneration of the first [facility] joint and mild degeneration of the first MCP joint. Minimal degenerative changes of the third and fourth MCP joints. There is widening of the scapholunate interval compatible with chronic scapholunate ligament tear. No joint space erosion or periosteal reaction. Alignment is within normal limits. Bone mineralization is decreased. No soft tissue calcification. RIGHT: No fracture or dislocation on plain film. There are scattered mild degenerative changes in the interphalangeal joints and first MCP joint. There is narrowing of the lateral margin radiocarpal joint There is widening of the scapholunate interval compatible with chronic scapholunate ligament tear. No joint space erosion or periosteal reaction. Alignment is within normal limits. Bone mineralization is decreased. No soft tissue calcification. Left Hip 7/15/2015: no fracture, dislocation or other acute abnormality.  There appears to be fusion of the left sacroiliac joint and possibly the right sacroiliac joint. Spurring is noted at the symphysis. Lumbar Spine 7/15/2015: the patient is leaning to the right. There is a 2 mm retrolisthesis of L1  relative to L2. Bilateral facet arthropathy is the dominant feature at the  lower 3 levels. Bilateral laminectomies have been performed at L5-S1. Vertebral body heights spaces are well-preserved.  There is no fracture. CT Imaging    None    MR Imaging    MRI Lumbar Spine without contrast 9/23/2020: Borderline congenitally slender lumbar spinal canal measures 15 mm in diameter at L3. Grade 1 retrolisthesis of L1 on L2 measures 3 mm. Vertebral body heights are maintained. Marrow signal is normal. Moderate disc desiccation at L1-L2. Mild disc desiccation at the other levels. No discitis. The conus medullaris terminates at L1-L2. Signal and caliber of the distal spinal cord are within normal limits.  There appear to be bilateral parapelvic renal cysts. Moderate atrophy of the paraspinal musculature. Lower thoracic spine: Mild central spinal canal stenosis at T11-T12 and T12-L1. L1-L2: Diffuse disc bulge, facet arthrosis, and thickened ligamentum flavum. Moderate central spinal canal stenosis. Mild bilateral foraminal stenosis. L2-L3: Diffuse disc bulge, facet arthrosis, and thickened ligamentum flavum. Mild central spinal canal stenosis. L3-L4: Diffuse disc bulge, facet arthrosis, and thickened ligamentum flavum. Moderate central spinal canal stenosis. L4-L5: Diffuse disc bulge, facet arthrosis, and thickened ligamentum flavum. Mild central spinal canal stenosis. L5-S1: No herniation or stenosis. Facet arthrosis and facet joint effusions. MRI Pelvis without contrast 9/20/2017: There is normal bone signal. No para-articular edema-like signal is shown nor is there demonstration of substantial joint effusion. There is ankylosis of the inferior left sacroiliac joint without demonstration of substantial osteophyte formation. The inferior right SI joint is substantially narrowed with perhaps a small area of ankylosis inferiorly.   There is minimal-mild superolateral joint space narrowing of the hip joints bilaterally with tiny marginal osteophytes. Moderate degenerative changes in the lower lumbar spine are shown with disc space narrowing through L4-5 as well as bilateral facet osteoarthrosis without ankylosis at L5-S1. No soft tissue mass is demonstrated. Muscle signal, size and contour appear normal. There is moderate insertional tendinopathy of the gluteus medius bilaterally with partial-thickness tearing in tiny bursal effusions. Ultrasound    Retroperitoneum 10/11/2017: RIGHT KIDNEY: The right kidney has normal echogenicity with no mass, stone or hydronephrosis. The right kidney measures 9.1 cm in length. LEFT KIDNEY: The left kidney has normal echogenicity with  no mass, stone or hydronephrosis. There may be mild caliectasis.  The left kidney measures 8.7 cm in length. RETROPERITONEUM: The aorta is atherosclerotic and tapers normally. The aortic bifurcation is normal. The IVC is not visualized due to body habitus and bowel gas. No retroperitoneal mass is identified. BLADDER: The urinary bladder is incompletely distended. DXA     DXA 9/12/2017: (excluded Lumbar spine due to degenerative changes) left femoral neck T score: -2.1 (0.749 g/cm2), left total hip T score: -1.9 (0.763 g/cm2), right femoral neck T score: -2.4 (0.709 g/cm2), right total hip T score: -2.1 (0.746 g/cm2), and distal one third left radius T score -3.0 (BMD 0.614 g/cm2). FRAX score 17.8 % probability in 10 years for major osteoporotic fracture and 5.8 % 10 year probability of hip fracture. ASSESSMENT AND PLAN    This is a follow up visit for Ms. Sigrid Srivastava. 1) HLA-B27 Positive Ankylosing Spondylitis with Seronegative Rheumatoid Arthritis. (positive HLA-B27, sacroiliitis, inflammatory arthritis). She reports a history of Rheumatoid Arthritis that was treated with gold, which she thinks led her to remission until 2016. She had developed sore pain and Polymyalgia Rheumatica-like symptoms involving her hip girdle in the fall of 2016. Her hip radiograph on 7/15/2015 showed ankylosis of the left sacroiliac joint. A repeat radiograph of SI joints 8/25/2017 confirmed left sacroiliac ankylosis and probable right sided involvement. MRI pelvis showed left SI joint ankylosis. Partial ankylosis of right SI joint. Her labs showed a positive HLA-B27. She had peripheral synovitis. She has CKD stage 3, so NSAIDs and methotrexate should be avoided. She had been on Cosentyx and Orencia 125 mg SubQ with good tolerance but did not feel any improvement. I gave her Gerveda Neer 11 mg XR samples but developed flu-like symptoms and mouth sores that resovled 5 days after stopping it. I asked her to re-challenge it and she had recurrence of oral ulcers. Her joint symptoms improved while on it.     I will give her samples of Rinvoq and will have her follow up in a month. 2) Polymyalgia Rheumatica. This is secondary to #1. 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 Her DXA 9/12/2017 showed T score distal radius -3.0 and right femoral neck -2.4. She began Fosamax 70 mg every Sunday 11/2017 and but fractured her foot in 2/2020, so I discontinued it and started Prolia 60 mg every 6 months, which she received with good tolerance. Her most recent dose was 12/29/2020. 4) Long Term Use of Immunosuppressants. The patient will initiate on high risk immunomodulatory medications (Rinvoq) and requires frequent toxicity monitoring by blood work to evaluate for toxicities. Respective labs were ordered (see below orders for details). 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 Her most recent labs 12/29/2020 show creatinine 0.87 mg/dL and eGFR >60.    6) Right Trochanteric Bursitis with IT Band Syndrome. I had referred her to physical therapy previously with benefit. 7) Bilateral Knee Osteoarthritis. This was an active issue today. 8) Neurogenic Claudication. MRI lumbar spine showed L1-L2 and L3-L4 moderate central spinal canal stenosis. I had referred her to physical therapy with instructions of seeing Dr. Mame Watt if no improvement. She has done physical therapy which has helped. The patient voiced understanding of the aforementioned assessment and plan. The patient has consented for synchronous (real-time) Telemedicine (audio technology) on 3/11/2021 for their care to be delivered over telemedicine in place of their regularly scheduled office visit pursuant to the emergency declaration under the [facility] waiver authority and the [employer] Act, this Virtual  Visit was conducted, with patient's consent, to reduce the patient's risk of exposure to COVID-19 and provide continuity of care for an established patient.      A total of 38 minutes was spent on this visit, reviewing interval notes, interval testing results, ordering tests, refilling medications, documenting the findings in the note, patient education, counseling, and coordination of care as described above. All questions asked and answered. Services were provided through an audio synchronous discussion virtually to substitute for in-person clinic visit.     TODAY'S ORDERS    Orders Placed This Encounter    QUANTIFERON-TB GOLD PLUS    CBC WITH AUTOMATED DIFF    METABOLIC PANEL, COMPREHENSIVE    C REACTIVE PROTEIN, QT    SED RATE (ESR)    CHRONIC HEPATITIS PANEL    upadacitinib (Rinvoq) 15 mg Tb24     Future Appointments   Date Time Provider Andrea Ga   3/15/2021  2:00 PM LAB ONLY PAFP BS AMB   4/27/2021  1:20  [address] [facility] RLMAMMO LABURNUM IM   4/28/2021  1:40 PM Maeve Copeland MD AOCR BS AMB   6/3/2021 11:40 AM Yecenia Le MD [facility] BS AMB   6/29/2021  2:00 PM A3 [facility]S [facility]   12/28/2021  2:00 PM A3 [facility]EDS [facility]     Kylah Riojas MD, [address]    Adult Rheumatology   Rheumatology Ultrasound Certified  [facility]  A Part of [facility], [address] [place] [address]   Phone [phone]  Fax [phone]

## 2021-03-11 NOTE — Clinical Note
Take your medicines every day, as directed    Changes made today:  o Take metolazone 2.5 today  o Take an extra 30 meq of KCl today because you are taking metolazone   o We will call you on Monday to talk about your weights and swelling  o Will send eliquis to [facility]- you need to take 5 mg twice a day. As we discussed, if you hit your head, you need to seek medical attention. If you notice blood in your stool you need to seek medical attention. If you have a nosebleed lasting longer than 20 minutes you need to seek medical attention. You should also be seen for any other bleeding that is concerning to you.     Monitor Your Weight and Symptoms    Contact us if you:      Gain 2 pounds in one day or 5 pounds in one week    Feel more short of breath    Notice more leg swelling    Feel lightheadeded   Change your lifestyle    Limit Salt or Sodium:    2000 mg  Limit Fluids:    2000 mL or approximately 64 ounces  Eat a Heart Healthy Diet    Low in saturated fats  Stay Active:    Aim to move at least 150 minutes every  week         To Contact us    During Business Hours:  [phone], option # 1 (University)  Then option # 4 (medical questions)     After hours, weekends or holidays:   [phone], Option #4  Ask to speak to the On-Call Cardiologist. Inform them you are a CORE/heart failure patient at the [place].     Use [employer] allows you to communicate directly with your heart team through secure messaging.    [employer] can be accessed any time on your phone, computer, or tablet.    If you need assistance, we'd be happy to help!         Keep your Heart Appointments:    - We will call you on Monday  - Labs on Monday  - Lets plan on seeing you in 3 weeks, but we can adjust that based on the phone call on Monday  - Dr. Cohen in 6 weeks (already scheduled)          VV 4/28/2021 @ 1:40pm

## 2021-03-15 ENCOUNTER — LAB ONLY (OUTPATIENT)
Dept: FAMILY MEDICINE CLINIC | Age: 81
End: 2021-03-15

## 2021-03-15 DIAGNOSIS — M06.09 SERONEGATIVE RHEUMATOID ARTHRITIS OF MULTIPLE SITES (HCC): ICD-10-CM

## 2021-03-15 DIAGNOSIS — Z79.60 LONG-TERM USE OF IMMUNOSUPPRESSANT MEDICATION: ICD-10-CM

## 2021-03-16 LAB
ALBUMIN SERPL-MCNC: 3.6 G/DL (ref 3.5–5)
ALBUMIN/GLOB SERPL: 1.3 {RATIO} (ref 1.1–2.2)
ALP SERPL-CCNC: 86 U/L (ref 45–117)
ALT SERPL-CCNC: 24 U/L (ref 12–78)
ANION GAP SERPL CALC-SCNC: 3 MMOL/L (ref 5–15)
AST SERPL-CCNC: 20 U/L (ref 15–37)
BASOPHILS # BLD: 0 K/UL (ref 0–0.1)
BASOPHILS NFR BLD: 1 % (ref 0–1)
BILIRUB SERPL-MCNC: 0.3 MG/DL (ref 0.2–1)
BUN SERPL-MCNC: 20 MG/DL (ref 6–20)
BUN/CREAT SERPL: 28 (ref 12–20)
CALCIUM SERPL-MCNC: 8.4 MG/DL (ref 8.5–10.1)
CHLORIDE SERPL-SCNC: 113 MMOL/L (ref 97–108)
CO2 SERPL-SCNC: 27 MMOL/L (ref 21–32)
CREAT SERPL-MCNC: 0.72 MG/DL (ref 0.55–1.02)
CRP SERPL-MCNC: 0.31 MG/DL (ref 0–0.6)
DIFFERENTIAL METHOD BLD: ABNORMAL
EOSINOPHIL # BLD: 0.1 K/UL (ref 0–0.4)
EOSINOPHIL NFR BLD: 2 % (ref 0–7)
ERYTHROCYTE [DISTWIDTH] IN BLOOD BY AUTOMATED COUNT: 14.9 % (ref 11.5–14.5)
ERYTHROCYTE [SEDIMENTATION RATE] IN BLOOD: 13 MM/HR (ref 0–30)
GLOBULIN SER CALC-MCNC: 2.8 G/DL (ref 2–4)
GLUCOSE SERPL-MCNC: 123 MG/DL (ref 65–100)
HCT VFR BLD AUTO: 39.2 % (ref 35–47)
HGB BLD-MCNC: 11.8 G/DL (ref 11.5–16)
IMM GRANULOCYTES # BLD AUTO: 0 K/UL (ref 0–0.04)
IMM GRANULOCYTES NFR BLD AUTO: 0 % (ref 0–0.5)
LYMPHOCYTES # BLD: 2 K/UL (ref 0.8–3.5)
LYMPHOCYTES NFR BLD: 29 % (ref 12–49)
MCH RBC QN AUTO: 30.3 PG (ref 26–34)
MCHC RBC AUTO-ENTMCNC: 30.1 G/DL (ref 30–36.5)
MCV RBC AUTO: 100.8 FL (ref 80–99)
MONOCYTES # BLD: 0.6 K/UL (ref 0–1)
MONOCYTES NFR BLD: 8 % (ref 5–13)
NEUTS SEG # BLD: 4.3 K/UL (ref 1.8–8)
NEUTS SEG NFR BLD: 60 % (ref 32–75)
NRBC # BLD: 0 K/UL (ref 0–0.01)
NRBC BLD-RTO: 0 PER 100 WBC
PLATELET # BLD AUTO: 260 K/UL (ref 150–400)
PMV BLD AUTO: 10.6 FL (ref 8.9–12.9)
POTASSIUM SERPL-SCNC: 4.4 MMOL/L (ref 3.5–5.1)
PROT SERPL-MCNC: 6.4 G/DL (ref 6.4–8.2)
RBC # BLD AUTO: 3.89 M/UL (ref 3.8–5.2)
SODIUM SERPL-SCNC: 143 MMOL/L (ref 136–145)
WBC # BLD AUTO: 7 K/UL (ref 3.6–11)

## 2021-03-17 LAB
HBV CORE AB SERPL QL IA: NEGATIVE
HBV CORE IGM SERPL QL IA: NEGATIVE
HBV E AB SERPL QL IA: NEGATIVE
HBV E AG SERPL QL IA: NEGATIVE
HBV SURFACE AB SER QL: NON REACTIVE INDEX VALUE
HBV SURFACE AG SERPL QL IA: NEGATIVE
HCV AB S/CO SERPL IA: <0.1 S/CO RATIO (ref 0–0.9)

## 2021-04-27 ENCOUNTER — HOSPITAL ENCOUNTER (OUTPATIENT)
Dept: MAMMOGRAPHY | Age: 81
Discharge: HOME | End: 2021-04-27
Attending: FAMILY MEDICINE
Payer: MEDICARE

## 2021-04-27 DIAGNOSIS — Z12.31 ENCOUNTER FOR SCREENING MAMMOGRAM FOR BREAST CANCER: ICD-10-CM

## 2021-04-27 PROCEDURE — 77067 SCR MAMMO BI INCL CAD: CPT

## 2021-05-05 ENCOUNTER — OFFICE VISIT (OUTPATIENT)
Dept: RHEUMATOLOGY | Age: 81
End: 2021-05-05
Payer: MEDICARE

## 2021-05-05 VITALS
RESPIRATION RATE: 18 BRPM | WEIGHT: 158 LBS | SYSTOLIC BLOOD PRESSURE: 82 MMHG | TEMPERATURE: 97.8 F | DIASTOLIC BLOOD PRESSURE: 51 MMHG | BODY MASS INDEX: 26.29 KG/M2 | HEART RATE: 79 BPM

## 2021-05-05 DIAGNOSIS — Z79.60 LONG-TERM USE OF IMMUNOSUPPRESSANT MEDICATION: ICD-10-CM

## 2021-05-05 DIAGNOSIS — M06.09 SERONEGATIVE RHEUMATOID ARTHRITIS OF MULTIPLE SITES (HCC): Primary | ICD-10-CM

## 2021-05-05 PROCEDURE — 1090F PRES/ABSN URINE INCON ASSESS: CPT | Performed by: INTERNAL MEDICINE

## 2021-05-05 PROCEDURE — G8752 SYS BP LESS 140: HCPCS | Performed by: INTERNAL MEDICINE

## 2021-05-05 PROCEDURE — G8427 DOCREV CUR MEDS BY ELIG CLIN: HCPCS | Performed by: INTERNAL MEDICINE

## 2021-05-05 PROCEDURE — G0463 HOSPITAL OUTPT CLINIC VISIT: HCPCS | Performed by: INTERNAL MEDICINE

## 2021-05-05 PROCEDURE — G8536 NO DOC ELDER MAL SCRN: HCPCS | Performed by: INTERNAL MEDICINE

## 2021-05-05 PROCEDURE — G8419 CALC BMI OUT NRM PARAM NOF/U: HCPCS | Performed by: INTERNAL MEDICINE

## 2021-05-05 PROCEDURE — 99215 OFFICE O/P EST HI 40 MIN: CPT | Performed by: INTERNAL MEDICINE

## 2021-05-05 PROCEDURE — G9717 DOC PT DX DEP/BP F/U NT REQ: HCPCS | Performed by: INTERNAL MEDICINE

## 2021-05-05 PROCEDURE — G8754 DIAS BP LESS 90: HCPCS | Performed by: INTERNAL MEDICINE

## 2021-05-05 PROCEDURE — 1101F PT FALLS ASSESS-DOCD LE1/YR: CPT | Performed by: INTERNAL MEDICINE

## 2021-05-05 RX ORDER — TOFACITINIB 5 MG/1
5 TABLET, FILM COATED ORAL ONCE
Qty: 60 TAB | Refills: 0 | Status: SHIPPED | COMMUNITY
Start: 2021-05-05 | End: 2021-05-05

## 2021-05-05 NOTE — PROGRESS NOTES
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    Adult Rheumatology   Rheumatology Ultrasound Certified  [facility]  A Part of [facility], [address] [place] [address]   Phone [phone]  Fax [phone]

## 2021-05-05 NOTE — LETTER
5/5/2021 Patient: Marisol Choi YOB: 1940 Date of Visit: 5/5/2021 Nanette Quinones MD 
[address] [place] [id number] [address] [employer] Dear Nanette Quinones MD, Thank you for referring Ms. Pam Dubose to [facility] for evaluation. My notes for this consultation are attached. If you have questions, please do not hesitate to call me. I look forward to following your patient along with you.  
 
 
Sincerely, 
 
Regina Parisi MD

## 2021-05-14 LAB
GAMMA INTERFERON BACKGROUND BLD IA-ACNC: 0.02 IU/ML
M TB IFN-G BLD-IMP: NEGATIVE
M TB IFN-G CD4+ BCKGRND COR BLD-ACNC: 0 IU/ML
MITOGEN IGNF BLD-ACNC: >10 IU/ML
QUANTIFERON INCUBATION, QF1T: NORMAL
QUANTIFERON TB2 AG: 0.03 IU/ML
SERVICE CMNT-IMP: NORMAL

## 2021-06-29 ENCOUNTER — HOSPITAL ENCOUNTER (OUTPATIENT)
Dept: INFUSION THERAPY | Age: 81
Discharge: HOME | End: 2021-06-29
Payer: MEDICARE

## 2021-06-29 VITALS
SYSTOLIC BLOOD PRESSURE: 143 MMHG | OXYGEN SATURATION: 97 % | RESPIRATION RATE: 18 BRPM | DIASTOLIC BLOOD PRESSURE: 73 MMHG | HEART RATE: 58 BPM | TEMPERATURE: 98.1 F

## 2021-06-29 LAB
ALBUMIN SERPL-MCNC: 3.5 G/DL (ref 3.5–5)
ANION GAP SERPL CALC-SCNC: 6 MMOL/L (ref 5–15)
BUN SERPL-MCNC: 27 MG/DL (ref 6–20)
BUN/CREAT SERPL: 38 (ref 12–20)
CALCIUM SERPL-MCNC: 8.7 MG/DL (ref 8.5–10.1)
CHLORIDE SERPL-SCNC: 108 MMOL/L (ref 97–108)
CO2 SERPL-SCNC: 25 MMOL/L (ref 21–32)
CREAT SERPL-MCNC: 0.71 MG/DL (ref 0.55–1.02)
GLUCOSE SERPL-MCNC: 114 MG/DL (ref 65–100)
PHOSPHATE SERPL-MCNC: 3.8 MG/DL (ref 2.6–4.7)
POTASSIUM SERPL-SCNC: 4.2 MMOL/L (ref 3.5–5.1)
SODIUM SERPL-SCNC: 139 MMOL/L (ref 136–145)

## 2021-06-29 PROCEDURE — 74011250636 HC RX REV CODE- 250/636: Performed by: INTERNAL MEDICINE

## 2021-06-29 PROCEDURE — 80069 RENAL FUNCTION PANEL: CPT

## 2021-06-29 PROCEDURE — 96372 THER/PROPH/DIAG INJ SC/IM: CPT

## 2021-06-29 PROCEDURE — 36415 COLL VENOUS BLD VENIPUNCTURE: CPT

## 2021-06-29 RX ADMIN — DENOSUMAB 60 MG: 60 INJECTION SUBCUTANEOUS at 14:55

## 2021-06-29 NOTE — PROGRESS NOTES
[address] [facility] VISIT NOTE     3018 Patient arrives for Prolia Q 6 Months without acute problems. Please see [facility] for complete assessment and education provided. Vital signs stable throughout and prior to discharge, Pt. Tolerated treatment well and discharged without incident. Patient is aware of next [facility] appointment on 12/28/2021. Appointment card given to patient. The patient/parent denies any symptoms of COVID (SOB, coughing, fever, or generally not feeling well), denies any known exposure to COVID-19 recently, and denies any known recent contact with family/friend that has a pending COVID test.      Medications Verified by Phillip Obrien RN via [employer]:  1.  Prolia 60 mg SQ via Left Arm     VITAL SIGNS  Patient Vitals for the past 12 hrs:   Temp Pulse Resp BP SpO2   06/29/21 1346 98.1 °F (36.7 °C) (!) 58 18 (!) 143/73 97 %     LAB WORK  Recent Results (from the past 12 hour(s))   RENAL FUNCTION PANEL    Collection Time: 06/29/21  1:54 PM   Result Value Ref Range    Sodium 139 136 - 145 mmol/L    Potassium 4.2 3.5 - 5.1 mmol/L    Chloride 108 97 - 108 mmol/L    CO2 25 21 - 32 mmol/L    Anion gap 6 5 - 15 mmol/L    Glucose 114 (H) 65 - 100 mg/dL    BUN 27 (H) 6 - 20 MG/DL    Creatinine 0.71 0.55 - 1.02 MG/DL    BUN/Creatinine ratio 38 (H) 12 - 20      GFR est AA >60 >60 ml/min/1.73m2    GFR est non-AA >60 >60 ml/min/1.73m2    Calcium 8.7 8.5 - 10.1 MG/DL    Phosphorus 3.8 2.6 - 4.7 MG/DL    Albumin 3.5 3.5 - 5.0 g/dL

## 2021-08-19 ENCOUNTER — TELEPHONE (OUTPATIENT)
Dept: FAMILY MEDICINE CLINIC | Age: 81
End: 2021-08-19

## 2021-08-20 ENCOUNTER — OFFICE VISIT (OUTPATIENT)
Dept: FAMILY MEDICINE CLINIC | Age: 81
End: 2021-08-20
Payer: MEDICARE

## 2021-08-20 VITALS
SYSTOLIC BLOOD PRESSURE: 130 MMHG | HEART RATE: 49 BPM | HEIGHT: 65 IN | BODY MASS INDEX: 27.22 KG/M2 | DIASTOLIC BLOOD PRESSURE: 62 MMHG | OXYGEN SATURATION: 97 % | WEIGHT: 163.4 LBS | RESPIRATION RATE: 16 BRPM | TEMPERATURE: 98.5 F

## 2021-08-20 DIAGNOSIS — N30.90 CYSTITIS: Primary | ICD-10-CM

## 2021-08-20 DIAGNOSIS — T50.905A ADVERSE EFFECT OF DRUG, INITIAL ENCOUNTER: ICD-10-CM

## 2021-08-20 PROCEDURE — G8752 SYS BP LESS 140: HCPCS | Performed by: FAMILY MEDICINE

## 2021-08-20 PROCEDURE — G9717 DOC PT DX DEP/BP F/U NT REQ: HCPCS | Performed by: FAMILY MEDICINE

## 2021-08-20 PROCEDURE — G8536 NO DOC ELDER MAL SCRN: HCPCS | Performed by: FAMILY MEDICINE

## 2021-08-20 PROCEDURE — G8427 DOCREV CUR MEDS BY ELIG CLIN: HCPCS | Performed by: FAMILY MEDICINE

## 2021-08-20 PROCEDURE — G8419 CALC BMI OUT NRM PARAM NOF/U: HCPCS | Performed by: FAMILY MEDICINE

## 2021-08-20 PROCEDURE — 1101F PT FALLS ASSESS-DOCD LE1/YR: CPT | Performed by: FAMILY MEDICINE

## 2021-08-20 PROCEDURE — 1090F PRES/ABSN URINE INCON ASSESS: CPT | Performed by: FAMILY MEDICINE

## 2021-08-20 PROCEDURE — 99213 OFFICE O/P EST LOW 20 MIN: CPT | Performed by: FAMILY MEDICINE

## 2021-08-20 PROCEDURE — G0463 HOSPITAL OUTPT CLINIC VISIT: HCPCS | Performed by: FAMILY MEDICINE

## 2021-08-20 PROCEDURE — G8754 DIAS BP LESS 90: HCPCS | Performed by: FAMILY MEDICINE

## 2021-08-20 RX ORDER — TOFACITINIB 5 MG/1
5 TABLET, FILM COATED ORAL ONCE
COMMUNITY
End: 2021-09-02 | Stop reason: SINTOL

## 2021-08-20 RX ORDER — NITROFURANTOIN 25; 75 MG/1; MG/1
100 CAPSULE ORAL 2 TIMES DAILY
Qty: 6 CAPSULE | Refills: 0 | Status: SHIPPED | OUTPATIENT
Start: 2021-08-20 | End: 2021-08-23 | Stop reason: SDUPTHER

## 2021-08-20 NOTE — PROGRESS NOTES
HISTORY OF PRESENT ILLNESS  [place]anto is a 79 y.o. female. 1 week hx of circumoral and oral painful rash,and rash exteral genetalia. Having some dysuria and hesitency. She feels rash due to Rosemary Blanka for RA  Rash   The history is provided by the patient. This is a new problem. The current episode started more than 1 week ago. The problem has been gradually improving. The problem is associated with medication. The rash is present on the face, lips and genitalia. The pain is at a severity of 3/10. The pain is moderate. The pain has been fluctuating since onset. Associated symptoms include itching. Dysuria  The history is provided by the patient. This is a new problem. The current episode started more than 2 days ago. The problem occurs daily. The problem has not changed since onset. Pertinent negatives include no chest pain. Review of Systems   Respiratory: Negative for cough. Cardiovascular: Negative for chest pain, palpitations and orthopnea. Genitourinary: Positive for difficulty urinating and dysuria. Musculoskeletal: Positive for joint pain. Skin: Positive for itching and rash. Physical Exam  Constitutional:       Appearance: Normal appearance. HENT:      Head: Normocephalic and atraumatic. Right Ear: Tympanic membrane normal.      Left Ear: Tympanic membrane normal.      Nose: Nose normal. No congestion or rhinorrhea. Mouth/Throat:      Mouth: Mucous membranes are moist.      Pharynx: Oropharynx is clear. No oropharyngeal exudate or posterior oropharyngeal erythema. Eyes:      Pupils: Pupils are equal, round, and reactive to light. Skin:     General: Skin is warm and dry. Findings: No erythema or rash. Neurological:      Mental Status: She is alert. ASSESSMENT and PLAN  Diagnoses and all orders for this visit:    1. Cystitis  -     AMB POC URINALYSIS DIP STICK AUTO W/O MICRO  -     CULTURE, URINE;  Future  -     nitrofurantoin, macrocrystal-monohydrate, (Macrobid) 100 mg capsule; Take 1 Capsule by mouth two (2) times a day. 2. Adverse effect of drug, initial encounter,likely to [facility]      Follow-up and Dispositions    · Return if symptoms worsen or fail to improve.

## 2021-08-22 LAB
BACTERIA SPEC CULT: NORMAL
CC UR VC: NORMAL
SERVICE CMNT-IMP: NORMAL

## 2021-08-23 DIAGNOSIS — N30.90 CYSTITIS: ICD-10-CM

## 2021-08-23 RX ORDER — CHLORPHENIRAMINE MALEATE 4 MG
TABLET ORAL
Qty: 28 G | Refills: 1 | Status: SHIPPED | OUTPATIENT
Start: 2021-08-23

## 2021-08-23 RX ORDER — NITROFURANTOIN 25; 75 MG/1; MG/1
100 CAPSULE ORAL 2 TIMES DAILY
Qty: 6 CAPSULE | Refills: 0 | Status: SHIPPED | OUTPATIENT
Start: 2021-08-23 | End: 2022-07-14 | Stop reason: ALTCHOICE

## 2021-09-01 ENCOUNTER — OFFICE VISIT (OUTPATIENT)
Dept: RHEUMATOLOGY | Age: 81
End: 2021-09-01
Payer: MEDICARE

## 2021-09-01 VITALS
HEART RATE: 56 BPM | BODY MASS INDEX: 26.63 KG/M2 | RESPIRATION RATE: 18 BRPM | TEMPERATURE: 97.6 F | WEIGHT: 160 LBS | DIASTOLIC BLOOD PRESSURE: 68 MMHG | SYSTOLIC BLOOD PRESSURE: 138 MMHG

## 2021-09-01 DIAGNOSIS — M06.09 SERONEGATIVE RHEUMATOID ARTHRITIS OF MULTIPLE SITES (HCC): Primary | ICD-10-CM

## 2021-09-01 DIAGNOSIS — M81.0 AGE-RELATED OSTEOPOROSIS WITHOUT CURRENT PATHOLOGICAL FRACTURE: ICD-10-CM

## 2021-09-01 PROCEDURE — G8427 DOCREV CUR MEDS BY ELIG CLIN: HCPCS | Performed by: INTERNAL MEDICINE

## 2021-09-01 PROCEDURE — G8754 DIAS BP LESS 90: HCPCS | Performed by: INTERNAL MEDICINE

## 2021-09-01 PROCEDURE — 1090F PRES/ABSN URINE INCON ASSESS: CPT | Performed by: INTERNAL MEDICINE

## 2021-09-01 PROCEDURE — G0463 HOSPITAL OUTPT CLINIC VISIT: HCPCS | Performed by: INTERNAL MEDICINE

## 2021-09-01 PROCEDURE — 1101F PT FALLS ASSESS-DOCD LE1/YR: CPT | Performed by: INTERNAL MEDICINE

## 2021-09-01 PROCEDURE — G8419 CALC BMI OUT NRM PARAM NOF/U: HCPCS | Performed by: INTERNAL MEDICINE

## 2021-09-01 PROCEDURE — G8536 NO DOC ELDER MAL SCRN: HCPCS | Performed by: INTERNAL MEDICINE

## 2021-09-01 PROCEDURE — G9717 DOC PT DX DEP/BP F/U NT REQ: HCPCS | Performed by: INTERNAL MEDICINE

## 2021-09-01 PROCEDURE — G8752 SYS BP LESS 140: HCPCS | Performed by: INTERNAL MEDICINE

## 2021-09-01 PROCEDURE — 99215 OFFICE O/P EST HI 40 MIN: CPT | Performed by: INTERNAL MEDICINE

## 2021-09-01 RX ORDER — DIPHENHYDRAMINE HYDROCHLORIDE 50 MG/ML
25 INJECTION, SOLUTION INTRAMUSCULAR; INTRAVENOUS AS NEEDED
Status: CANCELLED
Start: 2021-09-01

## 2021-09-01 RX ORDER — DICLOFENAC SODIUM 10 MG/G
2 GEL TOPICAL 4 TIMES DAILY
Qty: 300 G | Refills: 2 | Status: SHIPPED | OUTPATIENT
Start: 2021-09-01 | End: 2022-03-31 | Stop reason: SDUPTHER

## 2021-09-01 RX ORDER — DIPHENHYDRAMINE HYDROCHLORIDE 50 MG/ML
50 INJECTION, SOLUTION INTRAMUSCULAR; INTRAVENOUS AS NEEDED
Status: CANCELLED
Start: 2021-09-01

## 2021-09-01 RX ORDER — ACETAMINOPHEN 325 MG/1
650 TABLET ORAL AS NEEDED
Status: CANCELLED
Start: 2021-09-01

## 2021-09-01 RX ORDER — ALBUTEROL SULFATE 0.83 MG/ML
2.5 SOLUTION RESPIRATORY (INHALATION) AS NEEDED
Status: CANCELLED
Start: 2021-09-01

## 2021-09-01 RX ORDER — ONDANSETRON 2 MG/ML
8 INJECTION INTRAMUSCULAR; INTRAVENOUS AS NEEDED
Status: CANCELLED | OUTPATIENT
Start: 2021-09-01

## 2021-09-01 RX ORDER — EPINEPHRINE 1 MG/ML
0.3 INJECTION, SOLUTION, CONCENTRATE INTRAVENOUS AS NEEDED
Status: CANCELLED | OUTPATIENT
Start: 2021-09-01

## 2021-09-01 RX ORDER — HYDROCORTISONE SODIUM SUCCINATE 100 MG/2ML
100 INJECTION, POWDER, FOR SOLUTION INTRAMUSCULAR; INTRAVENOUS AS NEEDED
Status: CANCELLED | OUTPATIENT
Start: 2021-09-01

## 2021-09-01 NOTE — LETTER
9/1/2021    Patient: Vargas Welch   YOB: 1940   Date of Visit: 9/1/2021     Elton Hoover, [address] [address]  [address] [place]    Dear Elton Hoover MD,      Thank you for referring Ms. Bing Ho to [facility] for evaluation. My notes for this consultation are attached. If you have questions, please do not hesitate to call me. I look forward to following your patient along with you.       Sincerely,    Ant Richard MD

## 2021-09-01 NOTE — PROGRESS NOTES
REASON FOR VISIT    This is a follow up visit for Ms. Lauren Díaz for    ICD-10-CM   1. Ankylosing spondylitis of sacral region ([facility] M45.8   2. Seronegative rheumatoid arthritis of multiple sites ([facility] M06.09   3.  Age-related osteoporosis without current pathological fracture M81.0     Spondyloarthritis phenotype includes:  Anti-CCP positive: no  Rheumatoid factor positive: no  HLA-B27: yes  Erosive disease: no  Sacroiliitis: yes  Ankylosis: yes (left SI)  Psoriasis: no  Enthesitis: yes (Achilles)  Dactylitis: no  Nail Pitting: no  Onycholysis: no  Extra-articular manifestations include: Polymyalgia Rheumatica   SAPHO: no    Immunosuppression Screening:  Quantiferon TB: negative (5/11/2021)  PPD:  Not performed  Hepatitis B: negative (3/15/2021)  Hepatitis C: negative (3/15/2021)    Therapy History includes:  Current NSAIDs include: none (contraindicated due to CKD stage 3)  Current DMARD therapy include: none  Prior DMARD therapy includes: Gold, leflunomide, Humira (11/2017 to 12/2017), Enbrel 50 mg weekly (5/22/2018 to 9/20/2018), Cosentyx 300 mg (3/07/2019 to 6/04/2020), Orencia 125 mg SubQ every Monday (6/10/2020 to 12/30/2020), Xeljanz 11 mg XR daily (1/04/2021 to 1/25/2021; SAMPLE), Rinvoq 15 mg daily (3/11/2021; SAMPLE), Barnes Tori 5 mg daily (5/05/2021 to 6/2021; 7/2021 to 8/20/2021 but every other day dosing)  The following DMARDs have been ineffective: Orencia   The following DMARDs were stopped because of side effects: Gold (\"pass out\"), leflunomide (swelling), Humira (hives, lip and eyelid swelling), Enbrel (swelling of lips), Xeljanz 11 mg XR (oral ulcers, flu-like symptoms), Rinvoq (oral ulcers, dry mouth sensation), Xeljanz 5 mg daily (oral ulcers)  Contra-Indicated DMARDs because of CKD stage 3: methotrexate   Contra-Indicated DMARDs because sulfa allergy: sulfasalazine    Osteoporosis Historical Synopsis     Height loss since age 26 (at least two inches): 0.5  Fracture history includes: yes (ankle, left foot)  Family history of hip fracture: no  Fall Risk: no     Daily calcium intake is 1200 mg  Daily vitamin D intake is 800 IU     Smoking history: no  Alcohol consumption: no  Prednisone history: no     Exercise: yes     Previous work-up for osteoporosis includes the following:  DEXA Scan: 9/12/2017  Vitamin 25OH D level: 40.8 (9/08/2020)  PTH: 44 (9/08/2020)  TSH: 2.090 (9/08/2020)     Therapy History includes:  Current osteoporosis therapy includes: Prolia 60 mg every 6 months (6/29/2020 to present)  Prior osteoporosis therapy includes: alendronate 70 mg every Sunday (11/2017 to 6/04/2020)  The following osteoporosis therapy have been ineffective: alendronate  The following osteoporosis therapy were stopped because of side effects: none    HISTORY OF PRESENT ILLNESS    Ms. Nila Todd returns for a follow up visit. On her last visit, I gave her a sample of Xeljanz 5 mg daily due to oral ulcers on 11 mg XR and Rinvoq 15 mg daily. She had a oral ulcers and nasal sores, so stopped it it. She resumed it on 7/2021 every other day and then 8/20/2021 she developed UTI and was with antibiotics around 2 weeks ago and has held it since. She also had recurrence of oral ulcers in the beginning of 8/2021. She then resumed took I continued Prolia, which she received on 6/29/2021. She is going to wear a heart monitor and having a stress test.     Today, she feels okay. She felt that NeuroNascent helped. She had less pain and more strength while on it, but noted she had ups and downs. She endorses sores in mouth, easy bruising. She denies fever, weight loss, blurred vision, vision loss, ankle swelling, dry cough, dyspnea, nausea, vomiting, dysphagia, abdominal pain, black or bloody stool, fall since last visit, rash and increased thirst.    Most recent toxicity monitoring by blood work was done on 3/15/2021 and did not reveal any significant adverse effects.     I reviewed the patient's interval medical history, surgical history, medications, and allergies. She continues to work as a . The patient has had a UTI infection but interval hospital admissions or surgeries. REVIEW OF SYSTEMS    A comprehensive review of systems was performed and pertinent results are documented in the HPI, review of systems is otherwise non-contributory. PAST MEDICAL HISTORY    She has a past medical history of Allergic rhinitis, cause unspecified (5/26/2010), Asthma (5/26/2010), Depression (5/26/2010), Encounter for long-term (current) use of other medications (7/11/2011), Essential hypertension, benign (5/26/2010), Hypertension, OA (osteoarthritis) (5/26/2010), Rheumatoid arthritis ([facility] and Unspecified hypothyroidism (5/26/2010). FAMILY HISTORY    Her family history includes Breast Cancer (age of onset: 36) in her niece; Cancer in her sister; Heart Disease in her father, mother, and sister; Ovarian Cancer in her sister; Stroke in her sister. SOCIAL HISTORY    She reports that she has never smoked. She has never used smokeless tobacco. She reports that she does not drink alcohol and does not use drugs. MEDICATIONS    Current Outpatient Medications   Medication Sig    diclofenac (VOLTAREN) 1 % gel Apply 2 g to affected area four (4) times daily.  nitrofurantoin, macrocrystal-monohydrate, (Macrobid) 100 mg capsule Take 1 Capsule by mouth two (2) times a day. (Patient not taking: Reported on 9/1/2021)    clotrimazole (LOTRIMIN) 1 % topical cream Apply  to affected area two (2) times daily as needed for Skin Irritation.  denosumab (Prolia) 60 mg/mL injection 60 mg by SubCUTAneous route.  ergocalciferol (ERGOCALCIFEROL) 1,250 mcg (50,000 unit) capsule Take 1 Cap by mouth every seven (7) days. Indications: vitamin D deficiency (high dose therapy)    Calcium-Cholecalciferol, D3, (CALCIUM 600 WITH VITAMIN D3) 600 mg(1,500mg) -400 unit cap Take  by mouth.     fluticasone (FLONASE) 50 mcg/actuation nasal spray PLACE TWO SPRAYS IN EACH NOSTRIL ONCE DAILY    DULERA 200-5 mcg/actuation HFA inhaler 2 Puffs two (2) times a day.  FEXOFENADINE HCL (ALLEGRA PO) Take  by mouth daily.  albuterol (PROVENTIL HFA, VENTOLIN HFA) 90 mcg/actuation inhaler Take 1 Puff by inhalation every six (6) hours as needed for Wheezing.  levothyroxine (SYNTHROID) 50 mcg tablet Take  by mouth daily (before breakfast).  amlodipine (NORVASC) 5 mg tablet Take 5 mg by mouth daily.  tofacitinib Edgar Sinning) 5 mg tab Take 5 mg by mouth once. Indications: Pt state she stopped taking this medication. (Patient not taking: Reported on 9/1/2021)    upadacitinib (Rinvoq) 15 mg Tb24 Take 15 mg by mouth daily. Indications: rheumatoid arthritis (Patient not taking: Reported on 8/20/2021)    acyclovir (ZOVIRAX) 400 mg tablet as needed. (Patient not taking: Reported on 9/1/2021)     No current facility-administered medications for this visit. ALLERGIES    Allergies   Allergen Reactions    Humira [Adalimumab] Hives    Leflunomide Swelling    Penicillins Unable to Obtain    Sulfa (Sulfonamide Antibiotics) Unknown (comments)       PHYSICAL EXAMINATION    Visit Vitals  /68   Pulse (!) 56   Temp 97.6 °F (36.4 °C)   Resp 18   Wt 160 lb (72.6 kg)   BMI 26.63 kg/m²     Body mass index is 26.63 kg/m². General: Patient is alert, oriented x 3, not in acute distress    HEENT:   Sclerae are not injected and appear moist.  There is no alopecia. Cardiovascular:  Heart is regular rate and rhythm, no murmurs. Chest:  Lungs are clear to auscultation bilaterally. No rhonchi, wheezes, or crackles.     Skin:    Psoriasis:     no  Nail Pitting:     no  Onycholysis:     no  Palmoplantar pustulosis:   no  Acne fulminans:    no  Acne conglobata:    no  Hidradenitis Suppurativa:   no  Dissecting cellulitis of the scalp:  no  Pilonidal sinus:    no  Erythema nodosum:    no    Musculoskeletal:  A comprehensive musculoskeletal exam was NOT performed for all joints of each upper and lower extremity and assessed for swelling, tenderness and range of motion. Previous exam    Bilateral arm, forearm, thigh, and calf allodynia on MCPs, PIPs  Bilateral Sharmila and Heberden nodes. Bilateral greater trochanter tenderness  Right IT band tenderness  Bilateral knee crepitus without effusion.   Bilateral Achilles tenderness in the body not insertion    Right collapse of arch with hallux valgus    Costochondritis:  no   Synovitis:   Yes (see below table)  Dactylitis:   no  Enthesitis:   no    Joint Count 5/5/2021 9/2/2020 6/4/2020 9/19/2019 6/6/2019 3/7/2019 11/7/2018   Patient pain (0-100) 25 55 - - - - -   MHAQ 0.25 1 - - - - -   Left shoulder - Tender - - - - - 1 -   Left shoulder - Swollen - - - - - 0 -   Left elbow - Tender - - - - 1 1 -   Left elbow - Swollen - - - - 0 0 -   Left wrist- Tender 1 1 1 1 1 1 1   Left wrist- Swollen 1 1 1 1 1 1 1   Left 1st MCP - Tender 1 1 1 1 1 1 1   Left 1st MCP - Swollen 1 0 1 1 1 1 -   Left 2nd MCP - Tender 1 1 0 1 1 0 1   Left 2nd MCP - Swollen 1 0 1 1 1 1 1   Left 3rd MCP - Tender - 1 0 - 1 - 1   Left 3rd MCP - Swollen - 0 1 - 1 - -   Left 4th MCP - Tender 1 1 1 - - - -   Left 4th MCP - Swollen 0 0 1 - - - -   Left 5th MCP - Tender 1 1 1 1 - - -   Left 5th MCP - Swollen 1 0 - 1 - - -   Left thumb IP - Tender - 1 - 1 - 1 1   Left thumb IP - Swollen - 0 - 0 - 1 0   Left 2nd PIP - Tender - 1 - 1 1 1 1   Left 2nd PIP - Swollen - 0 - 1 1 1 0   Left 3rd PIP - Tender - 1 1 1 1 - -   Left 3rd PIP - Swollen - 0 1 1 1 - -   Left 4th PIP - Tender - 1 - 1 - - -   Left 4th PIP - Swollen - 0 - 0 - - -   Left 5th PIP - Tender - 1 - 1 - - 1   Left 5th PIP - Swollen - 0 - 0 - - 0   Right shoulder - Tender - - - - - 1 -   Right shoulder - Swollen - - - - - 0 -   Right elbow - Tender - - - - 1 - -   Right elbow - Swollen - - - - 0 - -   Right wrist- Tender 1 1 1 1 1 1 1   Right wrist- Swollen 1 1 1 1 1 1 1   Right 1st MCP - Tender 1 1 - 1 - 0 -   Right 1st MCP - Swollen 1 1 - 1 - 1 -   Right 2nd MCP - Tender 1 1 0 1 1 1 1   Right 2nd MCP - Swollen 1 0 1 0 1 1 1   Right 3rd MCP - Tender 1 1 1 1 - 1 -   Right 3rd MCP - Swollen 1 0 1 0 - 1 -   Right 4th MCP - Tender 1 1 1 1 - - -   Right 4th MCP - Swollen 1 0 0 0 - - -   Right 5th MCP - Tender 1 1 - 1 0 0 -   Right 5th MCP - Swollen 1 1 - 1 1 1 -   Right thumb IP - Tender - - - - - - 1   Right 2nd PIP - Tender - 1 1 1 - - 1   Right 2nd PIP - Swollen - 0 0 1 - - -   Right 3rd PIP - Tender - 1 - 1 - - -   Right 3rd PIP - Swollen - 0 - 1 - - -   Right 4th PIP - Tender 1 1 1 - 1 1 1   Right 4th PIP - Swollen 0 0 0 - 0 0 -   Right 5th PIP - Tender - 1 1 1 - - 1   Right 5th PIP - Swollen - 0 0 1 - - -   Tender Joint Count (Total) 12 21 11 18 11 11 13   Swollen Joint Count (Total) 10 4 9 12 9 10 4   Physician Assessment (0-10) 3 - - - - - -   Patient Assessment (0-10) 3 3 - - - - -   CDAI Total (calculated) 28 - - - - - -     DATA REVIEW    Laboratory     Recent laboratory results were reviewed, summarized, and discussed with the patient. Imaging    Musculoskeletal Ultrasound    None    Radiographs    Sacroiliac Joint 8/25/2017: There is bony ankylosis of the left SI joint unchanged. There is probable ankylosis of the right SI joint as well. The right SI joint is at least narrowed. Bone mineral density is decreased without fracture or bone destruction    Bilateral Foot 8/25/2017: LEFT: No fracture or dislocation on plain film. There is narrowing of the first MTP joint with minimal spurring. No joint space erosion or periosteal reaction. Alignment is within normal limits. Bone mineralization is decreased. No soft tissue calcification. RIGHT: No fracture or dislocation on plain film. There is metatarsus primus varus with hallux valgus deformity. There is joint space loss and spurring first MTP joint. No joint space erosion or periosteal reaction. Alignment is within normal limits. Bone mineralization is decreased.  No soft tissue calcification. Chest 3/09/2017: normal heart size. There is no acute process in the lung fields. The osseous structures are unremarkable. Bilateral Hand 1/20/2017: LEFT: No fracture or dislocation on plain film. There are scattered mild degenerative changes of the interphalangeal joints. There is moderate degeneration of the first [facility] joint and mild degeneration of the first MCP joint. Minimal degenerative changes of the third and fourth MCP joints. There is widening of the scapholunate interval compatible with chronic scapholunate ligament tear. No joint space erosion or periosteal reaction. Alignment is within normal limits. Bone mineralization is decreased. No soft tissue calcification. RIGHT: No fracture or dislocation on plain film. There are scattered mild degenerative changes in the interphalangeal joints and first MCP joint. There is narrowing of the lateral margin radiocarpal joint There is widening of the scapholunate interval compatible with chronic scapholunate ligament tear. No joint space erosion or periosteal reaction. Alignment is within normal limits. Bone mineralization is decreased. No soft tissue calcification. Left Hip 7/15/2015: no fracture, dislocation or other acute abnormality.  There appears to be fusion of the left sacroiliac joint and possibly the right sacroiliac joint. Spurring is noted at the symphysis. Lumbar Spine 7/15/2015: the patient is leaning to the right. There is a 2 mm retrolisthesis of L1  relative to L2. Bilateral facet arthropathy is the dominant feature at the  lower 3 levels. Bilateral laminectomies have been performed at L5-S1. Vertebral body heights spaces are well-preserved.  There is no fracture. CT Imaging    None    MR Imaging    MRI Lumbar Spine without contrast 9/23/2020: Borderline congenitally slender lumbar spinal canal measures 15 mm in diameter at L3. Grade 1 retrolisthesis of L1 on L2 measures 3 mm. Vertebral body heights are maintained. Marrow signal is normal. Moderate disc desiccation at L1-L2. Mild disc desiccation at the other levels. No discitis. The conus medullaris terminates at L1-L2. Signal and caliber of the distal spinal cord are within normal limits. There appear to be bilateral parapelvic renal cysts. Moderate atrophy of the paraspinal musculature. Lower thoracic spine: Mild central spinal canal stenosis at T11-T12 and T12-L1. L1-L2: Diffuse disc bulge, facet arthrosis, and thickened ligamentum flavum. Moderate central spinal canal stenosis. Mild bilateral foraminal stenosis. L2-L3: Diffuse disc bulge, facet arthrosis, and thickened ligamentum flavum. Mild central spinal canal stenosis. L3-L4: Diffuse disc bulge, facet arthrosis, and thickened ligamentum flavum. Moderate central spinal canal stenosis. L4-L5: Diffuse disc bulge, facet arthrosis, and thickened ligamentum flavum. Mild central spinal canal stenosis. L5-S1: No herniation or stenosis. Facet arthrosis and facet joint effusions. MRI Pelvis without contrast 9/20/2017: There is normal bone signal. No para-articular edema-like signal is shown nor is there demonstration of substantial joint effusion. There is ankylosis of the inferior left sacroiliac joint without demonstration of substantial osteophyte formation. The inferior right SI joint is substantially narrowed with perhaps a small area of ankylosis inferiorly.   There is minimal-mild superolateral joint space narrowing of the hip joints bilaterally with tiny marginal osteophytes. Moderate degenerative changes in the lower lumbar spine are shown with disc space narrowing through L4-5 as well as bilateral facet osteoarthrosis without ankylosis at L5-S1. No soft tissue mass is demonstrated. Muscle signal, size and contour appear normal. There is moderate insertional tendinopathy of the gluteus medius bilaterally with partial-thickness tearing in tiny bursal effusions.     Ultrasound    Retroperitoneum 10/11/2017: RIGHT KIDNEY: The right kidney has normal echogenicity with no mass, stone or hydronephrosis. The right kidney measures 9.1 cm in length. LEFT KIDNEY: The left kidney has normal echogenicity with  no mass, stone or hydronephrosis. There may be mild caliectasis. The left kidney measures 8.7 cm in length. RETROPERITONEUM: The aorta is atherosclerotic and tapers normally. The aortic bifurcation is normal. The IVC is not visualized due to body habitus and bowel gas. No retroperitoneal mass is identified. BLADDER: The urinary bladder is incompletely distended. DXA     DXA 9/12/2017: (excluded Lumbar spine due to degenerative changes) left femoral neck T score: -2.1 (0.749 g/cm2), left total hip T score: -1.9 (0.763 g/cm2), right femoral neck T score: -2.4 (0.709 g/cm2), right total hip T score: -2.1 (0.746 g/cm2), and distal one third left radius T score -3.0 (BMD 0.614 g/cm2). FRAX score 17.8 % probability in 10 years for major osteoporotic fracture and 5.8 % 10 year probability of hip fracture. ASSESSMENT AND PLAN    This is a follow up visit for Ms. Anjali Okeefe. 1) HLA-B27 Positive Ankylosing Spondylitis with Seronegative Rheumatoid Arthritis. (positive HLA-B27, sacroiliitis, inflammatory arthritis). She reports a history of Rheumatoid Arthritis that was treated with gold, which she thinks led her to remission until 2016. She had developed sore pain and Polymyalgia Rheumatica-like symptoms involving her hip girdle in the fall of 2016. Her hip radiograph on 7/15/2015 showed ankylosis of the left sacroiliac joint. A repeat radiograph of SI joints 8/25/2017 confirmed left sacroiliac ankylosis and probable right sided involvement. MRI pelvis showed left SI joint ankylosis. Partial ankylosis of right SI joint. Her labs showed a positive HLA-B27. She had peripheral synovitis. She has CKD stage 3, so NSAIDs and methotrexate should be avoided.  She had been on Cosentyx and Orencia 125 mg SubQ with good tolerance but did not feel any improvement. I gave her [place] 11 mg XR samples but developed flu-like symptoms and mouth sores that resovled 5 days after stopping it. I asked her to re-challenge it and she had recurrence of oral ulcers. Her joint symptoms improved while on it. I gave her Rinvoq samples and she had oral ulcers and dry mouth sensation when taking it daily. She also developed oral ulcers on Xeljanz 5 mg daily and had some benefit in her joints. I will give her samples of Actemra to assess for benefit and/or adverse reactions. 2) Polymyalgia Rheumatica. This is secondary to #1. 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 Her DXA 9/12/2017 showed T score distal radius -3.0 and right femoral neck -2.4. She began Fosamax 70 mg every Sunday 11/2017 and but fractured her foot in 2/2020, so I discontinued it and started Prolia 60 mg every 6 months, which she received with good tolerance. Her most recent dose was 6/29/2021. I will continue it. Therapy plan order placed for OBIC. 4) Long Term Use of Immunosuppressants. The patient will initiate on high risk immunomodulatory medications Lachelle Marcela) and requires frequent toxicity monitoring by blood work to evaluate for toxicities. Respective labs were ordered (see below orders for details). 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 Her most recent labs 12/29/2020 show creatinine 0.87 mg/dL and eGFR >60.    6) Right Trochanteric Bursitis with IT Band Syndrome. I had referred her to physical therapy previously with benefit. 7) Bilateral Knee Osteoarthritis. This was an active issue today. 8) Neurogenic Claudication. MRI lumbar spine showed L1-L2 and L3-L4 moderate central spinal canal stenosis. I had referred her to physical therapy with instructions of seeing Dr. Zoya Howell if no improvement. She has done physical therapy which has helped. 9) Right PTT Tendonitis. This is due to collapsing arch. I recommend arch support.     The patient voiced understanding of the aforementioned assessment and plan. A total of 43 minutes was spent on this visit, reviewing interval notes, interval testing results, ordering tests, refilling medications, documenting the findings in the note, patient education, counseling, and coordination of care as described above. All questions asked and answered.     TODAY'S ORDERS    Orders Placed This Encounter    REFERRAL TO INFUSION THERAPY    diclofenac (VOLTAREN) 1 % gel     Future Appointments   Date Time Provider Andrea Sury   12/28/2021  2:00 PM A3 P[facility]S [facility] Jovan Perry MD, [address] [place] [address]    Adult Rheumatology   Rheumatology Ultrasound Certified  [facility]  A Part of [address], [address] [place] [address]   Phone [phone]  Fax [phone]

## 2021-09-02 ENCOUNTER — TELEPHONE (OUTPATIENT)
Dept: RHEUMATOLOGY | Age: 81
End: 2021-09-02

## 2021-09-02 RX ORDER — TOCILIZUMAB 180 MG/ML
162 INJECTION, SOLUTION SUBCUTANEOUS
Qty: 2 EACH | Refills: 0 | Status: SHIPPED | COMMUNITY
Start: 2021-09-02 | End: 2022-06-06 | Stop reason: ALTCHOICE

## 2021-10-16 DIAGNOSIS — E55.9 VITAMIN D DEFICIENCY: ICD-10-CM

## 2021-10-18 RX ORDER — ERGOCALCIFEROL 1.25 MG/1
CAPSULE ORAL
Qty: 12 CAPSULE | Refills: 4 | Status: SHIPPED | OUTPATIENT
Start: 2021-10-18

## 2021-11-08 ENCOUNTER — TELEPHONE (OUTPATIENT)
Dept: FAMILY MEDICINE CLINIC | Age: 81
End: 2021-11-08

## 2021-11-08 RX ORDER — PROMETHAZINE HYDROCHLORIDE AND DEXTROMETHORPHAN HYDROBROMIDE 6.25; 15 MG/5ML; MG/5ML
5 SYRUP ORAL
Qty: 5 ML | Refills: 1 | Status: SHIPPED | OUTPATIENT
Start: 2021-11-08 | End: 2021-11-15

## 2021-11-08 RX ORDER — AZITHROMYCIN 250 MG/1
TABLET, FILM COATED ORAL
Qty: 6 TABLET | Refills: 0 | Status: SHIPPED | OUTPATIENT
Start: 2021-11-08 | End: 2021-11-13

## 2021-11-08 NOTE — TELEPHONE ENCOUNTER
Message sent to Dr. Miguel Chowdary and Rx waiting for approval.          ----- Message from Thomas Stone sent at 11/8/2021  9:38 AM EST -----  Subject: Message to Provider    QUESTIONS  Information for Provider? pt called because she is having sore throat and   its going thru her chest and she feels short winded. She would like to   know if her pcp can order z pack and cough syrup for her. this is urgent   ---------------------------------------------------------------------------  --------------  CALL BACK INFO  What is the best way for the office to contact you? OK to leave message on   voicemail  Preferred Call Back Phone Number? [phone]  ---------------------------------------------------------------------------  --------------  SCRIPT ANSWERS  Relationship to Patient?  Self

## 2021-11-08 NOTE — TELEPHONE ENCOUNTER
Pt has cough, congestion sore throat and chest congestion. Pt has azithromycin and promethazine in for a refill.          ---- Message from Irasema Mahajan sent at 11/8/2021  8:50 AM EST -----  Subject: Message to Provider    QUESTIONS  Information for Provider? would like to speak with doctor debbie nurse, is   having cold symptoms and now it is going down to her chest.  ---------------------------------------------------------------------------  --------------  CALL BACK INFO  What is the best way for the office to contact you? OK to leave message on   voicemail  Preferred Call Back Phone Number? [phone]  ---------------------------------------------------------------------------  --------------  SCRIPT ANSWERS  Relationship to Patient?  Self

## 2021-12-14 DIAGNOSIS — M81.0 AGE-RELATED OSTEOPOROSIS WITHOUT CURRENT PATHOLOGICAL FRACTURE: Primary | ICD-10-CM

## 2021-12-22 ENCOUNTER — TELEPHONE (OUTPATIENT)
Dept: RHEUMATOLOGY | Age: 81
End: 2021-12-22

## 2021-12-22 NOTE — TELEPHONE ENCOUNTER
Patient called to say that she could not come here to do blood work on either 12/22 or 12/23. I transferred the call to [facility] to discuss options with the patient, and [facility] rescheduled the Prolia injection to 12/30/21 to give the patient more time to get blood work done.

## 2021-12-28 ENCOUNTER — APPOINTMENT (OUTPATIENT)
Dept: INFUSION THERAPY | Age: 81
End: 2021-12-28

## 2021-12-28 NOTE — PROGRESS NOTES
[facility] Rheumatology  OBIC Note    Date: 2021    Rheumatology OBIC COVID-19 SCREENING  The patient was asked the following questions and answered as documented below:    1. Do you have any symptoms of COVID-19? SOB, coughing, fever, or generally not feeling well? NO  2. Have you been exposed to COVID-19 recently? NO  3. Have you had any recent contact with family/friend that has a pending COVID test? NO    Name: Jeramy Porter  MRN: [id number]       : 1940  Diagnosis: Osteoporosis  Treatment: Prolia  Referring Provider: Dr. Brittnee Russell  Supervising Provider: Dr. Lyndal Hodgkins    Patient arrived to [facility] at 1250. Ms. Rosita Pressley allergies reviewed and she agreed to receiving today's treatment. Pt. denies new complaints today. Visit Vitals  BP (!) 175/70   Pulse 60   Temp 98 °F (36.7 °C)   Resp 16   SpO2 96%      Recent labs results:  Recent Results (from the past 72 hour(s))   RENAL FUNCTION PANEL    Collection Time: 21  1:32 PM   Result Value Ref Range    Sodium 140 136 - 145 mmol/L    Potassium 4.5 3.5 - 5.1 mmol/L    Chloride 109 (H) 97 - 108 mmol/L    CO2 24 21 - 32 mmol/L    Anion gap 7 5 - 15 mmol/L    Glucose 78 65 - 100 mg/dL    BUN 21 (H) 6 - 20 MG/DL    Creatinine 0.60 0.55 - 1.02 MG/DL    BUN/Creatinine ratio 35 (H) 12 - 20      GFR est AA >60 >60 ml/min/1.73m2    GFR est non-AA >60 >60 ml/min/1.73m2    Calcium 9.2 8.5 - 10.1 MG/DL    Phosphorus 3.6 2.6 - 4.7 MG/DL    Albumin 3.7 3.5 - 5.0 g/dL      Medications given per providers orders:  Administrations This Visit     denosumab (PROLIA) injection 60 mg     Admin Date  2021 Action  Given Dose  60 mg Route  SubCUTAneous Administered By  Daisy Steele RN            Prolia to upper left posterior  [facility] [id number]  Lot # [id number]  Exp 2024     Ms. Rosita Pressley tolerated the treatment without complaints and no medication reactions were seen while in presence of this RN.    Patient provided with AVS, which included future appointments and written educational material regarding Prolia. All of the patients questions were answered and then discharged ambulatory from Rheumatology OBIC in stable condition at 1305.      Future Appointments   Date Time Provider Andrea Sury   12/30/2021  1:30 PM INFUSIONINJ_RC AOCR BS AMB   2/16/2022  9:30 AM Liset Ryan MD AOCR BS AMB   6/21/2022 10:30 AM LAB ONLY PAFP BS AMB   7/12/2022 10:30 AM INFUSIONINJ_RC AOCR BS AMB     Link Moody RN  December 30, 2021  1:15pm

## 2021-12-30 ENCOUNTER — OFFICE VISIT (OUTPATIENT)
Dept: RHEUMATOLOGY | Age: 81
End: 2021-12-30
Payer: MEDICARE

## 2021-12-30 VITALS
OXYGEN SATURATION: 96 % | TEMPERATURE: 98 F | SYSTOLIC BLOOD PRESSURE: 175 MMHG | RESPIRATION RATE: 16 BRPM | HEART RATE: 60 BPM | DIASTOLIC BLOOD PRESSURE: 70 MMHG

## 2021-12-30 DIAGNOSIS — M81.0 AGE-RELATED OSTEOPOROSIS WITHOUT CURRENT PATHOLOGICAL FRACTURE: Primary | ICD-10-CM

## 2021-12-30 PROCEDURE — 96401 CHEMO ANTI-NEOPL SQ/IM: CPT | Performed by: INTERNAL MEDICINE

## 2021-12-30 PROCEDURE — 96372 THER/PROPH/DIAG INJ SC/IM: CPT

## 2021-12-30 RX ORDER — ONDANSETRON 2 MG/ML
8 INJECTION INTRAMUSCULAR; INTRAVENOUS AS NEEDED
Status: CANCELLED | OUTPATIENT
Start: 2022-06-26

## 2021-12-30 RX ORDER — DIPHENHYDRAMINE HYDROCHLORIDE 50 MG/ML
50 INJECTION, SOLUTION INTRAMUSCULAR; INTRAVENOUS AS NEEDED
Status: CANCELLED
Start: 2022-06-26

## 2021-12-30 RX ORDER — ACETAMINOPHEN 325 MG/1
650 TABLET ORAL AS NEEDED
Status: CANCELLED
Start: 2022-06-26

## 2021-12-30 RX ORDER — DIPHENHYDRAMINE HYDROCHLORIDE 50 MG/ML
25 INJECTION, SOLUTION INTRAMUSCULAR; INTRAVENOUS AS NEEDED
Status: CANCELLED
Start: 2022-06-26

## 2021-12-30 RX ORDER — ALBUTEROL SULFATE 0.83 MG/ML
2.5 SOLUTION RESPIRATORY (INHALATION) AS NEEDED
Status: CANCELLED
Start: 2022-06-26

## 2021-12-30 RX ORDER — EPINEPHRINE 1 MG/ML
0.3 INJECTION, SOLUTION, CONCENTRATE INTRAVENOUS AS NEEDED
Status: CANCELLED | OUTPATIENT
Start: 2022-06-26

## 2021-12-30 RX ORDER — HYDROCORTISONE SODIUM SUCCINATE 100 MG/2ML
100 INJECTION, POWDER, FOR SOLUTION INTRAMUSCULAR; INTRAVENOUS AS NEEDED
Status: CANCELLED | OUTPATIENT
Start: 2022-06-26

## 2021-12-30 RX ADMIN — DENOSUMAB 60 MG: 60 INJECTION SUBCUTANEOUS at 12:56

## 2022-01-17 DIAGNOSIS — M81.0 AGE-RELATED OSTEOPOROSIS WITHOUT CURRENT PATHOLOGICAL FRACTURE: Primary | ICD-10-CM

## 2022-01-25 ENCOUNTER — TELEPHONE (OUTPATIENT)
Dept: RHEUMATOLOGY | Age: 82
End: 2022-01-25

## 2022-01-28 DIAGNOSIS — J32.0 MAXILLARY SINUSITIS, UNSPECIFIED CHRONICITY: Primary | ICD-10-CM

## 2022-01-28 RX ORDER — AZITHROMYCIN 250 MG/1
TABLET, FILM COATED ORAL
Qty: 6 TABLET | Refills: 0 | Status: SHIPPED | OUTPATIENT
Start: 2022-01-28 | End: 2022-06-24 | Stop reason: SDUPTHER

## 2022-01-31 ENCOUNTER — TELEPHONE (OUTPATIENT)
Dept: RHEUMATOLOGY | Age: 82
End: 2022-01-31

## 2022-01-31 NOTE — TELEPHONE ENCOUNTER
Pt called and stated she would like last note and lab faxed to her new rheumatologist- Natividad Robb- his fax number is [phone]

## 2022-03-18 PROBLEM — N18.2 CKD (CHRONIC KIDNEY DISEASE) STAGE 2, GFR 60-89 ML/MIN: Status: ACTIVE | Noted: 2017-08-27

## 2022-03-18 PROBLEM — M43.28: Status: ACTIVE | Noted: 2017-08-27

## 2022-03-18 PROBLEM — M35.3 PMR (POLYMYALGIA RHEUMATICA) (HCC): Status: ACTIVE | Noted: 2017-08-27

## 2022-03-19 PROBLEM — M17.0 PRIMARY OSTEOARTHRITIS OF BOTH KNEES: Status: ACTIVE | Noted: 2017-08-25

## 2022-03-19 PROBLEM — K21.00 GASTROESOPHAGEAL REFLUX DISEASE WITH ESOPHAGITIS: Status: ACTIVE | Noted: 2018-08-28

## 2022-03-19 PROBLEM — M45.8 ANKYLOSING SPONDYLITIS OF SACRAL REGION (HCC): Status: ACTIVE | Noted: 2017-09-11

## 2022-03-19 PROBLEM — Z79.60 LONG-TERM USE OF IMMUNOSUPPRESSANT MEDICATION: Status: ACTIVE | Noted: 2018-08-01

## 2022-03-20 PROBLEM — M81.0 AGE-RELATED OSTEOPOROSIS WITHOUT CURRENT PATHOLOGICAL FRACTURE: Status: ACTIVE | Noted: 2018-08-01

## 2022-03-31 ENCOUNTER — OFFICE VISIT (OUTPATIENT)
Dept: RHEUMATOLOGY | Age: 82
End: 2022-03-31
Payer: MEDICARE

## 2022-03-31 VITALS
OXYGEN SATURATION: 95 % | DIASTOLIC BLOOD PRESSURE: 79 MMHG | WEIGHT: 163.8 LBS | RESPIRATION RATE: 16 BRPM | BODY MASS INDEX: 27.29 KG/M2 | SYSTOLIC BLOOD PRESSURE: 135 MMHG | HEIGHT: 65 IN | HEART RATE: 63 BPM | TEMPERATURE: 97.4 F

## 2022-03-31 DIAGNOSIS — M17.5 OTHER SECONDARY OSTEOARTHRITIS OF LEFT KNEE: ICD-10-CM

## 2022-03-31 DIAGNOSIS — M70.61 TROCHANTERIC BURSITIS OF BOTH HIPS: ICD-10-CM

## 2022-03-31 DIAGNOSIS — Z11.59 ENCOUNTER FOR SCREENING FOR OTHER VIRAL DISEASES: ICD-10-CM

## 2022-03-31 DIAGNOSIS — M81.0 AGE-RELATED OSTEOPOROSIS WITHOUT CURRENT PATHOLOGICAL FRACTURE: ICD-10-CM

## 2022-03-31 DIAGNOSIS — Z79.60 LONG-TERM USE OF IMMUNOSUPPRESSANT MEDICATION: ICD-10-CM

## 2022-03-31 DIAGNOSIS — M45.8 ANKYLOSING SPONDYLITIS OF SACRAL REGION (HCC): ICD-10-CM

## 2022-03-31 DIAGNOSIS — M06.00 SERONEGATIVE RHEUMATOID ARTHRITIS (HCC): Primary | ICD-10-CM

## 2022-03-31 DIAGNOSIS — M70.62 TROCHANTERIC BURSITIS OF BOTH HIPS: ICD-10-CM

## 2022-03-31 DIAGNOSIS — M17.4 OTHER SECONDARY OSTEOARTHRITIS OF BOTH KNEES: ICD-10-CM

## 2022-03-31 DIAGNOSIS — M06.09 SERONEGATIVE RHEUMATOID ARTHRITIS OF MULTIPLE SITES (HCC): ICD-10-CM

## 2022-03-31 PROCEDURE — G9717 DOC PT DX DEP/BP F/U NT REQ: HCPCS | Performed by: INTERNAL MEDICINE

## 2022-03-31 PROCEDURE — G8536 NO DOC ELDER MAL SCRN: HCPCS | Performed by: INTERNAL MEDICINE

## 2022-03-31 PROCEDURE — G8419 CALC BMI OUT NRM PARAM NOF/U: HCPCS | Performed by: INTERNAL MEDICINE

## 2022-03-31 PROCEDURE — 1090F PRES/ABSN URINE INCON ASSESS: CPT | Performed by: INTERNAL MEDICINE

## 2022-03-31 PROCEDURE — G8752 SYS BP LESS 140: HCPCS | Performed by: INTERNAL MEDICINE

## 2022-03-31 PROCEDURE — G8427 DOCREV CUR MEDS BY ELIG CLIN: HCPCS | Performed by: INTERNAL MEDICINE

## 2022-03-31 PROCEDURE — 99215 OFFICE O/P EST HI 40 MIN: CPT | Performed by: INTERNAL MEDICINE

## 2022-03-31 PROCEDURE — G0463 HOSPITAL OUTPT CLINIC VISIT: HCPCS | Performed by: INTERNAL MEDICINE

## 2022-03-31 PROCEDURE — 1101F PT FALLS ASSESS-DOCD LE1/YR: CPT | Performed by: INTERNAL MEDICINE

## 2022-03-31 PROCEDURE — G8754 DIAS BP LESS 90: HCPCS | Performed by: INTERNAL MEDICINE

## 2022-03-31 RX ORDER — DICLOFENAC SODIUM 10 MG/G
2 GEL TOPICAL 4 TIMES DAILY
Qty: 300 G | Refills: 2 | Status: SHIPPED | OUTPATIENT
Start: 2022-03-31

## 2022-03-31 NOTE — PROGRESS NOTES
Chief Complaint   Patient presents with    Arthritis     feet, ankles, knees, hips, back     1. Have you been to the ER, urgent care clinic since your last visit? Hospitalized since your last visit? No    2. Have you seen or consulted any other health care providers outside of the [address] [place] [address] since your last visit? Include any pap smears or colon screening.  No

## 2022-03-31 NOTE — PATIENT INSTRUCTIONS
1. Check your local pharmacy for Yovanny's Leg Cramps medication. 2. Continue physical therapy for back pain, hip pain, and balance problems. 3. Prescribed diclofenac gel. If insurance does not cover it, you will have to pay out of pocket. 4. Referred to Dr. Ruddy Flores knee orthopedist to evaluate the your knee and  knee injection. 5.  For ankylosing spondylosis will start on Remicade infusion here every 8 weeks. We are starting you on a low dose. If no improvement, we can increase the dose. If you have any rash or other reaction, give us a call. If you have any breathing issues, go to the emergency room. 6. Continue Vitamin D supplementation. 7.  Bring us your actemra. 8. Increase tylenol to 500mg up to 3 times a day as needed for pain. 9. Do updated labs as soon as you can. 10. Your next Prolia shot is in June as scheduled. 11. Schedule follow up appointment on the same day as Prolia Injection in June. Give use a call if any rashes, joint swelling, or mouth ulcers appear.

## 2022-03-31 NOTE — LETTER
4/13/2022    Patient: Courtney Riggins   YOB: 1940   Date of Visit: 3/31/2022     Galilea Mccormack MD  [address] [place] [id number]  [address] [place], MD  [address] [facility],  [address]  Suite [facility] [address]  Via In MD Cristina  [address] [facility]  [address] [facility] [address]  Via Fax: [phone]    Dear MD Lois Abbott Ra, MD Candiss Natter, MD,    We recently saw Ms. Neftaly Gomez in the [facility] for evaluation. My notes for this consultation are attached. If you have questions, please do not hesitate to call me. I look forward to following your patient along with you.       Sincerely,  Tony Chopra MD [facility]  Cell: [phone]

## 2022-03-31 NOTE — PROGRESS NOTES
REASON FOR VISIT    This is a new Rheumatology provider follow up visit for Ms. Inna Christensen for    ICD-10-CM   1. Ankylosing spondylitis of sacral region ([facility] M45.8   2. Seronegative rheumatoid arthritis of multiple sites ([facility] M06.09   3.  Age-related osteoporosis without current pathological fracture M81.0     Spondyloarthritis phenotype includes:  Anti-CCP positive: no  Rheumatoid factor positive: no  HLA-B27: yes  Erosive disease: no  Sacroiliitis: yes  Ankylosis: yes (left SI)  Psoriasis: no  Enthesitis: yes ([place])  Dactylitis: no  Nail Pitting: no  Onycholysis: no  Extra-articular manifestations include: Polymyalgia Rheumatica   SAPHO: no    Immunosuppression Screening:  Quantiferon TB: negative (5/11/2021)  PPD:  Not performed  Hepatitis B: negative (3/15/2021)  Hepatitis C: negative (3/15/2021)    Therapy History includes:  Current NSAIDs include: none (contraindicated due to CKD stage 3)  Current DMARD therapy include: none  Prior DMARD therapy includes: Gold, leflunomide, Humira (11/2017 to 12/2017), Enbrel 50 mg weekly (5/22/2018 to 9/20/2018), Cosentyx 300 mg (3/07/2019 to 6/04/2020), Orencia 125 mg SubQ every Monday (6/10/2020 to 12/30/2020), Xeljanz 11 mg XR daily (1/04/2021 to 1/25/2021; SAMPLE), Rinvoq 15 mg daily (3/11/2021; SAMPLE), [place] 5 mg daily (5/05/2021 to 6/2021; 7/2021 to 8/20/2021 but every other day dosing)  The following DMARDs have been ineffective: Orencia   The following DMARDs were stopped because of side effects: Gold (\"pass out\"), leflunomide (swelling), Humira (hives, lip and eyelid swelling), Enbrel (swelling of lips), Xeljanz 11 mg XR (oral ulcers, flu-like symptoms), Rinvoq (oral ulcers, dry mouth sensation), Xeljanz 5 mg daily (oral ulcers)  Contra-Indicated DMARDs because of CKD stage 3: methotrexate   Contra-Indicated DMARDs because sulfa allergy: sulfasalazine    Osteoporosis Historical Synopsis     Height loss since age 26 (at least two inches): 0.5  Fracture history includes: yes (ankle, left foot)  Family history of hip fracture: no  Fall Risk: no     Daily calcium intake is 1200 mg  Daily vitamin D intake is 800 IU     Smoking history: no  Alcohol consumption: no  Prednisone history: no     Exercise: yes     Previous work-up for osteoporosis includes the following:  DEXA Scan: 9/12/2017  Vitamin 25OH D level: 40.8 (9/08/2020)  PTH: 44 (9/08/2020)  TSH: 2.090 (9/08/2020)     Therapy History includes:  Current osteoporosis therapy includes: Prolia 60 mg every 6 months (6/29/2020 to present)  Prior osteoporosis therapy includes: alendronate 70 mg every Sunday (11/2017 to 6/04/2020)  The following osteoporosis therapy have been ineffective: alendronate  The following osteoporosis therapy were stopped because of side effects: none    HISTORY OF PRESENT ILLNESS    Ms. Sea Medley returns for a follow up visit. Former Dr. Jake Saldana pt. Pt is not taking Kyle Deleon due to the fact that Dr. Jake Saldana was leaving and was worried about who she would see if she had a reaction. Pt is only taking tylenol 1/2 a pill in the morning and 1/2 a pill in the evening. Pt reports of pain in base of her thumbs and that she feels unstable in her ankles. Pt is not currently on prednisone    Pt reports that her knees have gotten worse and that her L knee is starting to give out on her in the past week. Pt reports of recent fall while in [employer]. She that potentially it could have been due to the boots. Pt reports of cramps of in her legs and feet. She notes of pain in her leg muscles and groin. She states mustard and pickle juice help relief the cramping. The cramping is worse at night when she is laying down. It takes her about 2 hours for her to loosen up after waking up. Pt also reports of lower back pain. Pt reports she was put on Advair by her pulmonologist, which she takes consistently and feels keeps her asthma under control.     REVIEW OF SYSTEMS    A comprehensive review of systems was performed and pertinent results are documented in the HPI, review of systems is otherwise non-contributory. PAST MEDICAL HISTORY    She has a past medical history of Allergic rhinitis, cause unspecified (5/26/2010), Asthma (5/26/2010), Depression (5/26/2010), Encounter for long-term (current) use of other medications (7/11/2011), Essential hypertension, benign (5/26/2010), Hypertension, OA (osteoarthritis) (5/26/2010), Rheumatoid arthritis ([facility] and Unspecified hypothyroidism (5/26/2010). FAMILY HISTORY    Her family history includes Breast Cancer (age of onset: 36) in her niece; Cancer in her sister; Heart Disease in her father, mother, and sister; Ovarian Cancer in her sister; Stroke in her sister. SOCIAL HISTORY    She reports that she has never smoked. She has never used smokeless tobacco. She reports that she does not drink alcohol and does not use drugs. MEDICATIONS    Current Outpatient Medications   Medication Sig    diclofenac (VOLTAREN) 1 % gel Apply 2 g to affected area four (4) times daily.  guaiFENesin-dextromethorphan SR (Mucinex DM) 600-30 mg per tablet Take 1 Tablet by mouth two (2) times a day.  ergocalciferol (ERGOCALCIFEROL) 1,250 mcg (50,000 unit) capsule TAKE ONE CAPSULE BY MOUTH ONCE WEEKLY    nitrofurantoin, macrocrystal-monohydrate, (Macrobid) 100 mg capsule Take 1 Capsule by mouth two (2) times a day. (Patient not taking: Reported on 9/1/2021)    denosumab (Prolia) 60 mg/mL injection 60 mg by SubCUTAneous route.  acyclovir (ZOVIRAX) 400 mg tablet as needed.  Calcium-Cholecalciferol, D3, (CALCIUM 600 WITH VITAMIN D3) 600 mg(1,500mg) -400 unit cap Take  by mouth.  fluticasone (FLONASE) 50 mcg/actuation nasal spray PLACE TWO SPRAYS IN EACH NOSTRIL ONCE DAILY    FEXOFENADINE HCL (ALLEGRA PO) Take  by mouth daily.  albuterol (PROVENTIL HFA, VENTOLIN HFA) 90 mcg/actuation inhaler Take 1 Puff by inhalation every six (6) hours as needed for Wheezing.     levothyroxine (SYNTHROID) 50 mcg tablet Take  by mouth daily (before breakfast).  amlodipine (NORVASC) 5 mg tablet Take 5 mg by mouth daily.  azithromycin (ZITHROMAX) 250 mg tablet Take 2 tablets today, then take 1 tablet daily (Patient not taking: Reported on 3/31/2022)    tocilizumab (Actemra ACTPen) 162 mg/0.9 mL injection 0.9 mL by SubCUTAneous route every fourteen (14) days. Indications: rheumatoid arthritis (Patient not taking: Reported on 3/31/2022)    clotrimazole (LOTRIMIN) 1 % topical cream Apply  to affected area two (2) times daily as needed for Skin Irritation. (Patient not taking: Reported on 3/31/2022)    upadacitinib (Rinvoq) 15 mg Tb24 Take 15 mg by mouth daily. Indications: rheumatoid arthritis (Patient not taking: Reported on 8/20/2021)    DULERA 200-5 mcg/actuation HFA inhaler 2 Puffs two (2) times a day. (Patient not taking: Reported on 3/31/2022)     No current facility-administered medications for this visit. ALLERGIES    Allergies   Allergen Reactions    Humira [Adalimumab] Hives    Leflunomide Swelling    Penicillins Unable to Obtain    Sulfa (Sulfonamide Antibiotics) Unknown (comments)      Physical Exam  Visit Vitals  /79 (BP 1 Location: Left upper arm, BP Patient Position: Sitting)   Pulse 63   Temp 97.4 °F (36.3 °C) (Oral)   Resp 16   Ht 5' 5\" (1.651 m)   Wt 163 lb 12.8 oz (74.3 kg)   SpO2 95%   BMI 27.26 kg/m²        General:  The patient is well developed, well nourished, alert, and in no apparent distress. Eyes: Sclera are anicteric. No conjunctival injection. HEENT:  Oropharynx is clear. No oral ulcers. Adequate salivary pooling. No cervical or supraclavicular lymphadenopathy. Lungs:  Clear to auscultation bilaterally, without wheeze or stridor. Normal respiratory effort. Cor:  Regular rate and rhythm. No murmur rub or gallop. Abdomen: Soft, non-tender, without hepatomegaly or masses. Extremities: Well perfused.    Skin:  No significant abnormalities. Neuro: Nonfocal  Musculoskeletal:    A comprehensive musculoskeletal exam was performed for all joints of each upper and lower extremity and assessed for swelling, tenderness and range of motion. Results are documented as below:   Bilateral shoulder crepitus but intact ROM. L wrist synovitis and tenderness. Bilateral CMC squaring and early subluxation. No synovitis in the small joints of the hand. Direct Right SI tenderness and bilateral paraspinal tenderness. Mild valgus deformity of the knees. Bilateral baker's cyst with joint line tenderness bilaterally. Small cool patellar effusion. Prominent metatarsal heads bilaterally with hallux valgus of the right. Joint line tenderness of the ankle, right greater than left, with trace cool effusion. DATA REVIEW     Laboratory   12/28/21 Cr 0.60.  6/29/21: Cr 0.71.  5/11/21: QTB neg.   3/15/21: WBC 7.0, HGB 11.8, Plt 260, Cr 0.72, LFT WNL, CRP 0.31 mg/dL, ESR 13. Chronic Hepatitis panel neg. Recent laboratory results were reviewed, summarized, and discussed with the patient. Imaging    Musculoskeletal Ultrasound    None    Radiographs    Sacroiliac Joint 8/25/2017: There is bony ankylosis of the left SI joint unchanged. There is probable ankylosis of the right SI joint as well. The right SI joint is at least narrowed. Bone mineral density is decreased without fracture or bone destruction    Bilateral Foot 8/25/2017: LEFT: No fracture or dislocation on plain film. There is narrowing of the first MTP joint with minimal spurring. No joint space erosion or periosteal reaction. Alignment is within normal limits. Bone mineralization is decreased. No soft tissue calcification. RIGHT: No fracture or dislocation on plain film. There is metatarsus primus varus with hallux valgus deformity. There is joint space loss and spurring first MTP joint. No joint space erosion or periosteal reaction. Alignment is within normal limits.  Bone mineralization is decreased. No soft tissue calcification. Chest 3/09/2017: normal heart size. There is no acute process in the lung fields. The osseous structures are unremarkable. Bilateral Hand 1/20/2017: LEFT: No fracture or dislocation on plain film. There are scattered mild degenerative changes of the interphalangeal joints. There is moderate degeneration of the first [facility] joint and mild degeneration of the first MCP joint. Minimal degenerative changes of the third and fourth MCP joints. There is widening of the scapholunate interval compatible with chronic scapholunate ligament tear. No joint space erosion or periosteal reaction. Alignment is within normal limits. Bone mineralization is decreased. No soft tissue calcification. RIGHT: No fracture or dislocation on plain film. There are scattered mild degenerative changes in the interphalangeal joints and first MCP joint. There is narrowing of the lateral margin radiocarpal joint There is widening of the scapholunate interval compatible with chronic scapholunate ligament tear. No joint space erosion or periosteal reaction. Alignment is within normal limits. Bone mineralization is decreased. No soft tissue calcification. Left Hip 7/15/2015: no fracture, dislocation or other acute abnormality.  There appears to be fusion of the left sacroiliac joint and possibly the right sacroiliac joint. Spurring is noted at the symphysis. Lumbar Spine 7/15/2015: the patient is leaning to the right. There is a 2 mm retrolisthesis of L1  relative to L2. Bilateral facet arthropathy is the dominant feature at the  lower 3 levels. Bilateral laminectomies have been performed at L5-S1. Vertebral body heights spaces are well-preserved.  There is no fracture. CT Imaging    None    MR Imaging    MRI Lumbar Spine without contrast 9/23/2020: Borderline congenitally slender lumbar spinal canal measures 15 mm in diameter at L3. Grade 1 retrolisthesis of L1 on L2 measures 3 mm.  Vertebral body heights are maintained. Marrow signal is normal. Moderate disc desiccation at L1-L2. Mild disc desiccation at the other levels. No discitis. The conus medullaris terminates at L1-L2. Signal and caliber of the distal spinal cord are within normal limits. There appear to be bilateral parapelvic renal cysts. Moderate atrophy of the paraspinal musculature. Lower thoracic spine: Mild central spinal canal stenosis at T11-T12 and T12-L1. L1-L2: Diffuse disc bulge, facet arthrosis, and thickened ligamentum flavum. Moderate central spinal canal stenosis. Mild bilateral foraminal stenosis. L2-L3: Diffuse disc bulge, facet arthrosis, and thickened ligamentum flavum. Mild central spinal canal stenosis. L3-L4: Diffuse disc bulge, facet arthrosis, and thickened ligamentum flavum. Moderate central spinal canal stenosis. L4-L5: Diffuse disc bulge, facet arthrosis, and thickened ligamentum flavum. Mild central spinal canal stenosis. L5-S1: No herniation or stenosis. Facet arthrosis and facet joint effusions. MRI Pelvis without contrast 9/20/2017: There is normal bone signal. No para-articular edema-like signal is shown nor is there demonstration of substantial joint effusion. There is ankylosis of the inferior left sacroiliac joint without demonstration of substantial osteophyte formation. The inferior right SI joint is substantially narrowed with perhaps a small area of ankylosis inferiorly.   There is minimal-mild superolateral joint space narrowing of the hip joints bilaterally with tiny marginal osteophytes. Moderate degenerative changes in the lower lumbar spine are shown with disc space narrowing through L4-5 as well as bilateral facet osteoarthrosis without ankylosis at L5-S1. No soft tissue mass is demonstrated. Muscle signal, size and contour appear normal. There is moderate insertional tendinopathy of the gluteus medius bilaterally with partial-thickness tearing in tiny bursal effusions.     Ultrasound    Retroperitoneum 10/11/2017: RIGHT KIDNEY: The right kidney has normal echogenicity with no mass, stone or hydronephrosis. The right kidney measures 9.1 cm in length. LEFT KIDNEY: The left kidney has normal echogenicity with  no mass, stone or hydronephrosis. There may be mild caliectasis. The left kidney measures 8.7 cm in length. RETROPERITONEUM: The aorta is atherosclerotic and tapers normally. The aortic bifurcation is normal. The IVC is not visualized due to body habitus and bowel gas. No retroperitoneal mass is identified. BLADDER: The urinary bladder is incompletely distended. DXA     DXA 9/12/2017: (excluded Lumbar spine due to degenerative changes) left femoral neck T score: -2.1 (0.749 g/cm2), left total hip T score: -1.9 (0.763 g/cm2), right femoral neck T score: -2.4 (0.709 g/cm2), right total hip T score: -2.1 (0.746 g/cm2), and distal one third left radius T score -3.0 (BMD 0.614 g/cm2). FRAX score 17.8 % probability in 10 years for major osteoporotic fracture and 5.8 % 10 year probability of hip fracture. ASSESSMENT AND PLAN    This is a follow up visit for Ms. Cece Dickey for ankylosing spondylitis c/b sacroiliac ankylosis, with moderately active disease currently exhibited by inflammatory back pain and wrist synovitis. Dominant process appears to be ankylosing spondylitis, for which would favor infliximab over tocilizumab, though with peripheral arthritis in the wrists and a preference to keep infusions more spaced will applying for infliximab infusions with loading dose and every 8 week (rather than q6wk) maintenance to start. Referring also to orthopedics for knee osteoarthritis. 1. Ankylosing spondylitis of sacral region ([facility]  - REFERRAL TO PHYSICAL THERAPY  - diclofenac (VOLTAREN) 1 % gel; Apply 2 g to affected area four (4) times daily. Dispense: 300 g; Refill: 2  - REFERRAL TO ORTHOPEDICS; Future  - INFLIXIMAB (IFX) CONC+ IFX AB;  Future  - C REACTIVE PROTEIN, QT; Future  - CBC WITH AUTOMATED DIFF; Future  - METABOLIC PANEL, COMPREHENSIVE; Future  - SED RATE (ESR); Future  - QUANTIFERON-TB GOLD PLUS; Future  - HEPATITIS C AB; Future  - HBV CORE AB, IGG/IGM; Future  - HEP B SURFACE AG; Future  - REFERRAL TO INFUSION THERAPY    2. Seronegative rheumatoid arthritis of multiple sites (HCC)  - REFERRAL TO INFUSION THERAPY for infliximab 5mg/kg every 8 weeks, with loading doses    3.. Other secondary osteoarthritis of both knees  - REFERRAL TO PHYSICAL THERAPY  - diclofenac (VOLTAREN) 1 % gel; Apply 2 g to affected area four (4) times daily. Dispense: 300 g; Refill: 2  - REFERRAL TO ORTHOPEDICS; Future    4. Age-related osteoporosis without current pathological fracture  - Cont Prolia q6mo, next due 6/30/21    5. Long-term use of immunosuppressant medication  - CBC WITH AUTOMATED DIFF; Future  - METABOLIC PANEL, COMPREHENSIVE; Future  - QUANTIFERON-TB GOLD PLUS; Future  - HEPATITIS C AB; Future  - HBV CORE AB, IGG/IGM; Future  - HEP B SURFACE AG; Future    6. Trochanteric bursitis of both hips  - REFERRAL TO PHYSICAL THERAPY    7. Encounter for screening for other viral diseases  - HEPATITIS C AB; Future  - HBV CORE AB, IGG/IGM; Future  - HEP B SURFACE AG; Future      Patient Instructions   1. Check your local pharmacy for Yovanny's Leg Cramps medication. 2. Continue physical therapy for back pain, hip pain, and balance problems. 3. Prescribed diclofenac gel. If insurance does not cover it, you will have to pay out of pocket. 4. Referred to Dr. Shelly Worley knee orthopedist to evaluate the your knee and  knee injection. 5.  For ankylosing spondylosis will start on Remicade infusion here every 8 weeks. We are starting you on a low dose. If no improvement, we can increase the dose. If you have any rash or other reaction, give us a call. If you have any breathing issues, go to the emergency room. 6. Continue Vitamin D supplementation. 7.  Bring us your actemra.     8. Increase tylenol to 500mg up to 3 times a day as needed for pain. 9. Do updated labs as soon as you can. 10. Your next Prolia shot is in June as scheduled. 11. Schedule follow up appointment on the same day as Prolia Injection in June. Give use a call if any rashes, joint swelling, or mouth ulcers appear. TODAY'S ORDERS    Orders Placed This Encounter    QUANTIFERON-TB GOLD PLUS    INFLIXIMAB (IFX) CONC+ IFX AB    C REACTIVE PROTEIN, QT    CBC WITH AUTOMATED DIFF    METABOLIC PANEL, COMPREHENSIVE    SED RATE (ESR)    HEPATITIS C AB    HBV CORE AB, IGG/IGM    HEP B SURFACE AG    REFERRAL TO PHYSICAL THERAPY    [facility]nee [facility] EMPL    diclofenac (VOLTAREN) 1 % gel     Future Appointments   Date Time Provider Andrea Ga   6/21/2022 10:30 AM LAB ONLY PAFP BS AMB   7/12/2022 10:30 AM INFUSIONINJ_RC AOCR BS AMB     Face to face time: 30 minutes  Note preparation and records review day of service:20 minutes  Total provider time day of service: 50 Minutes    This was scribed by Sunitha Olmos in the presence of Dr. Hugh Yañez.      Ashlyn Goldstein MD    Adult Rheumatology   [facility]  A Part of [facility]  [facility], [address]   Phone [phone]  Fax [phone]

## 2022-04-12 DIAGNOSIS — M25.561 RIGHT KNEE PAIN, UNSPECIFIED CHRONICITY: ICD-10-CM

## 2022-04-12 DIAGNOSIS — M25.562 LEFT KNEE PAIN, UNSPECIFIED CHRONICITY: Primary | ICD-10-CM

## 2022-04-12 LAB
ALBUMIN SERPL-MCNC: 4.3 G/DL (ref 3.6–4.6)
ALBUMIN/GLOB SERPL: 2 {RATIO} (ref 1.2–2.2)
ALP SERPL-CCNC: 80 IU/L (ref 44–121)
ALT SERPL-CCNC: 18 IU/L (ref 0–32)
AST SERPL-CCNC: 22 IU/L (ref 0–40)
BASOPHILS # BLD AUTO: 0.1 X10E3/UL (ref 0–0.2)
BASOPHILS NFR BLD AUTO: 1 %
BILIRUB SERPL-MCNC: 0.6 MG/DL (ref 0–1.2)
BUN SERPL-MCNC: 22 MG/DL (ref 8–27)
BUN/CREAT SERPL: 33 (ref 12–28)
CALCIUM SERPL-MCNC: 9.1 MG/DL (ref 8.7–10.3)
CHLORIDE SERPL-SCNC: 106 MMOL/L (ref 96–106)
CO2 SERPL-SCNC: 22 MMOL/L (ref 20–29)
CREAT SERPL-MCNC: 0.67 MG/DL (ref 0.57–1)
CRP SERPL-MCNC: 3 MG/L (ref 0–10)
EGFR: 87 ML/MIN/1.73
EOSINOPHIL # BLD AUTO: 0.1 X10E3/UL (ref 0–0.4)
EOSINOPHIL NFR BLD AUTO: 2 %
ERYTHROCYTE [DISTWIDTH] IN BLOOD BY AUTOMATED COUNT: 13 % (ref 11.7–15.4)
ERYTHROCYTE [SEDIMENTATION RATE] IN BLOOD BY WESTERGREN METHOD: 11 MM/HR (ref 0–40)
GAMMA INTERFERON BACKGROUND BLD IA-ACNC: 0.01 IU/ML
GLOBULIN SER CALC-MCNC: 2.2 G/DL (ref 1.5–4.5)
GLUCOSE SERPL-MCNC: 78 MG/DL (ref 65–99)
HBV CORE AB SERPL QL IA: NEGATIVE
HBV CORE IGM SERPL QL IA: NEGATIVE
HBV SURFACE AG SERPL QL IA: NEGATIVE
HCT VFR BLD AUTO: 40.2 % (ref 34–46.6)
HCV AB S/CO SERPL IA: <0.1 S/CO RATIO (ref 0–0.9)
HGB BLD-MCNC: 12.9 G/DL (ref 11.1–15.9)
IMM GRANULOCYTES # BLD AUTO: 0 X10E3/UL (ref 0–0.1)
IMM GRANULOCYTES NFR BLD AUTO: 1 %
INFLIXIMAB AB SERPL-MCNC: <22 NG/ML
INFLIXIMAB SERPL-MCNC: <0.4 UG/ML
LYMPHOCYTES # BLD AUTO: 1.4 X10E3/UL (ref 0.7–3.1)
LYMPHOCYTES NFR BLD AUTO: 24 %
M TB IFN-G BLD-IMP: NEGATIVE
M TB IFN-G CD4+ BCKGRND COR BLD-ACNC: 0.02 IU/ML
MCH RBC QN AUTO: 30.2 PG (ref 26.6–33)
MCHC RBC AUTO-ENTMCNC: 32.1 G/DL (ref 31.5–35.7)
MCV RBC AUTO: 94 FL (ref 79–97)
MITOGEN IGNF BLD-ACNC: >10 IU/ML
MONOCYTES # BLD AUTO: 0.5 X10E3/UL (ref 0.1–0.9)
MONOCYTES NFR BLD AUTO: 9 %
NEUTROPHILS # BLD AUTO: 3.7 X10E3/UL (ref 1.4–7)
NEUTROPHILS NFR BLD AUTO: 63 %
PLATELET # BLD AUTO: 302 X10E3/UL (ref 150–450)
POTASSIUM SERPL-SCNC: 4.6 MMOL/L (ref 3.5–5.2)
PROT SERPL-MCNC: 6.5 G/DL (ref 6–8.5)
QUANTIFERON INCUBATION, QF1T: NORMAL
QUANTIFERON TB2 AG: 0.02 IU/ML
RBC # BLD AUTO: 4.27 X10E6/UL (ref 3.77–5.28)
SERVICE CMNT-IMP: NORMAL
SODIUM SERPL-SCNC: 141 MMOL/L (ref 134–144)
WBC # BLD AUTO: 5.9 X10E3/UL (ref 3.4–10.8)

## 2022-04-13 ENCOUNTER — OFFICE VISIT (OUTPATIENT)
Dept: ORTHOPEDIC SURGERY | Age: 82
End: 2022-04-13
Payer: MEDICARE

## 2022-04-13 ENCOUNTER — HOSPITAL ENCOUNTER (OUTPATIENT)
Dept: GENERAL RADIOLOGY | Age: 82
Discharge: HOME | End: 2022-04-13
Payer: MEDICARE

## 2022-04-13 VITALS
OXYGEN SATURATION: 97 % | SYSTOLIC BLOOD PRESSURE: 145 MMHG | WEIGHT: 164 LBS | HEART RATE: 57 BPM | TEMPERATURE: 96.4 F | BODY MASS INDEX: 27.32 KG/M2 | DIASTOLIC BLOOD PRESSURE: 76 MMHG | HEIGHT: 65 IN

## 2022-04-13 DIAGNOSIS — M25.561 RIGHT KNEE PAIN, UNSPECIFIED CHRONICITY: ICD-10-CM

## 2022-04-13 DIAGNOSIS — M25.562 LEFT KNEE PAIN, UNSPECIFIED CHRONICITY: ICD-10-CM

## 2022-04-13 DIAGNOSIS — M17.0 BILATERAL PRIMARY OSTEOARTHRITIS OF KNEE: Primary | ICD-10-CM

## 2022-04-13 PROBLEM — M06.00 SERONEGATIVE RHEUMATOID ARTHRITIS (HCC): Status: ACTIVE | Noted: 2022-04-13

## 2022-04-13 PROCEDURE — G8753 SYS BP > OR = 140: HCPCS | Performed by: ORTHOPAEDIC SURGERY

## 2022-04-13 PROCEDURE — 73564 X-RAY EXAM KNEE 4 OR MORE: CPT

## 2022-04-13 PROCEDURE — 1101F PT FALLS ASSESS-DOCD LE1/YR: CPT | Performed by: ORTHOPAEDIC SURGERY

## 2022-04-13 PROCEDURE — G9717 DOC PT DX DEP/BP F/U NT REQ: HCPCS | Performed by: ORTHOPAEDIC SURGERY

## 2022-04-13 PROCEDURE — G8419 CALC BMI OUT NRM PARAM NOF/U: HCPCS | Performed by: ORTHOPAEDIC SURGERY

## 2022-04-13 PROCEDURE — G8754 DIAS BP LESS 90: HCPCS | Performed by: ORTHOPAEDIC SURGERY

## 2022-04-13 PROCEDURE — G8536 NO DOC ELDER MAL SCRN: HCPCS | Performed by: ORTHOPAEDIC SURGERY

## 2022-04-13 PROCEDURE — 99203 OFFICE O/P NEW LOW 30 MIN: CPT | Performed by: ORTHOPAEDIC SURGERY

## 2022-04-13 PROCEDURE — G8427 DOCREV CUR MEDS BY ELIG CLIN: HCPCS | Performed by: ORTHOPAEDIC SURGERY

## 2022-04-13 PROCEDURE — 1090F PRES/ABSN URINE INCON ASSESS: CPT | Performed by: ORTHOPAEDIC SURGERY

## 2022-04-13 RX ORDER — DIPHENHYDRAMINE HCL 25 MG
50 TABLET ORAL ONCE
Status: CANCELLED | OUTPATIENT
Start: 2022-06-14 | End: 2022-04-21

## 2022-04-13 RX ORDER — ACETAMINOPHEN 325 MG/1
650 TABLET ORAL ONCE
Status: CANCELLED
Start: 2022-06-14 | End: 2022-04-21

## 2022-04-13 RX ORDER — HEPARIN 100 UNIT/ML
300-500 SYRINGE INTRAVENOUS AS NEEDED
Status: CANCELLED
Start: 2022-06-14

## 2022-04-13 RX ORDER — DIPHENHYDRAMINE HYDROCHLORIDE 50 MG/ML
50 INJECTION, SOLUTION INTRAMUSCULAR; INTRAVENOUS AS NEEDED
Status: CANCELLED
Start: 2022-06-14

## 2022-04-13 RX ORDER — EPINEPHRINE 1 MG/ML
0.3 INJECTION, SOLUTION, CONCENTRATE INTRAVENOUS AS NEEDED
Status: CANCELLED | OUTPATIENT
Start: 2022-06-14

## 2022-04-13 RX ORDER — SODIUM CHLORIDE 9 MG/ML
25 INJECTION, SOLUTION INTRAVENOUS CONTINUOUS
Status: CANCELLED | OUTPATIENT
Start: 2022-06-14

## 2022-04-13 RX ORDER — ALBUTEROL SULFATE 0.83 MG/ML
2.5 SOLUTION RESPIRATORY (INHALATION) AS NEEDED
Status: CANCELLED
Start: 2022-06-14

## 2022-04-13 RX ORDER — SODIUM CHLORIDE 9 MG/ML
10 INJECTION INTRAMUSCULAR; INTRAVENOUS; SUBCUTANEOUS AS NEEDED
Status: CANCELLED | OUTPATIENT
Start: 2022-06-14

## 2022-04-13 RX ORDER — ACETAMINOPHEN 325 MG/1
650 TABLET ORAL AS NEEDED
Status: CANCELLED
Start: 2022-06-14

## 2022-04-13 RX ORDER — ONDANSETRON 2 MG/ML
8 INJECTION INTRAMUSCULAR; INTRAVENOUS AS NEEDED
Status: CANCELLED | OUTPATIENT
Start: 2022-06-14

## 2022-04-13 RX ORDER — HYDROCORTISONE SODIUM SUCCINATE 100 MG/2ML
100 INJECTION, POWDER, FOR SOLUTION INTRAMUSCULAR; INTRAVENOUS AS NEEDED
Status: CANCELLED | OUTPATIENT
Start: 2022-06-14

## 2022-04-13 RX ORDER — DIPHENHYDRAMINE HYDROCHLORIDE 50 MG/ML
25 INJECTION, SOLUTION INTRAMUSCULAR; INTRAVENOUS AS NEEDED
Status: CANCELLED
Start: 2022-06-14

## 2022-04-13 RX ORDER — SODIUM CHLORIDE 0.9 % (FLUSH) 0.9 %
10 SYRINGE (ML) INJECTION AS NEEDED
Status: CANCELLED | OUTPATIENT
Start: 2022-06-14

## 2022-04-13 NOTE — PROGRESS NOTES
Identified pt with two pt identifiers (name and ). Reviewed chart in preparation for visit and have obtained necessary documentation. Antonietta Brooks is a 79 y.o. female  Chief Complaint   Patient presents with    Knee Pain     Bilateral knee     Visit Vitals  BP (!) 145/76 (BP 1 Location: Left upper arm, BP Patient Position: Sitting, BP Cuff Size: Large adult)   Pulse (!) 57   Temp (!) 96.4 °F (35.8 °C) (Tympanic)   Ht 5' 5\" (1.651 m)   Wt 164 lb (74.4 kg)   SpO2 97%   BMI 27.29 kg/m²     1. Have you been to the ER, urgent care clinic since your last visit? Hospitalized since your last visit? No    2. Have you seen or consulted any other health care providers outside of the [address] [place] [address] since your last visit? Include any pap smears or colon screening.  No

## 2022-04-13 NOTE — LETTER
4/13/2022    Patient: Eamon Castro   YOB: 1940   Date of Visit: 4/13/2022     Slick Dejesus MD  [address] [id number]  [place]  MD Jaxon Dacosta  [address]  [place]ia [place]    Dear MD Chase Floyd MD,      Thank you for referring Ms. Ammy Jaramillo to [facility] for evaluation. My notes for this consultation are attached. If you have questions, please do not hesitate to call me. I look forward to following your patient along with you.       Sincerely,    [facility], DO

## 2022-04-13 NOTE — PROGRESS NOTES
4/13/2022    Chief Complaint: Bilateral knee pain    Assessment: Osteoarthritis bilateral knee    Plan: This patient and I did discuss the many options in treating knee osteoarthritis. We did discuss that we could continue to seek out nonoperative modalities, such as: NSAIDs, oral and topical analgesics, knee injections, knee braces, physical therapy, stretching, strengthening, and weight loss strategies, activity modification, ambulatory assistive devices. The patient stated their understanding with this, but and would like to proceed with nonsurgical management in the form of observation, possible injections in the future. HPI: This is a 79 y.o. female who complains of bilateral knee pain. neither side is worse than the other side. Onset was over many yeras. The patient has had activity dependent pain for years. The patient has tried activity modification, physical therapy exercises, injections have worked in the past.  The pain is in the global knee, it is severe in intensity. The patient feels unstable with the knee, fears falling, and has significant limitation with activities of daily living, recreation, and walks with a limp. Past Medical History:   Diagnosis Date    Allergic rhinitis, cause unspecified 5/26/2010    Asthma 5/26/2010    Depression 5/26/2010    Encounter for long-term (current) use of other medications 7/11/2011    Essential hypertension, benign 5/26/2010    Hypertension     OA (osteoarthritis) 5/26/2010    Rheumatoid arthritis ([facility] [address]     Unspecified hypothyroidism 5/26/2010       Past Surgical History:   Procedure Laterality Date    HX BREAST BIOPSY Right     benign bx x2    HX HYSTERECTOMY         Current Outpatient Medications on File Prior to Visit   Medication Sig Dispense Refill    diclofenac (VOLTAREN) 1 % gel Apply 2 g to affected area four (4) times daily.  300 g 2    ergocalciferol (ERGOCALCIFEROL) 1,250 mcg (50,000 unit) capsule TAKE ONE CAPSULE BY MOUTH ONCE WEEKLY 12 Capsule 4    nitrofurantoin, macrocrystal-monohydrate, (Macrobid) 100 mg capsule Take 1 Capsule by mouth two (2) times a day. (Patient not taking: Reported on 9/1/2021) 6 Capsule 0    denosumab (Prolia) 60 mg/mL injection 60 mg by SubCUTAneous route.  Calcium-Cholecalciferol, D3, (CALCIUM 600 WITH VITAMIN D3) 600 mg(1,500mg) -400 unit cap Take  by mouth.  fluticasone (FLONASE) 50 mcg/actuation nasal spray PLACE TWO SPRAYS IN EACH NOSTRIL ONCE DAILY 3 Bottle 2    FEXOFENADINE HCL (ALLEGRA PO) Take  by mouth daily.  albuterol (PROVENTIL HFA, VENTOLIN HFA) 90 mcg/actuation inhaler Take 1 Puff by inhalation every six (6) hours as needed for Wheezing. 1 Inhaler 5    levothyroxine (SYNTHROID) 50 mcg tablet Take  by mouth daily (before breakfast).  amlodipine (NORVASC) 5 mg tablet Take 5 mg by mouth daily.  azithromycin (ZITHROMAX) 250 mg tablet Take 2 tablets today, then take 1 tablet daily (Patient not taking: Reported on 3/31/2022) 6 Tablet 0    guaiFENesin-dextromethorphan SR (Mucinex DM) 600-30 mg per tablet Take 1 Tablet by mouth two (2) times a day. (Patient not taking: Reported on 4/13/2022) 20 Tablet 3    tocilizumab (Actemra ACTPen) 162 mg/0.9 mL injection 0.9 mL by SubCUTAneous route every fourteen (14) days. Indications: rheumatoid arthritis (Patient not taking: Reported on 3/31/2022) 2 Each 0    clotrimazole (LOTRIMIN) 1 % topical cream Apply  to affected area two (2) times daily as needed for Skin Irritation. (Patient not taking: Reported on 3/31/2022) 28 g 1    upadacitinib (Rinvoq) 15 mg Tb24 Take 15 mg by mouth daily. Indications: rheumatoid arthritis (Patient not taking: Reported on 8/20/2021) 28 Tab 0    acyclovir (ZOVIRAX) 400 mg tablet as needed. (Patient not taking: Reported on 4/13/2022)      DULERA 200-5 mcg/actuation HFA inhaler 2 Puffs two (2) times a day.  (Patient not taking: Reported on 3/31/2022)       No current facility-administered medications on file prior to visit. Allergies   Allergen Reactions    Humira [Adalimumab] Hives    Leflunomide Swelling    Penicillins Unable to Obtain    Sulfa (Sulfonamide Antibiotics) Unknown (comments)       Family History   Problem Relation Age of Onset    Heart Disease Mother     Heart Disease Father     Cancer Sister     Ovarian Cancer Sister     Stroke Sister     Heart Disease Sister     Breast Cancer Niece 36       Social History     Socioeconomic History    Marital status:    Tobacco Use    Smoking status: Never Smoker    Smokeless tobacco: Never Used   Vaping Use    Vaping Use: Never used   Substance and Sexual Activity    Alcohol use: No    Drug use: No    Sexual activity: Not Currently         Review of Systems:       General: Denies headache, lethargy, fever, weight loss  Ears/Nose/Throat: Denies ear discharge, drainage, nosebleeds, hoarse voice, dental problems  Cardiovascular: Denies chest pain, shortness of breath  Lungs: Denies chest pain, breathing problems, wheezing, pneumonia  Stomach: Denies stomach pain, heartburn, constipation, irritable bowel  Skin: Denies rash, sores, open wounds  Musculoskeletal: Admits to bilateral knee pain  Genitourinary: Denies dysuria, hematuria, polyuria  Gastrointestinal: Denies constipation, obstipation, diarrhea  Neurological: Denies changes in sight, smell, hearing, taste, seizures. Denies loss of consciousness.   Psychiatric: Denies depression, sleep pattern changes, anxiety, change in personality  Endocrine: Denies mood swings, heat or cold intolerance  Hematologic/Lymphatic: Denies anemia, purpura, petechia  Allergic/Immunologic: Denies swelling of throat, pain or swelling at lymph nodes      Physical Examination:    Visit Vitals  BP (!) 145/76 (BP 1 Location: Left upper arm, BP Patient Position: Sitting, BP Cuff Size: Large adult)   Pulse (!) 57   Temp (!) 96.4 °F (35.8 °C) (Tympanic)   Ht 5' 5\" (1.651 m)   Wt 164 lb (74.4 kg) SpO2 97%   BMI 27.29 kg/m²        General: AOX3, no apparent distress  Psychiatric: mood and affect appropriate  Lungs: breathing is symmetric and unlabored bilaterally  Heart: regular rate and rhythm  Abdomen: no guarding  Head: normocephalic, atraumatic  Skin: No significant abnormalities, good turgor  Sensation intact to light touch: L1-S1 dermatomes  Muscular exam: 5/5 strength in all major muscle groups unless noted in specialty exam.    Extremities:      Left upper extremity: Full active and passive range of motion without pain, deformity, no open wound, strength 5/5 in all major muscle groups. Right upper extremity: Full active and passive range of motion without pain, deformity, no open wound, strength 5/5 in all major muscle groups. Right lower extremity: No deformity is noted. Range of motion of the knee is 0-110. Ligamentous testing of the knee indicates stability of the the MCL, LCL, PCL, and ACL. Lachman's, anterior and posterior drawer tests are specifically negative. Medial joint line tenderness to palpation is noted. Popliteal area is unremarkable. 1+  for effusion. + patellar crepitus. Patella tracks centrally. Pivot shift is negative. Strength testing is indicative of 5/5 strength at hip flexion, extension, knee flexion and extension, tibialis anterior, EHL, and FHL. Sensation is intact to light touch in the L1-S1 dermatomes. Capillary refill is less than 2 seconds in the toes. Left lower extremity:  No deformity is noted. Range of motion of the knee is 0-110. Ligamentous testing of the knee indicates stability of the the MCL, LCL, PCL, and ACL. Lachman's, anterior and posterior drawer tests are specifically negative. Medial joint line tenderness to palpation is noted. Popliteal area is unremarkable. 1+  for effusion. + patellar crepitus. Patella tracks centrally. Pivot shift is negative.   Strength testing is indicative of 5/5 strength at hip flexion, extension, knee flexion and extension, tibialis anterior, EHL, and FHL. Sensation is intact to light touch in the L1-S1 dermatomes. Capillary refill is less than 2 seconds in the toes. Diagnostics:    Pertinent Diagnostics:  Xrays are available of the bilateral knee, they indicate severe symmetrical osteoarthritis of the knee joints, no significant other findings, no other osseus abnormalities, fractures, or dislocations. Ms. Anjali Okeefe has a reminder for a \"due or due soon\" health maintenance. I have asked that she contact her primary care provider for follow-up on this health maintenance.

## 2022-04-18 ENCOUNTER — TELEPHONE (OUTPATIENT)
Dept: FAMILY MEDICINE CLINIC | Age: 82
End: 2022-04-18

## 2022-04-18 DIAGNOSIS — J32.0 MAXILLARY SINUSITIS, UNSPECIFIED CHRONICITY: ICD-10-CM

## 2022-04-18 RX ORDER — PROMETHAZINE HYDROCHLORIDE AND DEXTROMETHORPHAN HYDROBROMIDE 6.25; 15 MG/5ML; MG/5ML
5 SYRUP ORAL
Qty: 180 ML | Refills: 1 | Status: SHIPPED | OUTPATIENT
Start: 2022-04-18 | End: 2022-04-25

## 2022-04-18 RX ORDER — AZITHROMYCIN 250 MG/1
TABLET, FILM COATED ORAL
Qty: 6 TABLET | Refills: 0 | Status: SHIPPED | OUTPATIENT
Start: 2022-04-18 | End: 2022-04-23

## 2022-04-18 NOTE — TELEPHONE ENCOUNTER
----- Message from Gege Mike sent at 4/18/2022  9:50 AM EDT -----  Subject: Refill Request    QUESTIONS  Name of Medication? promethazine-dextromethorphan (PROMETHAZINE-DM)   6.25-15 mg/5 mL syrup  Patient-reported dosage and instructions? up to 4x/day as needed for cough  How many days do you have left? 1  [facility] [id number]  Pharmacy phone number (if available)? [phone]  Additional Information for Provider? Patient said she's got chest   congestion since 4/13/21 which she believes is linked to high pollen   counts. Patient says you normally prescribe her antibiotics and cough   syrup because she's prone to bronchitis. Will you please call in \"the   usual\" for her, unless you've got something more effective.   ---------------------------------------------------------------------------  --------------,  Name of Medication? azithromycin (ZITHROMAX) 250 mg tablet  Patient-reported dosage and instructions? unsure  How many days do you have left? 0  [facility] [id number]  Pharmacy phone number (if available)? [phone]  ---------------------------------------------------------------------------  --------------  CALL BACK INFO  What is the best way for the office to contact you? OK to leave message on   voicemail  Preferred Call Back Phone Number? [phone]  ---------------------------------------------------------------------------  --------------  SCRIPT ANSWERS  Relationship to Patient?  Self

## 2022-04-18 NOTE — TELEPHONE ENCOUNTER
Patient is c/o chest congestion, she is requesting promethazine Dm and Zithromax.  Telephone number [phone]

## 2022-04-18 NOTE — TELEPHONE ENCOUNTER
Bing Cranker    Female, 79 y.o., 1940  Weight:   164 lb (74.4 kg)  Phone:   [phone] Chantale Machuca  PCP:   Lawson Opitz, MD          MRN:   [id number]  MyChart: Active  Next Appt:   06/21/2022                  Message  Received: Today  Akhil Doing [facility]  Subject: Refill Request     QUESTIONS   Name of Medication? promethazine-dextromethorphan (PROMETHAZINE-DM)   6.25-15 mg/5 mL syrup   Patient-reported dosage and instructions? up to 4x/day as needed for cough   How many days do you have left? 1   Preferred Pharmacy? [facility] [id number]   Pharmacy phone number (if available)? [phone]   Additional Information for Provider? Patient said she's got chest   congestion since 4/13/21 which she believes is linked to high pollen   counts. Patient says you normally prescribe her antibiotics and cough   syrup because she's prone to bronchitis. Will you please call in \"the   usual\" for her, unless you've got something more effective.   ---------------------------------------------------------------------------   --------------,   Name of Medication? azithromycin (ZITHROMAX) 250 mg tablet   Patient-reported dosage and instructions? unsure   How many days do you have left? 0   Preferred Pharmacy? [facility] [id number]   Pharmacy phone number (if available)? [phone]   ---------------------------------------------------------------------------   --------------   CALL BACK INFO   What is the best way for the office to contact you? OK to leave message on   voicemail   Preferred Call Back Phone Number? [phone]   ---------------------------------------------------------------------------   --------------   SCRIPT ANSWERS   Relationship to Patient?  Self

## 2022-05-26 NOTE — PROGRESS NOTES
PT DAILY TREATMENT NOTE - [facility] 2-15    Patient Name: Paty Hunter  Date:2021  : 1940  [x]  Patient  Verified  [place] MED[place] / Plan: [place] MEDICARE PART A & B / Product Type: Medicare /    In time: 3:24 PM  Out time: 4:14 PM  Total Treatment Time (min): 50 minutes  Total Timed Codes (min): 50 minutes  1:1 Treatment Time ( only): 45 minutes   Visit #:  2    Treatment Area: Low back pain [M54.5]    SUBJECTIVE  Pain Level (0-10 scale): 3/10  Any medication changes, allergies to medications, adverse drug reactions, diagnosis change, or new procedure performed?: [x] No    [] Yes (see summary sheet for update)  Subjective functional status/changes:   [] No changes reported  The Pt reports that she has spent the morning unpacking boxes so her back is more aggravated. She has been doing her exercises at home, but is unsure if she is doing them correctly. OBJECTIVE     50 min Therapeutic Exercise:  [x] See flow sheet :   Rationale: increase ROM, increase strength, improve coordination, improve balance and increase proprioception to improve the patients ability to perform ADLs with less pain or discomfort. With   [x] TE   [] TA   [] neuro   [] other: Patient Education: [x] Review HEP    [] Progressed/Changed HEP based on:   [] positioning   [] body mechanics   [] transfers   [] heat/ice application    [] other:      Other Objective/Functional Measures:  FOTO 58/100     Pain Level (0-10 scale) post treatment: 2/10    ASSESSMENT/Changes in Function:   The Pt had difficulty performing some of the stretches in her original HEP despite many cues and corrections and it kept irritating her back. Some of the exercises were changed to ones that did not cause further back irritation and she reported decreased pain after this change.   Patient will continue to benefit from skilled PT services to modify and progress therapeutic interventions, address functional mobility deficits, address ROM deficits, address strength deficits, analyze and address soft tissue restrictions, analyze and cue movement patterns, analyze and modify body mechanics/ergonomics, assess and modify postural abnormalities and instruct in home and community integration to attain remaining goals. []  See Plan of Care  []  See progress note/recertification  []  See Discharge Summary         Progress towards goals / Updated goals:  Long Term Goals:  To be accomplished in 4-6 weeks:  1) Pt will be able to stand greater than 10 minutes without increase pain so that can better tolerate household chores- progressing  2) Pt will be able to ambulate greater than 10 without increase of pain so she can walk her dog - progressing  3) Pt will demonstrate independence with modified exercise/gym routine without aggravation of sx- progressing  4) Pt will be able to perform 10 consecutive bridge w/out hip drop to demonstrate improved core strength in stance- progressing     PLAN  [x]  Upgrade activities as tolerated     [x]  Continue plan of care  [x]  Update interventions per flow sheet       []  Discharge due to:_  []  Other:_      Edgard Pham, PT 1/7/2021 History of COPD

## 2022-06-03 ENCOUNTER — TELEPHONE (OUTPATIENT)
Dept: RHEUMATOLOGY | Age: 82
End: 2022-06-03

## 2022-06-03 NOTE — TELEPHONE ENCOUNTER
Called patient's secondary insurance [employer] FEP to discuss benefits for  Prolia infusion. Per rep, call reference [id number], since medicare is primary, [employer] Walker will waive calendar year coinsurance, copay and deductible, but they will not waive the benefit limitations.     It's not 100% positive, but the rep stated since [employer] Walker will pay what medicare doesn't, it's most likely that the patient will have little to no cost.

## 2022-06-06 DIAGNOSIS — M45.8 ANKYLOSING SPONDYLITIS OF SACRAL REGION (HCC): ICD-10-CM

## 2022-06-06 DIAGNOSIS — M06.00 SERONEGATIVE RHEUMATOID ARTHRITIS (HCC): Primary | ICD-10-CM

## 2022-06-06 DIAGNOSIS — M81.0 AGE-RELATED OSTEOPOROSIS WITHOUT CURRENT PATHOLOGICAL FRACTURE: ICD-10-CM

## 2022-06-07 NOTE — PROGRESS NOTES
[facility] Rheumatology  OBIC Note    Date: 2022  Name: Antonio Butler  MRN: [id number]       : 1940  Diagnosis: Rheumatoid Arthritis  Treatment: Renflexis 400mg week 0 Induction  Referring Provider: Dr. Conner Mcrae  Supervising Provider: Dr. Conner Mcrae    Patient arrived to [facility] at 1412. Ms. Yonis Shankar allergies reviewed and she agreed to receiving today's treatment. A physical assessment was performed initially and post-treatment. Pt. denies new complaints today. Ms. Juanpablo Hicks vitals were monitored before, during and after medication administration.    Vitals:    22 0955 22 1012 22 1055 22 1113   BP: (!) 145/75 133/71 (!) 147/70 (!) 144/64   Pulse: (!) 45 (!) 42 (!) 42 (!) 44   Resp: 16 16 16 16   Temp:       SpO2: 96% 96% 98% 96%     Medications given per providers orders:  Administrations This Visit       0.9% sodium chloride infusion       Admin Date  2022 Action  New Bag Dose  25 mL/hr Rate  25 mL/hr Route  IntraVENous Administered By  Elva Jones RN              acetaminophen (TYLENOL) tablet 650 mg       Admin Date  2022 Action  Given Dose  650 mg Route  Oral Administered By  Elva Jones RN              diphenhydrAMINE (BENADRYL) tablet 50 mg       Admin Date  2022 Action  Given Dose  50 mg Route  Oral Administered By  Elva Jones RN              inFLIXimab-abda (RENFLEXIS) 400 mg in 0.9% sodium chloride 250 mL, overfill volume 25 mL infusion       Admin Date  2022 Action  New Bag Dose  400 mg Rate  10 mL/hr Route  IntraVENous Administered By  Elva Jones RN              sodium chloride (NS) flush 10 mL       Admin Date  2022 Action  Given Dose  10 mL Route  IntraVENous Administered By  Elva Jones RN                    500ml bag 0.9% Normal Saline @ 25ml/hr  NDC 3384-7327-27  Lot # [id number]  Exp 10/2024    Acetaminophen 325mg tablets x 2 (650mg total)  NDC 6267-7929-57  Lot # [id number]  Exp 2024     Benadryl 50mg PO  NDC 1542-2725-74  Lot # [id number]  Exp 01/2023    Renflexis 100mg vials x 4 (total dose 400mg)  NDC 34957-204-53  Lot # [id number]  Exp 03/24/2025  ---mixed in--  250ml 0.9% Normal saline  NDC 1797-7264-80  Lot # [id number]  Exp 5/31/23     Start: 10ml/hr @ 070-073-058  20ml/hr @ 872-150-1279  40ml/hr @ 4566  80ml/hr @ 6247  150ml/hr @ 6315  250ml/hr @ 1010  Stop: 1105    Lines: 24G Left FA. Blood return noted and IV site assessed before, during, and after treatment. Line flushed with 10-30 ml's 0.9% Normal Saline solution per protocol. Access was removed from Ms. Elieser Adams after completion of infusion/injection. Ms. Elieser Adams tolerated the treatment without complaints and no medication reactions were seen while in presence of this RN. Patient provided with AVS, which included future appointments and written educational material regarding Renflexis. All of the patients questions were answered and then discharged ambulatory from Rheumatology OBIC in stable condition at 1115. Encounter routed to supervising provider for co-signing. Future Appointments   Date Time Provider Andrea Ga   6/28/2022  9:30 AM INFUSIONINJ_RC AO[facility]   7/14/2022  9:00 AM Griffin Merlin, MD [facility]   7/25/2022  9:30 AM INFUSIONINJ_RC [facility]   9/19/2022  9:00 AM INFUSIONINJ_RC [facility]   9/19/2022  9:30 AM Ana M Chavez MD AO[facility]     Joey Feliz RN  June 14, 2022  12:13 PM    ---  ATTENDING ATTESTATION    Lupis Moore, hereby attest that the above medical record entry accurately reflects notations that I made in my capacity as treating physician when I treated the above listed patient. I do hereby attest that this information is true, accurate and complete to the best of my knowledge.   Preeti Zavala MD S  Adult Rheumatology  Signed 09/27/22 1:54 PM

## 2022-06-14 ENCOUNTER — OFFICE VISIT (OUTPATIENT)
Dept: RHEUMATOLOGY | Age: 82
End: 2022-06-14
Payer: MEDICARE

## 2022-06-14 VITALS
RESPIRATION RATE: 16 BRPM | OXYGEN SATURATION: 96 % | DIASTOLIC BLOOD PRESSURE: 64 MMHG | SYSTOLIC BLOOD PRESSURE: 144 MMHG | HEART RATE: 44 BPM | TEMPERATURE: 98 F

## 2022-06-14 DIAGNOSIS — M45.8 ANKYLOSING SPONDYLITIS OF SACRAL REGION (HCC): ICD-10-CM

## 2022-06-14 DIAGNOSIS — M06.00 SERONEGATIVE RHEUMATOID ARTHRITIS (HCC): Primary | ICD-10-CM

## 2022-06-14 PROCEDURE — 96415 CHEMO IV INFUSION ADDL HR: CPT | Performed by: INTERNAL MEDICINE

## 2022-06-14 PROCEDURE — 96413 CHEMO IV INFUSION 1 HR: CPT | Performed by: INTERNAL MEDICINE

## 2022-06-14 PROCEDURE — 96372 THER/PROPH/DIAG INJ SC/IM: CPT

## 2022-06-14 RX ORDER — DIPHENHYDRAMINE HCL 25 MG
50 TABLET ORAL ONCE
OUTPATIENT
Start: 2022-06-26 | End: 2022-06-26

## 2022-06-14 RX ORDER — SODIUM CHLORIDE 9 MG/ML
10 INJECTION INTRAMUSCULAR; INTRAVENOUS; SUBCUTANEOUS AS NEEDED
OUTPATIENT
Start: 2022-06-26

## 2022-06-14 RX ORDER — DIPHENHYDRAMINE HYDROCHLORIDE 50 MG/ML
25 INJECTION, SOLUTION INTRAMUSCULAR; INTRAVENOUS AS NEEDED
Start: 2022-06-26

## 2022-06-14 RX ORDER — ONDANSETRON 2 MG/ML
8 INJECTION INTRAMUSCULAR; INTRAVENOUS AS NEEDED
OUTPATIENT
Start: 2022-06-26

## 2022-06-14 RX ORDER — EPINEPHRINE 1 MG/ML
0.3 INJECTION, SOLUTION, CONCENTRATE INTRAVENOUS AS NEEDED
OUTPATIENT
Start: 2022-06-26

## 2022-06-14 RX ORDER — SODIUM CHLORIDE 0.9 % (FLUSH) 0.9 %
10 SYRINGE (ML) INJECTION AS NEEDED
OUTPATIENT
Start: 2022-06-26

## 2022-06-14 RX ORDER — SODIUM CHLORIDE 9 MG/ML
25 INJECTION, SOLUTION INTRAVENOUS CONTINUOUS
OUTPATIENT
Start: 2022-06-26

## 2022-06-14 RX ORDER — ACETAMINOPHEN 325 MG/1
650 TABLET ORAL ONCE
Start: 2022-06-26 | End: 2022-06-26

## 2022-06-14 RX ORDER — DIPHENHYDRAMINE HYDROCHLORIDE 50 MG/ML
50 INJECTION, SOLUTION INTRAMUSCULAR; INTRAVENOUS AS NEEDED
Start: 2022-06-26

## 2022-06-14 RX ORDER — HYDROCORTISONE SODIUM SUCCINATE 100 MG/2ML
100 INJECTION, POWDER, FOR SOLUTION INTRAMUSCULAR; INTRAVENOUS AS NEEDED
OUTPATIENT
Start: 2022-06-26

## 2022-06-14 RX ORDER — ACETAMINOPHEN 325 MG/1
650 TABLET ORAL ONCE
Status: COMPLETED | OUTPATIENT
Start: 2022-06-14 | End: 2022-06-14

## 2022-06-14 RX ORDER — SODIUM CHLORIDE 9 MG/ML
25 INJECTION, SOLUTION INTRAVENOUS CONTINUOUS
Status: DISCONTINUED | OUTPATIENT
Start: 2022-06-14 | End: 2022-06-14 | Stop reason: HOSPADM

## 2022-06-14 RX ORDER — ALBUTEROL SULFATE 0.83 MG/ML
2.5 SOLUTION RESPIRATORY (INHALATION) AS NEEDED
Start: 2022-06-26

## 2022-06-14 RX ORDER — SODIUM CHLORIDE 0.9 % (FLUSH) 0.9 %
10 SYRINGE (ML) INJECTION AS NEEDED
Status: DISCONTINUED | OUTPATIENT
Start: 2022-06-14 | End: 2022-06-14 | Stop reason: HOSPADM

## 2022-06-14 RX ORDER — ACETAMINOPHEN 325 MG/1
650 TABLET ORAL AS NEEDED
Start: 2022-06-26

## 2022-06-14 RX ORDER — HEPARIN 100 UNIT/ML
300-500 SYRINGE INTRAVENOUS AS NEEDED
Start: 2022-06-26

## 2022-06-14 RX ORDER — DIPHENHYDRAMINE HCL 25 MG
50 TABLET ORAL ONCE
Status: COMPLETED | OUTPATIENT
Start: 2022-06-14 | End: 2022-06-14

## 2022-06-14 RX ADMIN — Medication 50 MG: at 08:37

## 2022-06-14 RX ADMIN — SODIUM CHLORIDE 25 ML/HR: 0.9 INJECTION, SOLUTION INTRAVENOUS at 08:44

## 2022-06-14 RX ADMIN — ACETAMINOPHEN 650 MG: 325 TABLET ORAL at 08:37

## 2022-06-14 RX ADMIN — INFLIXIMAB 400 MG: 100 INJECTION, POWDER, LYOPHILIZED, FOR SOLUTION INTRAVENOUS at 08:50

## 2022-06-14 RX ADMIN — Medication 10 ML: at 08:44

## 2022-06-21 DIAGNOSIS — M81.0 AGE-RELATED OSTEOPOROSIS WITHOUT CURRENT PATHOLOGICAL FRACTURE: Primary | ICD-10-CM

## 2022-06-23 ENCOUNTER — TELEPHONE (OUTPATIENT)
Dept: RHEUMATOLOGY | Age: 82
End: 2022-06-23

## 2022-06-23 NOTE — TELEPHONE ENCOUNTER
Pt called saying she thinks she is having a rxn to the renflexis infusion she received on 6/14. Reported entire mouth was tingling last week and hoarseness followed by mouth sores. She called to find out what she should take to help. Pt also reports her lips feel slightly \"yao\" and all she wants to do is sleep. Told patient if she had benadryl at her location she could take one of those but that Dr. Saman Nielsen or his nurse will be reaching back out with further instructions. Pt verbalized understanding.

## 2022-06-24 DIAGNOSIS — U07.1 ACUTE BRONCHITIS DUE TO COVID-19 VIRUS: Primary | ICD-10-CM

## 2022-06-24 DIAGNOSIS — J20.8 ACUTE BRONCHITIS DUE TO COVID-19 VIRUS: Primary | ICD-10-CM

## 2022-06-24 DIAGNOSIS — J32.0 MAXILLARY SINUSITIS, UNSPECIFIED CHRONICITY: ICD-10-CM

## 2022-06-24 RX ORDER — NIRMATRELVIR AND RITONAVIR 300-100 MG
KIT ORAL
Qty: 1 BOX | Refills: 0 | Status: SHIPPED | OUTPATIENT
Start: 2022-06-24 | End: 2022-07-14 | Stop reason: ALTCHOICE

## 2022-06-24 RX ORDER — AZITHROMYCIN 250 MG/1
TABLET, FILM COATED ORAL
Qty: 6 TABLET | Refills: 0 | Status: SHIPPED | OUTPATIENT
Start: 2022-06-24 | End: 2022-07-14 | Stop reason: ALTCHOICE

## 2022-06-27 ENCOUNTER — NURSE TRIAGE (OUTPATIENT)
Dept: OTHER | Facility: CLINIC | Age: 82
End: 2022-06-27

## 2022-06-27 NOTE — TELEPHONE ENCOUNTER
Dr. Aung Meneses called pt on Friday June 24th and [facility] to talk about pts symptoms. He did not hear back from patient. Monday, pt called infusion and stated she test positive for COVID on Fridat June 24th and wanted to cancel her infusion appt for 6/28. Informed patient Dr. Aung Meneses will be reaching back out and appt was cancelled. Pt reports she as placed on Paxlovid and Azithromycin but wanted to check with Dr. Aung Meneses if there was anything else she could take to feel better. Will notify provider.

## 2022-06-27 NOTE — TELEPHONE ENCOUNTER
Received call from [address] at [facility] with Red Flag Complaint. Subjective: Caller states \"short of breath. Dx with Covid Sunday night. Medication sent over and this morning I feel worse. Feels like I'm going into asthma. Short of breath even when sitting down. \" Pt is currently doing nebulizer treatment. Current Symptoms: short of breath even at rest, chest tightness     Onset: 3 days ago; worsening    Associated Symptoms: reduced activity    Pain Severity: 0/10; Temperature: denies fever by unknown method    What has been tried: nothing    LMP: NA Pregnant: NA    Recommended disposition: Advised to try her nebulizer and her inhaler 20 minutes apart and to call back with update. Care advice provided, patient verbalizes understanding; denies any other questions or concerns; instructed to call back for any new or worsening symptoms. Pt verbalizes agreement. Attention Provider: Thank you for allowing me to participate in the care of your patient. The patient was connected to triage in response to information provided to the [facility]. Please do not respond through this encounter as the response is not directed to a shared pool. Called pt back to f/u on neb/inhaler tx. Pt states they did help her breathing. She would like to go ahead and see pt today so that she does not end up going back to hospital. Warm transfer to [address] Brittny Aleman at [facility] to further assist with appt scheduling.

## 2022-06-27 NOTE — TELEPHONE ENCOUNTER
Discussed with patient. Fever on Thurs and Friday, cough persists and may be worsening over last day. PCP Rx'd Paxovid and azithro which she started this morning. 2 small ulcers on inside of lip, unusual feeling coating mouth like mouth was burnt, started about a week after infusion and resolved over the next week or so. Will d/w patient re: alternatives to Renflexis vs challenging with another infusion, once she recovers from [place]. Will have  reach out to schedule followup in 2 weeks.

## 2022-06-28 ENCOUNTER — TELEPHONE (OUTPATIENT)
Dept: RHEUMATOLOGY | Age: 82
End: 2022-06-28

## 2022-06-28 NOTE — TELEPHONE ENCOUNTER
Called patient per doc request Please reach out to schedule an in-person f/u in 2 weeks. Let her know that if she is having fevers or worsened cough around the time of the appointment then she should call to change to virtual (or telephone if unable to do virtual). COULD NOT LEAVE VOICE MESSAGE PATIENT VOICE MAIL BOX IS NOT SETUP.  SDH

## 2022-07-01 ENCOUNTER — NURSE TRIAGE (OUTPATIENT)
Dept: OTHER | Facility: CLINIC | Age: 82
End: 2022-07-01

## 2022-07-01 ENCOUNTER — TELEPHONE (OUTPATIENT)
Dept: FAMILY MEDICINE CLINIC | Age: 82
End: 2022-07-01

## 2022-07-01 NOTE — TELEPHONE ENCOUNTER
Received call from Manuel at [facility] with Red Flag Complaint. Subjective: Caller states \"I am out of my medicine from having COVID a week ago and I am still showing positive on the tests. I am feeling 75% better and I am just wondering if I should get some more medicine or what I should do. \"     No triage indicated as patient reports she is feeling better with no new or worsening sx. Educated patient that tests can show positive for extended time frames after infection and that second round of antiviral treatment is not indicated. Advised patient to call back with any new or worsening sx. Patient verbalized understanding. Current Symptoms: No new or worsening sx. All sx are improving from 1 week ago. Reports lingering cough, but improved     Onset: 1 week ago; improving    Associated Symptoms: NA    Pain Severity: na    Temperature: na    What has been tried: Antiviral treatment    LMP: NA Pregnant: NA    Recommended disposition: Post COVID follow up    Care advice provided, patient verbalizes understanding; denies any other questions or concerns; instructed to call back for any new or worsening symptoms. Patient/Caller agrees with recommended disposition; writer provided warm transfer to [employer] at [facility] for appointment scheduling    Attention Provider: Thank you for allowing me to participate in the care of your patient. The patient was connected to triage in response to information provided to the [facility]. Please do not respond through this encounter as the response is not directed to a shared pool.       Reason for Disposition   [1] PERSISTING SYMPTOMS OF COVID-19 AND [2] symptoms BETTER (improving)    Protocols used: CORONAVIRUS (COVID-19) PERSISTING SYMPTOMS FOLLOW-UP CALL-ADULT-[place]

## 2022-07-01 NOTE — TELEPHONE ENCOUNTER
----- Message from Morenita Soares sent at 7/1/2022  9:11 AM EDT -----  Subject: Message to Provider    QUESTIONS  Information for Provider? Pt is requesting something is called in to help   with her lingering covid symptoms. ---------------------------------------------------------------------------  --------------  Manoj CID  [id number]; OK to leave message on voicemail  ---------------------------------------------------------------------------  --------------  SCRIPT ANSWERS  Relationship to Patient?  Self

## 2022-07-14 ENCOUNTER — OFFICE VISIT (OUTPATIENT)
Dept: FAMILY MEDICINE CLINIC | Age: 82
End: 2022-07-14
Payer: MEDICARE

## 2022-07-14 VITALS
WEIGHT: 158.6 LBS | SYSTOLIC BLOOD PRESSURE: 127 MMHG | RESPIRATION RATE: 16 BRPM | BODY MASS INDEX: 26.42 KG/M2 | OXYGEN SATURATION: 96 % | DIASTOLIC BLOOD PRESSURE: 59 MMHG | HEART RATE: 60 BPM | HEIGHT: 65 IN | TEMPERATURE: 98.6 F

## 2022-07-14 DIAGNOSIS — Z00.00 MEDICARE ANNUAL WELLNESS VISIT, SUBSEQUENT: Primary | ICD-10-CM

## 2022-07-14 DIAGNOSIS — Z79.60 LONG-TERM USE OF IMMUNOSUPPRESSANT MEDICATION: ICD-10-CM

## 2022-07-14 DIAGNOSIS — E53.8 B12 DEFICIENCY: ICD-10-CM

## 2022-07-14 DIAGNOSIS — R53.83 OTHER FATIGUE: ICD-10-CM

## 2022-07-14 DIAGNOSIS — N18.2 CKD (CHRONIC KIDNEY DISEASE) STAGE 2, GFR 60-89 ML/MIN: ICD-10-CM

## 2022-07-14 DIAGNOSIS — U07.1 COVID-19: ICD-10-CM

## 2022-07-14 DIAGNOSIS — M06.09 RHEUMATOID ARTHRITIS OF MULTIPLE SITES WITH NEGATIVE RHEUMATOID FACTOR (HCC): ICD-10-CM

## 2022-07-14 DIAGNOSIS — I10 ESSENTIAL HYPERTENSION, BENIGN: ICD-10-CM

## 2022-07-14 LAB
ALBUMIN SERPL-MCNC: 3.5 G/DL (ref 3.5–5)
ALBUMIN/GLOB SERPL: 1.2 {RATIO} (ref 1.1–2.2)
ALP SERPL-CCNC: 82 U/L (ref 45–117)
ALT SERPL-CCNC: 31 U/L (ref 12–78)
ANION GAP SERPL CALC-SCNC: 7 MMOL/L (ref 5–15)
AST SERPL-CCNC: 18 U/L (ref 15–37)
BASOPHILS # BLD: 0 K/UL (ref 0–0.1)
BASOPHILS NFR BLD: 1 % (ref 0–1)
BILIRUB SERPL-MCNC: 0.4 MG/DL (ref 0.2–1)
BUN SERPL-MCNC: 14 MG/DL (ref 6–20)
BUN/CREAT SERPL: 16 (ref 12–20)
CALCIUM SERPL-MCNC: 8.6 MG/DL (ref 8.5–10.1)
CHLORIDE SERPL-SCNC: 108 MMOL/L (ref 97–108)
CO2 SERPL-SCNC: 25 MMOL/L (ref 21–32)
CREAT SERPL-MCNC: 0.85 MG/DL (ref 0.55–1.02)
DIFFERENTIAL METHOD BLD: ABNORMAL
EOSINOPHIL # BLD: 0.1 K/UL (ref 0–0.4)
EOSINOPHIL NFR BLD: 2 % (ref 0–7)
ERYTHROCYTE [DISTWIDTH] IN BLOOD BY AUTOMATED COUNT: 13.6 % (ref 11.5–14.5)
GLOBULIN SER CALC-MCNC: 2.9 G/DL (ref 2–4)
GLUCOSE SERPL-MCNC: 121 MG/DL (ref 65–100)
HCT VFR BLD AUTO: 41.4 % (ref 35–47)
HGB BLD-MCNC: 12.7 G/DL (ref 11.5–16)
IMM GRANULOCYTES # BLD AUTO: 0 K/UL (ref 0–0.04)
IMM GRANULOCYTES NFR BLD AUTO: 1 % (ref 0–0.5)
LYMPHOCYTES # BLD: 1.3 K/UL (ref 0.8–3.5)
LYMPHOCYTES NFR BLD: 22 % (ref 12–49)
MCH RBC QN AUTO: 30.9 PG (ref 26–34)
MCHC RBC AUTO-ENTMCNC: 30.7 G/DL (ref 30–36.5)
MCV RBC AUTO: 100.7 FL (ref 80–99)
MONOCYTES # BLD: 0.5 K/UL (ref 0–1)
MONOCYTES NFR BLD: 8 % (ref 5–13)
NEUTS SEG # BLD: 3.9 K/UL (ref 1.8–8)
NEUTS SEG NFR BLD: 66 % (ref 32–75)
NRBC # BLD: 0 K/UL (ref 0–0.01)
NRBC BLD-RTO: 0 PER 100 WBC
PLATELET # BLD AUTO: 251 K/UL (ref 150–400)
PMV BLD AUTO: 10.1 FL (ref 8.9–12.9)
POTASSIUM SERPL-SCNC: 4.3 MMOL/L (ref 3.5–5.1)
PROT SERPL-MCNC: 6.4 G/DL (ref 6.4–8.2)
RBC # BLD AUTO: 4.11 M/UL (ref 3.8–5.2)
SODIUM SERPL-SCNC: 140 MMOL/L (ref 136–145)
VIT B12 SERPL-MCNC: 203 PG/ML (ref 193–986)
WBC # BLD AUTO: 5.9 K/UL (ref 3.6–11)

## 2022-07-14 PROCEDURE — G8754 DIAS BP LESS 90: HCPCS | Performed by: FAMILY MEDICINE

## 2022-07-14 PROCEDURE — G8536 NO DOC ELDER MAL SCRN: HCPCS | Performed by: FAMILY MEDICINE

## 2022-07-14 PROCEDURE — G0463 HOSPITAL OUTPT CLINIC VISIT: HCPCS | Performed by: FAMILY MEDICINE

## 2022-07-14 PROCEDURE — 1101F PT FALLS ASSESS-DOCD LE1/YR: CPT | Performed by: FAMILY MEDICINE

## 2022-07-14 PROCEDURE — G0439 PPPS, SUBSEQ VISIT: HCPCS | Performed by: FAMILY MEDICINE

## 2022-07-14 PROCEDURE — 1123F ACP DISCUSS/DSCN MKR DOCD: CPT | Performed by: FAMILY MEDICINE

## 2022-07-14 PROCEDURE — G8427 DOCREV CUR MEDS BY ELIG CLIN: HCPCS | Performed by: FAMILY MEDICINE

## 2022-07-14 PROCEDURE — G8417 CALC BMI ABV UP PARAM F/U: HCPCS | Performed by: FAMILY MEDICINE

## 2022-07-14 PROCEDURE — G9717 DOC PT DX DEP/BP F/U NT REQ: HCPCS | Performed by: FAMILY MEDICINE

## 2022-07-14 PROCEDURE — 1090F PRES/ABSN URINE INCON ASSESS: CPT | Performed by: FAMILY MEDICINE

## 2022-07-14 PROCEDURE — 99213 OFFICE O/P EST LOW 20 MIN: CPT | Performed by: FAMILY MEDICINE

## 2022-07-14 PROCEDURE — G8752 SYS BP LESS 140: HCPCS | Performed by: FAMILY MEDICINE

## 2022-07-14 NOTE — PROGRESS NOTES
This is the Subsequent Medicare Annual Wellness Exam, performed 12 months or more after the Initial AWV or the last Subsequent AWV    I have reviewed the patient's medical history in detail and updated the computerized patient record. Assessment/Plan   Education and counseling provided:  Are appropriate based on today's review and evaluation    1. Medicare annual wellness visit, subsequent  2. AEUOL-65  -     METABOLIC PANEL, COMPREHENSIVE; Future  3. Rheumatoid arthritis of multiple sites with negative rheumatoid factor ([facility] [address]  4. Long-term use of immunosuppressant medication  5. Essential hypertension, benign  -     METABOLIC PANEL, COMPREHENSIVE; Future  6. CKD (chronic kidney disease) stage 2, GFR 60-89 ml/min  7. Other fatigue  -     CBC WITH AUTOMATED DIFF; Future  8. B12 deficiency  -     VITAMIN B12; Future       Depression Risk Factor Screening     3 most recent PHQ Screens 7/14/2022   Little interest or pleasure in doing things Not at all   Feeling down, depressed, irritable, or hopeless Not at all   Total Score PHQ 2 0       Alcohol & Drug Abuse Risk Screen    Do you average more than 1 drink per night or more than 7 drinks a week:  No    On any one occasion in the past three months have you have had more than 3 drinks containing alcohol:  No          Functional Ability and Level of Safety    Hearing: Hearing is good. Activities of Daily Living: The home contains: handrails  Patient does total self care      Ambulation: with no difficulty     Fall Risk:  Fall Risk Assessment, last 12 mths 7/14/2022   Able to walk? Yes   Fall in past 12 months? 0   Do you feel unsteady? 0   Are you worried about falling 0   Is TUG test greater than 12 seconds? -   Is the gait abnormal? -   Number of falls in past 12 months -   Fall with injury?  -      Abuse Screen:  Patient is not abused       Cognitive Screening    Has your family/caregiver stated any concerns about your memory: no     Cognitive Screening: Normal - 0    Health Maintenance Due     Health Maintenance Due   Topic Date Due    Pneumococcal 65+ years (1 - PCV) Never done    Shingrix Vaccine Age 50> (1 of 2) Never done    COVID-19 Vaccine (3 - Booster for Moderna series) 08/02/2021    Medicare Yearly Exam  01/23/2022       Patient Care Team   Patient Care Team:  Lokesh Lezama MD as PCP - Maxim Jones MD as PCP - [facility]  Gordy Blair MD as Consulting Provider (Pulmonary Disease)  Morris Koch MD as Physician (Nephrology)    History     Patient Active Problem List   Diagnosis Code    Allergic rhinitis J30.9    Asthma J45.909    Essential hypertension, benign I10    Depression F32. 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    PMR (polymyalgia rheumatica) ([facility]) M35.3    CKD (chronic kidney disease) stage 2, GFR 60-89 ml/min N18.2    Ankylosing spondylitis of sacral region ([address] M45.8    Long-term use of immunosuppressant medication Z79.899    Age-related osteoporosis without current pathological fracture M81.0    Gastroesophageal reflux disease with esophagitis K21.00    Seronegative rheumatoid arthritis ([address] M06.00     Past Medical History:   Diagnosis Date    Allergic rhinitis, cause unspecified 5/26/2010    Asthma 5/26/2010    Depression 5/26/2010    Encounter for long-term (current) use of other medications 7/11/2011    Essential hypertension, benign 5/26/2010    Hypertension     OA (osteoarthritis) 5/26/2010    Rheumatoid arthritis ([address]     Unspecified hypothyroidism 5/26/2010      Past Surgical History:   Procedure Laterality Date    HX BREAST BIOPSY Right     benign bx x2    HX HYSTERECTOMY       Current Outpatient Medications   Medication Sig Dispense Refill    diclofenac (VOLTAREN) 1 % gel Apply 2 g to affected area four (4) times daily.  300 g 2    guaiFENesin-dextromethorphan SR (Mucinex DM) 600-30 mg per tablet Take 1 Tablet by mouth two (2) times a day. 20 Tablet 3    ergocalciferol (ERGOCALCIFEROL) 1,250 mcg (50,000 unit) capsule TAKE ONE CAPSULE BY MOUTH ONCE WEEKLY 12 Capsule 4    clotrimazole (LOTRIMIN) 1 % topical cream Apply  to affected area two (2) times daily as needed for Skin Irritation. 28 g 1    denosumab (Prolia) 60 mg/mL injection 60 mg by SubCUTAneous route.  Calcium-Cholecalciferol, D3, (CALCIUM 600 WITH VITAMIN D3) 600 mg(1,500mg) -400 unit cap Take  by mouth.  FEXOFENADINE HCL (ALLEGRA PO) Take  by mouth daily.  albuterol (PROVENTIL HFA, VENTOLIN HFA) 90 mcg/actuation inhaler Take 1 Puff by inhalation every six (6) hours as needed for Wheezing. 1 Inhaler 5    levothyroxine (SYNTHROID) 50 mcg tablet Take  by mouth daily (before breakfast).  amlodipine (NORVASC) 5 mg tablet Take 5 mg by mouth daily.        Allergies   Allergen Reactions    Humira [Adalimumab] Hives    Leflunomide Swelling    Penicillins Unable to Obtain    Sulfa (Sulfonamide Antibiotics) Unknown (comments)       Family History   Problem Relation Age of Onset    Heart Disease Mother     Heart Disease Father     Cancer Sister     Ovarian Cancer Sister     Stroke Sister     Heart Disease Sister     Breast Cancer Niece 36     Social History     Tobacco Use    Smoking status: Never Smoker    Smokeless tobacco: Never Used   Substance Use Topics    Alcohol use: No         Sera Najera MD

## 2022-07-14 NOTE — PROGRESS NOTES
HISTORY OF PRESENT ILLNESS  Annamarie Salas is a 79 y.o. female. f/u recent covid infection,slowly improving,cough getting better. F/U COPD,HBP,Hypothyroidism  Fatigue  The history is provided by the patient. This is a chronic problem. The problem occurs daily. The problem has been gradually improving. Associated symptoms include shortness of breath. Cough  The history is provided by the patient. This is a chronic problem. The problem occurs daily. Associated symptoms include shortness of breath. Breathing Problem  The history is provided by the patient. This is a chronic problem. The problem has not changed since onset. Associated symptoms include cough. Pertinent negatives include no fever. Review of Systems   Constitutional: Positive for fatigue and malaise/fatigue. Negative for chills and fever. Respiratory: Positive for cough and shortness of breath. Genitourinary: Positive for frequency. Musculoskeletal: Positive for myalgias. Psychiatric/Behavioral: Negative for depression. Physical Exam  Constitutional:       Appearance: Normal appearance. HENT:      Head: Normocephalic and atraumatic. Right Ear: Tympanic membrane normal.      Left Ear: Tympanic membrane normal.      Nose: Congestion present. Mouth/Throat:      Mouth: Mucous membranes are moist.   Eyes:      Pupils: Pupils are equal, round, and reactive to light. Cardiovascular:      Rate and Rhythm: Normal rate and regular rhythm. Pulses: Normal pulses. Heart sounds: Normal heart sounds. Pulmonary:      Effort: Pulmonary effort is normal.      Breath sounds: Normal breath sounds. Abdominal:      Palpations: Abdomen is soft. Skin:     General: Skin is warm and dry. Neurological:      Mental Status: She is alert. Psychiatric:         Mood and Affect: Mood normal.         ASSESSMENT and PLAN  Diagnoses and all orders for this visit:    1. Medicare annual wellness visit, subsequent    2.  COVID-19  - METABOLIC PANEL, COMPREHENSIVE; Future    3. Rheumatoid arthritis of multiple sites with negative rheumatoid factor ([facility] [address]    4. Long-term use of immunosuppressant medication    5. Essential hypertension, benign  -     METABOLIC PANEL, COMPREHENSIVE; Future    6. CKD (chronic kidney disease) stage 2, GFR 60-89 ml/min    7. Other fatigue  -     CBC WITH AUTOMATED DIFF; Future    8. B12 deficiency  -     VITAMIN B12; Future      Follow-up and Dispositions    · Return in about 3 months (around 10/14/2022).

## 2022-07-14 NOTE — PROGRESS NOTES
Chief Complaint   Patient presents with   [address] [place] [address] Annual Wellness Visit       1. \"Have you been to the ER, urgent care clinic since your last visit? Hospitalized since your last visit? \" No    2. \"Have you seen or consulted any other health care providers outside of the [address] [place] [address] since your last visit? \" Yes Pulmonary Doctor     3. For patients aged 39-70: Has the patient had a colonoscopy / FIT/ Cologuard? Yes - no Care Gap present      If the patient is female:    4. For patients aged 41-77: Has the patient had a mammogram within the past 2 years? Yes - no Care Gap present      5. For patients aged 21-65: Has the patient had a pap smear?  NA - based on age or sex    Health Maintenance Due   Topic Date Due    Pneumococcal 65+ years (1 - PCV) Never done    Shingrix Vaccine Age 50> (1 of 2) Never done    COVID-19 Vaccine (3 - Booster for Moderna series) 08/02/2021    Medicare Yearly Exam  01/23/2022

## 2022-07-18 ENCOUNTER — TELEPHONE (OUTPATIENT)
Dept: RHEUMATOLOGY | Age: 82
End: 2022-07-18

## 2022-07-18 NOTE — TELEPHONE ENCOUNTER
Returned pts call from this morning in regards to blood work needing to be done. LVM stating she had blood work completed 7/14 and does not need additional testing for her upcoming Prolia appt. Left number in case she wants to discuss further.

## 2022-07-25 NOTE — PROGRESS NOTES
[facility] Rheumatology  OBIC Note    Date: 2022  Name: Whit Pickens  MRN: [id number]       : 1940  Diagnosis: Osteoporosis  Treatment: Prolia  Referring Provider: Dr. Ebenezer Garcia  Supervising Provider: Dr. Mallory Watkins    Patient arrived to [facility] at 1250. Ms. Sigrid Srivastava allergies reviewed and she agreed to receiving today's treatment. Pt. denies new complaints today. Pt recently recovered from [facility]. Visit Vitals  /76   Pulse 63   Temp 98 °F (36.7 °C)   Resp 16   SpO2 94%      Recent labs results:   22 09:35   Sodium 140   Potassium 4.3   Chloride 108   CO2 25   Anion gap 7   Glucose 121 (H)   BUN 14   Creatinine 0.85   BUN/Creatinine ratio 16   Calcium 8.6   GFR est non-AA >60   GFR est AA >60   Bilirubin, total 0.4   Protein, total 6.4   Albumin 3.5   Globulin 2.9   A-G Ratio 1.2   ALT 31   AST 18   Alk. phosphatase 82   Vitamin B12 203     Medications given per providers orders:  Administrations This Visit       denosumab (PROLIA) injection 60 mg       Admin Date  2022 Action  Given Dose  60 mg Route  SubCUTAneous Administered By  Gee Lopez RN                  Prolia to right arm  [address] 18034-891-80  Lot # [id number]  Exp 10/2024    Ms. Sigrid Srivastava tolerated the treatment without complaints and no medication reactions were seen while in presence of this RN. Patient provided with AVS, which included future appointments and written educational material regarding Prolia. All of the patients questions were answered and then discharged ambulatory from Rheumatology OBIC in stable condition at 1300. Encounter routed to supervising provider for co-signing. Future Appointments   Date Time Provider Andrea Ga   2022  1:30 PM [facility] BS AMB   2022  9:30 AM Maceo Claude, MD [facility] BS AMB   2023  1:30 PM [facility] BS AMB     Lab ordered placed. Unable to schedule for January at [facility].      Marina Thorne RN  2022

## 2022-07-26 ENCOUNTER — OFFICE VISIT (OUTPATIENT)
Dept: RHEUMATOLOGY | Age: 82
End: 2022-07-26
Payer: MEDICARE

## 2022-07-26 VITALS
DIASTOLIC BLOOD PRESSURE: 76 MMHG | OXYGEN SATURATION: 94 % | RESPIRATION RATE: 16 BRPM | HEART RATE: 63 BPM | SYSTOLIC BLOOD PRESSURE: 131 MMHG | TEMPERATURE: 98 F

## 2022-07-26 DIAGNOSIS — M81.0 AGE-RELATED OSTEOPOROSIS WITHOUT CURRENT PATHOLOGICAL FRACTURE: Primary | ICD-10-CM

## 2022-07-26 PROCEDURE — 96372 THER/PROPH/DIAG INJ SC/IM: CPT

## 2022-07-26 RX ORDER — HYDROCORTISONE SODIUM SUCCINATE 100 MG/2ML
100 INJECTION, POWDER, FOR SOLUTION INTRAMUSCULAR; INTRAVENOUS AS NEEDED
OUTPATIENT
Start: 2022-12-25

## 2022-07-26 RX ORDER — EPINEPHRINE 1 MG/ML
0.3 INJECTION, SOLUTION, CONCENTRATE INTRAVENOUS AS NEEDED
OUTPATIENT
Start: 2022-12-25

## 2022-07-26 RX ORDER — DIPHENHYDRAMINE HYDROCHLORIDE 50 MG/ML
25 INJECTION, SOLUTION INTRAMUSCULAR; INTRAVENOUS AS NEEDED
Start: 2022-12-25

## 2022-07-26 RX ORDER — ONDANSETRON 2 MG/ML
8 INJECTION INTRAMUSCULAR; INTRAVENOUS AS NEEDED
OUTPATIENT
Start: 2022-12-25

## 2022-07-26 RX ORDER — DIPHENHYDRAMINE HYDROCHLORIDE 50 MG/ML
50 INJECTION, SOLUTION INTRAMUSCULAR; INTRAVENOUS AS NEEDED
Start: 2022-12-25

## 2022-07-26 RX ORDER — ALBUTEROL SULFATE 0.83 MG/ML
2.5 SOLUTION RESPIRATORY (INHALATION) AS NEEDED
Start: 2022-12-25

## 2022-07-26 RX ORDER — ACETAMINOPHEN 325 MG/1
650 TABLET ORAL AS NEEDED
Start: 2022-12-25

## 2022-07-26 RX ADMIN — DENOSUMAB 60 MG: 60 INJECTION SUBCUTANEOUS at 12:55

## 2022-08-02 NOTE — PROGRESS NOTES
I do hereby attest that this information is true, accurate and complete to the best of my knowledge. I was available for questions and evaluation if needed. Panda Parsons MD  Adult and Pediatric Rheumatology

## 2022-08-14 DIAGNOSIS — U07.1 ACUTE BRONCHITIS DUE TO COVID-19 VIRUS: ICD-10-CM

## 2022-08-14 DIAGNOSIS — J20.8 ACUTE BRONCHITIS DUE TO COVID-19 VIRUS: ICD-10-CM

## 2022-08-15 RX ORDER — AZITHROMYCIN 250 MG/1
TABLET, FILM COATED ORAL
Qty: 6 TABLET | Refills: 0 | OUTPATIENT
Start: 2022-08-15

## 2022-08-15 RX ORDER — NIRMATRELVIR AND RITONAVIR 300-100 MG
KIT ORAL
OUTPATIENT
Start: 2022-08-15

## 2022-09-13 ENCOUNTER — TELEPHONE (OUTPATIENT)
Dept: RHEUMATOLOGY | Age: 82
End: 2022-09-13

## 2022-09-26 ENCOUNTER — OFFICE VISIT (OUTPATIENT)
Dept: RHEUMATOLOGY | Age: 82
End: 2022-09-26
Payer: MEDICARE

## 2022-09-26 VITALS
BODY MASS INDEX: 26.42 KG/M2 | HEIGHT: 65 IN | DIASTOLIC BLOOD PRESSURE: 71 MMHG | HEART RATE: 61 BPM | OXYGEN SATURATION: 95 % | SYSTOLIC BLOOD PRESSURE: 135 MMHG | RESPIRATION RATE: 18 BRPM | WEIGHT: 158.6 LBS | TEMPERATURE: 97.8 F

## 2022-09-26 DIAGNOSIS — M06.00 SERONEGATIVE RHEUMATOID ARTHRITIS (HCC): Primary | ICD-10-CM

## 2022-09-26 DIAGNOSIS — Z79.899 ONGOING USE OF POSSIBLY TOXIC MEDICATION: ICD-10-CM

## 2022-09-26 DIAGNOSIS — M81.0 AGE-RELATED OSTEOPOROSIS WITHOUT CURRENT PATHOLOGICAL FRACTURE: ICD-10-CM

## 2022-09-26 PROCEDURE — G8754 DIAS BP LESS 90: HCPCS | Performed by: INTERNAL MEDICINE

## 2022-09-26 PROCEDURE — 1123F ACP DISCUSS/DSCN MKR DOCD: CPT | Performed by: INTERNAL MEDICINE

## 2022-09-26 PROCEDURE — G8417 CALC BMI ABV UP PARAM F/U: HCPCS | Performed by: INTERNAL MEDICINE

## 2022-09-26 PROCEDURE — G0463 HOSPITAL OUTPT CLINIC VISIT: HCPCS | Performed by: INTERNAL MEDICINE

## 2022-09-26 PROCEDURE — G9717 DOC PT DX DEP/BP F/U NT REQ: HCPCS | Performed by: INTERNAL MEDICINE

## 2022-09-26 PROCEDURE — 1101F PT FALLS ASSESS-DOCD LE1/YR: CPT | Performed by: INTERNAL MEDICINE

## 2022-09-26 PROCEDURE — G8536 NO DOC ELDER MAL SCRN: HCPCS | Performed by: INTERNAL MEDICINE

## 2022-09-26 PROCEDURE — 1090F PRES/ABSN URINE INCON ASSESS: CPT | Performed by: INTERNAL MEDICINE

## 2022-09-26 PROCEDURE — 99215 OFFICE O/P EST HI 40 MIN: CPT | Performed by: INTERNAL MEDICINE

## 2022-09-26 PROCEDURE — G8427 DOCREV CUR MEDS BY ELIG CLIN: HCPCS | Performed by: INTERNAL MEDICINE

## 2022-09-26 PROCEDURE — G8752 SYS BP LESS 140: HCPCS | Performed by: INTERNAL MEDICINE

## 2022-09-26 NOTE — PROGRESS NOTES
Chief Complaint   Patient presents with    Osteoporosis     1. Have you been to the ER, urgent care clinic since your last visit? Hospitalized since your last visit? Yes Where: [facility]    2. Have you seen or consulted any other health care providers outside of the [address] [place] [address] since your last visit? Include any pap smears or colon screening.  Yes Where: \" [facility]\"

## 2022-09-26 NOTE — PATIENT INSTRUCTIONS
1) Continue to get your Prolia injection every 6 months as scheduled. Your next on is on January 24.     2) Keep reaching out to [facility] to schedule your pulmonary rehab. 3) Schedule to get your Remicade infusion whenever you think you are ready to start. If you want to push it off until mid October or November that is okay. 4) Check labs with your Remicade infusions once you restart them. 5) Follow up in 3 months. Let me know if you have any questions or concerns in the meantime.

## 2022-10-10 NOTE — TELEPHONE ENCOUNTER
Left message for pt stating that we have 2 samples of Actemra in the office for her and asked her to call us back to let us know when she can come pick them up. Update/FYI.

## 2022-12-07 ENCOUNTER — TELEPHONE (OUTPATIENT)
Dept: FAMILY MEDICINE CLINIC | Age: 82
End: 2022-12-07

## 2022-12-07 RX ORDER — PROMETHAZINE HYDROCHLORIDE AND DEXTROMETHORPHAN HYDROBROMIDE 6.25; 15 MG/5ML; MG/5ML
5 SYRUP ORAL
Qty: 180 ML | Refills: 0 | Status: SHIPPED | OUTPATIENT
Start: 2022-12-07 | End: 2022-12-14

## 2022-12-07 RX ORDER — AZITHROMYCIN 250 MG/1
TABLET, FILM COATED ORAL
Qty: 6 TABLET | Refills: 0 | Status: SHIPPED | OUTPATIENT
Start: 2022-12-07 | End: 2022-12-12

## 2022-12-07 NOTE — TELEPHONE ENCOUNTER
----- Message from [facility] sent at 12/7/2022  8:14 AM EST -----  Subject: Message to Provider    QUESTIONS  Information for Provider? Meggan Coppola would like for Dr Azucena Garay to call her in   a z-aaron to [id number]. Please call with questions or   concerns. ---------------------------------------------------------------------------  --------------  Anita VILCHIS  [phone]; OK to leave message on voicemail  ---------------------------------------------------------------------------  --------------  SCRIPT ANSWERS  Relationship to Patient?  Self

## 2022-12-30 ENCOUNTER — OFFICE VISIT (OUTPATIENT)
Dept: FAMILY MEDICINE CLINIC | Age: 82
End: 2022-12-30
Payer: MEDICARE

## 2022-12-30 ENCOUNTER — NURSE TRIAGE (OUTPATIENT)
Dept: OTHER | Facility: CLINIC | Age: 82
End: 2022-12-30

## 2022-12-30 ENCOUNTER — TELEPHONE (OUTPATIENT)
Dept: RHEUMATOLOGY | Age: 82
End: 2022-12-30

## 2022-12-30 VITALS
HEIGHT: 65 IN | BODY MASS INDEX: 27.09 KG/M2 | HEART RATE: 54 BPM | WEIGHT: 162.6 LBS | OXYGEN SATURATION: 98 % | SYSTOLIC BLOOD PRESSURE: 145 MMHG | DIASTOLIC BLOOD PRESSURE: 61 MMHG

## 2022-12-30 DIAGNOSIS — I80.03: Primary | ICD-10-CM

## 2022-12-30 DIAGNOSIS — I10 ESSENTIAL HYPERTENSION, BENIGN: ICD-10-CM

## 2022-12-30 DIAGNOSIS — M06.09 RHEUMATOID ARTHRITIS OF MULTIPLE SITES WITH NEGATIVE RHEUMATOID FACTOR (HCC): ICD-10-CM

## 2022-12-30 DIAGNOSIS — Z79.60 LONG-TERM USE OF IMMUNOSUPPRESSANT MEDICATION: ICD-10-CM

## 2022-12-30 RX ORDER — CLINDAMYCIN HYDROCHLORIDE 150 MG/1
150 CAPSULE ORAL EVERY 6 HOURS
Qty: 40 CAPSULE | Refills: 0 | Status: SHIPPED | OUTPATIENT
Start: 2022-12-30 | End: 2023-01-09

## 2022-12-30 RX ORDER — PREDNISONE 10 MG/1
10 TABLET ORAL 2 TIMES DAILY
Qty: 10 TABLET | Refills: 0 | Status: SHIPPED | OUTPATIENT
Start: 2022-12-30

## 2022-12-30 NOTE — TELEPHONE ENCOUNTER
Location of patient: [place]    Received call from Prabhjot Elliott at [facility] with [facility]. Subjective: Caller states \"I have places in the back of my legs, they are hard and inflamed. They are red. \"     Current Symptoms: 2 spots on left leg, 2 spots on right leg - sizes between the size of a dime and a nena    Spots are red, painful to touch    Onset: 2 days ago;       Pain Severity: 4/10; Temperature: denies     Denies - draining from spots / black area on spots / red streaking on legs    What has been tried: nothing      Recommended disposition: See in Office Today    Care advice provided, patient verbalizes understanding; denies any other questions or concerns; instructed to call back for any new or worsening symptoms. Patient/Caller agrees with recommended disposition; writer provided warm transfer to [facility] at [facility] for appointment scheduling    Attention Provider: Thank you for allowing me to participate in the care of your patient. The patient was connected to triage in response to information provided to the [facility]. Please do not respond through this encounter as the response is not directed to a shared pool.       Reason for Disposition   Spreading redness around the boil and no fever    Protocols used: Boil (Skin Abscess)-ADULT-[place]

## 2022-12-30 NOTE — PROGRESS NOTES
HISTORY OF PRESENT ILLNESS  Demi Guptared is a 79 y.o. female. c/o 2 day hx of painfull knots posterior rt calf,with similar knots developing todau in l calf. No trauma,or prolonged immobility. No fever or chills. No leg edema. No hx of DVT or PEHas remote hx of Covid 19 4 mo ago  Leg Pain   The history is provided by the Patient. This is a new problem. The current episode started 2 days ago. The problem has been gradually worsening. The pain is present in the right lower leg and left lower leg. The quality of the pain is described as aching. The pain is at a severity of 2/10. The pain is mild. The symptoms are aggravated by activity. Review of Systems   Musculoskeletal:  Positive for myalgias. Skin:  Positive for rash. Physical Exam  Constitutional:       Appearance: Normal appearance. She is obese. HENT:      Head: Normocephalic and atraumatic. Nose: Nose normal.      Mouth/Throat:      Mouth: Mucous membranes are moist.   Cardiovascular:      Rate and Rhythm: Normal rate and regular rhythm. Pulses: Normal pulses. Heart sounds: Normal heart sounds. Pulmonary:      Effort: Pulmonary effort is normal.      Breath sounds: Normal breath sounds. Skin:     General: Skin is warm and dry. Comments: Clusters os firm,tender subcutaneous masses bilateral calves. No deep tenderness or swelling. Mild eryrthema overlying masses   Neurological:      Mental Status: She is oriented to person, place, and time. ASSESSMENT and PLAN  Diagnoses and all orders for this visit:    1. Superficial phlebitis of leg, bilateral,rest elevation,warm compresses,Voltaren gel qid,prednisone x5 days;to call prn worsening    2. Rheumatoid arthritis of multiple sites with negative rheumatoid factor (HCC)    3. Long-term use of immunosuppressant medication    4.  Essential hypertension, benign

## 2022-12-30 NOTE — TELEPHONE ENCOUNTER
Spoke to pt regarding the message that was left informed pt that Dr Dolly Chirinos was out of the office until Tuesday Freeman 3, informed pt that in the mean time she should be evaluated by Urgent care or the ER.  Pt verbally acknowledged understanding

## 2022-12-30 NOTE — PROGRESS NOTES
Chief Complaint   Patient presents with    Leg Pain     Patient believes she may have blood clots in her leg     1. \"Have you been to the ER, urgent care clinic since your last visit? Hospitalized since your last visit? \" No    2. \"Have you seen or consulted any other health care providers outside of the [address] [place] [address] since your last visit? \" No     3. For patients aged 39-70: Has the patient had a colonoscopy / FIT/ Cologuard? NA - based on age      If the patient is female:    4. For patients aged 41-77: Has the patient had a mammogram within the past 2 years? NA - based on age or sex      11. For patients aged 21-65: Has the patient had a pap smear?  NA - based on age or sex normal...

## 2022-12-30 NOTE — TELEPHONE ENCOUNTER
Pt called office stating having an emergency and needing to see dr. Bethany Menjivar due to knots in the back of her leg that are swollen and painful and inflamed between knee and ankle back of leg. Please advise.     Phone # [phone]

## 2023-01-03 DIAGNOSIS — I80.03: Primary | ICD-10-CM

## 2023-01-05 ENCOUNTER — HOSPITAL ENCOUNTER (OUTPATIENT)
Dept: ULTRASOUND IMAGING | Age: 83
Discharge: HOME | End: 2023-01-05
Attending: FAMILY MEDICINE
Payer: MEDICARE

## 2023-01-05 DIAGNOSIS — I80.03: ICD-10-CM

## 2023-01-05 PROCEDURE — 93970 EXTREMITY STUDY: CPT

## 2023-01-06 ENCOUNTER — TELEPHONE (OUTPATIENT)
Dept: FAMILY MEDICINE CLINIC | Age: 83
End: 2023-01-06

## 2023-01-06 NOTE — TELEPHONE ENCOUNTER
Patient wants a return call regarding getting a booster shot. She has a questions.  Please give her a call @ [phone]

## 2023-01-06 NOTE — TELEPHONE ENCOUNTER
Pt stated she spoke to you last week about getting her booster and you told her no because she has blood clots. She wants to know how long should she wait to get the booster. She can be reached at [phone].

## 2023-01-10 ENCOUNTER — OFFICE VISIT (OUTPATIENT)
Dept: FAMILY MEDICINE CLINIC | Age: 83
End: 2023-01-10
Payer: MEDICARE

## 2023-01-10 VITALS
HEART RATE: 53 BPM | BODY MASS INDEX: 26.23 KG/M2 | HEIGHT: 65 IN | OXYGEN SATURATION: 98 % | SYSTOLIC BLOOD PRESSURE: 138 MMHG | WEIGHT: 157.4 LBS | DIASTOLIC BLOOD PRESSURE: 59 MMHG

## 2023-01-10 DIAGNOSIS — M06.09 RHEUMATOID ARTHRITIS OF MULTIPLE SITES WITH NEGATIVE RHEUMATOID FACTOR (HCC): ICD-10-CM

## 2023-01-10 DIAGNOSIS — E55.9 VITAMIN D DEFICIENCY: ICD-10-CM

## 2023-01-10 DIAGNOSIS — I10 ESSENTIAL HYPERTENSION, BENIGN: ICD-10-CM

## 2023-01-10 DIAGNOSIS — Z79.60 LONG-TERM USE OF IMMUNOSUPPRESSANT MEDICATION: ICD-10-CM

## 2023-01-10 DIAGNOSIS — I80.03: Primary | ICD-10-CM

## 2023-01-10 DIAGNOSIS — Z23 ENCOUNTER FOR IMMUNIZATION: ICD-10-CM

## 2023-01-10 PROCEDURE — G8417 CALC BMI ABV UP PARAM F/U: HCPCS | Performed by: FAMILY MEDICINE

## 2023-01-10 PROCEDURE — 1123F ACP DISCUSS/DSCN MKR DOCD: CPT | Performed by: FAMILY MEDICINE

## 2023-01-10 PROCEDURE — 3078F DIAST BP <80 MM HG: CPT | Performed by: FAMILY MEDICINE

## 2023-01-10 PROCEDURE — G9717 DOC PT DX DEP/BP F/U NT REQ: HCPCS | Performed by: FAMILY MEDICINE

## 2023-01-10 PROCEDURE — G8427 DOCREV CUR MEDS BY ELIG CLIN: HCPCS | Performed by: FAMILY MEDICINE

## 2023-01-10 PROCEDURE — G0463 HOSPITAL OUTPT CLINIC VISIT: HCPCS | Performed by: FAMILY MEDICINE

## 2023-01-10 PROCEDURE — 3075F SYST BP GE 130 - 139MM HG: CPT | Performed by: FAMILY MEDICINE

## 2023-01-10 PROCEDURE — G8536 NO DOC ELDER MAL SCRN: HCPCS | Performed by: FAMILY MEDICINE

## 2023-01-10 PROCEDURE — 1090F PRES/ABSN URINE INCON ASSESS: CPT | Performed by: FAMILY MEDICINE

## 2023-01-10 PROCEDURE — 1101F PT FALLS ASSESS-DOCD LE1/YR: CPT | Performed by: FAMILY MEDICINE

## 2023-01-10 PROCEDURE — 99213 OFFICE O/P EST LOW 20 MIN: CPT | Performed by: FAMILY MEDICINE

## 2023-01-10 NOTE — PROGRESS NOTES
HISTORY OF PRESENT ILLNESS  Thaddeus Erwin is a 79 y.o. female. f/u recent bout of bilateral leg superficial phlebitis,resolving nicely. F/U RA,AS COPD,HBP,Hypothyroidism. Feeling well has Rheum appt upcoming  Fatigue  The history is provided by the Patient. This is a chronic problem. The problem occurs daily. The problem has been gradually improving. Pertinent negatives include no chest pain. Follow-up  The history is provided by the Patient. This is a chronic problem. The problem occurs daily. Pertinent negatives include no chest pain. Leg Pain   This is a new problem. The current episode started more than 1 week ago. The problem has been gradually improving. Review of Systems   Constitutional:  Positive for malaise/fatigue. Negative for chills. Respiratory:  Negative for cough. Cardiovascular:  Negative for chest pain and palpitations. Gastrointestinal:  Negative for heartburn. Genitourinary:  Positive for frequency. Musculoskeletal:  Positive for myalgias. Skin:  Positive for rash. Endo/Heme/Allergies:  Bruises/bleeds easily. Psychiatric/Behavioral:  Negative for depression. Physical Exam  Constitutional:       Appearance: Normal appearance. She is normal weight. HENT:      Head: Normocephalic and atraumatic. Right Ear: Tympanic membrane normal.      Left Ear: Tympanic membrane normal.      Nose: Congestion present. Mouth/Throat:      Mouth: Mucous membranes are moist.   Eyes:      Pupils: Pupils are equal, round, and reactive to light. Cardiovascular:      Rate and Rhythm: Normal rate and regular rhythm. Pulses: Normal pulses. Heart sounds: Normal heart sounds. Pulmonary:      Effort: Pulmonary effort is normal.      Breath sounds: Normal breath sounds. Abdominal:      Palpations: Abdomen is soft. Musculoskeletal:      Cervical back: Neck supple. Skin:     General: Skin is warm and dry.       Comments: Resolving phlebitis bilateral calves Neurological:      Mental Status: She is alert and oriented to person, place, and time. Mental status is at baseline. Psychiatric:         Mood and Affect: Mood normal.       ASSESSMENT and PLAN  Diagnoses and all orders for this visit:    1. Superficial phlebitis of leg, bilateral,resolving,consider support hose,warm compresses,elevation    2. Rheumatoid arthritis of multiple sites with negative rheumatoid factor (HCC)  -     METABOLIC PANEL, COMPREHENSIVE; Future  -     CBC WITH AUTOMATED DIFF; Future    3. Long-term use of immunosuppressant medication    4. Essential hypertension, benign  -     METABOLIC PANEL, COMPREHENSIVE; Future  -     CBC WITH AUTOMATED DIFF; Future  -     CHOLESTEROL, TOTAL; Future    5. Encounter for immunization    6.  Vitamin D deficiency  -     VITAMIN D, 25 HYDROXY; Future

## 2023-01-10 NOTE — PROGRESS NOTES
Chief Complaint   Patient presents with    Follow-up     Patient is here for a follow up on her legs     1. \"Have you been to the ER, urgent care clinic since your last visit? Hospitalized since your last visit? \" No    2. \"Have you seen or consulted any other health care providers outside of the [address] [place] [address] since your last visit? \" No     3. For patients aged 39-70: Has the patient had a colonoscopy / FIT/ Cologuard? NA - based on age      If the patient is female:    4. For patients aged 41-77: Has the patient had a mammogram within the past 2 years? NA - based on age or sex      11. For patients aged 21-65: Has the patient had a pap smear?  NA - based on age or sex

## 2023-01-11 LAB
25(OH)D3 SERPL-MCNC: 97.5 NG/ML (ref 30–100)
ALBUMIN SERPL-MCNC: 3.7 G/DL (ref 3.5–5)
ALBUMIN/GLOB SERPL: 1.3 (ref 1.1–2.2)
ALP SERPL-CCNC: 87 U/L (ref 45–117)
ALT SERPL-CCNC: 20 U/L (ref 12–78)
ANION GAP SERPL CALC-SCNC: 6 MMOL/L (ref 5–15)
AST SERPL-CCNC: 17 U/L (ref 15–37)
BASOPHILS # BLD: 0.1 K/UL (ref 0–0.1)
BASOPHILS NFR BLD: 1 % (ref 0–1)
BILIRUB SERPL-MCNC: 0.4 MG/DL (ref 0.2–1)
BUN SERPL-MCNC: 18 MG/DL (ref 6–20)
BUN/CREAT SERPL: 27 (ref 12–20)
CALCIUM SERPL-MCNC: 9.3 MG/DL (ref 8.5–10.1)
CHLORIDE SERPL-SCNC: 105 MMOL/L (ref 97–108)
CHOLEST SERPL-MCNC: 204 MG/DL
CO2 SERPL-SCNC: 29 MMOL/L (ref 21–32)
CREAT SERPL-MCNC: 0.67 MG/DL (ref 0.55–1.02)
DIFFERENTIAL METHOD BLD: ABNORMAL
EOSINOPHIL # BLD: 0.1 K/UL (ref 0–0.4)
EOSINOPHIL NFR BLD: 2 % (ref 0–7)
ERYTHROCYTE [DISTWIDTH] IN BLOOD BY AUTOMATED COUNT: 14.3 % (ref 11.5–14.5)
GLOBULIN SER CALC-MCNC: 2.8 G/DL (ref 2–4)
GLUCOSE SERPL-MCNC: 84 MG/DL (ref 65–100)
HCT VFR BLD AUTO: 41.5 % (ref 35–47)
HGB BLD-MCNC: 13 G/DL (ref 11.5–16)
IMM GRANULOCYTES # BLD AUTO: 0 K/UL (ref 0–0.04)
IMM GRANULOCYTES NFR BLD AUTO: 1 % (ref 0–0.5)
LYMPHOCYTES # BLD: 1.7 K/UL (ref 0.8–3.5)
LYMPHOCYTES NFR BLD: 19 % (ref 12–49)
MCH RBC QN AUTO: 30.3 PG (ref 26–34)
MCHC RBC AUTO-ENTMCNC: 31.3 G/DL (ref 30–36.5)
MCV RBC AUTO: 96.7 FL (ref 80–99)
MONOCYTES # BLD: 0.9 K/UL (ref 0–1)
MONOCYTES NFR BLD: 10 % (ref 5–13)
NEUTS SEG # BLD: 6.1 K/UL (ref 1.8–8)
NEUTS SEG NFR BLD: 67 % (ref 32–75)
NRBC # BLD: 0 K/UL (ref 0–0.01)
NRBC BLD-RTO: 0 PER 100 WBC
PLATELET # BLD AUTO: 290 K/UL (ref 150–400)
PMV BLD AUTO: 10.4 FL (ref 8.9–12.9)
POTASSIUM SERPL-SCNC: 4.2 MMOL/L (ref 3.5–5.1)
PROT SERPL-MCNC: 6.5 G/DL (ref 6.4–8.2)
RBC # BLD AUTO: 4.29 M/UL (ref 3.8–5.2)
SODIUM SERPL-SCNC: 140 MMOL/L (ref 136–145)
WBC # BLD AUTO: 8.9 K/UL (ref 3.6–11)

## 2023-01-12 RX ORDER — ERGOCALCIFEROL 1.25 MG/1
50000 CAPSULE ORAL
Qty: 3 CAPSULE | Refills: 1 | Status: SHIPPED | OUTPATIENT
Start: 2023-01-12

## 2023-01-12 RX ORDER — ERGOCALCIFEROL 1.25 MG/1
50000 CAPSULE ORAL
COMMUNITY
End: 2023-01-12 | Stop reason: SDUPTHER

## 2023-01-12 NOTE — TELEPHONE ENCOUNTER
Received faxed refill request for Vitamin D2    Last visit was 9/26/23  Follow up scheduled for 1/18/23  Lab Results   Component Value Date/Time    Sodium 140 01/10/2023 10:45 AM    Potassium 4.2 01/10/2023 10:45 AM    Chloride 105 01/10/2023 10:45 AM    CO2 29 01/10/2023 10:45 AM    Anion gap 6 01/10/2023 10:45 AM    Glucose 84 01/10/2023 10:45 AM    BUN 18 01/10/2023 10:45 AM    Creatinine 0.67 01/10/2023 10:45 AM    BUN/Creatinine ratio 27 (H) 01/10/2023 10:45 AM    GFR est AA >60 07/14/2022 09:35 AM    GFR est non-AA >60 07/14/2022 09:35 AM    Calcium 9.3 01/10/2023 10:45 AM    Bilirubin, total 0.4 01/10/2023 10:45 AM    Alk.  phosphatase 87 01/10/2023 10:45 AM    Protein, total 6.5 01/10/2023 10:45 AM    Albumin 3.7 01/10/2023 10:45 AM    Globulin 2.8 01/10/2023 10:45 AM    A-G Ratio 1.3 01/10/2023 10:45 AM    ALT (SGPT) 20 01/10/2023 10:45 AM    AST (SGOT) 17 01/10/2023 10:45 AM     Lab Results   Component Value Date/Time    WBC 8.9 01/10/2023 10:45 AM    HGB 13.0 01/10/2023 10:45 AM    HCT 41.5 01/10/2023 10:45 AM    PLATELET 589 12/19/0281 10:45 AM    MCV 96.7 01/10/2023 10:45 AM

## 2023-01-18 ENCOUNTER — OFFICE VISIT (OUTPATIENT)
Dept: RHEUMATOLOGY | Age: 83
End: 2023-01-18
Payer: MEDICARE

## 2023-01-18 VITALS
OXYGEN SATURATION: 96 % | SYSTOLIC BLOOD PRESSURE: 143 MMHG | RESPIRATION RATE: 16 BRPM | BODY MASS INDEX: 26.29 KG/M2 | HEART RATE: 59 BPM | WEIGHT: 158 LBS | TEMPERATURE: 98.2 F | DIASTOLIC BLOOD PRESSURE: 88 MMHG

## 2023-01-18 DIAGNOSIS — M45.8 ANKYLOSING SPONDYLITIS OF SACRAL REGION (HCC): Primary | ICD-10-CM

## 2023-01-18 DIAGNOSIS — Z79.899 ONGOING USE OF POSSIBLY TOXIC MEDICATION: ICD-10-CM

## 2023-01-18 DIAGNOSIS — M06.00 SERONEGATIVE RHEUMATOID ARTHRITIS (HCC): ICD-10-CM

## 2023-01-18 DIAGNOSIS — L52 ERYTHEMA NODOSUM: ICD-10-CM

## 2023-01-18 PROCEDURE — G8427 DOCREV CUR MEDS BY ELIG CLIN: HCPCS | Performed by: INTERNAL MEDICINE

## 2023-01-18 PROCEDURE — G9717 DOC PT DX DEP/BP F/U NT REQ: HCPCS | Performed by: INTERNAL MEDICINE

## 2023-01-18 PROCEDURE — G0463 HOSPITAL OUTPT CLINIC VISIT: HCPCS | Performed by: INTERNAL MEDICINE

## 2023-01-18 PROCEDURE — G8536 NO DOC ELDER MAL SCRN: HCPCS | Performed by: INTERNAL MEDICINE

## 2023-01-18 PROCEDURE — 1123F ACP DISCUSS/DSCN MKR DOCD: CPT | Performed by: INTERNAL MEDICINE

## 2023-01-18 PROCEDURE — 1101F PT FALLS ASSESS-DOCD LE1/YR: CPT | Performed by: INTERNAL MEDICINE

## 2023-01-18 PROCEDURE — G8417 CALC BMI ABV UP PARAM F/U: HCPCS | Performed by: INTERNAL MEDICINE

## 2023-01-18 PROCEDURE — 3074F SYST BP LT 130 MM HG: CPT | Performed by: INTERNAL MEDICINE

## 2023-01-18 PROCEDURE — 1090F PRES/ABSN URINE INCON ASSESS: CPT | Performed by: INTERNAL MEDICINE

## 2023-01-18 PROCEDURE — 99214 OFFICE O/P EST MOD 30 MIN: CPT | Performed by: INTERNAL MEDICINE

## 2023-01-18 PROCEDURE — 3078F DIAST BP <80 MM HG: CPT | Performed by: INTERNAL MEDICINE

## 2023-01-18 NOTE — PROGRESS NOTES
Chief Complaint   Patient presents with    Joint Pain     1. Have you been to the ER, urgent care clinic since your last visit? Hospitalized since your last visit? Went to ER to get doppler test, but the hospital was closed due to the new year. 2. Have you seen or consulted any other health care providers outside of the [address] [place] [address] since your last visit? Include any pap smears or colon screening.  No

## 2023-01-18 NOTE — PATIENT INSTRUCTIONS
1) I will continue to try and get you approved for the Remicade infusions. 2) Continue to get Prolia injections once every 6 months. 3) Check labs with your infusions. 4) Follow up in 6-8 weeks. Let me know if you have any questions or concerns in the meantime.

## 2023-01-18 NOTE — PROGRESS NOTES
REASON FOR VISIT    This is an in-office follow up visit for Ms. Becky Dennis for    ICD-10-CM   1. Ankylosing spondylitis of sacral region ([facility] [address] M45.8   2. Seronegative rheumatoid arthritis of multiple sites ([facility] M06.09   3.  Age-related osteoporosis without current pathological fracture M81.0     Spondyloarthritis phenotype includes:  Anti-CCP positive: no  Rheumatoid factor positive: no  HLA-B27: yes  Erosive disease: no  Sacroiliitis: yes  Ankylosis: yes (left SI)  Psoriasis: no  Enthesitis: yes ([place])  Dactylitis: no  Nail Pitting: no  Onycholysis: no  Extra-articular manifestations include: Polymyalgia Rheumatica   SAPHO: no    Immunosuppression Screening:  Quantiferon TB: negative (5/11/2021)  PPD:  Not performed  Hepatitis B: negative (3/15/2021)  Hepatitis C: negative (3/15/2021)    Therapy History includes:  Current NSAIDs include: none (contraindicated due to CKD stage 3)  Current DMARD therapy include: none  Prior DMARD therapy includes: Gold, leflunomide, Humira (11/2017 to 12/2017), Enbrel 50 mg weekly (5/22/2018 to 9/20/2018), Cosentyx 300 mg (3/07/2019 to 6/04/2020), Orencia 125 mg SubQ every Monday (6/10/2020 to 12/30/2020), Xeljanz 11 mg XR daily (1/04/2021 to 1/25/2021; SAMPLE), Rinvoq 15 mg daily (3/11/2021; SAMPLE), Roberth Raw 5 mg daily (5/05/2021 to 6/2021; 7/2021 to 8/20/2021 but every other day dosing)  The following DMARDs have been ineffective: Orencia   The following DMARDs were stopped because of side effects: Gold (\"pass out\"), leflunomide (swelling), Humira (hives, lip and eyelid swelling), Enbrel (swelling of lips), Xeljanz 11 mg XR (oral ulcers, flu-like symptoms), Rinvoq (oral ulcers, dry mouth sensation), Xeljanz 5 mg daily (oral ulcers)  Contra-Indicated DMARDs because of CKD stage 3: methotrexate   Contra-Indicated DMARDs because sulfa allergy: sulfasalazine    Osteoporosis Historical Synopsis     Height loss since age 26 (at least two inches): 0.5  Fracture history includes: yes (ankle, left foot)  Family history of hip fracture: no  Fall Risk: no     Daily calcium intake is 1200 mg  Daily vitamin D intake is 800 IU     Smoking history: no  Alcohol consumption: no  Prednisone history: no     Exercise: yes     Previous work-up for osteoporosis includes the following:  DEXA Scan: 9/12/2017  Vitamin 25OH D level: 40.8 (9/08/2020)  PTH: 44 (9/08/2020)  TSH: 2.090 (9/08/2020)     Therapy History includes:  Current osteoporosis therapy includes: Prolia 60 mg every 6 months (6/29/2020 to present)  Prior osteoporosis therapy includes: alendronate 70 mg every Sunday (11/2017 to 6/04/2020)  The following osteoporosis therapy have been ineffective: alendronate  The following osteoporosis therapy were stopped because of side effects: none    HISTORY OF PRESENT ILLNESS    Ms. Zay Wright returns for a follow up visit. Last visit 9/26/2022. Pt still takes Prolia injections every 6 months which she tolerates well. She does not currently take any arthritis medications. She was going to start in Remicade infusions, but she never did. Pt was taking a vitamin D supplement every week when she was following with Dr. Yanelis Jack. She now takes the supplement every other week because her vitamin D level was high. Pt reports that she had two knots in her R leg and two on her L. She was given 20mg of daily Prednisone for 10 days. She also took a course of antibiotics. She says that these knots are improving. She is worried that these knots may be blood clots. Pt has bilateral hand and wrist pain. She says that he is not able to twist things anymore because of her pain. She went to Dr. Trisha Gamboa for knee pain, but she did not get get her knees injected. Pt is still doing breathing physical therapy right now. REVIEW OF SYSTEMS    A comprehensive review of systems was performed and pertinent results are documented in the HPI, review of systems is otherwise non-contributory.     PAST MEDICAL HISTORY    She has a past medical history of Allergic rhinitis, cause unspecified (5/26/2010), Asthma (5/26/2010), Depression (5/26/2010), Encounter for long-term (current) use of other medications (7/11/2011), Essential hypertension, benign (5/26/2010), Hypertension, OA (osteoarthritis) (5/26/2010), Rheumatoid arthritis ([facility] and Unspecified hypothyroidism (5/26/2010). FAMILY HISTORY    Her family history includes Breast Cancer (age of onset: 36) in her niece; Cancer in her sister; Heart Disease in her father, mother, and sister; Ovarian Cancer in her sister; Stroke in her sister. SOCIAL HISTORY    She reports that she has never smoked. She has never used smokeless tobacco. She reports that she does not drink alcohol and does not use drugs. MEDICATIONS    Current Outpatient Medications   Medication Sig    ergocalciferol (ERGOCALCIFEROL) 1,250 mcg (50,000 unit) capsule Take 1 Capsule by mouth every fourteen (14) days. guaiFENesin-dextromethorphan SR (Mucinex DM) 600-30 mg per tablet Take 1 Tablet by mouth two (2) times a day. (Patient not taking: Reported on 1/10/2023)    diclofenac (VOLTAREN) 1 % gel Apply 2 g to affected area four (4) times daily. guaiFENesin-dextromethorphan SR (Mucinex DM) 600-30 mg per tablet Take 1 Tablet by mouth two (2) times a day. denosumab (Prolia) 60 mg/mL injection 60 mg by SubCUTAneous route. Calcium-Cholecalciferol, D3, 600 mg-10 mcg (400 unit) cap Take  by mouth. FEXOFENADINE HCL (ALLEGRA PO) Take  by mouth daily. albuterol (PROVENTIL HFA, VENTOLIN HFA) 90 mcg/actuation inhaler Take 1 Puff by inhalation every six (6) hours as needed for Wheezing. levothyroxine (SYNTHROID) 50 mcg tablet Take  by mouth daily (before breakfast). amlodipine (NORVASC) 5 mg tablet Take 5 mg by mouth daily. No current facility-administered medications for this visit.      ALLERGIES    Allergies   Allergen Reactions    Humira [Adalimumab] Hives    Leflunomide Swelling    Penicillins Unable to Obtain    Sulfa (Sulfonamide Antibiotics) Unknown (comments)      Physical Exam  There were no vitals taken for this visit. General:  The patient is well developed, well nourished, alert, and in no apparent distress. Eyes: Sclera are anicteric. No conjunctival injection. HEENT:  Oropharynx is clear. No oral ulcers. Adequate salivary pooling. No cervical or supraclavicular lymphadenopathy. Lungs:  Clear to auscultation bilaterally, without wheeze or stridor. Normal respiratory effort. Cor:  Regular rate and rhythm. No murmur rub or gallop. Abdomen: Soft, non-tender, without hepatomegaly or masses. Extremities: No calf tenderness or edema. Warm and well perfused. Skin:  No significant abnormalities. Neuro: Nonfocal  Musculoskeletal:    A comprehensive musculoskeletal exam was performed for all joints of each upper and lower extremity and assessed for swelling, tenderness and range of motion. Results are documented as below:  No evidence of synovitis in the small joints of the hands, wrists, shoulders, elbows, hips, knees or ankles. ___  General:  The patient is well developed, well nourished, alert, and in no apparent distress. Eyes: Sclera are anicteric. No conjunctival injection. HEENT:  Oropharynx is clear. No oral ulcers. Adequate salivary pooling. No cervical or supraclavicular lymphadenopathy. Lungs:  Clear to auscultation bilaterally, without wheeze or stridor. Normal respiratory effort. Cor:  Regular rate and rhythm. No murmur rub or gallop. Abdomen: Soft, non-tender, without hepatomegaly or masses. Extremities: Well perfused. Skin:  No significant abnormalities. Neuro: Nonfocal  Musculoskeletal:    A comprehensive musculoskeletal exam was performed for all joints of each upper and lower extremity and assessed for swelling, tenderness and range of motion. Results are documented as below:   Bilateral shoulder crepitus with intact ROM.    L wrist tenderness and trace synovitis. Bilateral CMC squaring and early subluxation. Bilateral paraspinous tenderness. Mild valgus deformity of the knees. Bilateral baker's cyst with joint line tenderness bilaterally. Small cool patellar effusion. Prominent metatarsal heads bilaterally with hallux valgus of the right. Joint line tenderness of the ankle, right greater than left, with trace cool effusion. DATA REVIEW     Laboratory   1/10/23: Cr 0.67, LFT WNL, CBC WNL, Cholesterol 204, Vitamin D 97.5  7/14/22: Cr 0.85, LFT WNL, Vit B12 203, CBC WNL  4/1/22: CRP 3 mg/L, Hep B surface Ag neg, ESR 11, Hep C virus Ab <0.1, QuantifERON plus neg, Heb B core Ab IgM neg, Hep B Core Ab total neg, Infliximab Drug level <0.4, Cr 0.67, LFT WNL, CBC WNL  12/28/21 Cr 0.60.  6/29/21: Cr 0.71.  5/11/21: QTB neg.   3/15/21: WBC 7.0, HGB 11.8, Plt 260, Cr 0.72, LFT WNL, CRP 0.31 mg/dL, ESR 13. Chronic Hepatitis panel neg. Recent laboratory results were reviewed, summarized, and discussed with the patient. Imaging    Musculoskeletal Ultrasound    None    Radiographs    XR KNEE RT MIN 4 V (4/13/22): Bilateral tricompartmental osteoarthritis, most pronounced in the right lateral compartment. Right knee effusion with loose body  Left knee chondrocalcinosis without CPPD arthropathy    XR KNEE LT MIN 4 V (4/13/22): Moderate lateral right knee erosive CPPD arthropathy with loose body Chondrocalcinosis of the left knee    Sacroiliac Joint 8/25/2017: There is bony ankylosis of the left SI joint unchanged. There is probable ankylosis of the right SI joint as well. The right SI joint is at least narrowed. Bone mineral density is decreased without fracture or bone destruction    Bilateral Foot 8/25/2017: LEFT: No fracture or dislocation on plain film. There is narrowing of the first MTP joint with minimal spurring. No joint space erosion or periosteal reaction. Alignment is within normal limits. Bone mineralization is decreased.  No soft tissue calcification. RIGHT: No fracture or dislocation on plain film. There is metatarsus primus varus with hallux valgus deformity. There is joint space loss and spurring first MTP joint. No joint space erosion or periosteal reaction. Alignment is within normal limits. Bone mineralization is decreased. No soft tissue calcification. Chest 3/09/2017: normal heart size. There is no acute process in the lung fields. The osseous structures are unremarkable. Bilateral Hand 1/20/2017: LEFT: No fracture or dislocation on plain film. There are scattered mild degenerative changes of the interphalangeal joints. There is moderate degeneration of the first [facility] joint and mild degeneration of the first MCP joint. Minimal degenerative changes of the third and fourth MCP joints. There is widening of the scapholunate interval compatible with chronic scapholunate ligament tear. No joint space erosion or periosteal reaction. Alignment is within normal limits. Bone mineralization is decreased. No soft tissue calcification. RIGHT: No fracture or dislocation on plain film. There are scattered mild degenerative changes in the interphalangeal joints and first MCP joint. There is narrowing of the lateral margin radiocarpal joint There is widening of the scapholunate interval compatible with chronic scapholunate ligament tear. No joint space erosion or periosteal reaction. Alignment is within normal limits. Bone mineralization is decreased. No soft tissue calcification. Left Hip 7/15/2015: no fracture, dislocation or other acute abnormality. There appears to be fusion of the left sacroiliac joint and possibly the right sacroiliac joint. Spurring is noted at the symphysis. Lumbar Spine 7/15/2015: the patient is leaning to the right. There is a 2 mm retrolisthesis of L1  relative to L2. Bilateral facet arthropathy is the dominant feature at the  lower 3 levels. Bilateral laminectomies have been performed at L5-S1.  Vertebral body heights spaces are well-preserved. There is no fracture. CT Imaging    None    MR Imaging    MRI Lumbar Spine without contrast 9/23/2020: Borderline congenitally slender lumbar spinal canal measures 15 mm in diameter at L3. Grade 1 retrolisthesis of L1 on L2 measures 3 mm. Vertebral body heights are maintained. Marrow signal is normal. Moderate disc desiccation at L1-L2. Mild disc desiccation at the other levels. No discitis. The conus medullaris terminates at L1-L2. Signal and caliber of the distal spinal cord are within normal limits. There appear to be bilateral parapelvic renal cysts. Moderate atrophy of the paraspinal musculature. Lower thoracic spine: Mild central spinal canal stenosis at T11-T12 and T12-L1. L1-L2: Diffuse disc bulge, facet arthrosis, and thickened ligamentum flavum. Moderate central spinal canal stenosis. Mild bilateral foraminal stenosis. L2-L3: Diffuse disc bulge, facet arthrosis, and thickened ligamentum flavum. Mild central spinal canal stenosis. L3-L4: Diffuse disc bulge, facet arthrosis, and thickened ligamentum flavum. Moderate central spinal canal stenosis. L4-L5: Diffuse disc bulge, facet arthrosis, and thickened ligamentum flavum. Mild central spinal canal stenosis. L5-S1: No herniation or stenosis. Facet arthrosis and facet joint effusions. MRI Pelvis without contrast 9/20/2017: There is normal bone signal. No para-articular edema-like signal is shown nor is there demonstration of substantial joint effusion. There is ankylosis of the inferior left sacroiliac joint without demonstration of substantial osteophyte formation. The inferior right SI joint is substantially narrowed with perhaps a small area of ankylosis inferiorly. There is minimal-mild superolateral joint space narrowing of the hip joints bilaterally with tiny marginal osteophytes.  Moderate degenerative changes in the lower lumbar spine are shown with disc space narrowing through L4-5 as well as bilateral facet osteoarthrosis without ankylosis at L5-S1. No soft tissue mass is demonstrated. Muscle signal, size and contour appear normal. There is moderate insertional tendinopathy of the gluteus medius bilaterally with partial-thickness tearing in tiny bursal effusions. Ultrasound    Retroperitoneum 10/11/2017: RIGHT KIDNEY: The right kidney has normal echogenicity with no mass, stone or hydronephrosis. The right kidney measures 9.1 cm in length. LEFT KIDNEY: The left kidney has normal echogenicity with  no mass, stone or hydronephrosis. There may be mild caliectasis. The left kidney measures 8.7 cm in length. RETROPERITONEUM: The aorta is atherosclerotic and tapers normally. The aortic bifurcation is normal. The IVC is not visualized due to body habitus and bowel gas. No retroperitoneal mass is identified. BLADDER: The urinary bladder is incompletely distended. DXA     DXA 9/12/2017: (excluded Lumbar spine due to degenerative changes) left femoral neck T score: -2.1 (0.749 g/cm2), left total hip T score: -1.9 (0.763 g/cm2), right femoral neck T score: -2.4 (0.709 g/cm2), right total hip T score: -2.1 (0.746 g/cm2), and distal one third left radius T score -3.0 (BMD 0.614 g/cm2). FRAX score 17.8 % probability in 10 years for major osteoporotic fracture and 5.8 % 10 year probability of hip fracture. ASSESSMENT AND PLAN    This is a follow up visit for Ms. Aster Conti for ankylosing spondylitis c/b sacroiliac ankylosis, with still moderately active disease having had to delay infliximab due to recent COVID. 1. Seronegative rheumatoid arthritis ([facility] [address]  -Pt to schedule infliximab 5mg/kg loading and then q8wk maintenance pending improvement in breathing with pulmonary rehab    2. Age-related osteoporosis without current pathological fracture  -Cont denosumab, next due January    3. Ongoing use of possibly toxic medication  - C REACTIVE PROTEIN, QT; Future  - CBC WITH AUTOMATED DIFF;  Future  - METABOLIC PANEL, COMPREHENSIVE; Future  - SED RATE (ESR); Future  - ANTI-NUCLEAR AB BY IFA (RDL); Future      There are no Patient Instructions on file for this visit. TODAY'S ORDERS    No orders of the defined types were placed in this encounter.       Future Appointments   Date Time Provider Andrea Ga   1/18/2023  2:40 PM Jay Singh MD [facility] BS AMB   1/18/2023  3:30 PM LAB ONLY [facility] BS AMB   1/24/2023  1:30 PM INFUSIONINJ_RC [facility] BS AMB   6/29/2023  9:20 AM Ankit Cartwright III, DO [facility] BS AMB     Face to face time: minutes  Note preparation and records review day of service: minutes  Total provider time day of service: Minutes

## 2023-01-24 ENCOUNTER — OFFICE VISIT (OUTPATIENT)
Dept: RHEUMATOLOGY | Age: 83
End: 2023-01-24
Payer: MEDICARE

## 2023-01-24 VITALS
OXYGEN SATURATION: 98 % | RESPIRATION RATE: 16 BRPM | TEMPERATURE: 98 F | SYSTOLIC BLOOD PRESSURE: 118 MMHG | HEART RATE: 60 BPM | DIASTOLIC BLOOD PRESSURE: 72 MMHG

## 2023-01-24 DIAGNOSIS — M81.0 AGE-RELATED OSTEOPOROSIS WITHOUT CURRENT PATHOLOGICAL FRACTURE: Primary | ICD-10-CM

## 2023-01-24 PROCEDURE — 96372 THER/PROPH/DIAG INJ SC/IM: CPT

## 2023-01-24 RX ORDER — ONDANSETRON 2 MG/ML
8 INJECTION INTRAMUSCULAR; INTRAVENOUS AS NEEDED
OUTPATIENT
Start: 2023-06-25

## 2023-01-24 RX ORDER — ALBUTEROL SULFATE 0.83 MG/ML
2.5 SOLUTION RESPIRATORY (INHALATION) AS NEEDED
Start: 2023-06-25

## 2023-01-24 RX ORDER — ACETAMINOPHEN 325 MG/1
650 TABLET ORAL AS NEEDED
Start: 2023-06-25

## 2023-01-24 RX ORDER — DIPHENHYDRAMINE HYDROCHLORIDE 50 MG/ML
50 INJECTION, SOLUTION INTRAMUSCULAR; INTRAVENOUS AS NEEDED
Start: 2023-06-25

## 2023-01-24 RX ORDER — DIPHENHYDRAMINE HYDROCHLORIDE 50 MG/ML
25 INJECTION, SOLUTION INTRAMUSCULAR; INTRAVENOUS AS NEEDED
Start: 2023-06-25

## 2023-01-24 RX ORDER — EPINEPHRINE 1 MG/ML
0.3 INJECTION, SOLUTION, CONCENTRATE INTRAVENOUS AS NEEDED
OUTPATIENT
Start: 2023-06-25

## 2023-01-24 RX ORDER — HYDROCORTISONE SODIUM SUCCINATE 100 MG/2ML
100 INJECTION, POWDER, FOR SOLUTION INTRAMUSCULAR; INTRAVENOUS AS NEEDED
OUTPATIENT
Start: 2023-06-25

## 2023-01-24 RX ADMIN — DENOSUMAB 60 MG: 60 INJECTION SUBCUTANEOUS at 13:45

## 2023-01-24 NOTE — PROGRESS NOTES
[facility] Rheumatology  OBIC Note    Date: 2023  Name: Seb Smith  MRN: [id number]       : 1940  Diagnosis: Osteoporosis (M81.0)   Treatment: Prolia  Referring Provider: Dr. Hannah Coppola  Supervising Provider: Dr. Hannah Coppola    Patient arrived to [facility] at [phone]. Ms. Charlotte Amaro allergies reviewed and she agreed to receiving today's treatment. Visit Vitals  /72   Pulse 60   Temp 98 °F (36.7 °C)   Resp 16   SpO2 98%     Recent labs results:  Lab Results   Component Value Date/Time    Sodium 140 01/10/2023 10:45 AM    Potassium 4.2 01/10/2023 10:45 AM    Chloride 105 01/10/2023 10:45 AM    CO2 29 01/10/2023 10:45 AM    Anion gap 6 01/10/2023 10:45 AM    Glucose 84 01/10/2023 10:45 AM    BUN 18 01/10/2023 10:45 AM    Creatinine 0.67 01/10/2023 10:45 AM    BUN/Creatinine ratio 27 (H) 01/10/2023 10:45 AM    GFR est AA >60 2022 09:35 AM    GFR est non-AA >60 2022 09:35 AM    Calcium 9.3 01/10/2023 10:45 AM    Bilirubin, total 0.4 01/10/2023 10:45 AM    Alk. phosphatase 87 01/10/2023 10:45 AM    Protein, total 6.5 01/10/2023 10:45 AM    Albumin 3.7 01/10/2023 10:45 AM    Globulin 2.8 01/10/2023 10:45 AM    A-G Ratio 1.3 01/10/2023 10:45 AM    ALT (SGPT) 20 01/10/2023 10:45 AM    AST (SGOT) 17 01/10/2023 10:45 AM     Medications given per providers orders:  Administrations This Visit       denosumab (PROLIA) injection 60 mg       Admin Date  2023 Action  Given Dose  60 mg Route  SubCUTAneous Administered By  SHONDA Stanford to Right arm  [phone] [phone]  Lot # [id number]  Exp 2025     Ms. Charlotte Amaro tolerated the treatment without complaints and no medication reactions were seen while in presence of this RN. Patient provided with AVS, which included future appointments and written educational material regarding Prolia. All of the patients questions were answered and then discharged ambulatory from Rheumatology OBIC in stable condition at 1345.   Encounter routed to supervising provider for co-signing. Future Appointments   Date Time Provider Andrea Ga   3/13/2023 11:00 AM Dorota Centeno MD [facility] BS AMB   3/17/2023 11:40 AM Dylan Robertson MD [facility] BS AMB   6/29/2023  9:20 AM Abe Thornton III, DO [facility] BS AMB   7/17/2023 11:00 AM LAB ONLY [facility] BS AMB   7/25/2023  1:30 PM INFUSIONINJ_RC [facility] BS AMB     Lab order placed and lab appt made.     Jag Olea RN  January 24, 2023

## 2023-01-26 ENCOUNTER — TELEPHONE (OUTPATIENT)
Dept: FAMILY MEDICINE CLINIC | Age: 83
End: 2023-01-26

## 2023-01-26 ENCOUNTER — NURSE TRIAGE (OUTPATIENT)
Dept: OTHER | Facility: CLINIC | Age: 83
End: 2023-01-26

## 2023-01-26 ENCOUNTER — OFFICE VISIT (OUTPATIENT)
Dept: FAMILY MEDICINE CLINIC | Age: 83
End: 2023-01-26
Payer: MEDICARE

## 2023-01-26 VITALS
OXYGEN SATURATION: 97 % | DIASTOLIC BLOOD PRESSURE: 63 MMHG | BODY MASS INDEX: 26.46 KG/M2 | HEIGHT: 65 IN | WEIGHT: 158.8 LBS | HEART RATE: 60 BPM | SYSTOLIC BLOOD PRESSURE: 148 MMHG

## 2023-01-26 DIAGNOSIS — I80.03: Primary | ICD-10-CM

## 2023-01-26 DIAGNOSIS — G95.19 NEUROGENIC CLAUDICATION (HCC): ICD-10-CM

## 2023-01-26 DIAGNOSIS — M06.09 RHEUMATOID ARTHRITIS OF MULTIPLE SITES WITH NEGATIVE RHEUMATOID FACTOR (HCC): ICD-10-CM

## 2023-01-26 DIAGNOSIS — M45.8 ANKYLOSING SPONDYLITIS OF SACRAL REGION (HCC): ICD-10-CM

## 2023-01-26 PROBLEM — R29.818 NEUROGENIC CLAUDICATION: Status: ACTIVE | Noted: 2023-01-26

## 2023-01-26 PROCEDURE — 1090F PRES/ABSN URINE INCON ASSESS: CPT | Performed by: FAMILY MEDICINE

## 2023-01-26 PROCEDURE — G8417 CALC BMI ABV UP PARAM F/U: HCPCS | Performed by: FAMILY MEDICINE

## 2023-01-26 PROCEDURE — G8536 NO DOC ELDER MAL SCRN: HCPCS | Performed by: FAMILY MEDICINE

## 2023-01-26 PROCEDURE — G0463 HOSPITAL OUTPT CLINIC VISIT: HCPCS | Performed by: FAMILY MEDICINE

## 2023-01-26 PROCEDURE — 3077F SYST BP >= 140 MM HG: CPT | Performed by: FAMILY MEDICINE

## 2023-01-26 PROCEDURE — G8427 DOCREV CUR MEDS BY ELIG CLIN: HCPCS | Performed by: FAMILY MEDICINE

## 2023-01-26 PROCEDURE — 1101F PT FALLS ASSESS-DOCD LE1/YR: CPT | Performed by: FAMILY MEDICINE

## 2023-01-26 PROCEDURE — 99213 OFFICE O/P EST LOW 20 MIN: CPT | Performed by: FAMILY MEDICINE

## 2023-01-26 PROCEDURE — 1123F ACP DISCUSS/DSCN MKR DOCD: CPT | Performed by: FAMILY MEDICINE

## 2023-01-26 PROCEDURE — G9717 DOC PT DX DEP/BP F/U NT REQ: HCPCS | Performed by: FAMILY MEDICINE

## 2023-01-26 PROCEDURE — 3078F DIAST BP <80 MM HG: CPT | Performed by: FAMILY MEDICINE

## 2023-01-26 RX ORDER — PREDNISONE 5 MG/1
TABLET ORAL
Qty: 21 TABLET | Refills: 0 | Status: SHIPPED | OUTPATIENT
Start: 2023-01-26

## 2023-01-26 NOTE — TELEPHONE ENCOUNTER
Patient would like a call from [facility] regarding swelling in her legs she can be reached @ [phone]

## 2023-01-26 NOTE — PROGRESS NOTES
HISTORY OF PRESENT ILLNESS  Brennan Hilton is a 79 y.o. female. f/u recent bout of bilateral leg superficial phlebitis,,having some new areas of pain,inflammation. Recently seen by Rheum. F/U RA,AS COPD,HBP,Hypothyroidism. Feeling well   Fatigue  The history is provided by the Patient. This is a chronic problem. The problem occurs daily. The problem has been gradually improving. Follow-up  The history is provided by the Patient. This is a chronic problem. The problem occurs daily. Leg Pain   This is a new problem. The current episode started more than 1 week ago. The problem has been gradually improving. The pain is present in the left lower leg and right lower leg. The quality of the pain is described as aching. The pain is mild. Review of Systems   Constitutional:  Positive for malaise/fatigue. Negative for chills. Respiratory:  Negative for cough. Cardiovascular:  Negative for palpitations. Gastrointestinal:  Negative for heartburn. Genitourinary:  Negative for frequency. Musculoskeletal:  Positive for myalgias. Skin:  Positive for rash. Endo/Heme/Allergies:  Bruises/bleeds easily. Psychiatric/Behavioral:  Negative for depression. Physical Exam  Constitutional:       Appearance: Normal appearance. She is normal weight. HENT:      Head: Normocephalic and atraumatic. Right Ear: Tympanic membrane normal.      Left Ear: Tympanic membrane normal.      Nose: Congestion present. Mouth/Throat:      Mouth: Mucous membranes are moist.   Eyes:      Pupils: Pupils are equal, round, and reactive to light. Cardiovascular:      Rate and Rhythm: Normal rate and regular rhythm. Pulses: Normal pulses. Heart sounds: Normal heart sounds. Pulmonary:      Effort: Pulmonary effort is normal.      Breath sounds: Normal breath sounds. Abdominal:      Palpations: Abdomen is soft. Musculoskeletal:      Cervical back: Neck supple. Skin:     General: Skin is warm and dry. Findings: Bruising and erythema present. Comments: Resolving areas of superficial phlebitis bilateral calves   Neurological:      Mental Status: She is alert and oriented to person, place, and time. Mental status is at baseline. Psychiatric:         Mood and Affect: Mood normal.     Diagnoses and all orders for this visit:    1. Superficial phlebitis of leg, bilateral,recurrent,worsening,recommend cool compresses,elevation,tylenol  -     predniSONE (STERAPRED) 5 mg dose pack; See administration instruction per 5mg dose pack  -     REFERRAL TO VASCULAR SURGERY    2. Neurogenic claudication ([facility] [address]    3. Rheumatoid arthritis of multiple sites with negative rheumatoid factor ([facility] [address]    4. Ankylosing spondylitis of sacral region [facility])        Follow-up and Dispositions    Return in about 2 weeks (around 2/9/2023).

## 2023-01-26 NOTE — TELEPHONE ENCOUNTER
Location of patient: [place]    Received call from [place] karime at [facility] with [employer]. Subjective: Caller states \"leg still swollen, right leg swollen, red and hot, was seen for this a few weeks ago, sent to vascular\"     Current Symptoms: see above, 4 places that are swollen,not going away, swelling is 4 inches in diameter, red splotches, hot/warm to touch, hard lumps. Doppler ruled out blood clots, was put on antibiotic, completed it. Hurts the longer she stands. Onset: 4 weeks ago; unchanged    Associated Symptoms: reduced activity    Pain Severity: 3-4/10; stiffness; intermittent, gets worse with movement    Temperature: denies fever     What has been tried: hot compress, antibiotic, prednisone    LMP: NA Pregnant: NA    Recommended disposition: Go to [facility]    Care advice provided, patient verbalizes understanding; denies any other questions or concerns; instructed to call back for any new or worsening symptoms. Patient/Caller agrees with recommended disposition; writer provided warm transfer to [place] at [facility] for appointment scheduling    Attention Provider: Thank you for allowing me to participate in the care of your patient. The patient was connected to triage in response to information provided to the [facility]. Please do not respond through this encounter as the response is not directed to a shared pool.     Reason for Disposition   Swelling is red and size > 2 inches (5.0 cm)    Protocols used: Skin Lump or Localized Swelling-ADULT-[place]

## 2023-01-26 NOTE — PROGRESS NOTES
Chief Complaint   Patient presents with    Leg Swelling     Patient has bilateral leg swelling- swelling started yesterday and patient had her prolia injection on tuesday 1. \"Have you been to the ER, urgent care clinic since your last visit? Hospitalized since your last visit? \" No    2. \"Have you seen or consulted any other health care providers outside of the [address] [place] [address] since your last visit? \" No     3. For patients aged 39-70: Has the patient had a colonoscopy / FIT/ Cologuard? NA - based on age      If the patient is female:    4. For patients aged 41-77: Has the patient had a mammogram within the past 2 years? NA - based on age or sex      11. For patients aged 21-65: Has the patient had a pap smear?  NA - based on age or sex

## 2023-02-08 ENCOUNTER — TELEPHONE (OUTPATIENT)
Dept: FAMILY MEDICINE CLINIC | Age: 83
End: 2023-02-08

## 2023-02-08 DIAGNOSIS — J20.9 ACUTE BRONCHITIS, UNSPECIFIED ORGANISM: Primary | ICD-10-CM

## 2023-02-08 RX ORDER — PREDNISONE 10 MG/1
10 TABLET ORAL 2 TIMES DAILY
Qty: 10 TABLET | Refills: 0 | Status: SHIPPED | OUTPATIENT
Start: 2023-02-08

## 2023-02-08 RX ORDER — AZITHROMYCIN 250 MG/1
TABLET, FILM COATED ORAL
Qty: 6 TABLET | Refills: 0 | Status: SHIPPED | OUTPATIENT
Start: 2023-02-08

## 2023-02-08 NOTE — TELEPHONE ENCOUNTER
Patient called requesting something be called in for bronchial asthma. She states it's settled in her chest and lungs and is having a hard time breathing. She can be reached at [phone]. Pharmacy has been verified.  ------------------------------------------------------------------------  Called the pt 3 x to get her symptoms, no answer.   [phone]

## 2023-02-08 NOTE — TELEPHONE ENCOUNTER
Patient called requesting something be called in for bronchial asthma. She states it's settled in her chest and lungs and is having a hard time breathing. She can be reached at [phone]. Pharmacy has been verified.

## 2023-03-16 DIAGNOSIS — E55.9 VITAMIN D DEFICIENCY: Primary | ICD-10-CM

## 2023-03-16 NOTE — TELEPHONE ENCOUNTER
Received refill request for Vitamin D2    Last visit was 1/18/23  Follow up scheduled for 4/21/23    Lab Results   Component Value Date/Time    Sodium 140 01/10/2023 10:45 AM    Potassium 4.2 01/10/2023 10:45 AM    Chloride 105 01/10/2023 10:45 AM    CO2 29 01/10/2023 10:45 AM    Anion gap 6 01/10/2023 10:45 AM    Glucose 84 01/10/2023 10:45 AM    BUN 18 01/10/2023 10:45 AM    Creatinine 0.67 01/10/2023 10:45 AM    BUN/Creatinine ratio 27 (H) 01/10/2023 10:45 AM    GFR est AA >60 07/14/2022 09:35 AM    GFR est non-AA >60 07/14/2022 09:35 AM    Calcium 9.3 01/10/2023 10:45 AM    Bilirubin, total 0.4 01/10/2023 10:45 AM    Alk.  phosphatase 87 01/10/2023 10:45 AM    Protein, total 6.5 01/10/2023 10:45 AM    Albumin 3.7 01/10/2023 10:45 AM    Globulin 2.8 01/10/2023 10:45 AM    A-G Ratio 1.3 01/10/2023 10:45 AM    ALT (SGPT) 20 01/10/2023 10:45 AM    AST (SGOT) 17 01/10/2023 10:45 AM     Lab Results   Component Value Date/Time    WBC 8.9 01/10/2023 10:45 AM    HGB 13.0 01/10/2023 10:45 AM    HCT 41.5 01/10/2023 10:45 AM    PLATELET 956 54/82/9759 10:45 AM    MCV 96.7 01/10/2023 10:45 AM

## 2023-03-17 RX ORDER — ERGOCALCIFEROL 1.25 MG/1
50000 CAPSULE ORAL
Qty: 6 CAPSULE | Refills: 0 | Status: SHIPPED | OUTPATIENT
Start: 2023-03-17

## 2023-03-20 ENCOUNTER — TELEPHONE (OUTPATIENT)
Dept: FAMILY MEDICINE CLINIC | Age: 83
End: 2023-03-20

## 2023-03-20 RX ORDER — AZITHROMYCIN 250 MG/1
TABLET, FILM COATED ORAL
Qty: 6 TABLET | Refills: 0 | Status: SHIPPED | OUTPATIENT
Start: 2023-03-20 | End: 2023-03-25

## 2023-03-20 NOTE — TELEPHONE ENCOUNTER
Patient would like for  to  send in a z pac for bronchitis she can be reached @ [phone]  ------------------------------------------------------------------------------  Spoke to the pt and sh states she just has a cough.   The z-aaron was sent to  for approval.  Pt can be reached at [phone]

## 2023-03-27 NOTE — PROGRESS NOTES
[facility] Rheumatology  OBIC Note    Date: 2023  Name: Nusrat Aden  MRN: [id number]    : 1940  Diagnosis: Rheumatoid Arthritis (M06.00) & Ankylosing Spondylitis (M45.8)  Treatment: Renflexis 400 W0  Referring Provider: Dr. Tiffanie Shah  Supervising Provider: Dr. Tiffanie Shah    Patient arrived to [facility] at 1300. Ms. Humberto Singh allergies reviewed and she agreed to receiving today's treatment. A physical assessment was performed initially and post-treatment. Pt. denies new complaints today. Pt initially received one dose of renflexis last  but pt believed she had some sort of reaction to it and it was discontinued. Dr. Tiffanie Shah and pt agreed to trial this medication again in hopes of getting control of disease. Ms. Claudia Ingram vitals were monitored before, during and after medication administration. Vitals:    23 1410 23 1425 23 1445 23 1530   BP: 137/84 132/77 132/83 130/67   Pulse: (!) 56 (!) 54 80 65   Resp: 16 16 14 16   Temp:       SpO2: 95% 95% 96% 97%      Labs drawn and walked to [facility].     Medications given per providers orders:  Administrations This Visit       0.9% sodium chloride infusion       Admin Date  2023 Action  New Bag Dose  25 mL/hr Rate  25 mL/hr Route  IntraVENous Administered By  Estevan Montano RN              acetaminophen (TYLENOL) tablet 650 mg       Admin Date  2023 Action  Given Dose  650 mg Route  Oral Administered By  Estevan Montano RN              diphenhydrAMINE (BENADRYL) tablet 50 mg       Admin Date  2023 Action  Given Dose  50 mg Route  Oral Administered By  Estevan Montano RN              inFLIXimab-abda (RENFLEXIS) 400 mg in 0.9% sodium chloride 250 mL, overfill volume 25 mL infusion       Admin Date  2023 Action  New Bag Dose  400 mg Route  IntraVENous Administered By  Estevan Montano RN              sodium chloride (NS) flush 10 mL       Admin Date  2023 Action  Given Dose  10 mL Route  IntraVENous Administered By  Kaycee Lin RN                 Renflexis 100mg/vial x4 (total dose 400mg)  NDC 06673-214-45  Lot #  [id number]  Exp    5/07/26  ---mixed in---  0.9% Normal saline 250ml bag  NDC 4027-7310-59  Lot # [id number]  Exp 02/28/24     START:  10ml/hr @ 9663  20ml/hr @ 9620  40ml/hr @ 7875  80ml/hr @ 6765  150ml/hr @ 4642  200ml/hr @ 6075   STOP: 1530     Tylenol 325mg tablets x 2  NDC 9430-0417-18  Lot # [id number]  Exp 07/2025     Benadryl 25mg capsules x 2  NDC 3539-2249-75  Lot # [id number]  Exp 03/2025    250 ml bag 0.9% Normal Saline @ 25ml/hr  NDC 7320-4335-09  lot # [id number]  Exp 07/2024    0.9% Normal Saline 10ml flush  NDC 6782529596  Lot # [id number]  Exp 04/30/2025    Lines: 24G Right lower FA. Blood return noted and IV site assessed before, during, and after treatment. Line flushed with 10-30 ml's 0.9% Normal Saline solution per protocol. Access was removed from Ms. Guanako Jim after completion of infusion/injection. Ms. Guanako Jim tolerated the treatment without complaints and no medication reactions were seen while in presence of this RN. Patient provided with AVS, which included future appointments and written educational material regarding Renflexis. All of the patients questions were answered and then discharged ambulatory from Rheumatology [facility] in stable condition at 1545. Encounter routed to supervising provider for co-signing.      Future Appointments   Date Time Provider Andrea Ga   4/14/2023  2:20 PM Yuli Reece MD [facility] BS AMB   4/20/2023  1:30 PM INFUSIONINJ_[facility] BS AMB   4/20/2023  4:00 PM Lidia Quezada MD [facility] BS AMB   5/23/2023  1:00 PM INFUSIONINJ_RC [facility] BS AMB   6/29/2023  9:20 AM Sun Holliday III, DO [facility] BS AMB   7/17/2023 11:00 AM LAB ONLY [facility] KAHLIL MANZO   7/25/2023  1:30 PM INFUSIONINJ_RC [facility] BS ANAIS Martínez RN  April 4, 2023

## 2023-04-03 DIAGNOSIS — M81.0 AGE-RELATED OSTEOPOROSIS WITHOUT CURRENT PATHOLOGICAL FRACTURE: Primary | ICD-10-CM

## 2023-04-03 DIAGNOSIS — M06.00 SERONEGATIVE RHEUMATOID ARTHRITIS (HCC): ICD-10-CM

## 2023-04-03 DIAGNOSIS — M45.8 ANKYLOSING SPONDYLITIS OF SACRAL REGION (HCC): ICD-10-CM

## 2023-04-03 RX ORDER — DIPHENHYDRAMINE HCL 25 MG
50 TABLET ORAL ONCE
Status: CANCELLED
Start: 2023-04-03 | End: 2023-04-03

## 2023-04-03 RX ORDER — ACETAMINOPHEN 325 MG/1
650 TABLET ORAL ONCE
Status: CANCELLED
Start: 2023-04-03 | End: 2023-04-03

## 2023-04-03 RX ORDER — DIPHENHYDRAMINE HYDROCHLORIDE 50 MG/ML
25 INJECTION, SOLUTION INTRAMUSCULAR; INTRAVENOUS AS NEEDED
Status: CANCELLED
Start: 2023-04-03

## 2023-04-03 RX ORDER — HEPARIN 100 UNIT/ML
300-500 SYRINGE INTRAVENOUS AS NEEDED
Status: CANCELLED
Start: 2023-04-03

## 2023-04-03 RX ORDER — SODIUM CHLORIDE 9 MG/ML
25 INJECTION, SOLUTION INTRAVENOUS CONTINUOUS
Status: CANCELLED
Start: 2023-04-03

## 2023-04-03 RX ORDER — SODIUM CHLORIDE 0.9 % (FLUSH) 0.9 %
10 SYRINGE (ML) INJECTION AS NEEDED
Status: CANCELLED
Start: 2023-04-03

## 2023-04-03 RX ORDER — SODIUM CHLORIDE 9 MG/ML
10 INJECTION INTRAVENOUS AS NEEDED
Status: CANCELLED
Start: 2023-04-03

## 2023-04-03 RX ORDER — ONDANSETRON 2 MG/ML
8 INJECTION INTRAMUSCULAR; INTRAVENOUS AS NEEDED
Status: CANCELLED
Start: 2023-04-03

## 2023-04-03 RX ORDER — ACETAMINOPHEN 325 MG/1
650 TABLET ORAL AS NEEDED
Status: CANCELLED
Start: 2023-04-03

## 2023-04-04 ENCOUNTER — OFFICE VISIT (OUTPATIENT)
Dept: RHEUMATOLOGY | Age: 83
End: 2023-04-04
Payer: MEDICARE

## 2023-04-04 PROCEDURE — 96413 CHEMO IV INFUSION 1 HR: CPT | Performed by: INTERNAL MEDICINE

## 2023-04-04 PROCEDURE — 96415 CHEMO IV INFUSION ADDL HR: CPT | Performed by: INTERNAL MEDICINE

## 2023-04-04 RX ORDER — SODIUM CHLORIDE 9 MG/ML
10 INJECTION INTRAVENOUS AS NEEDED
Start: 2023-04-16

## 2023-04-04 RX ORDER — DIPHENHYDRAMINE HCL 25 MG
50 TABLET ORAL ONCE
Status: CANCELLED
Start: 2023-04-16 | End: 2023-04-16

## 2023-04-04 RX ORDER — DIPHENHYDRAMINE HCL 25 MG
50 TABLET ORAL ONCE
Start: 2023-04-16 | End: 2023-04-16

## 2023-04-04 RX ORDER — DIPHENHYDRAMINE HCL 25 MG
50 TABLET ORAL ONCE
Status: COMPLETED
Start: 2023-04-04 | End: 2023-04-04

## 2023-04-04 RX ORDER — SODIUM CHLORIDE 9 MG/ML
25 INJECTION, SOLUTION INTRAVENOUS CONTINUOUS
Status: DISCONTINUED
Start: 2023-04-04 | End: 2023-04-04 | Stop reason: HOSPADM

## 2023-04-04 RX ORDER — ALBUTEROL SULFATE 0.83 MG/ML
2.5 SOLUTION RESPIRATORY (INHALATION) AS NEEDED
Start: 2023-04-16

## 2023-04-04 RX ORDER — SODIUM CHLORIDE 9 MG/ML
25 INJECTION, SOLUTION INTRAVENOUS CONTINUOUS
Start: 2023-04-16

## 2023-04-04 RX ORDER — FLUTICASONE PROPIONATE AND SALMETEROL 500; 50 UG/1; UG/1
POWDER RESPIRATORY (INHALATION)
Start: 2022-04-30

## 2023-04-04 RX ORDER — SODIUM CHLORIDE 9 MG/ML
25 INJECTION, SOLUTION INTRAVENOUS CONTINUOUS
Status: CANCELLED
Start: 2023-04-16

## 2023-04-04 RX ORDER — ACETAMINOPHEN 325 MG/1
650 TABLET ORAL ONCE
Start: 2023-04-16 | End: 2023-04-16

## 2023-04-04 RX ORDER — DIPHENHYDRAMINE HYDROCHLORIDE 50 MG/ML
25 INJECTION, SOLUTION INTRAMUSCULAR; INTRAVENOUS AS NEEDED
Start: 2023-04-16

## 2023-04-04 RX ORDER — CLORAZEPATE DIPOTASSIUM 3.75 MG/1
TABLET ORAL
Start: 2023-01-25

## 2023-04-04 RX ORDER — HYDROCORTISONE SODIUM SUCCINATE 100 MG/2ML
100 INJECTION, POWDER, FOR SOLUTION INTRAMUSCULAR; INTRAVENOUS AS NEEDED
Start: 2023-04-16

## 2023-04-04 RX ORDER — ACETAMINOPHEN 325 MG/1
650 TABLET ORAL ONCE
Status: COMPLETED
Start: 2023-04-04 | End: 2023-04-04

## 2023-04-04 RX ORDER — HEPARIN 100 UNIT/ML
300-500 SYRINGE INTRAVENOUS AS NEEDED
Start: 2023-04-16

## 2023-04-04 RX ORDER — ACETAMINOPHEN 325 MG/1
650 TABLET ORAL AS NEEDED
Start: 2023-04-16

## 2023-04-04 RX ORDER — SODIUM CHLORIDE 0.9 % (FLUSH) 0.9 %
10 SYRINGE (ML) INJECTION AS NEEDED
Start: 2023-04-16

## 2023-04-04 RX ORDER — ACETAMINOPHEN 325 MG/1
650 TABLET ORAL ONCE
Status: CANCELLED
Start: 2023-04-16 | End: 2023-04-16

## 2023-04-04 RX ORDER — EPINEPHRINE 1 MG/ML
0.3 INJECTION, SOLUTION, CONCENTRATE INTRAVENOUS AS NEEDED
Start: 2023-04-16

## 2023-04-04 RX ORDER — DIPHENHYDRAMINE HYDROCHLORIDE 50 MG/ML
50 INJECTION, SOLUTION INTRAMUSCULAR; INTRAVENOUS AS NEEDED
Start: 2023-04-16

## 2023-04-04 RX ORDER — SODIUM CHLORIDE 0.9 % (FLUSH) 0.9 %
10 SYRINGE (ML) INJECTION AS NEEDED
Status: DISCONTINUED
Start: 2023-04-04 | End: 2023-04-04 | Stop reason: HOSPADM

## 2023-04-04 RX ORDER — ONDANSETRON 2 MG/ML
8 INJECTION INTRAMUSCULAR; INTRAVENOUS AS NEEDED
Start: 2023-04-16

## 2023-04-04 RX ADMIN — Medication 10 ML: at 13:14

## 2023-04-04 RX ADMIN — SODIUM CHLORIDE 25 ML/HR: 9 INJECTION, SOLUTION INTRAVENOUS at 13:15

## 2023-04-04 RX ADMIN — SODIUM CHLORIDE 400 MG: 9 INJECTION, SOLUTION INTRAVENOUS at 13:25

## 2023-04-04 RX ADMIN — Medication 50 MG: at 13:08

## 2023-04-04 RX ADMIN — ACETAMINOPHEN 650 MG: 325 TABLET ORAL at 13:08

## 2023-04-05 LAB
ALBUMIN SERPL-MCNC: 3.8 G/DL (ref 3.5–5)
ALBUMIN/GLOB SERPL: 1.3 (ref 1.1–2.2)
ALP SERPL-CCNC: 70 U/L (ref 45–117)
ALT SERPL-CCNC: 21 U/L (ref 12–78)
ANION GAP SERPL CALC-SCNC: 4 MMOL/L (ref 5–15)
AST SERPL-CCNC: 19 U/L (ref 15–37)
BASOPHILS # BLD: 0 K/UL (ref 0–0.1)
BASOPHILS NFR BLD: 1 % (ref 0–1)
BILIRUB SERPL-MCNC: 0.5 MG/DL (ref 0.2–1)
BUN SERPL-MCNC: 17 MG/DL (ref 6–20)
BUN/CREAT SERPL: 25 (ref 12–20)
CALCIUM SERPL-MCNC: 9.1 MG/DL (ref 8.5–10.1)
CHLORIDE SERPL-SCNC: 109 MMOL/L (ref 97–108)
CO2 SERPL-SCNC: 25 MMOL/L (ref 21–32)
CREAT SERPL-MCNC: 0.68 MG/DL (ref 0.55–1.02)
CRP SERPL-MCNC: <0.29 MG/DL (ref 0–0.6)
DIFFERENTIAL METHOD BLD: NORMAL
EOSINOPHIL # BLD: 0.1 K/UL (ref 0–0.4)
EOSINOPHIL NFR BLD: 2 % (ref 0–7)
ERYTHROCYTE [DISTWIDTH] IN BLOOD BY AUTOMATED COUNT: 13.5 % (ref 11.5–14.5)
ERYTHROCYTE [SEDIMENTATION RATE] IN BLOOD: 5 MM/HR (ref 0–30)
GLOBULIN SER CALC-MCNC: 2.9 G/DL (ref 2–4)
GLUCOSE SERPL-MCNC: 87 MG/DL (ref 65–100)
HCT VFR BLD AUTO: 42.1 % (ref 35–47)
HGB BLD-MCNC: 13.3 G/DL (ref 11.5–16)
IMM GRANULOCYTES # BLD AUTO: 0 K/UL (ref 0–0.04)
IMM GRANULOCYTES NFR BLD AUTO: 0 % (ref 0–0.5)
LYMPHOCYTES # BLD: 1.6 K/UL (ref 0.8–3.5)
LYMPHOCYTES NFR BLD: 22 % (ref 12–49)
MCH RBC QN AUTO: 30.4 PG (ref 26–34)
MCHC RBC AUTO-ENTMCNC: 31.6 G/DL (ref 30–36.5)
MCV RBC AUTO: 96.3 FL (ref 80–99)
MONOCYTES # BLD: 0.7 K/UL (ref 0–1)
MONOCYTES NFR BLD: 9 % (ref 5–13)
NEUTS SEG # BLD: 5 K/UL (ref 1.8–8)
NEUTS SEG NFR BLD: 66 % (ref 32–75)
NRBC # BLD: 0 K/UL (ref 0–0.01)
NRBC BLD-RTO: 0 PER 100 WBC
PLATELET # BLD AUTO: 318 K/UL (ref 150–400)
PMV BLD AUTO: 10.8 FL (ref 8.9–12.9)
POTASSIUM SERPL-SCNC: 4.9 MMOL/L (ref 3.5–5.1)
PROT SERPL-MCNC: 6.7 G/DL (ref 6.4–8.2)
RBC # BLD AUTO: 4.37 M/UL (ref 3.8–5.2)
SODIUM SERPL-SCNC: 138 MMOL/L (ref 136–145)
WBC # BLD AUTO: 7.4 K/UL (ref 3.6–11)

## 2023-04-18 NOTE — PROGRESS NOTES
The results were reviewed. All labs are normal/negative. assumed care of pt at this time. pt currently down at US, will complete assessment when returns to assigned location.

## 2023-04-20 ENCOUNTER — OFFICE VISIT (OUTPATIENT)
Dept: RHEUMATOLOGY | Age: 83
End: 2023-04-20
Payer: MEDICARE

## 2023-04-20 VITALS
HEART RATE: 50 BPM | SYSTOLIC BLOOD PRESSURE: 145 MMHG | OXYGEN SATURATION: 98 % | RESPIRATION RATE: 16 BRPM | DIASTOLIC BLOOD PRESSURE: 78 MMHG

## 2023-04-20 VITALS
HEART RATE: 57 BPM | TEMPERATURE: 98 F | RESPIRATION RATE: 16 BRPM | OXYGEN SATURATION: 97 % | SYSTOLIC BLOOD PRESSURE: 161 MMHG | DIASTOLIC BLOOD PRESSURE: 76 MMHG

## 2023-04-20 DIAGNOSIS — M81.0 AGE-RELATED OSTEOPOROSIS WITHOUT CURRENT PATHOLOGICAL FRACTURE: ICD-10-CM

## 2023-04-20 DIAGNOSIS — E88.2 ADIPOSIS DOLOROSA: ICD-10-CM

## 2023-04-20 DIAGNOSIS — M06.00 SERONEGATIVE RHEUMATOID ARTHRITIS (HCC): Primary | ICD-10-CM

## 2023-04-20 DIAGNOSIS — M45.8 ANKYLOSING SPONDYLITIS OF SACRAL REGION (HCC): ICD-10-CM

## 2023-04-20 DIAGNOSIS — Z79.899 ONGOING USE OF POSSIBLY TOXIC MEDICATION: ICD-10-CM

## 2023-04-20 PROCEDURE — 96413 CHEMO IV INFUSION 1 HR: CPT | Performed by: INTERNAL MEDICINE

## 2023-04-20 PROCEDURE — 96415 CHEMO IV INFUSION ADDL HR: CPT | Performed by: INTERNAL MEDICINE

## 2023-04-20 PROCEDURE — G0463 HOSPITAL OUTPT CLINIC VISIT: HCPCS | Performed by: INTERNAL MEDICINE

## 2023-04-20 RX ORDER — DIPHENHYDRAMINE HYDROCHLORIDE 50 MG/ML
50 INJECTION, SOLUTION INTRAMUSCULAR; INTRAVENOUS AS NEEDED
Start: 2023-06-25

## 2023-04-20 RX ORDER — SODIUM CHLORIDE 9 MG/ML
25 INJECTION, SOLUTION INTRAVENOUS CONTINUOUS
Status: DISCONTINUED | OUTPATIENT
Start: 2023-04-20 | End: 2023-04-24 | Stop reason: HOSPADM

## 2023-04-20 RX ORDER — ONDANSETRON 2 MG/ML
8 INJECTION INTRAMUSCULAR; INTRAVENOUS AS NEEDED
OUTPATIENT
Start: 2023-06-25

## 2023-04-20 RX ORDER — HYDROCORTISONE SODIUM SUCCINATE 100 MG/2ML
100 INJECTION, POWDER, FOR SOLUTION INTRAMUSCULAR; INTRAVENOUS AS NEEDED
OUTPATIENT
Start: 2023-06-25

## 2023-04-20 RX ORDER — ONDANSETRON 2 MG/ML
8 INJECTION INTRAMUSCULAR; INTRAVENOUS AS NEEDED
OUTPATIENT
Start: 2023-05-02

## 2023-04-20 RX ORDER — DIPHENHYDRAMINE HYDROCHLORIDE 50 MG/ML
25 INJECTION, SOLUTION INTRAMUSCULAR; INTRAVENOUS AS NEEDED
Start: 2023-06-25

## 2023-04-20 RX ORDER — SODIUM CHLORIDE 9 MG/ML
25 INJECTION, SOLUTION INTRAVENOUS CONTINUOUS
OUTPATIENT
Start: 2023-05-02

## 2023-04-20 RX ORDER — SODIUM CHLORIDE 0.9 % (FLUSH) 0.9 %
10 SYRINGE (ML) INJECTION AS NEEDED
Status: SHIPPED | OUTPATIENT
Start: 2023-04-20 | End: 2023-04-21

## 2023-04-20 RX ORDER — EPINEPHRINE 1 MG/ML
0.3 INJECTION, SOLUTION, CONCENTRATE INTRAVENOUS AS NEEDED
OUTPATIENT
Start: 2023-06-25

## 2023-04-20 RX ORDER — DIPHENHYDRAMINE HCL 25 MG
50 TABLET ORAL ONCE
OUTPATIENT
Start: 2023-05-02 | End: 2023-05-02

## 2023-04-20 RX ORDER — DIPHENHYDRAMINE HCL 25 MG
50 TABLET ORAL ONCE
Status: CANCELLED
Start: 2023-05-02 | End: 2023-05-02

## 2023-04-20 RX ORDER — HEPARIN 100 UNIT/ML
300-500 SYRINGE INTRAVENOUS AS NEEDED
Start: 2023-05-02

## 2023-04-20 RX ORDER — ACETAMINOPHEN 325 MG/1
650 TABLET ORAL ONCE
Status: CANCELLED
Start: 2023-05-02 | End: 2023-05-02

## 2023-04-20 RX ORDER — ALBUTEROL SULFATE 0.83 MG/ML
2.5 SOLUTION RESPIRATORY (INHALATION) AS NEEDED
Start: 2023-06-25

## 2023-04-20 RX ORDER — ACETAMINOPHEN 325 MG/1
650 TABLET ORAL ONCE
Start: 2023-05-02 | End: 2023-05-02

## 2023-04-20 RX ORDER — DICLOFENAC SODIUM 50 MG/1
50 TABLET, DELAYED RELEASE ORAL
Qty: 60 TABLET | Refills: 2 | Status: SHIPPED | OUTPATIENT
Start: 2023-04-20

## 2023-04-20 RX ORDER — DIPHENHYDRAMINE HYDROCHLORIDE 50 MG/ML
25 INJECTION, SOLUTION INTRAMUSCULAR; INTRAVENOUS AS NEEDED
Start: 2023-05-02

## 2023-04-20 RX ORDER — ACETAMINOPHEN 325 MG/1
650 TABLET ORAL ONCE
Status: COMPLETED | OUTPATIENT
Start: 2023-04-20 | End: 2023-04-20

## 2023-04-20 RX ORDER — EPINEPHRINE 1 MG/ML
0.3 INJECTION, SOLUTION, CONCENTRATE INTRAVENOUS AS NEEDED
OUTPATIENT
Start: 2023-05-02

## 2023-04-20 RX ORDER — HYDROCORTISONE SODIUM SUCCINATE 100 MG/2ML
100 INJECTION, POWDER, FOR SOLUTION INTRAMUSCULAR; INTRAVENOUS AS NEEDED
OUTPATIENT
Start: 2023-05-02

## 2023-04-20 RX ORDER — ACETAMINOPHEN 325 MG/1
650 TABLET ORAL AS NEEDED
Start: 2023-06-25

## 2023-04-20 RX ORDER — ALBUTEROL SULFATE 0.83 MG/ML
2.5 SOLUTION RESPIRATORY (INHALATION) AS NEEDED
Start: 2023-05-02

## 2023-04-20 RX ORDER — SODIUM CHLORIDE 9 MG/ML
25 INJECTION, SOLUTION INTRAVENOUS CONTINUOUS
Status: CANCELLED
Start: 2023-05-02

## 2023-04-20 RX ORDER — DIPHENHYDRAMINE HYDROCHLORIDE 50 MG/ML
50 INJECTION, SOLUTION INTRAMUSCULAR; INTRAVENOUS AS NEEDED
Start: 2023-05-02

## 2023-04-20 RX ORDER — SODIUM CHLORIDE 9 MG/ML
10 INJECTION INTRAVENOUS AS NEEDED
OUTPATIENT
Start: 2023-05-02

## 2023-04-20 RX ORDER — SODIUM CHLORIDE 0.9 % (FLUSH) 0.9 %
10 SYRINGE (ML) INJECTION AS NEEDED
OUTPATIENT
Start: 2023-05-02

## 2023-04-20 RX ORDER — DIPHENHYDRAMINE HCL 25 MG
50 TABLET ORAL ONCE
Status: COMPLETED | OUTPATIENT
Start: 2023-04-20 | End: 2023-04-20

## 2023-04-20 RX ORDER — ACETAMINOPHEN 325 MG/1
650 TABLET ORAL AS NEEDED
Start: 2023-05-02

## 2023-04-20 RX ADMIN — INFLIXIMAB 400 MG: 100 INJECTION, POWDER, LYOPHILIZED, FOR SOLUTION INTRAVENOUS at 13:30

## 2023-04-20 RX ADMIN — Medication 10 ML: at 13:26

## 2023-04-20 RX ADMIN — Medication 50 MG: at 13:18

## 2023-04-20 RX ADMIN — ACETAMINOPHEN 650 MG: 325 TABLET ORAL at 13:18

## 2023-04-20 RX ADMIN — SODIUM CHLORIDE 25 ML/HR: 9 INJECTION, SOLUTION INTRAVENOUS at 13:26

## 2023-04-20 NOTE — PROGRESS NOTES
Chief Complaint   Patient presents with    Joint Pain     1. Have you been to the ER, urgent care clinic since your last visit? Hospitalized since your last visit? No    2. Have you seen or consulted any other health care providers outside of the [address] [place] [address] since your last visit? Include any pap smears or colon screening.  No

## 2023-04-20 NOTE — PATIENT INSTRUCTIONS
Wear a spica splint when you are not using your hand for activities that require your fingers. You can buy one in your local [facility].   Continue to get Prolia injections once every 6 months and Remicade infusions as scheduled. Next Prolia injection on 7/25. 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 Try putting lidocaine patches on your legs when they feel sore, or apply small amounts of diclofenac gel. For more widespread pain, you can take diclofenac pills up to twice a day if pain doesn't respond to Tylenol. I'm sending my note to Dr. Maykel Ferreira, but please also make sure he is comfortable with your taking occasional low-dose diclofenac pills as well. Continue taking 650mg of Tylenol up to 3 times a day as needed for joint pain. Complete your labs with your next infusion. Schedule a follow up with me in June. Talk to [facility] about Dr. Yuridia Bellamy availability if you would prefer to continue here. Reach out to the practices listed in our letter to try to establish with a new Rheumatolgist within the next 6 months. If you find a new Rheumatologist, please send me a message.

## 2023-04-22 DIAGNOSIS — M81.0 AGE-RELATED OSTEOPOROSIS WITHOUT CURRENT PATHOLOGICAL FRACTURE: Primary | ICD-10-CM

## 2023-05-03 RX ORDER — DIPHENHYDRAMINE HCL 25 MG
25 CAPSULE ORAL ONCE
OUTPATIENT
Start: 2023-05-23 | End: 2023-05-23

## 2023-05-03 RX ORDER — ALBUTEROL SULFATE 90 UG/1
4 AEROSOL, METERED RESPIRATORY (INHALATION) PRN
OUTPATIENT
Start: 2023-05-23

## 2023-05-03 RX ORDER — DIPHENHYDRAMINE HYDROCHLORIDE 50 MG/ML
50 INJECTION INTRAMUSCULAR; INTRAVENOUS
OUTPATIENT
Start: 2023-05-23

## 2023-05-03 RX ORDER — ACETAMINOPHEN 325 MG/1
650 TABLET ORAL ONCE
OUTPATIENT
Start: 2023-05-23 | End: 2023-05-23

## 2023-05-03 RX ORDER — ONDANSETRON 2 MG/ML
8 INJECTION INTRAMUSCULAR; INTRAVENOUS
OUTPATIENT
Start: 2023-05-23

## 2023-05-03 RX ORDER — SODIUM CHLORIDE 9 MG/ML
5-250 INJECTION, SOLUTION INTRAVENOUS PRN
OUTPATIENT
Start: 2023-05-23

## 2023-05-03 RX ORDER — SODIUM CHLORIDE 9 MG/ML
INJECTION, SOLUTION INTRAVENOUS CONTINUOUS
OUTPATIENT
Start: 2023-05-23

## 2023-05-03 RX ORDER — ACETAMINOPHEN 325 MG/1
650 TABLET ORAL
OUTPATIENT
Start: 2023-05-23

## 2023-05-03 RX ORDER — EPINEPHRINE 1 MG/ML
0.3 INJECTION, SOLUTION, CONCENTRATE INTRAVENOUS PRN
OUTPATIENT
Start: 2023-05-23

## 2023-05-03 RX ORDER — SODIUM CHLORIDE 0.9 % (FLUSH) 0.9 %
5-40 SYRINGE (ML) INJECTION PRN
OUTPATIENT
Start: 2023-05-23

## 2023-05-03 RX ORDER — HEPARIN SODIUM (PORCINE) LOCK FLUSH IV SOLN 100 UNIT/ML 100 UNIT/ML
500 SOLUTION INTRAVENOUS PRN
OUTPATIENT
Start: 2023-05-23

## 2023-05-15 ENCOUNTER — TELEPHONE (OUTPATIENT)
Age: 83
End: 2023-05-15

## 2023-05-15 DIAGNOSIS — J20.9 ACUTE BRONCHITIS, UNSPECIFIED ORGANISM: Primary | ICD-10-CM

## 2023-05-15 RX ORDER — GUAIFENESIN 600 MG/1
600 TABLET, EXTENDED RELEASE ORAL 2 TIMES DAILY
Qty: 30 TABLET | Refills: 0 | Status: SHIPPED | OUTPATIENT
Start: 2023-05-15 | End: 2023-05-30

## 2023-05-15 RX ORDER — AZITHROMYCIN 250 MG/1
250 TABLET, FILM COATED ORAL SEE ADMIN INSTRUCTIONS
Qty: 6 TABLET | Refills: 0 | Status: SHIPPED | OUTPATIENT
Start: 2023-05-15 | End: 2023-05-20

## 2023-05-15 NOTE — TELEPHONE ENCOUNTER
Patient wants to get something called in for a cough and congestion.   Please give her a call @ [phone]

## 2023-05-15 NOTE — TELEPHONE ENCOUNTER
Pt requesting something be sent to her pharmacy for cough with green mucus,  congestion and sore throat. She cn be reached at [phone].

## 2023-05-22 ENCOUNTER — TELEPHONE (OUTPATIENT)
Age: 83
End: 2023-05-22

## 2023-05-22 ENCOUNTER — OFFICE VISIT (OUTPATIENT)
Age: 83
End: 2023-05-22
Payer: MEDICARE

## 2023-05-22 DIAGNOSIS — J20.9 ACUTE BRONCHITIS, UNSPECIFIED ORGANISM: Primary | ICD-10-CM

## 2023-05-22 LAB
INFLUENZA A ANTIGEN, POC: NEGATIVE
INFLUENZA B ANTIGEN, POC: NEGATIVE
VALID INTERNAL CONTROL, POC: NORMAL

## 2023-05-22 PROCEDURE — PBSHW AMB POC INFLUENZA A  AND B REAL-TIME RT-PCR: Performed by: FAMILY MEDICINE

## 2023-05-22 PROCEDURE — 87502 INFLUENZA DNA AMP PROBE: CPT | Performed by: FAMILY MEDICINE

## 2023-05-22 NOTE — TELEPHONE ENCOUNTER
Patient states she still has a cough and congestion. She wants to come by and get a covid test.  Please give her call @ [phone]  -------------------------------------------------------------------------------------    Returned pt call no answer.

## 2023-05-22 NOTE — TELEPHONE ENCOUNTER
Pt called [facility] to say shes been battling chest congestion and productive cough despite finishing Z-pack on Saturday prescribed by PCP. She self tested for COVID and it was negative. Encouraged pt to seek chest x ray and formal COVID/Flu test at urgent care since her symptoms have not resolved. Will notify Dr. René Walker and seek his recommendations.

## 2023-05-22 NOTE — TELEPHONE ENCOUNTER
Patient states she still has a cough and congestion.   She wants to come by and get a covid test.  Please give her call @ [phone]

## 2023-05-22 NOTE — TELEPHONE ENCOUNTER
Pt has appt at PCP this afternoon.   Will call us tomorrow to reschedule infusion based on evaluation of PCP as well as Dr. Trevon Ji recommendations

## 2023-05-22 NOTE — TELEPHONE ENCOUNTER
With clear chest xray and neg flu screen, would still push back infusion 1 week to facilitate clearance of presumably viral infection. Please encourage patient to push antihistamines (OTC Zyrtec or Allegra once a day) and nasal steroids (OTC Flonase) to help clear sinus symptoms in meantime.

## 2023-05-23 NOTE — TELEPHONE ENCOUNTER
Spoke with patient. She called ot say negative flu/covid and negative CXR. Encouraged antihistamines like zyrtec or other allergy meds. R/s to 6/5 for W6 induction dose. Pt verbalized understanding.  Will draw blood work at infusion appt for upcoming f/u with dr Alondra Potter on 6/15

## 2023-05-31 RX ORDER — DIPHENHYDRAMINE HCL 25 MG
25 TABLET ORAL ONCE
OUTPATIENT
Start: 2023-06-05 | End: 2023-06-05

## 2023-05-31 RX ORDER — ACETAMINOPHEN 325 MG/1
650 TABLET ORAL ONCE
OUTPATIENT
Start: 2023-06-05 | End: 2023-06-05

## 2023-06-01 RX ORDER — PREDNISONE 10 MG/1
TABLET ORAL
Qty: 10 TABLET | Refills: 0 | Status: SHIPPED | OUTPATIENT
Start: 2023-06-01

## 2023-06-01 RX ORDER — PREDNISONE 5 MG/1
TABLET ORAL
Qty: 21 EACH | Refills: 0 | Status: SHIPPED | OUTPATIENT
Start: 2023-06-01

## 2023-06-01 RX ORDER — AZITHROMYCIN 250 MG/1
TABLET, FILM COATED ORAL
Qty: 6 TABLET | Refills: 0 | Status: SHIPPED | OUTPATIENT
Start: 2023-06-01

## 2023-06-01 NOTE — TELEPHONE ENCOUNTER
Last appointment: 4/14/23  Next appointment: Sandeep Pina to follow-up 7/14/23  Previous refill encounter(s): 2/8/23 Prednisone 10mg #10 & Z-[place] #6, 1/26/23 Prednisone #5mg #21    Requested Prescriptions     Pending Prescriptions Disp Refills    predniSONE 5 MG (21) TBPK [Pharmacy Med Name: predniSONE 5 MG TAB DOSE PACK] 21 each 0     Sig: TAKE BY MOUTH AS INSTRUCTED - PER PACKAGE INSTRUCTIONS    predniSONE (DELTASONE) 10 MG tablet [Pharmacy Med Name: predniSONE 10 MG TABLET] 10 tablet 0     Sig: TAKE 1 TABLET BY MOUTH TWO TIMES A DAY    azithromycin (ZITHROMAX) 250 MG tablet [Pharmacy Med Name: AZITHROMYCIN 250 MG TAB (6CT)] 6 tablet 0     Sig: TAKE 2 TABLETS BY MOUTH TODAY, THEN TAKE 1 TABLET DAILY FOR 4 DAYS         For Pharmacy Admin Tracking Only    Program: Medication Refill  CPA in place:    Recommendation Provided To:    Intervention Detail: New Rx: 3, reason: Patient Preference  Intervention Accepted By:   Kwesi Goddard Closed?:    Time Spent (min): 5

## 2023-06-05 ENCOUNTER — NURSE ONLY (OUTPATIENT)
Age: 83
End: 2023-06-05
Payer: MEDICARE

## 2023-06-05 VITALS
HEART RATE: 55 BPM | RESPIRATION RATE: 16 BRPM | OXYGEN SATURATION: 98 % | SYSTOLIC BLOOD PRESSURE: 147 MMHG | BODY MASS INDEX: 26.05 KG/M2 | WEIGHT: 156.53 LBS | DIASTOLIC BLOOD PRESSURE: 76 MMHG | TEMPERATURE: 98 F

## 2023-06-05 DIAGNOSIS — M45.8 ANKYLOSING SPONDYLITIS OF SACRAL REGION (HCC): ICD-10-CM

## 2023-06-05 DIAGNOSIS — M06.00 SERONEGATIVE RHEUMATOID ARTHRITIS (HCC): Primary | ICD-10-CM

## 2023-06-05 PROCEDURE — 96415 CHEMO IV INFUSION ADDL HR: CPT | Performed by: INTERNAL MEDICINE

## 2023-06-05 PROCEDURE — 96413 CHEMO IV INFUSION 1 HR: CPT | Performed by: INTERNAL MEDICINE

## 2023-06-05 PROCEDURE — PBSHW PBB SHADOW CHARGE: Performed by: INTERNAL MEDICINE

## 2023-06-05 RX ORDER — SODIUM CHLORIDE 0.9 % (FLUSH) 0.9 %
5-40 SYRINGE (ML) INJECTION PRN
OUTPATIENT
Start: 2023-08-13

## 2023-06-05 RX ORDER — SODIUM CHLORIDE 9 MG/ML
INJECTION, SOLUTION INTRAVENOUS CONTINUOUS
OUTPATIENT
Start: 2023-08-13

## 2023-06-05 RX ORDER — DIPHENHYDRAMINE HYDROCHLORIDE 50 MG/ML
50 INJECTION INTRAMUSCULAR; INTRAVENOUS
OUTPATIENT
Start: 2023-08-13

## 2023-06-05 RX ORDER — DIPHENHYDRAMINE HCL 25 MG
25 TABLET ORAL ONCE
OUTPATIENT
Start: 2023-08-13 | End: 2023-08-13

## 2023-06-05 RX ORDER — SODIUM CHLORIDE 0.9 % (FLUSH) 0.9 %
5-40 SYRINGE (ML) INJECTION PRN
Status: DISCONTINUED | OUTPATIENT
Start: 2023-06-05 | End: 2023-06-05 | Stop reason: HOSPADM

## 2023-06-05 RX ORDER — HEPARIN SODIUM (PORCINE) LOCK FLUSH IV SOLN 100 UNIT/ML 100 UNIT/ML
500 SOLUTION INTRAVENOUS PRN
Status: CANCELLED | OUTPATIENT
Start: 2023-08-13

## 2023-06-05 RX ORDER — ACETAMINOPHEN 325 MG/1
650 TABLET ORAL
OUTPATIENT
Start: 2023-08-13

## 2023-06-05 RX ORDER — SODIUM CHLORIDE 9 MG/ML
5-250 INJECTION, SOLUTION INTRAVENOUS PRN
OUTPATIENT
Start: 2023-08-13

## 2023-06-05 RX ORDER — ACETAMINOPHEN 325 MG/1
650 TABLET ORAL ONCE
Status: COMPLETED | OUTPATIENT
Start: 2023-06-05 | End: 2023-06-05

## 2023-06-05 RX ORDER — DIPHENHYDRAMINE HCL 25 MG
25 TABLET ORAL ONCE
Status: CANCELLED | OUTPATIENT
Start: 2023-08-13 | End: 2023-08-13

## 2023-06-05 RX ORDER — ALBUTEROL SULFATE 90 UG/1
4 AEROSOL, METERED RESPIRATORY (INHALATION) PRN
OUTPATIENT
Start: 2023-08-13

## 2023-06-05 RX ORDER — DIPHENHYDRAMINE HCL 25 MG
25 TABLET ORAL ONCE
Status: COMPLETED | OUTPATIENT
Start: 2023-06-05 | End: 2023-06-05

## 2023-06-05 RX ORDER — EPINEPHRINE 1 MG/ML
0.3 INJECTION, SOLUTION, CONCENTRATE INTRAVENOUS PRN
OUTPATIENT
Start: 2023-08-13

## 2023-06-05 RX ORDER — ACETAMINOPHEN 325 MG/1
650 TABLET ORAL ONCE
OUTPATIENT
Start: 2023-08-13 | End: 2023-08-13

## 2023-06-05 RX ORDER — ACETAMINOPHEN 325 MG/1
650 TABLET ORAL ONCE
Status: CANCELLED | OUTPATIENT
Start: 2023-08-13 | End: 2023-08-13

## 2023-06-05 RX ORDER — ONDANSETRON 2 MG/ML
8 INJECTION INTRAMUSCULAR; INTRAVENOUS
OUTPATIENT
Start: 2023-08-13

## 2023-06-05 RX ORDER — SODIUM CHLORIDE 9 MG/ML
5-250 INJECTION, SOLUTION INTRAVENOUS PRN
Status: DISCONTINUED | OUTPATIENT
Start: 2023-06-05 | End: 2023-06-05 | Stop reason: HOSPADM

## 2023-06-05 RX ADMIN — SODIUM CHLORIDE 25 ML/HR: 9 INJECTION, SOLUTION INTRAVENOUS at 12:07

## 2023-06-05 RX ADMIN — Medication 25 MG: at 11:31

## 2023-06-05 RX ADMIN — ACETAMINOPHEN 650 MG: 325 TABLET ORAL at 11:31

## 2023-06-05 RX ADMIN — INFLIXIMAB 400 MG: 100 INJECTION, POWDER, LYOPHILIZED, FOR SOLUTION INTRAVENOUS at 11:40

## 2023-06-05 RX ADMIN — Medication 10 ML: at 11:20

## 2023-06-05 ASSESSMENT — PAIN SCALES - GENERAL: PAINLEVEL_OUTOF10: 3

## 2023-06-06 LAB
ALBUMIN SERPL-MCNC: 3.8 G/DL (ref 3.5–5)
ALBUMIN/GLOB SERPL: 1.1 (ref 1.1–2.2)
ALP SERPL-CCNC: 83 U/L (ref 45–117)
ALT SERPL-CCNC: 25 U/L (ref 12–78)
ANION GAP SERPL CALC-SCNC: 9 MMOL/L (ref 5–15)
AST SERPL-CCNC: 26 U/L (ref 15–37)
BASOPHILS # BLD: 0 K/UL (ref 0–0.1)
BASOPHILS NFR BLD: 0 % (ref 0–1)
BILIRUB SERPL-MCNC: 0.4 MG/DL (ref 0.2–1)
BUN SERPL-MCNC: 25 MG/DL (ref 6–20)
BUN/CREAT SERPL: 34 (ref 12–20)
CALCIUM SERPL-MCNC: 9.1 MG/DL (ref 8.5–10.1)
CHLORIDE SERPL-SCNC: 107 MMOL/L (ref 97–108)
CO2 SERPL-SCNC: 24 MMOL/L (ref 21–32)
CREAT SERPL-MCNC: 0.73 MG/DL (ref 0.55–1.02)
CRP SERPL-MCNC: 1.13 MG/DL (ref 0–0.6)
DIFFERENTIAL METHOD BLD: NORMAL
EOSINOPHIL # BLD: 0.1 K/UL (ref 0–0.4)
EOSINOPHIL NFR BLD: 1 % (ref 0–7)
ERYTHROCYTE [DISTWIDTH] IN BLOOD BY AUTOMATED COUNT: 13.9 % (ref 11.5–14.5)
ERYTHROCYTE [SEDIMENTATION RATE] IN BLOOD: 20 MM/HR (ref 0–30)
GLOBULIN SER CALC-MCNC: 3.4 G/DL (ref 2–4)
GLUCOSE SERPL-MCNC: 90 MG/DL (ref 65–100)
HCT VFR BLD AUTO: 42 % (ref 35–47)
HGB BLD-MCNC: 12.7 G/DL (ref 11.5–16)
IMM GRANULOCYTES # BLD AUTO: 0 K/UL (ref 0–0.04)
IMM GRANULOCYTES NFR BLD AUTO: 0 % (ref 0–0.5)
LYMPHOCYTES # BLD: 1.7 K/UL (ref 0.8–3.5)
LYMPHOCYTES NFR BLD: 20 % (ref 12–49)
MCH RBC QN AUTO: 29.5 PG (ref 26–34)
MCHC RBC AUTO-ENTMCNC: 30.2 G/DL (ref 30–36.5)
MCV RBC AUTO: 97.7 FL (ref 80–99)
MONOCYTES # BLD: 0.7 K/UL (ref 0–1)
MONOCYTES NFR BLD: 9 % (ref 5–13)
NEUTS SEG # BLD: 5.6 K/UL (ref 1.8–8)
NEUTS SEG NFR BLD: 70 % (ref 32–75)
NRBC # BLD: 0 K/UL (ref 0–0.01)
NRBC BLD-RTO: 0 PER 100 WBC
PLATELET # BLD AUTO: 293 K/UL (ref 150–400)
PMV BLD AUTO: 10.7 FL (ref 8.9–12.9)
POTASSIUM SERPL-SCNC: 4.6 MMOL/L (ref 3.5–5.1)
PROT SERPL-MCNC: 7.2 G/DL (ref 6.4–8.2)
RBC # BLD AUTO: 4.3 M/UL (ref 3.8–5.2)
SODIUM SERPL-SCNC: 140 MMOL/L (ref 136–145)
WBC # BLD AUTO: 8.2 K/UL (ref 3.6–11)

## 2023-06-07 RX ORDER — ERGOCALCIFEROL 1.25 MG/1
CAPSULE ORAL
Qty: 6 CAPSULE | Refills: 0 | Status: SHIPPED | OUTPATIENT
Start: 2023-06-07

## 2023-06-13 ENCOUNTER — OFFICE VISIT (OUTPATIENT)
Age: 83
End: 2023-06-13
Payer: MEDICARE

## 2023-06-13 VITALS
OXYGEN SATURATION: 94 % | BODY MASS INDEX: 25.96 KG/M2 | DIASTOLIC BLOOD PRESSURE: 66 MMHG | TEMPERATURE: 97 F | WEIGHT: 156 LBS | HEART RATE: 55 BPM | SYSTOLIC BLOOD PRESSURE: 137 MMHG | RESPIRATION RATE: 16 BRPM

## 2023-06-13 DIAGNOSIS — M45.8 ANKYLOSING SPONDYLITIS SACRAL AND SACROCOCCYGEAL REGION (HCC): Primary | ICD-10-CM

## 2023-06-13 DIAGNOSIS — E88.2 ADIPOSIS DOLOROSA: ICD-10-CM

## 2023-06-13 DIAGNOSIS — M70.61 TROCHANTERIC BURSITIS OF BOTH HIPS: ICD-10-CM

## 2023-06-13 DIAGNOSIS — Z79.899 ONGOING USE OF POSSIBLY TOXIC MEDICATION: ICD-10-CM

## 2023-06-13 DIAGNOSIS — M70.62 TROCHANTERIC BURSITIS OF BOTH HIPS: ICD-10-CM

## 2023-06-13 DIAGNOSIS — M81.0 AGE-RELATED OSTEOPOROSIS WITHOUT CURRENT PATHOLOGICAL FRACTURE: ICD-10-CM

## 2023-06-13 PROCEDURE — 3078F DIAST BP <80 MM HG: CPT | Performed by: INTERNAL MEDICINE

## 2023-06-13 PROCEDURE — 1036F TOBACCO NON-USER: CPT | Performed by: INTERNAL MEDICINE

## 2023-06-13 PROCEDURE — 1090F PRES/ABSN URINE INCON ASSESS: CPT | Performed by: INTERNAL MEDICINE

## 2023-06-13 PROCEDURE — 1123F ACP DISCUSS/DSCN MKR DOCD: CPT | Performed by: INTERNAL MEDICINE

## 2023-06-13 PROCEDURE — 3074F SYST BP LT 130 MM HG: CPT | Performed by: INTERNAL MEDICINE

## 2023-06-13 PROCEDURE — G8427 DOCREV CUR MEDS BY ELIG CLIN: HCPCS | Performed by: INTERNAL MEDICINE

## 2023-06-13 PROCEDURE — 99214 OFFICE O/P EST MOD 30 MIN: CPT | Performed by: INTERNAL MEDICINE

## 2023-06-13 PROCEDURE — G8399 PT W/DXA RESULTS DOCUMENT: HCPCS | Performed by: INTERNAL MEDICINE

## 2023-06-13 PROCEDURE — G8419 CALC BMI OUT NRM PARAM NOF/U: HCPCS | Performed by: INTERNAL MEDICINE

## 2023-06-13 RX ORDER — INFLIXIMAB 100 MG/10ML
5 INJECTION, POWDER, LYOPHILIZED, FOR SOLUTION INTRAVENOUS SEE ADMIN INSTRUCTIONS
COMMUNITY

## 2023-07-05 ENCOUNTER — TELEPHONE (OUTPATIENT)
Age: 83
End: 2023-07-05

## 2023-07-05 NOTE — TELEPHONE ENCOUNTER
Returned patient's apt and LVM for patient to call back to be scheduled for an apt to discuss getting gel injections for her bilateral knee pain.

## 2023-07-24 RX ORDER — HEPARIN 100 UNIT/ML
500 SYRINGE INTRAVENOUS PRN
OUTPATIENT
Start: 2023-07-31

## 2023-07-24 RX ORDER — HEPARIN 100 UNIT/ML
500 SYRINGE INTRAVENOUS PRN
OUTPATIENT
Start: 2023-07-24

## 2023-07-24 RX ORDER — ACETAMINOPHEN 325 MG/1
650 TABLET ORAL ONCE
OUTPATIENT
Start: 2023-07-31 | End: 2023-07-31

## 2023-07-24 RX ORDER — DIPHENHYDRAMINE HCL 25 MG
25 TABLET ORAL ONCE
OUTPATIENT
Start: 2023-07-31 | End: 2023-07-31

## 2023-08-28 RX ORDER — ERGOCALCIFEROL 1.25 MG/1
CAPSULE ORAL
Qty: 6 CAPSULE | Refills: 0 | Status: SHIPPED | OUTPATIENT
Start: 2023-08-28

## 2023-08-28 NOTE — TELEPHONE ENCOUNTER
Last appointment: 4/14/23  Next appointment: none  Previous refill encounter(s): 6/7/23 #6    Requested Prescriptions     Pending Prescriptions Disp Refills    vitamin D (ERGOCALCIFEROL) 1.25 MG (10513 UT) CAPS capsule [Pharmacy Med Name: VIT D2 (ERGOCAL) 1.25MG(50,000U) CP] 6 capsule 0     Sig: TAKE 1 CAPSULE BY MOUTH EVERY 14 DAYS         For Pharmacy Admin Tracking Only    Program: Medication Refill  CPA in place:    Recommendation Provided To:    Intervention Detail: New Rx: 1, reason: Patient Preference  Intervention Accepted By:   Gautam Schuler Closed?:    Time Spent (min): 5

## 2023-11-02 ENCOUNTER — TELEPHONE (OUTPATIENT)
Age: 83
End: 2023-11-02

## 2023-11-02 DIAGNOSIS — J20.9 ACUTE BRONCHITIS, UNSPECIFIED ORGANISM: Primary | ICD-10-CM

## 2023-11-02 RX ORDER — PREDNISONE 10 MG/1
10 TABLET ORAL 2 TIMES DAILY
Qty: 10 TABLET | Refills: 0 | Status: SHIPPED | OUTPATIENT
Start: 2023-11-02 | End: 2023-11-07

## 2023-11-02 RX ORDER — AZITHROMYCIN 250 MG/1
250 TABLET, FILM COATED ORAL SEE ADMIN INSTRUCTIONS
Qty: 6 TABLET | Refills: 0 | Status: SHIPPED | OUTPATIENT
Start: 2023-11-02 | End: 2023-11-07

## 2023-11-02 NOTE — TELEPHONE ENCOUNTER
Patient is returning your call. She took a covid test and it was neg. Please give her a call @ [phone]. She has cough, congestion, sore throat and a little SOB.

## 2023-11-02 NOTE — TELEPHONE ENCOUNTER
Patient states she has a sore throat and a little sob when coughing. [phone]    ---------------------------------------------------------------------------------------------    Returned the pt call twice today to see if she has any other sx and if a COVID test was taken, still no answer, left message for her to call the office back.

## 2023-11-02 NOTE — TELEPHONE ENCOUNTER
Patient is returning your call. She took a covid test and it was neg.   Please give her a call @ [phone]

## 2023-11-02 NOTE — TELEPHONE ENCOUNTER
Patient would like to get something called in for a cough and congestion.   Please give her a call @ [phone]

## 2023-11-02 NOTE — TELEPHONE ENCOUNTER
Patient would like to get something called in for a cough and congestion. Please give her a call @ [phone]  ---------------------------------------------------------------------------------------------    Returned pt call to see if she had any other sx, no answer.

## 2023-11-21 RX ORDER — ERGOCALCIFEROL 1.25 MG/1
CAPSULE ORAL
Qty: 6 CAPSULE | Refills: 0 | Status: SHIPPED | OUTPATIENT
Start: 2023-11-21

## 2023-11-21 NOTE — TELEPHONE ENCOUNTER
Last appointment: 05/22/2023 MD Mikaela Ahuja   Next appointment: Nothing scheduled   Previous refill encounter(s):   08/28/2023 Vitamin D2 #6 (1 every 14 days)     For Pharmacy Admin Tracking Only    Program: Medication Refill  Intervention Detail: New Rx: 1, reason: Patient Preference  Time Spent (min): 5      Requested Prescriptions     Pending Prescriptions Disp Refills    vitamin D (ERGOCALCIFEROL) 1.25 MG (16226 UT) CAPS capsule [Pharmacy Med Name: VIT D2 (ERGOCAL) 1.25MG CAP 50,000U] 6 capsule 0     Sig: TAKE 1 CAPSULE BY MOUTH EVERY 2 WEEKS

## 2023-12-28 NOTE — TELEPHONE ENCOUNTER
Michelle swann needs medicine for positive covid    Asap of course    Lyndsey cramer  [id number] [phone]

## 2023-12-29 DIAGNOSIS — U07.1 COVID-19: Primary | ICD-10-CM

## 2023-12-29 RX ORDER — NIRMATRELVIR AND RITONAVIR 150-100 MG
KIT ORAL
Qty: 1 BOX | Refills: 0 | Status: SHIPPED | OUTPATIENT
Start: 2023-12-29 | End: 2023-12-29

## 2023-12-29 RX ORDER — NIRMATRELVIR AND RITONAVIR 150-100 MG
KIT ORAL
Qty: 1 BOX | Refills: 0 | OUTPATIENT
Start: 2023-12-29

## 2023-12-29 RX ORDER — NIRMATRELVIR AND RITONAVIR 150-100 MG
KIT ORAL
Qty: 1 BOX | Refills: 0 | Status: SHIPPED | OUTPATIENT
Start: 2023-12-29 | End: 2023-12-29 | Stop reason: SDUPTHER

## 2023-12-29 RX ORDER — NIRMATRELVIR AND RITONAVIR 150-100 MG
KIT ORAL
Qty: 1 BOX | Refills: 0 | Status: CANCELLED | OUTPATIENT
Start: 2023-12-29

## 2023-12-29 NOTE — TELEPHONE ENCOUNTER
Received an after hours phone call from the patient. Diagnosed with Covid on Wednesday. Was prescribed Paxlovid, but pharmacy unable to fill without clear directions and transmission of the RX failed. Reviewed patient's labs with GFR>60. Sent in the standard dosing to [facility] on [address]. Patient aware. Receipt of RX confirmed by the pharmacy.      Leobardo Rendon,   12/29/23  6:24 PM

## 2023-12-29 NOTE — TELEPHONE ENCOUNTER
Patient states that she spoke to the pharmacy they could not release her medication  nirmatrelvir/ritonavir (PAXLOVID, 150/100,) 10 x 150 MG & 10 x 100MG TBPK   did not give direction please give her a call @ [phone]

## 2024-02-14 ENCOUNTER — OFFICE VISIT (OUTPATIENT)
Age: 84
End: 2024-02-14
Payer: MEDICARE

## 2024-02-14 VITALS
TEMPERATURE: 97.7 F | SYSTOLIC BLOOD PRESSURE: 141 MMHG | BODY MASS INDEX: 25.89 KG/M2 | WEIGHT: 155.4 LBS | HEIGHT: 65 IN | OXYGEN SATURATION: 97 % | RESPIRATION RATE: 18 BRPM | DIASTOLIC BLOOD PRESSURE: 71 MMHG | HEART RATE: 62 BPM

## 2024-02-14 DIAGNOSIS — Z23 ENCOUNTER FOR IMMUNIZATION: ICD-10-CM

## 2024-02-14 DIAGNOSIS — Z79.60 LONG TERM (CURRENT) USE OF UNSPECIFIED IMMUNOMODULATORS AND IMMUNOSUPPRESSANTS: ICD-10-CM

## 2024-02-14 DIAGNOSIS — I10 ESSENTIAL (PRIMARY) HYPERTENSION: ICD-10-CM

## 2024-02-14 DIAGNOSIS — M06.00 SERONEGATIVE RHEUMATOID ARTHRITIS (HCC): ICD-10-CM

## 2024-02-14 DIAGNOSIS — Z00.00 MEDICARE ANNUAL WELLNESS VISIT, SUBSEQUENT: Primary | ICD-10-CM

## 2024-02-14 DIAGNOSIS — M45.8 ANKYLOSING SPONDYLITIS SACRAL AND SACROCOCCYGEAL REGION (HCC): ICD-10-CM

## 2024-02-14 DIAGNOSIS — E78.2 MIXED HYPERLIPIDEMIA: ICD-10-CM

## 2024-02-14 DIAGNOSIS — R53.83 OTHER FATIGUE: ICD-10-CM

## 2024-02-14 PROCEDURE — G8419 CALC BMI OUT NRM PARAM NOF/U: HCPCS | Performed by: FAMILY MEDICINE

## 2024-02-14 PROCEDURE — 3077F SYST BP >= 140 MM HG: CPT | Performed by: FAMILY MEDICINE

## 2024-02-14 PROCEDURE — G8484 FLU IMMUNIZE NO ADMIN: HCPCS | Performed by: FAMILY MEDICINE

## 2024-02-14 PROCEDURE — G0439 PPPS, SUBSEQ VISIT: HCPCS | Performed by: FAMILY MEDICINE

## 2024-02-14 PROCEDURE — 99213 OFFICE O/P EST LOW 20 MIN: CPT | Performed by: FAMILY MEDICINE

## 2024-02-14 PROCEDURE — 3078F DIAST BP <80 MM HG: CPT | Performed by: FAMILY MEDICINE

## 2024-02-14 PROCEDURE — G8399 PT W/DXA RESULTS DOCUMENT: HCPCS | Performed by: FAMILY MEDICINE

## 2024-02-14 PROCEDURE — 1090F PRES/ABSN URINE INCON ASSESS: CPT | Performed by: FAMILY MEDICINE

## 2024-02-14 PROCEDURE — 1123F ACP DISCUSS/DSCN MKR DOCD: CPT | Performed by: FAMILY MEDICINE

## 2024-02-14 PROCEDURE — G8427 DOCREV CUR MEDS BY ELIG CLIN: HCPCS | Performed by: FAMILY MEDICINE

## 2024-02-14 PROCEDURE — 1036F TOBACCO NON-USER: CPT | Performed by: FAMILY MEDICINE

## 2024-02-14 RX ORDER — PREDNISOLONE ACETATE 10 MG/ML
SUSPENSION/ DROPS OPHTHALMIC
COMMUNITY
Start: 2023-11-21 | End: 2024-02-14 | Stop reason: CLARIF

## 2024-02-14 RX ORDER — FLUTICASONE PROPIONATE AND SALMETEROL 500; 50 UG/1; UG/1
POWDER RESPIRATORY (INHALATION)
COMMUNITY
Start: 2023-12-27

## 2024-02-14 RX ORDER — MINOCYCLINE HYDROCHLORIDE 50 MG/1
TABLET ORAL
COMMUNITY
Start: 2024-01-31

## 2024-02-14 NOTE — PROGRESS NOTES
Subjective:      Patient ID: Lyndsey Dietrich is a 83 y.o. female.f/u copd,as,ra,chol,worsening fatigue since covid in Dec      Shortness of Breath  This is a chronic problem. The current episode started more than 1 year ago. The problem occurs daily. The problem has been unchanged. Pertinent negatives include no chest pain, leg swelling, orthopnea, PND, rhinorrhea, sputum production or wheezing.   Fatigue  The history is provided by the Patient. This is a chronic problem. The problem occurs daily. The problem has been gradually improving.   Follow-up  The history is provided by the Patient. This is a chronic problem. The problem occurs daily.     Review of Systems   HENT:  Negative for congestion, facial swelling and rhinorrhea.    Respiratory:  Positive for cough and shortness of breath. Negative for sputum production, choking, chest tightness and wheezing.    Cardiovascular:  Negative for chest pain, palpitations, orthopnea, leg swelling and PND.   Gastrointestinal:  Negative for abdominal distention, anal bleeding and blood in stool.   Musculoskeletal:  Positive for arthralgias and back pain.   Psychiatric/Behavioral:  Negative for agitation.      Objective:   Physical Exam  Constitutional:       Appearance: Normal appearance. She is normal weight.   HENT:      Head: Normocephalic and atraumatic.      Nose: Nose normal.   Cardiovascular:      Rate and Rhythm: Normal rate and regular rhythm.      Pulses: Normal pulses.      Heart sounds: Normal heart sounds.   Pulmonary:      Effort: Pulmonary effort is normal.      Breath sounds: Normal breath sounds. No rales.   Chest:      Chest wall: No tenderness.   Abdominal:      General: Abdomen is flat. There is no distension.      Palpations: Abdomen is soft.      Tenderness: There is no abdominal tenderness. There is no guarding.   Skin:     General: Skin is warm and dry.   Neurological:      General: No focal deficit present.      Mental Status: She is alert and

## 2024-02-14 NOTE — PROGRESS NOTES
Chief Complaint   Patient presents with    Medicare AWV     Patient is here today for her medicare annual wellness exam.      1. Have you been to the ER, urgent care clinic since your last visit?  Hospitalized since your last visit? [facility]    2. Have you seen or consulted any other health care providers outside of the [facility] since your last visit?  Include any pap smears or colon screening. No

## 2024-02-15 LAB
ALBUMIN SERPL-MCNC: 3.6 G/DL (ref 3.5–5)
ALBUMIN/GLOB SERPL: 1.2 (ref 1.1–2.2)
ALP SERPL-CCNC: 85 U/L (ref 45–117)
ALT SERPL-CCNC: 33 U/L (ref 12–78)
ANION GAP SERPL CALC-SCNC: 2 MMOL/L (ref 5–15)
AST SERPL-CCNC: 31 U/L (ref 15–37)
BASOPHILS # BLD: 0.1 K/UL (ref 0–0.1)
BASOPHILS NFR BLD: 1 % (ref 0–1)
BILIRUB SERPL-MCNC: 0.4 MG/DL (ref 0.2–1)
BUN SERPL-MCNC: 19 MG/DL (ref 6–20)
BUN/CREAT SERPL: 26 (ref 12–20)
CALCIUM SERPL-MCNC: 9.1 MG/DL (ref 8.5–10.1)
CHLORIDE SERPL-SCNC: 107 MMOL/L (ref 97–108)
CHOLEST SERPL-MCNC: 216 MG/DL
CO2 SERPL-SCNC: 29 MMOL/L (ref 21–32)
CREAT SERPL-MCNC: 0.72 MG/DL (ref 0.55–1.02)
DIFFERENTIAL METHOD BLD: NORMAL
EOSINOPHIL # BLD: 0.1 K/UL (ref 0–0.4)
EOSINOPHIL NFR BLD: 2 % (ref 0–7)
ERYTHROCYTE [DISTWIDTH] IN BLOOD BY AUTOMATED COUNT: 13.6 % (ref 11.5–14.5)
GLOBULIN SER CALC-MCNC: 3 G/DL (ref 2–4)
GLUCOSE SERPL-MCNC: 81 MG/DL (ref 65–100)
HCT VFR BLD AUTO: 40.6 % (ref 35–47)
HDLC SERPL-MCNC: 67 MG/DL
HDLC SERPL: 3.2 (ref 0–5)
HGB BLD-MCNC: 12.6 G/DL (ref 11.5–16)
IMM GRANULOCYTES # BLD AUTO: 0 K/UL (ref 0–0.04)
IMM GRANULOCYTES NFR BLD AUTO: 0 % (ref 0–0.5)
LDLC SERPL CALC-MCNC: 131.4 MG/DL (ref 0–100)
LYMPHOCYTES # BLD: 1.7 K/UL (ref 0.8–3.5)
LYMPHOCYTES NFR BLD: 22 % (ref 12–49)
MCH RBC QN AUTO: 29.6 PG (ref 26–34)
MCHC RBC AUTO-ENTMCNC: 31 G/DL (ref 30–36.5)
MCV RBC AUTO: 95.5 FL (ref 80–99)
MONOCYTES # BLD: 0.8 K/UL (ref 0–1)
MONOCYTES NFR BLD: 10 % (ref 5–13)
NEUTS SEG # BLD: 4.8 K/UL (ref 1.8–8)
NEUTS SEG NFR BLD: 65 % (ref 32–75)
NRBC # BLD: 0 K/UL (ref 0–0.01)
NRBC BLD-RTO: 0 PER 100 WBC
PLATELET # BLD AUTO: 279 K/UL (ref 150–400)
PMV BLD AUTO: 10.8 FL (ref 8.9–12.9)
POTASSIUM SERPL-SCNC: 4.4 MMOL/L (ref 3.5–5.1)
PROT SERPL-MCNC: 6.6 G/DL (ref 6.4–8.2)
RBC # BLD AUTO: 4.25 M/UL (ref 3.8–5.2)
SODIUM SERPL-SCNC: 138 MMOL/L (ref 136–145)
TRIGL SERPL-MCNC: 88 MG/DL
VLDLC SERPL CALC-MCNC: 17.6 MG/DL
WBC # BLD AUTO: 7.4 K/UL (ref 3.6–11)

## 2024-02-20 RX ORDER — ERGOCALCIFEROL 1.25 MG/1
CAPSULE ORAL
Qty: 6 CAPSULE | Refills: 3 | Status: SHIPPED | OUTPATIENT
Start: 2024-02-20

## 2024-02-20 NOTE — TELEPHONE ENCOUNTER
Last appointment: 2/14/24  Next appointment: Advised to follow-up 5/14/24  Previous refill encounter(s): 11/21/23 #6    Requested Prescriptions     Pending Prescriptions Disp Refills    vitamin D (ERGOCALCIFEROL) 1.25 MG (63861 UT) CAPS capsule [Pharmacy Med Name: VITAMIN D2 1.25MG(50,000 UNIT)] 6 capsule 3     Sig: TAKE 1 CAPSULE BY MOUTH EVERY 2 WEEKS         For Pharmacy Admin Tracking Only    Program: Medication Refill  CPA in place:    Recommendation Provided To:   Intervention Detail: New Rx: 1, reason: Patient Preference  Intervention Accepted By:   Gap Closed?:    Time Spent (min): 5

## 2024-05-22 NOTE — LETTER
6/4/20 Patient: John Busby YOB: 1940 Date of Visit: 6/4/2020 Gerardo Chris MD 
[address] [place] [id number] VIA In Basket Dear Gerardo Chris MD, Thank you for referring Ms. Rogelio Sadler to [address] [place] [address] for evaluation. My notes for this consultation are attached. If you have questions, please do not hesitate to call me. I look forward to following your patient along with you.  
 
 
Sincerely, 
 
Bert Farley MD 
 
 [FreeTextEntry1] : #leukopenia with neutropenia - leukopenia likely patient's baseline. work up unrevealing 3.67 today neutropenia resolved no hemolysis flow neg b12/folate wnl ESR/CRP -  WNL hepatitis, HIV, EBV - negative AMY - wnl K:L ratio - 1.68 - monitor no monoclonal bands TSH wnl given stability will hold off on bm bx at this time  #Health Maintenance [facility] 10/2022 with Dr. 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 Thompson   RTC in 12 months with cbc with diff, cmp, retic, LDH, Immunoglobulins.

## 2024-06-12 ENCOUNTER — OFFICE VISIT (OUTPATIENT)
Age: 84
End: 2024-06-12
Payer: MEDICARE

## 2024-06-12 VITALS
TEMPERATURE: 97.2 F | OXYGEN SATURATION: 94 % | BODY MASS INDEX: 25.73 KG/M2 | DIASTOLIC BLOOD PRESSURE: 75 MMHG | HEART RATE: 67 BPM | SYSTOLIC BLOOD PRESSURE: 128 MMHG | WEIGHT: 154.6 LBS | RESPIRATION RATE: 16 BRPM

## 2024-06-12 DIAGNOSIS — N18.2 CKD (CHRONIC KIDNEY DISEASE) STAGE 2, GFR 60-89 ML/MIN: ICD-10-CM

## 2024-06-12 DIAGNOSIS — M45.8 ANKYLOSING SPONDYLITIS OF SACRAL REGION (HCC): ICD-10-CM

## 2024-06-12 DIAGNOSIS — Z79.60 LONG TERM (CURRENT) USE OF UNSPECIFIED IMMUNOMODULATORS AND IMMUNOSUPPRESSANTS: ICD-10-CM

## 2024-06-12 DIAGNOSIS — M81.0 AGE-RELATED OSTEOPOROSIS WITHOUT CURRENT PATHOLOGICAL FRACTURE: ICD-10-CM

## 2024-06-12 DIAGNOSIS — M06.00 SERONEGATIVE RHEUMATOID ARTHRITIS (HCC): ICD-10-CM

## 2024-06-12 DIAGNOSIS — R53.83 OTHER FATIGUE: ICD-10-CM

## 2024-06-12 DIAGNOSIS — M17.0 PRIMARY OSTEOARTHRITIS OF BOTH KNEES: ICD-10-CM

## 2024-06-12 DIAGNOSIS — K21.01 GASTROESOPHAGEAL REFLUX DISEASE WITH ESOPHAGITIS AND HEMORRHAGE: ICD-10-CM

## 2024-06-12 DIAGNOSIS — I10 ESSENTIAL HYPERTENSION, BENIGN: Primary | ICD-10-CM

## 2024-06-12 PROCEDURE — 1090F PRES/ABSN URINE INCON ASSESS: CPT | Performed by: FAMILY MEDICINE

## 2024-06-12 PROCEDURE — 3078F DIAST BP <80 MM HG: CPT | Performed by: FAMILY MEDICINE

## 2024-06-12 PROCEDURE — 1123F ACP DISCUSS/DSCN MKR DOCD: CPT | Performed by: FAMILY MEDICINE

## 2024-06-12 PROCEDURE — G8399 PT W/DXA RESULTS DOCUMENT: HCPCS | Performed by: FAMILY MEDICINE

## 2024-06-12 PROCEDURE — 99213 OFFICE O/P EST LOW 20 MIN: CPT | Performed by: FAMILY MEDICINE

## 2024-06-12 PROCEDURE — G8427 DOCREV CUR MEDS BY ELIG CLIN: HCPCS | Performed by: FAMILY MEDICINE

## 2024-06-12 PROCEDURE — G8419 CALC BMI OUT NRM PARAM NOF/U: HCPCS | Performed by: FAMILY MEDICINE

## 2024-06-12 PROCEDURE — 3074F SYST BP LT 130 MM HG: CPT | Performed by: FAMILY MEDICINE

## 2024-06-12 PROCEDURE — 1036F TOBACCO NON-USER: CPT | Performed by: FAMILY MEDICINE

## 2024-06-12 RX ORDER — TIOTROPIUM BROMIDE INHALATION SPRAY 1.56 UG/1
2 SPRAY, METERED RESPIRATORY (INHALATION) DAILY
COMMUNITY
Start: 2024-04-10

## 2024-06-12 ASSESSMENT — PATIENT HEALTH QUESTIONNAIRE - PHQ9
SUM OF ALL RESPONSES TO PHQ QUESTIONS 1-9: 3
3. TROUBLE FALLING OR STAYING ASLEEP: NOT AT ALL
4. FEELING TIRED OR HAVING LITTLE ENERGY: NEARLY EVERY DAY
SUM OF ALL RESPONSES TO PHQ9 QUESTIONS 1 & 2: 0
SUM OF ALL RESPONSES TO PHQ QUESTIONS 1-9: 3
10. IF YOU CHECKED OFF ANY PROBLEMS, HOW DIFFICULT HAVE THESE PROBLEMS MADE IT FOR YOU TO DO YOUR WORK, TAKE CARE OF THINGS AT HOME, OR GET ALONG WITH OTHER PEOPLE: NOT DIFFICULT AT ALL
SUM OF ALL RESPONSES TO PHQ QUESTIONS 1-9: 3
7. TROUBLE CONCENTRATING ON THINGS, SUCH AS READING THE NEWSPAPER OR WATCHING TELEVISION: NOT AT ALL
SUM OF ALL RESPONSES TO PHQ QUESTIONS 1-9: 3
1. LITTLE INTEREST OR PLEASURE IN DOING THINGS: NOT AT ALL
5. POOR APPETITE OR OVEREATING: NOT AT ALL
8. MOVING OR SPEAKING SO SLOWLY THAT OTHER PEOPLE COULD HAVE NOTICED. OR THE OPPOSITE, BEING SO FIGETY OR RESTLESS THAT YOU HAVE BEEN MOVING AROUND A LOT MORE THAN USUAL: NOT AT ALL
2. FEELING DOWN, DEPRESSED OR HOPELESS: NOT AT ALL
9. THOUGHTS THAT YOU WOULD BE BETTER OFF DEAD, OR OF HURTING YOURSELF: NOT AT ALL

## 2024-06-12 ASSESSMENT — ENCOUNTER SYMPTOMS
WHEEZING: 0
ABDOMINAL DISTENTION: 0
SHORTNESS OF BREATH: 1
DIFFICULTY BREATHING: 1
RHINORRHEA: 0
CHEST TIGHTNESS: 0
CONSTIPATION: 0

## 2024-06-12 NOTE — PROGRESS NOTES
Chief Complaint   Patient presents with    3 Month Follow-Up       1. \"Have you been to the ER, urgent care clinic since your last visit?  Hospitalized since your last visit?\" No    2. \"Have you seen or consulted any other health care providers outside of the [facility] since your last visit?\" Yes Liset Faithrico Nikunj receives Remicade.     3. For patients aged 45-75: Has the patient had a colonoscopy / FIT/ Cologuard? N/A      If the patient is female:    4. For patients aged 40-74: Has the patient had a mammogram within the past 2 years? N/A      5. For patients aged 21-65: Has the patient had a pap smear? N/A     Health Maintenance Due   Topic Date Due    DTaP/Tdap/Td vaccine (1 - Tdap) Never done    Shingles vaccine (1 of 2) Never done    Respiratory Syncytial Virus (RSV) Pregnant or age 60 yrs+ (1 - 1-dose 60+ series) Never done    Pneumococcal 65+ years Vaccine (2 of 2 - PPSV23 or PCV20) 12/13/2022    COVID-19 Vaccine (3 - 2023-24 season) 09/01/2023      
(HCC)    CKD (chronic kidney disease) stage 2, GFR 60-89 ml/min    Other fatigue    Long term (current) use of unspecified immunomodulators and immunosuppressants    Doing well,continue current meds and treatments        Attending Physician: Shreyas Pond MD

## 2024-08-28 ENCOUNTER — TELEPHONE (OUTPATIENT)
Age: 84
End: 2024-08-28

## 2024-08-28 NOTE — TELEPHONE ENCOUNTER
----- Message from Reena RETANA sent at 8/28/2024  4:05 PM EDT -----  Regarding: ECC Appointment Request  ECC Appointment Request    Patient needs appointment for ECC Appointment Type: Existing Condition Follow Up.    Patient Requested Dates(s): Earlier than 09/18/2024  Patient Requested Time: Any time will do  Provider Name: Shreyas Pond MD    Reason for Appointment Request: Established Patient - Available appointments did not meet patient need. Patient wants to reschedule her appointment this coming 09/18/2024 (11:00 am) with Dr. Pond due to schedule conflict.  --------------------------------------------------------------------------------------------------------------------------    Relationship to Patient: Self     Call Back Information: OK to leave message on voicemail  Preferred Call Back Number: Phone [phone]

## 2024-09-18 ENCOUNTER — OFFICE VISIT (OUTPATIENT)
Age: 84
End: 2024-09-18
Payer: MEDICARE

## 2024-09-18 VITALS
DIASTOLIC BLOOD PRESSURE: 64 MMHG | OXYGEN SATURATION: 96 % | HEIGHT: 65 IN | TEMPERATURE: 97.9 F | WEIGHT: 155 LBS | HEART RATE: 49 BPM | BODY MASS INDEX: 25.83 KG/M2 | RESPIRATION RATE: 18 BRPM | SYSTOLIC BLOOD PRESSURE: 130 MMHG

## 2024-09-18 DIAGNOSIS — M81.0 AGE-RELATED OSTEOPOROSIS WITHOUT CURRENT PATHOLOGICAL FRACTURE: ICD-10-CM

## 2024-09-18 DIAGNOSIS — M45.8 ANKYLOSING SPONDYLITIS OF SACRAL REGION (HCC): ICD-10-CM

## 2024-09-18 DIAGNOSIS — K21.01 GASTROESOPHAGEAL REFLUX DISEASE WITH ESOPHAGITIS AND HEMORRHAGE: ICD-10-CM

## 2024-09-18 DIAGNOSIS — Z79.01 ANTICOAGULATED: ICD-10-CM

## 2024-09-18 DIAGNOSIS — M17.0 PRIMARY OSTEOARTHRITIS OF BOTH KNEES: ICD-10-CM

## 2024-09-18 DIAGNOSIS — E78.2 MIXED HYPERLIPIDEMIA: ICD-10-CM

## 2024-09-18 DIAGNOSIS — N18.2 CKD (CHRONIC KIDNEY DISEASE) STAGE 2, GFR 60-89 ML/MIN: ICD-10-CM

## 2024-09-18 DIAGNOSIS — M06.00 SERONEGATIVE RHEUMATOID ARTHRITIS (HCC): ICD-10-CM

## 2024-09-18 DIAGNOSIS — I48.0 PAROXYSMAL ATRIAL FIBRILLATION (HCC): ICD-10-CM

## 2024-09-18 DIAGNOSIS — I10 ESSENTIAL HYPERTENSION, BENIGN: Primary | ICD-10-CM

## 2024-09-18 PROCEDURE — G8427 DOCREV CUR MEDS BY ELIG CLIN: HCPCS | Performed by: FAMILY MEDICINE

## 2024-09-18 PROCEDURE — 99214 OFFICE O/P EST MOD 30 MIN: CPT | Performed by: FAMILY MEDICINE

## 2024-09-18 PROCEDURE — 3078F DIAST BP <80 MM HG: CPT | Performed by: FAMILY MEDICINE

## 2024-09-18 PROCEDURE — 1036F TOBACCO NON-USER: CPT | Performed by: FAMILY MEDICINE

## 2024-09-18 PROCEDURE — 3075F SYST BP GE 130 - 139MM HG: CPT | Performed by: FAMILY MEDICINE

## 2024-09-18 PROCEDURE — G8419 CALC BMI OUT NRM PARAM NOF/U: HCPCS | Performed by: FAMILY MEDICINE

## 2024-09-18 PROCEDURE — G8399 PT W/DXA RESULTS DOCUMENT: HCPCS | Performed by: FAMILY MEDICINE

## 2024-09-18 PROCEDURE — 1123F ACP DISCUSS/DSCN MKR DOCD: CPT | Performed by: FAMILY MEDICINE

## 2024-09-18 PROCEDURE — 1090F PRES/ABSN URINE INCON ASSESS: CPT | Performed by: FAMILY MEDICINE

## 2024-09-18 RX ORDER — SOTALOL HYDROCHLORIDE 80 MG/1
80 TABLET ORAL 2 TIMES DAILY
COMMUNITY
Start: 2024-08-26

## 2024-09-18 RX ORDER — AMLODIPINE BESYLATE 2.5 MG/1
2.5 TABLET ORAL DAILY
COMMUNITY
Start: 2024-09-13

## 2024-09-18 RX ORDER — FUROSEMIDE 20 MG
20 TABLET ORAL 2 TIMES DAILY
COMMUNITY
Start: 2024-09-03

## 2024-09-18 RX ORDER — POTASSIUM CHLORIDE 750 MG/1
10 TABLET, EXTENDED RELEASE ORAL DAILY
COMMUNITY
Start: 2024-09-03

## 2024-09-18 RX ORDER — CALCIUM CARBONATE 500 MG/1
500 TABLET, CHEWABLE ORAL DAILY
COMMUNITY

## 2024-09-18 RX ORDER — DILTIAZEM HYDROCHLORIDE 60 MG/1
60 CAPSULE, EXTENDED RELEASE ORAL 3 TIMES DAILY
COMMUNITY
Start: 2024-08-19

## 2024-09-18 RX ORDER — VALACYCLOVIR HYDROCHLORIDE 500 MG/1
500 TABLET, FILM COATED ORAL DAILY PRN
COMMUNITY
Start: 2024-07-27

## 2024-09-18 RX ORDER — TOFACITINIB 5 MG/1
TABLET, FILM COATED ORAL
COMMUNITY
End: 2024-09-18 | Stop reason: CLARIF

## 2024-09-18 RX ORDER — APIXABAN 2.5 MG/1
2.5 TABLET, FILM COATED ORAL 2 TIMES DAILY
COMMUNITY
Start: 2024-08-15

## 2024-09-18 ASSESSMENT — PATIENT HEALTH QUESTIONNAIRE - PHQ9
SUM OF ALL RESPONSES TO PHQ9 QUESTIONS 1 & 2: 0
5. POOR APPETITE OR OVEREATING: NOT AT ALL
6. FEELING BAD ABOUT YOURSELF - OR THAT YOU ARE A FAILURE OR HAVE LET YOURSELF OR YOUR FAMILY DOWN: NOT AT ALL
SUM OF ALL RESPONSES TO PHQ QUESTIONS 1-9: 3
3. TROUBLE FALLING OR STAYING ASLEEP: NOT AT ALL
SUM OF ALL RESPONSES TO PHQ QUESTIONS 1-9: 3
2. FEELING DOWN, DEPRESSED OR HOPELESS: NOT AT ALL
SUM OF ALL RESPONSES TO PHQ QUESTIONS 1-9: 3
4. FEELING TIRED OR HAVING LITTLE ENERGY: NEARLY EVERY DAY
7. TROUBLE CONCENTRATING ON THINGS, SUCH AS READING THE NEWSPAPER OR WATCHING TELEVISION: NOT AT ALL
1. LITTLE INTEREST OR PLEASURE IN DOING THINGS: NOT AT ALL
9. THOUGHTS THAT YOU WOULD BE BETTER OFF DEAD, OR OF HURTING YOURSELF: NOT AT ALL
SUM OF ALL RESPONSES TO PHQ QUESTIONS 1-9: 3
10. IF YOU CHECKED OFF ANY PROBLEMS, HOW DIFFICULT HAVE THESE PROBLEMS MADE IT FOR YOU TO DO YOUR WORK, TAKE CARE OF THINGS AT HOME, OR GET ALONG WITH OTHER PEOPLE: NOT DIFFICULT AT ALL
8. MOVING OR SPEAKING SO SLOWLY THAT OTHER PEOPLE COULD HAVE NOTICED. OR THE OPPOSITE, BEING SO FIGETY OR RESTLESS THAT YOU HAVE BEEN MOVING AROUND A LOT MORE THAN USUAL: NOT AT ALL

## 2024-09-18 ASSESSMENT — ENCOUNTER SYMPTOMS
ANAL BLEEDING: 0
SHORTNESS OF BREATH: 1
BACK PAIN: 1
APNEA: 0
ABDOMINAL PAIN: 0
BLOOD IN STOOL: 0

## 2024-10-17 NOTE — PROGRESS NOTES
The results were reviewed. Negative QTB. Yes Patient is a 63y old  Male who presents with a chief complaint of Known right pleural effusion (2017 13:43)      HPI:  40 y/o male with PMHx HTN, HLD, hypothyroidism, and right kidney AVM post IR embolization years ago at an OSH (with 3 coils per patient) who suffered from a mechanical fall in 2016. States he was walking in his house at nighttime in the dark, and didn't want to wake his wife so didn't turn the lights on.  He tripped down the stairs and broke 2 right ribs.  He was found to have a pleural effusion after the fall that was 'watched' for a while by his out-patient doctors.  He then had a thoracentesis  at [facility] on 17 which drained ~1330cc (per patient) and looked 'bloody' when he saw the container.  He was then discharged home and upon follow up was found to have recurrent pleural effusion that he was told is related to his fall.  His wife is present and tells me that he has a mild cough at times, and his voice sounds 'gurgly'.  He denies any SOB, chest pain or palpitations.  His activity is not particularly limited over the past few weeks/months, although he states his wife 'doesn't let him' go to the gym b/c of his pleural effusion.  So he has been taking it easy lately.  No symptoms upon basic walking or climbing stairs.  He came through the ED today for evaluation and possible admission for surgery.    ***Of note, he tells me that secondary to his kidney AVM, his doctor told him that he should NEVER take NSAIDs***   Also, patient states he was recently diagnosed with anoplasmosis, after finding a tick on his body from [place].  He completed a course of Doxycycline. (2017 13:43)      PAST MEDICAL & SURGICAL HISTORY:  Hip pain, chronic, right  AVM (arteriovenous malformation): in the KIDNEY  Pleural effusion  HLD (hyperlipidemia)  HTN (hypertension)  History of thoracentesis: May 2017  History of hip replacement, total, right      FAMILY HISTORY:  No pertinent family history in first degree relatives      SOCIAL HISTORY:  Smoking Status: [ ] Current, [ ] Former, [ x] Never  Pack Years:    MEDICATIONS:  Pulmonary:    Antimicrobials:    Anticoagulants:    Onc:    GI/:    Endocrine:  atorvastatin 20milliGRAM(s) Oral at bedtime  levothyroxine 200MICROGram(s) Oral daily    Cardiac:  losartan 25milliGRAM(s) Oral daily    Other Medications:  acetaminophen   Tablet. 650milliGRAM(s) Oral every 6 hours PRN  chlorhexidine 4% Liquid 1Application(s) Topical once  chlorhexidine 0.12% Liquid 10milliLiter(s) Swish and Spit once      Allergies    codeine (Rash)  sulfa drugs (Unknown)    Intolerances    NSAID (Nephrotoxicity)      Vital Signs Last 24 Hrs  T(C): 36.7, Max: 36.8 ( @ 08:55)  T(F): 98.1, Max: 98.3 ( @ 08:55)  HR: 72 (63 - 94)  BP: 147/89 (116/82 - 147/89)  BP(mean): --  RR: 17 (17 - 18)  SpO2: 97% (95% - 99%)    I & Os for current day (as of  @ 18:59)  =============================================  IN: 0 ml / OUT: 190 ml / NET: -190 ml        LABS:      CBC Full  -  ( 2017 09:21 )  WBC Count : 8.5 K/uL  Hemoglobin : 12.7 g/dL  Hematocrit : 39.0 %  Platelet Count - Automated : 344 K/uL  Mean Cell Volume : 84.1 fL  Mean Cell Hemoglobin : 27.4 pg  Mean Cell Hemoglobin Concentration : 32.6 g/dL  Auto Neutrophil # : x  Auto Lymphocyte # : x  Auto Monocyte # : x  Auto Eosinophil # : x  Auto Basophil # : x  Auto Neutrophil % : 69.1 %  Auto Lymphocyte % : 13.2 %  Auto Monocyte % : 13.9 %  Auto Eosinophil % : 3.6 %  Auto Basophil % : 0.2 %        138  |  100  |  19  ----------------------------<  93  4.1   |  26  |  1.20    Ca    9.2      2017 09:21    TPro  7.0  /  Alb  3.5  /  TBili  0.6  /  DBili  x   /  AST  22  /  ALT  16  /  AlkPhos  71  06-12    PT/INR - ( 2017 09:21 )   PT: 12.5 sec;   INR: 1.12          PTT - ( 2017 09:21 )  PTT:33.2 sec      Urinalysis Basic - ( 2017 18:06 )    Color: Yellow / Appearance: Clear / S.010 / pH: x  Gluc: x / Ketone: 15 mg/dL  / Bili: NEGATIVE / Urobili: 0.2 E.U./dL   Blood: x / Protein: NEGATIVE mg/dL / Nitrite: NEGATIVE   Leuk Esterase: NEGATIVE / RBC: < 5 /HPF / WBC < 5 /HPF   Sq Epi: x / Non Sq Epi: Rare /HPF / Bacteria: Present /HPF          EXAM:  CT CHEST                          PROCEDURE DATE:  2017                     INTERPRETATION:  CT of the CHEST without intravenous contrast dated   2017 5:29 PM    INDICATION: Pleural effusion.    TECHNIQUE: CT of the chest was performed without intravenous contrast.    Intravenous contrast was not used as requested. Axial, sagittal and   coronal images were produced and reviewed.    PRIOR STUDIES: None.    FINDINGS: The heart is normal in size.  No pericardial effusion is seen.   Evaluation of the vasculature is limited without intravenous contrast.   Aneurysmal dilatation of the aortic root is visible at 4.3 cm. The mid   ascending aorta is mildly aneurysmal at 4.1 cm. Mild atherosclerotic   calcifications are noted within the thoracic aorta.  Evaluation for   adenopathy is limited without intravenous contrast. Within that   limitation, no mediastinal or hilar lymphadenopathy is seen.    A pigtail catheter is present in the right pleural space inferiorly and   posteriorly. Minimal residual right pleural effusion is seen. Minimal air   is seen in the right pleural space.     Multiple pulmonary nodules are noted including the followin.4 cm right apical nodule on series 4 image 51, possibly a calcified   granuloma.  0.5 x 0.3 cm fusiform nodule in the medial aspect of the right apex on   series 4 image 92.  0.4 x 0.3 cm nodule in the anterior right upper lobe on series 4 image   148.  1.0 x 0.6 cm nodule in the periphery of the superior segment of the right   lower lobe, seen on series 4, image 193.  0.3 cm calcified granuloma in left upper lobe on series 4 image 166.  0.2 cm nodule in the lateral aspect of the left lower lobe on series 4   image 210.  0.5 cm subpleural nodule in the lateral basal segment of the left lower   lobe on series 4 image 244.  0.5 cm nodule in the left lower lobe on series 4 image 261.    Limited evaluation of the upper abdomen demonstrates aneurysmal   dilatation of the celiac artery, measuring 1.8 cm diameter. Transposition   of the infrarenal portion of the inferior vena cava is evident, so that   it lies to the left of the infrarenal abdominal aorta. A cluster of   calcifications is noted in hepatic segment 5, possibly due to prior   granulomatous disease. Severalcysts measuring up to 1.5 cm and   indeterminate subcentimeter hypodensities are noted in the liver. The   left kidney is not visualized in the left renal fossa. It may have been   removed, may be congenitally absent, or may be pelvic in location. Right   parapelvic renal cyst are noted.    Evaluation of the osseous structures demonstrates mild scoliosis and mild   degenerative changes.      IMPRESSION:  1.  Pigtail catheter right pleural space. Minimal right hydropneumothorax.  2.  Multiple pulmonary nodules, largest in the right lower lobe with a   mean diameter of 0.8 cm.  3.  Mild aneurysmal dilatation of aortic root and ascending aorta.  4.  Marked aneurysmal dilatation of celiac artery.  5.  Left-sided infrarenal inferior vena cava.  6. Left kidney not visualized within left renal fossa.          RADIOLOGY & ADDITIONAL STUDIES (The following images were personally reviewed):

## 2024-11-22 ENCOUNTER — OFFICE VISIT (OUTPATIENT)
Age: 84
End: 2024-11-22
Payer: MEDICARE

## 2024-11-22 VITALS
SYSTOLIC BLOOD PRESSURE: 130 MMHG | TEMPERATURE: 97.6 F | WEIGHT: 155 LBS | HEIGHT: 65 IN | RESPIRATION RATE: 16 BRPM | DIASTOLIC BLOOD PRESSURE: 59 MMHG | OXYGEN SATURATION: 96 % | HEART RATE: 51 BPM | BODY MASS INDEX: 25.83 KG/M2

## 2024-11-22 DIAGNOSIS — Z23 ENCOUNTER FOR IMMUNIZATION: ICD-10-CM

## 2024-11-22 DIAGNOSIS — I48.0 PAROXYSMAL ATRIAL FIBRILLATION (HCC): ICD-10-CM

## 2024-11-22 DIAGNOSIS — K21.01 GASTROESOPHAGEAL REFLUX DISEASE WITH ESOPHAGITIS AND HEMORRHAGE: ICD-10-CM

## 2024-11-22 DIAGNOSIS — M45.8 ANKYLOSING SPONDYLITIS OF SACRAL REGION (HCC): ICD-10-CM

## 2024-11-22 DIAGNOSIS — Z79.01 ANTICOAGULATED: ICD-10-CM

## 2024-11-22 DIAGNOSIS — I10 ESSENTIAL HYPERTENSION, BENIGN: Primary | ICD-10-CM

## 2024-11-22 DIAGNOSIS — E78.2 MIXED HYPERLIPIDEMIA: ICD-10-CM

## 2024-11-22 DIAGNOSIS — M81.0 AGE-RELATED OSTEOPOROSIS WITHOUT CURRENT PATHOLOGICAL FRACTURE: ICD-10-CM

## 2024-11-22 DIAGNOSIS — M06.00 SERONEGATIVE RHEUMATOID ARTHRITIS (HCC): ICD-10-CM

## 2024-11-22 DIAGNOSIS — N18.2 CKD (CHRONIC KIDNEY DISEASE) STAGE 2, GFR 60-89 ML/MIN: ICD-10-CM

## 2024-11-22 PROCEDURE — G8482 FLU IMMUNIZE ORDER/ADMIN: HCPCS | Performed by: FAMILY MEDICINE

## 2024-11-22 PROCEDURE — G8399 PT W/DXA RESULTS DOCUMENT: HCPCS | Performed by: FAMILY MEDICINE

## 2024-11-22 PROCEDURE — 1036F TOBACCO NON-USER: CPT | Performed by: FAMILY MEDICINE

## 2024-11-22 PROCEDURE — 1125F AMNT PAIN NOTED PAIN PRSNT: CPT | Performed by: FAMILY MEDICINE

## 2024-11-22 PROCEDURE — G8427 DOCREV CUR MEDS BY ELIG CLIN: HCPCS | Performed by: FAMILY MEDICINE

## 2024-11-22 PROCEDURE — 1090F PRES/ABSN URINE INCON ASSESS: CPT | Performed by: FAMILY MEDICINE

## 2024-11-22 PROCEDURE — G8419 CALC BMI OUT NRM PARAM NOF/U: HCPCS | Performed by: FAMILY MEDICINE

## 2024-11-22 PROCEDURE — 99213 OFFICE O/P EST LOW 20 MIN: CPT | Performed by: FAMILY MEDICINE

## 2024-11-22 PROCEDURE — 3078F DIAST BP <80 MM HG: CPT | Performed by: FAMILY MEDICINE

## 2024-11-22 PROCEDURE — 3075F SYST BP GE 130 - 139MM HG: CPT | Performed by: FAMILY MEDICINE

## 2024-11-22 PROCEDURE — 1159F MED LIST DOCD IN RCRD: CPT | Performed by: FAMILY MEDICINE

## 2024-11-22 PROCEDURE — PBSHW INFLUENZA, FLUAD TRIVALENT, (AGE 65 Y+), IM, PRESERVATIVE FREE, 0.5ML: Performed by: FAMILY MEDICINE

## 2024-11-22 PROCEDURE — 90653 IIV ADJUVANT VACCINE IM: CPT | Performed by: FAMILY MEDICINE

## 2024-11-22 PROCEDURE — 1123F ACP DISCUSS/DSCN MKR DOCD: CPT | Performed by: FAMILY MEDICINE

## 2024-11-22 ASSESSMENT — ENCOUNTER SYMPTOMS
SHORTNESS OF BREATH: 0
ABDOMINAL DISTENTION: 0
ABDOMINAL PAIN: 0
ANAL BLEEDING: 0
CHEST TIGHTNESS: 0
WHEEZING: 0

## 2024-11-22 NOTE — PROGRESS NOTES
NAME:  Lyndsey Dietrich   :   1940   MRN:   [id number]     Date/Time:  2024 5:09 PM  Subjective:f/u PAF/A/C,HBP,AS,RA,CKD.fEELING OK   Heart Problem  This is a chronic problem. The problem occurs daily. The problem has been unchanged. Associated symptoms include fatigue. Pertinent negatives include no abdominal pain, chest pain or congestion.      Breathing Problem  She complains of difficulty breathing and shortness of breath. There is no wheezing. This is a chronic problem. The problem occurs daily. The problem has been gradually improving. Pertinent negatives include no appetite change, chest pain, fever, PND, rhinorrhea or weight loss.    Fatigue  The history is provided by the Patient. This is a chronic problem. The problem occurs daily. The problem has been gradually improving.   Follow-up  The history is provided by the Patient. This is a chronic problem. The problem occurs daily  Review of Systems   Constitutional:  Positive for fatigue.   HENT:  Negative for congestion.    Respiratory:  Negative for chest tightness, shortness of breath and wheezing.    Cardiovascular:  Negative for chest pain and palpitations.   Gastrointestinal:  Negative for abdominal distention, abdominal pain and anal bleeding.   Neurological:  Negative for dizziness.   Psychiatric/Behavioral:  The patient is nervous/anxious.              Medications reviewed:  Current Outpatient Medications   Medication Sig    ELIQUIS 2.5 MG TABS tablet Take 1 tablet by mouth 2 times daily    dilTIAZem (CARDIZEM 12 HR) 60 MG extended release capsule Take 1 capsule by mouth 3 times daily    furosemide (LASIX) 20 MG tablet Take 1 tablet by mouth 2 times daily    potassium chloride (KLOR-CON) 10 MEQ extended release tablet Take 1 tablet by mouth daily    sotalol (BETAPACE) 80 MG tablet Take 20 mg by mouth 2 times daily    valACYclovir (VALTREX) 500 MG tablet Take 1 tablet by mouth daily as needed    amLODIPine (NORVASC) 2.5 MG tablet

## 2024-11-22 NOTE — PROGRESS NOTES
Chief Complaint   Patient presents with    Follow-up     BP (!) 130/59   Pulse 51   Temp 97.6 °F (36.4 °C)   Resp 16   Ht 1.651 m (5' 5\")   Wt 70.3 kg (155 lb)   SpO2 96%   BMI 25.79 kg/m²   \"Have you been to the ER, urgent care clinic since your last visit?  Hospitalized since your last visit?\"    NO    “Have you seen or consulted any other health care providers outside our system since your last visit?”    NO

## 2024-11-23 LAB
ALBUMIN SERPL-MCNC: 3.6 G/DL (ref 3.5–5)
ALBUMIN/GLOB SERPL: 1.1 (ref 1.1–2.2)
ALP SERPL-CCNC: 108 U/L (ref 45–117)
ALT SERPL-CCNC: 24 U/L (ref 12–78)
ANION GAP SERPL CALC-SCNC: 6 MMOL/L (ref 2–12)
AST SERPL-CCNC: 27 U/L (ref 15–37)
BASOPHILS # BLD: 0 K/UL (ref 0–0.1)
BASOPHILS NFR BLD: 1 % (ref 0–1)
BILIRUB SERPL-MCNC: 0.4 MG/DL (ref 0.2–1)
BUN SERPL-MCNC: 20 MG/DL (ref 6–20)
BUN/CREAT SERPL: 24 (ref 12–20)
CALCIUM SERPL-MCNC: 9.3 MG/DL (ref 8.5–10.1)
CHLORIDE SERPL-SCNC: 107 MMOL/L (ref 97–108)
CHOLEST SERPL-MCNC: 198 MG/DL
CO2 SERPL-SCNC: 29 MMOL/L (ref 21–32)
CREAT SERPL-MCNC: 0.83 MG/DL (ref 0.55–1.02)
DIFFERENTIAL METHOD BLD: NORMAL
EOSINOPHIL # BLD: 0.2 K/UL (ref 0–0.4)
EOSINOPHIL NFR BLD: 3 % (ref 0–7)
ERYTHROCYTE [DISTWIDTH] IN BLOOD BY AUTOMATED COUNT: 14.5 % (ref 11.5–14.5)
GLOBULIN SER CALC-MCNC: 3.3 G/DL (ref 2–4)
GLUCOSE SERPL-MCNC: 115 MG/DL (ref 65–100)
HCT VFR BLD AUTO: 39.5 % (ref 35–47)
HGB BLD-MCNC: 12.5 G/DL (ref 11.5–16)
IMM GRANULOCYTES # BLD AUTO: 0 K/UL (ref 0–0.04)
IMM GRANULOCYTES NFR BLD AUTO: 0 % (ref 0–0.5)
LYMPHOCYTES # BLD: 1.9 K/UL (ref 0.8–3.5)
LYMPHOCYTES NFR BLD: 30 % (ref 12–49)
MCH RBC QN AUTO: 29.7 PG (ref 26–34)
MCHC RBC AUTO-ENTMCNC: 31.6 G/DL (ref 30–36.5)
MCV RBC AUTO: 93.8 FL (ref 80–99)
MONOCYTES # BLD: 0.6 K/UL (ref 0–1)
MONOCYTES NFR BLD: 9 % (ref 5–13)
NEUTS SEG # BLD: 3.6 K/UL (ref 1.8–8)
NEUTS SEG NFR BLD: 57 % (ref 32–75)
NRBC # BLD: 0 K/UL (ref 0–0.01)
NRBC BLD-RTO: 0 PER 100 WBC
PLATELET # BLD AUTO: 287 K/UL (ref 150–400)
PMV BLD AUTO: 11.4 FL (ref 8.9–12.9)
POTASSIUM SERPL-SCNC: 4.8 MMOL/L (ref 3.5–5.1)
PROT SERPL-MCNC: 6.9 G/DL (ref 6.4–8.2)
RBC # BLD AUTO: 4.21 M/UL (ref 3.8–5.2)
SODIUM SERPL-SCNC: 142 MMOL/L (ref 136–145)
WBC # BLD AUTO: 6.4 K/UL (ref 3.6–11)

## 2024-12-19 ENCOUNTER — TELEPHONE (OUTPATIENT)
Age: 84
End: 2024-12-19

## 2024-12-19 NOTE — TELEPHONE ENCOUNTER
Spoke to Ms. Dietrich and she was scheduled for Friday, 12/20/24 at 12:20 pm with Dr. Pond and she accepted the appointment.

## 2024-12-19 NOTE — TELEPHONE ENCOUNTER
Identified patient 2 identifiers verified.  Patient  was seen in ED for abdominal pains. CT scan showed bleeding in abdomen. Patient was scheduled for 1/31/25 but was told to be seen sooner.  Patient  can be reached at [phone].

## 2024-12-20 ENCOUNTER — TELEPHONE (OUTPATIENT)
Age: 84
End: 2024-12-20

## 2024-12-20 ENCOUNTER — OFFICE VISIT (OUTPATIENT)
Age: 84
End: 2024-12-20
Payer: MEDICARE

## 2024-12-20 VITALS
HEIGHT: 65 IN | SYSTOLIC BLOOD PRESSURE: 130 MMHG | HEART RATE: 53 BPM | TEMPERATURE: 98.2 F | BODY MASS INDEX: 25.83 KG/M2 | DIASTOLIC BLOOD PRESSURE: 99 MMHG | WEIGHT: 155 LBS | RESPIRATION RATE: 18 BRPM | OXYGEN SATURATION: 96 %

## 2024-12-20 DIAGNOSIS — M45.8 ANKYLOSING SPONDYLITIS OF SACRAL REGION (HCC): ICD-10-CM

## 2024-12-20 DIAGNOSIS — Z79.01 ANTICOAGULATED: ICD-10-CM

## 2024-12-20 DIAGNOSIS — R31.29 MICROHEMATURIA: ICD-10-CM

## 2024-12-20 DIAGNOSIS — M06.00 SERONEGATIVE RHEUMATOID ARTHRITIS (HCC): ICD-10-CM

## 2024-12-20 DIAGNOSIS — I10 ESSENTIAL HYPERTENSION, BENIGN: ICD-10-CM

## 2024-12-20 DIAGNOSIS — R10.31 RLQ ABDOMINAL PAIN: Primary | ICD-10-CM

## 2024-12-20 LAB
BILIRUBIN, URINE, POC: NEGATIVE
BLOOD URINE, POC: NEGATIVE
GLUCOSE URINE, POC: NEGATIVE
KETONES, URINE, POC: NORMAL
LEUKOCYTE ESTERASE, URINE, POC: NEGATIVE
NITRITE, URINE, POC: NEGATIVE
PH, URINE, POC: 5.5 (ref 4.6–8)
PROTEIN,URINE, POC: NEGATIVE
SPECIFIC GRAVITY, URINE, POC: 1.01 (ref 1–1.03)
URINALYSIS CLARITY, POC: CLEAR
URINALYSIS COLOR, POC: YELLOW
UROBILINOGEN, POC: NORMAL

## 2024-12-20 PROCEDURE — 99213 OFFICE O/P EST LOW 20 MIN: CPT | Performed by: FAMILY MEDICINE

## 2024-12-20 PROCEDURE — G8399 PT W/DXA RESULTS DOCUMENT: HCPCS | Performed by: FAMILY MEDICINE

## 2024-12-20 PROCEDURE — 1036F TOBACCO NON-USER: CPT | Performed by: FAMILY MEDICINE

## 2024-12-20 PROCEDURE — PBSHW AMB POC URINALYSIS DIP STICK AUTO W/O MICRO: Performed by: FAMILY MEDICINE

## 2024-12-20 PROCEDURE — 3080F DIAST BP >= 90 MM HG: CPT | Performed by: FAMILY MEDICINE

## 2024-12-20 PROCEDURE — 1123F ACP DISCUSS/DSCN MKR DOCD: CPT | Performed by: FAMILY MEDICINE

## 2024-12-20 PROCEDURE — 1090F PRES/ABSN URINE INCON ASSESS: CPT | Performed by: FAMILY MEDICINE

## 2024-12-20 PROCEDURE — 81003 URINALYSIS AUTO W/O SCOPE: CPT | Performed by: FAMILY MEDICINE

## 2024-12-20 PROCEDURE — 3075F SYST BP GE 130 - 139MM HG: CPT | Performed by: FAMILY MEDICINE

## 2024-12-20 PROCEDURE — G8427 DOCREV CUR MEDS BY ELIG CLIN: HCPCS | Performed by: FAMILY MEDICINE

## 2024-12-20 PROCEDURE — 1125F AMNT PAIN NOTED PAIN PRSNT: CPT | Performed by: FAMILY MEDICINE

## 2024-12-20 PROCEDURE — 1159F MED LIST DOCD IN RCRD: CPT | Performed by: FAMILY MEDICINE

## 2024-12-20 PROCEDURE — G8482 FLU IMMUNIZE ORDER/ADMIN: HCPCS | Performed by: FAMILY MEDICINE

## 2024-12-20 PROCEDURE — G8419 CALC BMI OUT NRM PARAM NOF/U: HCPCS | Performed by: FAMILY MEDICINE

## 2024-12-20 RX ORDER — MAGNESIUM OXIDE 400 MG/1
TABLET ORAL
COMMUNITY

## 2024-12-20 RX ORDER — METRONIDAZOLE 250 MG/1
250 TABLET ORAL 3 TIMES DAILY
Qty: 21 TABLET | Refills: 0 | Status: SHIPPED | OUTPATIENT
Start: 2024-12-20 | End: 2024-12-27

## 2024-12-20 RX ORDER — NITROFURANTOIN 25; 75 MG/1; MG/1
CAPSULE ORAL
COMMUNITY
Start: 2024-12-18 | End: 2024-12-20

## 2024-12-20 RX ORDER — FLUTICASONE FUROATE, UMECLIDINIUM BROMIDE AND VILANTEROL TRIFENATATE 200; 62.5; 25 UG/1; UG/1; UG/1
1 POWDER RESPIRATORY (INHALATION)
COMMUNITY

## 2024-12-20 RX ORDER — CIPROFLOXACIN 500 MG/1
500 TABLET, FILM COATED ORAL 2 TIMES DAILY
Qty: 14 TABLET | Refills: 0 | Status: SHIPPED | OUTPATIENT
Start: 2024-12-20 | End: 2024-12-27

## 2024-12-20 RX ORDER — PREDNISONE 10 MG/1
TABLET ORAL
COMMUNITY
End: 2024-12-20 | Stop reason: ALTCHOICE

## 2024-12-20 ASSESSMENT — PATIENT HEALTH QUESTIONNAIRE - PHQ9
SUM OF ALL RESPONSES TO PHQ QUESTIONS 1-9: 3
9. THOUGHTS THAT YOU WOULD BE BETTER OFF DEAD, OR OF HURTING YOURSELF: NOT AT ALL
10. IF YOU CHECKED OFF ANY PROBLEMS, HOW DIFFICULT HAVE THESE PROBLEMS MADE IT FOR YOU TO DO YOUR WORK, TAKE CARE OF THINGS AT HOME, OR GET ALONG WITH OTHER PEOPLE: NOT DIFFICULT AT ALL
3. TROUBLE FALLING OR STAYING ASLEEP: NOT AT ALL
SUM OF ALL RESPONSES TO PHQ QUESTIONS 1-9: 3
5. POOR APPETITE OR OVEREATING: NOT AT ALL
1. LITTLE INTEREST OR PLEASURE IN DOING THINGS: NOT AT ALL
7. TROUBLE CONCENTRATING ON THINGS, SUCH AS READING THE NEWSPAPER OR WATCHING TELEVISION: NOT AT ALL
8. MOVING OR SPEAKING SO SLOWLY THAT OTHER PEOPLE COULD HAVE NOTICED. OR THE OPPOSITE, BEING SO FIGETY OR RESTLESS THAT YOU HAVE BEEN MOVING AROUND A LOT MORE THAN USUAL: NOT AT ALL
6. FEELING BAD ABOUT YOURSELF - OR THAT YOU ARE A FAILURE OR HAVE LET YOURSELF OR YOUR FAMILY DOWN: NOT AT ALL
SUM OF ALL RESPONSES TO PHQ QUESTIONS 1-9: 3
SUM OF ALL RESPONSES TO PHQ QUESTIONS 1-9: 3
4. FEELING TIRED OR HAVING LITTLE ENERGY: NEARLY EVERY DAY
SUM OF ALL RESPONSES TO PHQ9 QUESTIONS 1 & 2: 0
2. FEELING DOWN, DEPRESSED OR HOPELESS: NOT AT ALL

## 2024-12-20 ASSESSMENT — ENCOUNTER SYMPTOMS
BLOOD IN STOOL: 0
ABDOMINAL DISTENTION: 0
ABDOMINAL PAIN: 1
CONSTIPATION: 0
ANAL BLEEDING: 0

## 2024-12-20 NOTE — PROGRESS NOTES
NAME:  Lyndsey Dietrich   :   1940   MRN:   [id number]     Date/Time:  2024 6:20 AM  Subjective: f/u ER visit for rlq pain,hematuria.Treated with macrobid with slow impr   Abdominal Pain  This is a new problem. The current episode started in the past 7 days. The problem occurs daily. The problem has been gradually improving. The pain is located in the RLQ. The pain is at a severity of 3/10. The pain is mild. The quality of the pain is colicky. Pertinent negatives include no arthralgias, constipation, dysuria or frequency.      Heart Problem  This is a chronic problem. The problem occurs daily. The problem has been unchanged. Associated symptoms include fatigue. Pertinent negatives include no abdominal pain, chest pain or congestion.      Breathing Problem  She complains of difficulty breathing and shortness of breath. There is no wheezing. This is a chronic problem. The problem occurs daily. The problem has been gradually improving. Pertinent negatives include no appetite change, chest pain, fever, PND, rhinorrhea or weight loss.    Review of Systems   HENT:  Negative for congestion.    Gastrointestinal:  Positive for abdominal pain. Negative for abdominal distention, anal bleeding, blood in stool and constipation.   Genitourinary:  Negative for difficulty urinating, dysuria, flank pain and frequency.   Musculoskeletal:  Negative for arthralgias.             Medications reviewed:  Current Outpatient Medications   Medication Sig    ELIQUIS 2.5 MG TABS tablet Take 1 tablet by mouth 2 times daily    dilTIAZem (CARDIZEM 12 HR) 60 MG extended release capsule Take 1 capsule by mouth 3 times daily    furosemide (LASIX) 20 MG tablet Take 1 tablet by mouth 2 times daily    potassium chloride (KLOR-CON) 10 MEQ extended release tablet Take 1 tablet by mouth daily    sotalol (BETAPACE) 80 MG tablet Take 20 mg by mouth 2 times daily    valACYclovir (VALTREX) 500 MG tablet Take 1 tablet by mouth daily as

## 2024-12-20 NOTE — TELEPHONE ENCOUNTER
[facility] to discuss Cipro interacting with Sotalol 80 mg can result in life threatening arrhythmia. [phone] can speak with Trudi hernandez pharmacist.

## 2024-12-20 NOTE — PROGRESS NOTES
Chief Complaint   Patient presents with    Follow-Up from Hospital     Patient is here today for an ER follow up.      \"Have you been to the ER, urgent care clinic since your last visit?  Hospitalized since your last visit?\"    Rhonda Calvin 12/18/24 for abdominal pain    “Have you seen or consulted any other health care providers outside of [facility] since your last visit?”    NO            Click Here for Release of Records Request

## 2025-01-03 ENCOUNTER — OFFICE VISIT (OUTPATIENT)
Age: 85
End: 2025-01-03
Payer: MEDICARE

## 2025-01-03 VITALS
HEIGHT: 65 IN | SYSTOLIC BLOOD PRESSURE: 139 MMHG | RESPIRATION RATE: 18 BRPM | DIASTOLIC BLOOD PRESSURE: 67 MMHG | OXYGEN SATURATION: 95 % | WEIGHT: 153 LBS | TEMPERATURE: 98.7 F | BODY MASS INDEX: 25.49 KG/M2 | HEART RATE: 53 BPM

## 2025-01-03 DIAGNOSIS — I10 ESSENTIAL HYPERTENSION, BENIGN: ICD-10-CM

## 2025-01-03 DIAGNOSIS — R10.31 RLQ ABDOMINAL PAIN: ICD-10-CM

## 2025-01-03 DIAGNOSIS — R31.29 MICROHEMATURIA: ICD-10-CM

## 2025-01-03 DIAGNOSIS — R10.31 RLQ ABDOMINAL PAIN: Primary | ICD-10-CM

## 2025-01-03 DIAGNOSIS — M45.8 ANKYLOSING SPONDYLITIS OF SACRAL REGION (HCC): ICD-10-CM

## 2025-01-03 LAB
ALBUMIN SERPL-MCNC: 3.6 G/DL (ref 3.5–5)
ALBUMIN/GLOB SERPL: 1.1 (ref 1.1–2.2)
ALP SERPL-CCNC: 81 U/L (ref 45–117)
ALT SERPL-CCNC: 28 U/L (ref 12–78)
ANION GAP SERPL CALC-SCNC: 3 MMOL/L (ref 2–12)
AST SERPL-CCNC: 30 U/L (ref 15–37)
BASOPHILS # BLD: 0.1 K/UL (ref 0–0.1)
BASOPHILS NFR BLD: 1 % (ref 0–1)
BILIRUB SERPL-MCNC: 0.4 MG/DL (ref 0.2–1)
BILIRUBIN, URINE, POC: NEGATIVE
BLOOD URINE, POC: NORMAL
BUN SERPL-MCNC: 23 MG/DL (ref 6–20)
BUN/CREAT SERPL: 27 (ref 12–20)
CALCIUM SERPL-MCNC: 9 MG/DL (ref 8.5–10.1)
CHLORIDE SERPL-SCNC: 106 MMOL/L (ref 97–108)
CO2 SERPL-SCNC: 29 MMOL/L (ref 21–32)
CREAT SERPL-MCNC: 0.86 MG/DL (ref 0.55–1.02)
DIFFERENTIAL METHOD BLD: ABNORMAL
EOSINOPHIL # BLD: 0.2 K/UL (ref 0–0.4)
EOSINOPHIL NFR BLD: 2 % (ref 0–7)
ERYTHROCYTE [DISTWIDTH] IN BLOOD BY AUTOMATED COUNT: 14.6 % (ref 11.5–14.5)
GLOBULIN SER CALC-MCNC: 3.4 G/DL (ref 2–4)
GLUCOSE SERPL-MCNC: 95 MG/DL (ref 65–100)
GLUCOSE URINE, POC: NEGATIVE
HCT VFR BLD AUTO: 41.8 % (ref 35–47)
HGB BLD-MCNC: 12.9 G/DL (ref 11.5–16)
IMM GRANULOCYTES # BLD AUTO: 0 K/UL (ref 0–0.04)
IMM GRANULOCYTES NFR BLD AUTO: 0 % (ref 0–0.5)
KETONES, URINE, POC: NEGATIVE
LEUKOCYTE ESTERASE, URINE, POC: NORMAL
LYMPHOCYTES # BLD: 1.8 K/UL (ref 0.8–3.5)
LYMPHOCYTES NFR BLD: 26 % (ref 12–49)
MCH RBC QN AUTO: 29.5 PG (ref 26–34)
MCHC RBC AUTO-ENTMCNC: 30.9 G/DL (ref 30–36.5)
MCV RBC AUTO: 95.7 FL (ref 80–99)
MONOCYTES # BLD: 0.7 K/UL (ref 0–1)
MONOCYTES NFR BLD: 11 % (ref 5–13)
NEUTS SEG # BLD: 4 K/UL (ref 1.8–8)
NEUTS SEG NFR BLD: 60 % (ref 32–75)
NITRITE, URINE, POC: NEGATIVE
NRBC # BLD: 0 K/UL (ref 0–0.01)
NRBC BLD-RTO: 0 PER 100 WBC
PH, URINE, POC: 5 (ref 4.6–8)
PLATELET # BLD AUTO: 306 K/UL (ref 150–400)
PMV BLD AUTO: 10.6 FL (ref 8.9–12.9)
POTASSIUM SERPL-SCNC: 4.6 MMOL/L (ref 3.5–5.1)
PROT SERPL-MCNC: 7 G/DL (ref 6.4–8.2)
PROTEIN,URINE, POC: NEGATIVE
RBC # BLD AUTO: 4.37 M/UL (ref 3.8–5.2)
SODIUM SERPL-SCNC: 138 MMOL/L (ref 136–145)
SPECIFIC GRAVITY, URINE, POC: 1.02 (ref 1–1.03)
URINALYSIS CLARITY, POC: CLEAR
URINALYSIS COLOR, POC: YELLOW
UROBILINOGEN, POC: NORMAL MG/DL
WBC # BLD AUTO: 6.8 K/UL (ref 3.6–11)

## 2025-01-03 PROCEDURE — 99213 OFFICE O/P EST LOW 20 MIN: CPT | Performed by: FAMILY MEDICINE

## 2025-01-03 PROCEDURE — 81002 URINALYSIS NONAUTO W/O SCOPE: CPT | Performed by: FAMILY MEDICINE

## 2025-01-03 ASSESSMENT — PATIENT HEALTH QUESTIONNAIRE - PHQ9
5. POOR APPETITE OR OVEREATING: NOT AT ALL
SUM OF ALL RESPONSES TO PHQ QUESTIONS 1-9: 3
SUM OF ALL RESPONSES TO PHQ QUESTIONS 1-9: 3
6. FEELING BAD ABOUT YOURSELF - OR THAT YOU ARE A FAILURE OR HAVE LET YOURSELF OR YOUR FAMILY DOWN: NOT AT ALL
4. FEELING TIRED OR HAVING LITTLE ENERGY: NEARLY EVERY DAY
3. TROUBLE FALLING OR STAYING ASLEEP: NOT AT ALL
SUM OF ALL RESPONSES TO PHQ QUESTIONS 1-9: 3
9. THOUGHTS THAT YOU WOULD BE BETTER OFF DEAD, OR OF HURTING YOURSELF: NOT AT ALL
SUM OF ALL RESPONSES TO PHQ9 QUESTIONS 1 & 2: 0
2. FEELING DOWN, DEPRESSED OR HOPELESS: NOT AT ALL
8. MOVING OR SPEAKING SO SLOWLY THAT OTHER PEOPLE COULD HAVE NOTICED. OR THE OPPOSITE, BEING SO FIGETY OR RESTLESS THAT YOU HAVE BEEN MOVING AROUND A LOT MORE THAN USUAL: NOT AT ALL
1. LITTLE INTEREST OR PLEASURE IN DOING THINGS: NOT AT ALL
SUM OF ALL RESPONSES TO PHQ QUESTIONS 1-9: 3
7. TROUBLE CONCENTRATING ON THINGS, SUCH AS READING THE NEWSPAPER OR WATCHING TELEVISION: NOT AT ALL
10. IF YOU CHECKED OFF ANY PROBLEMS, HOW DIFFICULT HAVE THESE PROBLEMS MADE IT FOR YOU TO DO YOUR WORK, TAKE CARE OF THINGS AT HOME, OR GET ALONG WITH OTHER PEOPLE: NOT DIFFICULT AT ALL

## 2025-01-03 ASSESSMENT — ENCOUNTER SYMPTOMS
ABDOMINAL PAIN: 1
CHEST TIGHTNESS: 0

## 2025-01-03 NOTE — PROGRESS NOTES
Chief Complaint   Patient presents with    Follow-up     Patient is here today for a 2 week follow up.      \"Have you been to the ER, urgent care clinic since your last visit?  Hospitalized since your last visit?\"    NO    “Have you seen or consulted any other health care providers outside of [facility] since your last visit?”    NO            Click Here for Release of Records Request    
MG tablet Take 1 tablet by mouth daily as needed    amLODIPine (NORVASC) 2.5 MG tablet Take 1 tablet by mouth daily    vitamin D (ERGOCALCIFEROL) 1.25 MG (08991 UT) CAPS capsule TAKE 1 CAPSULE BY MOUTH EVERY 2 WEEKS    fluticasone-salmeterol (ADVAIR) 500-50 MCG/ACT AEPB diskus inhaler Inhale 1 puff into the lungs 2 times daily    inFLIXimab (REMICADE) 100 MG injection Infuse 5 mg/kg intravenously See Admin Instructions    albuterol sulfate HFA (PROVENTIL;VENTOLIN;PROAIR) 108 (90 Base) MCG/ACT inhaler Inhale 1 puff into the lungs every 6 hours as needed    Calcium Carb-Cholecalciferol 600-10 MG-MCG CAPS Take 1 capsule by mouth 2 times daily    denosumab (PROLIA) 60 MG/ML SOSY SC injection Inject 1 mL into the skin every 6 months    dextromethorphan-guaiFENesin (MUCINEX DM)  MG per extended release tablet Take 1 tablet by mouth 2 times daily as needed for Cough    diclofenac sodium (VOLTAREN) 1 % GEL Apply 2 g topically 4 times daily as needed for Pain    levothyroxine (SYNTHROID) 50 MCG tablet Take 1 tablet by mouth every morning (before breakfast)     No current facility-administered medications for this visit.        Objective:   Vitals:  /67 (Site: Left Upper Arm, Position: Sitting, Cuff Size: Large Adult)   Pulse 53   Temp 98.7 °F (37.1 °C) (Infrared)   Resp 18   Ht 1.651 m (5' 5\")   Wt 69.4 kg (153 lb)   SpO2 95%   BMI 25.46 kg/m²        PHYSICAL EXAM:  General:     Alert, cooperative, no distress, appears stated age.     Head:    Normocephalic, without obvious abnormality, atraumatic.  Ears:   External canals WNL TMs WNL   Eyes:    Conjunctivae/corneas clear.  PERRLA  Nose:   Nares normal. No drainage or sinus tenderness.  Throat:     Lips, mucosa, and tongue normal.  No Thrush  Neck:   Supple, symmetrical,  no adenopathy, thyroid: non tender     no carotid bruit and no JVD.  Back:     Symmetric,  No CVA tenderness.  Lungs:    Clear to auscultation bilaterally.  No Wheezing or Rhonchi. No

## 2025-02-21 RX ORDER — ERGOCALCIFEROL 1.25 MG/1
CAPSULE, LIQUID FILLED ORAL
Qty: 6 CAPSULE | Refills: 0 | Status: SHIPPED | OUTPATIENT
Start: 2025-02-21

## 2025-02-21 NOTE — TELEPHONE ENCOUNTER
Last appointment: 1/3/25  Next appointment: 4/4/25  Previous refill encounter(s): 2/20/24    Requested Prescriptions     Pending Prescriptions Disp Refills    vitamin D (ERGOCALCIFEROL) 1.25 MG (80880 UT) CAPS capsule [Pharmacy Med Name: VIT D2 (ERGOCAL) 1.25MG(50,000U) CP] 6 capsule 3     Sig: TAKE 1 CAPSULE BY MOUTH EVERY 2 WEEKS         For Pharmacy Admin Tracking Only    Program: Medication Refill  CPA in place:    Recommendation Provided To:   Intervention Detail: New Rx: 1, reason: Patient Preference  Intervention Accepted By:   Gap Closed?:    Time Spent (min): 5

## 2025-04-03 PROBLEM — G95.19 OTHER VASCULAR MYELOPATHIES: Status: ACTIVE | Noted: 2025-04-03

## 2025-04-03 PROBLEM — I48.0 PAROXYSMAL ATRIAL FIBRILLATION (HCC): Status: ACTIVE | Noted: 2025-04-03

## 2025-04-04 ENCOUNTER — OFFICE VISIT (OUTPATIENT)
Age: 85
End: 2025-04-04
Payer: MEDICARE

## 2025-04-04 VITALS
HEIGHT: 65 IN | HEART RATE: 60 BPM | DIASTOLIC BLOOD PRESSURE: 65 MMHG | BODY MASS INDEX: 26.42 KG/M2 | OXYGEN SATURATION: 97 % | WEIGHT: 158.6 LBS | RESPIRATION RATE: 18 BRPM | TEMPERATURE: 97.9 F | SYSTOLIC BLOOD PRESSURE: 141 MMHG

## 2025-04-04 DIAGNOSIS — Z00.00 MEDICARE ANNUAL WELLNESS VISIT, SUBSEQUENT: Primary | ICD-10-CM

## 2025-04-04 DIAGNOSIS — Z79.60 LONG TERM (CURRENT) USE OF UNSPECIFIED IMMUNOMODULATORS AND IMMUNOSUPPRESSANTS: ICD-10-CM

## 2025-04-04 DIAGNOSIS — I48.0 PAROXYSMAL ATRIAL FIBRILLATION (HCC): ICD-10-CM

## 2025-04-04 DIAGNOSIS — M06.00 SERONEGATIVE RHEUMATOID ARTHRITIS (HCC): ICD-10-CM

## 2025-04-04 DIAGNOSIS — Z79.01 ANTICOAGULATED: ICD-10-CM

## 2025-04-04 DIAGNOSIS — I10 ESSENTIAL HYPERTENSION, BENIGN: ICD-10-CM

## 2025-04-04 DIAGNOSIS — N18.2 CKD (CHRONIC KIDNEY DISEASE) STAGE 2, GFR 60-89 ML/MIN: ICD-10-CM

## 2025-04-04 DIAGNOSIS — M45.8 ANKYLOSING SPONDYLITIS OF SACRAL REGION (HCC): ICD-10-CM

## 2025-04-04 PROCEDURE — 99213 OFFICE O/P EST LOW 20 MIN: CPT | Performed by: FAMILY MEDICINE

## 2025-04-04 RX ORDER — CLOTRIMAZOLE 1 %
CREAM (GRAM) TOPICAL 2 TIMES DAILY
COMMUNITY

## 2025-04-04 RX ORDER — AMLODIPINE BESYLATE 5 MG/1
TABLET ORAL
COMMUNITY
Start: 2025-03-14

## 2025-04-04 SDOH — ECONOMIC STABILITY: FOOD INSECURITY: WITHIN THE PAST 12 MONTHS, YOU WORRIED THAT YOUR FOOD WOULD RUN OUT BEFORE YOU GOT MONEY TO BUY MORE.: NEVER TRUE

## 2025-04-04 SDOH — ECONOMIC STABILITY: FOOD INSECURITY: WITHIN THE PAST 12 MONTHS, THE FOOD YOU BOUGHT JUST DIDN'T LAST AND YOU DIDN'T HAVE MONEY TO GET MORE.: NEVER TRUE

## 2025-04-04 ASSESSMENT — PATIENT HEALTH QUESTIONNAIRE - PHQ9
SUM OF ALL RESPONSES TO PHQ QUESTIONS 1-9: 5
2. FEELING DOWN, DEPRESSED OR HOPELESS: NOT AT ALL
10. IF YOU CHECKED OFF ANY PROBLEMS, HOW DIFFICULT HAVE THESE PROBLEMS MADE IT FOR YOU TO DO YOUR WORK, TAKE CARE OF THINGS AT HOME, OR GET ALONG WITH OTHER PEOPLE: NOT DIFFICULT AT ALL
8. MOVING OR SPEAKING SO SLOWLY THAT OTHER PEOPLE COULD HAVE NOTICED. OR THE OPPOSITE, BEING SO FIGETY OR RESTLESS THAT YOU HAVE BEEN MOVING AROUND A LOT MORE THAN USUAL: NOT AT ALL
SUM OF ALL RESPONSES TO PHQ QUESTIONS 1-9: 5
7. TROUBLE CONCENTRATING ON THINGS, SUCH AS READING THE NEWSPAPER OR WATCHING TELEVISION: NOT AT ALL
1. LITTLE INTEREST OR PLEASURE IN DOING THINGS: MORE THAN HALF THE DAYS
9. THOUGHTS THAT YOU WOULD BE BETTER OFF DEAD, OR OF HURTING YOURSELF: NOT AT ALL
SUM OF ALL RESPONSES TO PHQ QUESTIONS 1-9: 5
5. POOR APPETITE OR OVEREATING: NOT AT ALL
4. FEELING TIRED OR HAVING LITTLE ENERGY: NEARLY EVERY DAY
3. TROUBLE FALLING OR STAYING ASLEEP: NOT AT ALL
6. FEELING BAD ABOUT YOURSELF - OR THAT YOU ARE A FAILURE OR HAVE LET YOURSELF OR YOUR FAMILY DOWN: NOT AT ALL
SUM OF ALL RESPONSES TO PHQ QUESTIONS 1-9: 5

## 2025-04-04 ASSESSMENT — ENCOUNTER SYMPTOMS
SHORTNESS OF BREATH: 1
WHEEZING: 0
ABDOMINAL PAIN: 0
BACK PAIN: 1
STRIDOR: 1
CHEST TIGHTNESS: 0

## 2025-04-04 ASSESSMENT — LIFESTYLE VARIABLES
HOW MANY STANDARD DRINKS CONTAINING ALCOHOL DO YOU HAVE ON A TYPICAL DAY: PATIENT DOES NOT DRINK
HOW OFTEN DO YOU HAVE A DRINK CONTAINING ALCOHOL: NEVER

## 2025-04-04 NOTE — PROGRESS NOTES
NAME:  Lyndsey Dietrich   :   1940   MRN:   [id number]     Date/Time:  4/3/2025 8:51 PM  Subjective:for MWV,f/u PAFib,A/C,AS,RA,HBP,Moderate Asthma.Feeling pretty well.No longer on Sotalol   Joint Pain   The pain is present in the left hand, left fingers, right hand, right fingers, right wrist, left wrist, right shoulder, left shoulder, back and neck. This is a chronic problem. There has been no history of extremity trauma. The problem occurs daily. The problem has been gradually improving. The pain is at a severity of 4/10. The pain is moderate. Associated symptoms include a limited range of motion. The symptoms are aggravated by activity.      Abdominal Pain  This is a new problem. The current episode started in the past 7 days. The problem occurs daily. The problem has been gradually improving. The pain is located in the RLQ. The pain is at a severity of 3/10. The pain is mild. The quality of the pain is colicky. Pertinent negatives include no arthralgias, constipation, dysuria or frequency.      Heart Problem  This is a chronic problem. The problem occurs daily. The problem has been unchanged. Associated symptoms include fatigue. Pertinent negatives include no abdominal pain, chest pain or congestion.      Breathing Problem  She complains of difficulty breathing and shortness of breath. There is no wheezing. This is a chronic problem. The problem occurs daily. The problem has been gradually improving. Pertinent negatives include no appetite change, chest pain, fever, PND, rhinorrhea or weight loss.      Review of Systems   Constitutional:  Positive for fatigue.   HENT:  Negative for congestion.    Respiratory:  Positive for shortness of breath and stridor. Negative for chest tightness and wheezing.    Cardiovascular:  Positive for palpitations. Negative for chest pain and leg swelling.   Gastrointestinal:  Negative for abdominal pain.   Musculoskeletal:  Positive for arthralgias, back pain, gait

## 2025-04-04 NOTE — PROGRESS NOTES
Chief Complaint   Patient presents with    Medicare AWV     Patient is here today for her medicare annual wellness exam and 3 month follow up.      \"Have you been to the ER, urgent care clinic since your last visit?  Hospitalized since your last visit?\"    NO    “Have you seen or consulted any other health care providers outside of [facility] since your last visit?”    Pulmonology, Cardiology            Click Here for Release of Records Request

## 2025-04-07 RX ORDER — CLOTRIMAZOLE 1 %
CREAM (GRAM) TOPICAL 2 TIMES DAILY
Qty: 28 G | Refills: 1 | Status: SHIPPED | OUTPATIENT
Start: 2025-04-07

## 2025-04-07 NOTE — TELEPHONE ENCOUNTER
Last appointment: 4/4/25  Next appointment: 7/11/25  Previous refill encounter(s): 8/23/21    Requested Prescriptions     Pending Prescriptions Disp Refills    clotrimazole (LOTRIMIN) 1 % cream 28 g 1     Sig: Apply topically 2 times daily Apply topically 2 times daily.         For Pharmacy Admin Tracking Only    Program: Medication Refill  CPA in place:    Recommendation Provided To:   Intervention Detail: New Rx: 1, reason: Patient Preference  Intervention Accepted By:   Gap Closed?:    Time Spent (min): 5

## 2025-06-04 RX ORDER — ERGOCALCIFEROL 1.25 MG/1
CAPSULE, LIQUID FILLED ORAL
Qty: 6 CAPSULE | Refills: 3 | Status: SHIPPED | OUTPATIENT
Start: 2025-06-04

## 2025-06-04 NOTE — TELEPHONE ENCOUNTER
Last appointment: 4/4/25  Next appointment: 7/11/25  Previous refill encounter(s): 2/21/25 #6    Requested Prescriptions     Pending Prescriptions Disp Refills    vitamin D (ERGOCALCIFEROL) 1.25 MG (31732 UT) CAPS capsule [Pharmacy Med Name: VIT D2 (ERGOCAL) 1.25MG(50,000U) CP] 6 capsule 3     Sig: TAKE 1 CAPSULE BY MOUTH EVERY 2 WEEKS         For Pharmacy Admin Tracking Only    Program: Medication Refill  CPA in place:    Recommendation Provided To:   Intervention Detail: New Rx: 1, reason: Patient Preference  Intervention Accepted By:   Gap Closed?:    Time Spent (min): 5

## 2025-07-10 PROBLEM — G95.19 OTHER VASCULAR MYELOPATHIES (HCC): Status: RESOLVED | Noted: 2025-04-03 | Resolved: 2025-07-10

## 2025-07-10 NOTE — PROGRESS NOTES
NAME:  Lyndsey Dietrich   :   1940   MRN:   [id number]     Date/Time:  7/10/2025 6:06 AM  Subjective:f/u RA on DMARDs,PAF,A/C,HBP,Chol,COPD.Feeling well   HPI   Joint Pain   The pain is present in the left hand, left fingers, right hand, right fingers, right wrist, left wrist, right shoulder, left shoulder, back and neck. This is a chronic problem. There has been no history of extremity trauma. The problem occurs daily. The problem has been gradually improving. The pain is at a severity of 4/10. The pain is moderate. Associated symptoms include a limited range of motion. The symptoms are aggravated by activity.    Heart Problem  This is a chronic problem. The problem occurs daily. The problem has been unchanged. Associated symptoms include fatigue. Pertinent negatives include no abdominal pain, chest pain or congestion.      Breathing Problem  She complains of difficulty breathing and shortness of breath. There is no wheezing. This is a chronic problem. The problem occurs daily. The problem has been gradually improving. Pertinent negatives include no appetite change, chest pain, fever, PND, rhinorrhea or weight loss.   Review of Systems   Constitutional:  Negative for activity change.   HENT:  Negative for congestion.    Respiratory:  Negative for chest tightness, shortness of breath, wheezing and stridor.    Cardiovascular:  Positive for leg swelling. Negative for palpitations.   Gastrointestinal:  Negative for abdominal distention, abdominal pain, anal bleeding and blood in stool.   Genitourinary:  Positive for frequency.   Musculoskeletal:  Positive for arthralgias, back pain and gait problem.   Neurological:  Negative for dizziness.   Psychiatric/Behavioral:  Negative for agitation.              Medications reviewed:  Current Outpatient Medications   Medication Sig    vitamin D (ERGOCALCIFEROL) 1.25 MG (39695 UT) CAPS capsule TAKE 1 CAPSULE BY MOUTH EVERY 2 WEEKS    clotrimazole (LOTRIMIN) 1 %

## 2025-07-14 ENCOUNTER — OFFICE VISIT (OUTPATIENT)
Age: 85
End: 2025-07-14
Payer: MEDICARE

## 2025-07-14 VITALS
DIASTOLIC BLOOD PRESSURE: 79 MMHG | TEMPERATURE: 98 F | SYSTOLIC BLOOD PRESSURE: 126 MMHG | HEART RATE: 94 BPM | RESPIRATION RATE: 18 BRPM | OXYGEN SATURATION: 99 % | BODY MASS INDEX: 26.02 KG/M2 | WEIGHT: 156.2 LBS | HEIGHT: 65 IN

## 2025-07-14 DIAGNOSIS — I48.0 PAROXYSMAL ATRIAL FIBRILLATION (HCC): ICD-10-CM

## 2025-07-14 DIAGNOSIS — Z79.60 LONG TERM (CURRENT) USE OF UNSPECIFIED IMMUNOMODULATORS AND IMMUNOSUPPRESSANTS: ICD-10-CM

## 2025-07-14 DIAGNOSIS — N18.2 CKD (CHRONIC KIDNEY DISEASE) STAGE 2, GFR 60-89 ML/MIN: ICD-10-CM

## 2025-07-14 DIAGNOSIS — K21.01 GASTROESOPHAGEAL REFLUX DISEASE WITH ESOPHAGITIS AND HEMORRHAGE: ICD-10-CM

## 2025-07-14 DIAGNOSIS — M45.8 ANKYLOSING SPONDYLITIS OF SACRAL REGION (HCC): ICD-10-CM

## 2025-07-14 DIAGNOSIS — E78.2 MIXED HYPERLIPIDEMIA: ICD-10-CM

## 2025-07-14 DIAGNOSIS — I10 ESSENTIAL HYPERTENSION, BENIGN: Primary | ICD-10-CM

## 2025-07-14 DIAGNOSIS — M06.00 SERONEGATIVE RHEUMATOID ARTHRITIS (HCC): ICD-10-CM

## 2025-07-14 DIAGNOSIS — Z79.01 ANTICOAGULATED: ICD-10-CM

## 2025-07-14 LAB
CHOLEST SERPL-MCNC: 192 MG/DL
HDLC SERPL-MCNC: 79 MG/DL
HDLC SERPL: 2.4 (ref 0–5)
LDLC SERPL CALC-MCNC: 94.6 MG/DL (ref 0–100)
TRIGL SERPL-MCNC: 92 MG/DL
VLDLC SERPL CALC-MCNC: 18.4 MG/DL

## 2025-07-14 PROCEDURE — 99213 OFFICE O/P EST LOW 20 MIN: CPT | Performed by: FAMILY MEDICINE

## 2025-07-14 PROCEDURE — G8427 DOCREV CUR MEDS BY ELIG CLIN: HCPCS | Performed by: FAMILY MEDICINE

## 2025-07-14 PROCEDURE — G8419 CALC BMI OUT NRM PARAM NOF/U: HCPCS | Performed by: FAMILY MEDICINE

## 2025-07-14 PROCEDURE — 1159F MED LIST DOCD IN RCRD: CPT | Performed by: FAMILY MEDICINE

## 2025-07-14 PROCEDURE — 1123F ACP DISCUSS/DSCN MKR DOCD: CPT | Performed by: FAMILY MEDICINE

## 2025-07-14 PROCEDURE — 1126F AMNT PAIN NOTED NONE PRSNT: CPT | Performed by: FAMILY MEDICINE

## 2025-07-14 PROCEDURE — 1036F TOBACCO NON-USER: CPT | Performed by: FAMILY MEDICINE

## 2025-07-14 PROCEDURE — 1090F PRES/ABSN URINE INCON ASSESS: CPT | Performed by: FAMILY MEDICINE

## 2025-07-14 ASSESSMENT — ENCOUNTER SYMPTOMS
CHEST TIGHTNESS: 0
STRIDOR: 0
BACK PAIN: 1
BLOOD IN STOOL: 0
SHORTNESS OF BREATH: 0
WHEEZING: 0
ANAL BLEEDING: 0
ABDOMINAL DISTENTION: 0
ABDOMINAL PAIN: 0

## 2025-07-14 ASSESSMENT — PATIENT HEALTH QUESTIONNAIRE - PHQ9
5. POOR APPETITE OR OVEREATING: NOT AT ALL
2. FEELING DOWN, DEPRESSED OR HOPELESS: NOT AT ALL
SUM OF ALL RESPONSES TO PHQ QUESTIONS 1-9: 5
SUM OF ALL RESPONSES TO PHQ QUESTIONS 1-9: 5
10. IF YOU CHECKED OFF ANY PROBLEMS, HOW DIFFICULT HAVE THESE PROBLEMS MADE IT FOR YOU TO DO YOUR WORK, TAKE CARE OF THINGS AT HOME, OR GET ALONG WITH OTHER PEOPLE: NOT DIFFICULT AT ALL
6. FEELING BAD ABOUT YOURSELF - OR THAT YOU ARE A FAILURE OR HAVE LET YOURSELF OR YOUR FAMILY DOWN: NOT AT ALL
1. LITTLE INTEREST OR PLEASURE IN DOING THINGS: MORE THAN HALF THE DAYS
9. THOUGHTS THAT YOU WOULD BE BETTER OFF DEAD, OR OF HURTING YOURSELF: NOT AT ALL
7. TROUBLE CONCENTRATING ON THINGS, SUCH AS READING THE NEWSPAPER OR WATCHING TELEVISION: NOT AT ALL
SUM OF ALL RESPONSES TO PHQ QUESTIONS 1-9: 5
SUM OF ALL RESPONSES TO PHQ QUESTIONS 1-9: 5
8. MOVING OR SPEAKING SO SLOWLY THAT OTHER PEOPLE COULD HAVE NOTICED. OR THE OPPOSITE, BEING SO FIGETY OR RESTLESS THAT YOU HAVE BEEN MOVING AROUND A LOT MORE THAN USUAL: NOT AT ALL
4. FEELING TIRED OR HAVING LITTLE ENERGY: NEARLY EVERY DAY
3. TROUBLE FALLING OR STAYING ASLEEP: NOT AT ALL

## 2025-07-14 NOTE — PROGRESS NOTES
Chief Complaint   Patient presents with    Follow-up     Patient is here today for a 3 month follow up.      \"Have you been to the ER, urgent care clinic since your last visit?  Hospitalized since your last visit?\"    NO    “Have you seen or consulted any other health care providers outside of [facility] since your last visit?”    Rheumatology            Click Here for Release of Records Request

## 2025-07-15 ENCOUNTER — RESULTS FOLLOW-UP (OUTPATIENT)
Age: 85
End: 2025-07-15